# Patient Record
Sex: MALE | Race: WHITE | NOT HISPANIC OR LATINO | Employment: OTHER | URBAN - METROPOLITAN AREA
[De-identification: names, ages, dates, MRNs, and addresses within clinical notes are randomized per-mention and may not be internally consistent; named-entity substitution may affect disease eponyms.]

---

## 2017-11-09 ENCOUNTER — APPOINTMENT (OUTPATIENT)
Dept: GENERAL RADIOLOGY | Facility: HOSPITAL | Age: 72
End: 2017-11-09

## 2017-11-09 ENCOUNTER — HOSPITAL ENCOUNTER (EMERGENCY)
Facility: HOSPITAL | Age: 72
Discharge: HOME | End: 2017-11-09
Attending: EMERGENCY MEDICINE | Admitting: EMERGENCY MEDICINE

## 2017-11-09 VITALS
TEMPERATURE: 97.1 F | WEIGHT: 210 LBS | HEIGHT: 69 IN | HEART RATE: 82 BPM | SYSTOLIC BLOOD PRESSURE: 129 MMHG | OXYGEN SATURATION: 96 % | BODY MASS INDEX: 31.1 KG/M2 | DIASTOLIC BLOOD PRESSURE: 84 MMHG | RESPIRATION RATE: 20 BRPM

## 2017-11-09 DIAGNOSIS — S39.012A STRAIN OF LUMBAR REGION, INITIAL ENCOUNTER: Primary | ICD-10-CM

## 2017-11-09 PROCEDURE — 96374 THER/PROPH/DIAG INJ IV PUSH: CPT

## 2017-11-09 PROCEDURE — 99284 EMERGENCY DEPT VISIT MOD MDM: CPT

## 2017-11-09 PROCEDURE — 25010000002 MORPHINE PER 10 MG: Performed by: NURSE PRACTITIONER

## 2017-11-09 PROCEDURE — 72110 X-RAY EXAM L-2 SPINE 4/>VWS: CPT

## 2017-11-09 PROCEDURE — 25010000002 ONDANSETRON PER 1 MG: Performed by: NURSE PRACTITIONER

## 2017-11-09 PROCEDURE — 96375 TX/PRO/DX INJ NEW DRUG ADDON: CPT

## 2017-11-09 RX ORDER — HYDROCODONE BITARTRATE AND ACETAMINOPHEN 5; 325 MG/1; MG/1
1 TABLET ORAL EVERY 4 HOURS PRN
Qty: 12 TABLET | Refills: 0 | Status: SHIPPED | OUTPATIENT
Start: 2017-11-09 | End: 2017-12-15

## 2017-11-09 RX ORDER — MELATONIN
1000 EVERY MORNING
COMMUNITY

## 2017-11-09 RX ORDER — DIAZEPAM 5 MG/1
5 TABLET ORAL ONCE
Status: COMPLETED | OUTPATIENT
Start: 2017-11-09 | End: 2017-11-09

## 2017-11-09 RX ORDER — SODIUM CHLORIDE 0.9 % (FLUSH) 0.9 %
10 SYRINGE (ML) INJECTION AS NEEDED
Status: DISCONTINUED | OUTPATIENT
Start: 2017-11-09 | End: 2017-11-09 | Stop reason: HOSPADM

## 2017-11-09 RX ORDER — CHLORAL HYDRATE 500 MG
1000 CAPSULE ORAL 2 TIMES DAILY WITH MEALS
COMMUNITY

## 2017-11-09 RX ORDER — CLOPIDOGREL BISULFATE 75 MG/1
75 TABLET ORAL EVERY MORNING
COMMUNITY

## 2017-11-09 RX ORDER — ATORVASTATIN CALCIUM 20 MG/1
20 TABLET, FILM COATED ORAL NIGHTLY
COMMUNITY

## 2017-11-09 RX ORDER — METOPROLOL TARTRATE 50 MG/1
50 TABLET, FILM COATED ORAL 2 TIMES DAILY
COMMUNITY
End: 2017-12-15 | Stop reason: SDUPTHER

## 2017-11-09 RX ORDER — BACLOFEN 10 MG/1
10 TABLET ORAL 3 TIMES DAILY PRN
Qty: 15 TABLET | Refills: 0 | Status: SHIPPED | OUTPATIENT
Start: 2017-11-09 | End: 2017-12-15

## 2017-11-09 RX ORDER — ONDANSETRON 2 MG/ML
4 INJECTION INTRAMUSCULAR; INTRAVENOUS ONCE
Status: COMPLETED | OUTPATIENT
Start: 2017-11-09 | End: 2017-11-09

## 2017-11-09 RX ORDER — RAMIPRIL 2.5 MG/1
2.5 CAPSULE ORAL EVERY MORNING
COMMUNITY
End: 2022-04-15

## 2017-11-09 RX ADMIN — MORPHINE SULFATE 4 MG: 10 INJECTION, SOLUTION INTRAMUSCULAR; INTRAVENOUS at 15:54

## 2017-11-09 RX ADMIN — ONDANSETRON 4 MG: 2 INJECTION INTRAMUSCULAR; INTRAVENOUS at 15:53

## 2017-11-09 RX ADMIN — DIAZEPAM 5 MG: 5 TABLET ORAL at 15:38

## 2017-11-09 NOTE — ED PROVIDER NOTES
EMERGENCY DEPARTMENT ENCOUNTER    CHIEF COMPLAINT  Chief Complaint: back pain  History given by: patient  History limited by: N/A  Room Number: 05/05  PMD: Rigoberto Adhikari MD      HPI:  Pt is a 72 y.o. male who presents with intermittent right lower back pain that started about 3 weeks ago and has gradually worsened. It is exacerbated by movement and bending over. He denies recent injury or trauma, pain radiation to BLE, bladder dysfunction, bowel dysfunction, sensory loss, motor loss, saddle anesthesia, pain and difficulty with urination, abd pain, N/V/D, fevers, chest pain, and trouble breathing. He reports that he was seen by PMD for this on 10/30/17 and was prescribed steroid which he has taken without significant sx relief. He also reports that his PMD is currently in the process of referring him to orthopedic surgery. Past Medical History of prostate cancer (underwent prostatectomy, currently not on any treatment), HTN, and CAD (on plavix).     Duration: started about 3 weeks ago  Timing: intermittent   Location: right lower back  Radiation: none  Quality: pain  Intensity/Severity: moderate  Progression: worsening  Associated Symptoms: none  Aggravating Factors: movement, bending over  Alleviating Factors: none  Previous Episodes: none mentioned  Treatment before arrival: Pt reports that he has taken steroid for his right lower back pain without significant sx relief.     PAST MEDICAL HISTORY  Active Ambulatory Problems     Diagnosis Date Noted   • No Active Ambulatory Problems     Resolved Ambulatory Problems     Diagnosis Date Noted   • No Resolved Ambulatory Problems     Past Medical History:   Diagnosis Date   • Cancer    • Hypertension        PAST SURGICAL HISTORY  Past Surgical History:   Procedure Laterality Date   • APPENDECTOMY     • PROSTATECTOMY         FAMILY HISTORY  History reviewed. No pertinent family history.    SOCIAL HISTORY  Social History     Social History   • Marital status:       Spouse name: N/A   • Number of children: N/A   • Years of education: N/A     Occupational History   • Not on file.     Social History Main Topics   • Smoking status: Never Smoker   • Smokeless tobacco: Never Used   • Alcohol use No   • Drug use: No   • Sexual activity: Not on file     Other Topics Concern   • Not on file     Social History Narrative   • No narrative on file         ALLERGIES  Aspirin and Belladonna alkaloids-opium    REVIEW OF SYSTEMS  Review of Systems   Constitutional: Negative for chills and fever.   HENT: Negative for sore throat.    Respiratory: Negative for shortness of breath.    Cardiovascular: Negative for chest pain.   Gastrointestinal: Negative for abdominal pain, diarrhea, nausea and vomiting.        No bowel dysfunction   Genitourinary: Negative for difficulty urinating and dysuria.        No bladder dysfunction   Musculoskeletal: Positive for back pain (right lower back pain).   Skin: Negative for rash.   Neurological: Negative for weakness and numbness.        No saddle anesthesia   Psychiatric/Behavioral: The patient is not nervous/anxious.        PHYSICAL EXAM  ED Triage Vitals   Temp Heart Rate Resp BP SpO2   11/09/17 1210 11/09/17 1210 11/09/17 1219 11/09/17 1217 11/09/17 1210   97.1 °F (36.2 °C) 79 20 147/100 95 % WNL     Physical Exam   Constitutional: He is oriented to person, place, and time and well-developed, well-nourished, and in no distress. No distress.   HENT:   Head: Atraumatic.   Mouth/Throat: Mucous membranes are normal.   Eyes: No scleral icterus.   Neck: Normal range of motion.   Cardiovascular: Normal rate, regular rhythm and normal heart sounds.    Pulses:       Dorsalis pedis pulses are 2+ on the right side, and 2+ on the left side.        Posterior tibial pulses are 2+ on the right side, and 2+ on the left side.   Pulmonary/Chest: Effort normal and breath sounds normal. No respiratory distress.   Abdominal: Soft. There is no tenderness.    Musculoskeletal:   Right paraspinal lumbar tenderness, no midline l-spine tenderness, negative straight leg raises bilaterally, NV intact distally to BLE   Neurological: He is alert and oriented to person, place, and time. He has normal motor skills and normal sensation.   Reflex Scores:       Patellar reflexes are 2+ on the right side and 2+ on the left side.  Sensation and motor function intact to BLE   Skin: Skin is warm and dry.   Psychiatric: Mood and affect normal.   Nursing note and vitals reviewed.          RADIOLOGY         XR Spine Lumbar 4+ View (Final result) Result time: 11/09/17 15:32:35     Final result by Efrain Cooper MD (11/09/17 15:32:35)     Impression:        No acute fracture is identified. Degenerative changes. For further  evaluation, if clinically indicated, MRI can be considered.     This report was finalized on 11/9/2017 3:32 PM by Dr. Efrain Cooper MD.        Narrative:     XR SPINE LUMBAR 4+ VW-     INDICATIONS:     Low back pain, no injury     TECHNIQUE: 5 VIEWS OF THE LUMBAR SPINE     COMPARISON: None available     FINDINGS:      Alignment is preserved. Multilevel endplate spurring and mid to lower  lumbar facet arthropathy are seen. Vertebral body and disc space heights  are maintained. No acute fracture is identified. Arterial calcification  is present. Small calcifications at the level of the kidneys could be  material overlying bowel or potentially nephrolithiasis.          I ordered the above noted radiological studies and reviewed the images on the PACS system.          MEDICAL RECORD REVIEW  On 10/30/17, pt was seen by Dr Adhikari for chest pain and right hip pain. At the time, right hip and pelvis xray showed no acute process.         PROGRESS AND CONSULTS  2:54 PM- Ordered morphine, valium, and zofran for pain.   4:13 PM- Family is now at pt's bedside. Rechecked pt. He states that his pain has improved after ED treatments. He is resting comfortably and is in no  acute distress. Reviewed implications of results (including l-spine xray findings (degenerative changes of l-spine, no fracture)), diagnosis, meds, responsibility to follow up, warning signs and symptoms of possible worsening, potential complications and reasons to return to ER with patient.  Discussed all results and noted any abnormalities with patient. Discussed absolute need to recheck abnormalities and condition with PMD. Informed pt that sx could possibly be due to lumbar strain for which he will be prescribed short course of pain medication and muscle relaxer. Instructed pt to discuss with PMD about possibly arranging outpatient MRI l-spine for further evaluation of right lower back pain if sx persist. Advised pt to apply heat or ice to back with gentle stretching and to perform activity as tolerated.  Discussed plan for discharge, as there is no emergent indication for admission.  Pt is agreeable and understands need for follow up and repeat testing.  Pt is aware that discharge does not mean that nothing is wrong but it indicates no emergency is present.  Pt is discharged with instructions to follow up with primary care doctor to have their blood pressure rechecked.   4:20 PM- Reviewed pt's history and workup with Dr. Wright.  At bedside evaluation, they agree with the plan of care.  5:08 PM- Per RN, pt ambulated in ED without difficulty.       DIAGNOSIS  Final diagnoses:   Strain of lumbar region, initial encounter       FOLLOW UP   Rigoberto Adhikari MD  [address] [id number] [phone]    Call in 1 day        RX     Medication List      baclofen 10 MG tablet   Commonly known as:  LIORESAL   Take 1 tablet by mouth 3 (Three) Times a Day As Needed for Muscle Spasms.       HYDROcodone-acetaminophen 5-325 MG per tablet   Commonly known as:  NORCO   Take 1 tablet by mouth Every 4 (Four) Hours As Needed for Moderate Pain .           Be report [id number] reviewed.  Risks,  "benefits, alternatives discussed with patient.  Pt consents to treatment and agrees to follow up with PMD tomorrow for further care and any other prescriptions.         COURSE & MEDICAL DECISION MAKING  Pertinent Imaging studies that were ordered and reviewed are noted above.  Results were reviewed/discussed with the patient and they were also made aware of online assess.   Pt also made aware that some labs, such as cultures, will not be resulted during ER visit and follow up with PMD is necessary.     MEDICATIONS GIVEN IN ER  Medications   sodium chloride 0.9 % flush 10 mL (not administered)   morphine injection 4 mg (4 mg Intravenous Given 11/9/17 1554)   ondansetron (ZOFRAN) injection 4 mg (4 mg Intravenous Given 11/9/17 1553)   diazePAM (VALIUM) tablet 5 mg (5 mg Oral Given 11/9/17 1538)       /84  Pulse 79  Temp 97.1 °F (36.2 °C) (Tympanic)   Resp 20  Ht 69\" (175.3 cm)  Wt 210 lb (95.3 kg)  SpO2 94%  BMI 31.01 kg/m2      I personally reviewed the past medical history, past surgical history, social history, family history, current medications and allergies as they appear in this chart.  The scribe's note accurately reflects the work and decisions made by me.     Documentation assistance provided by german Benitez for CHARLES Monroy on 11/9/2017 at 5:02 PM. Information recorded by the scribe was done at my direction and has been verified and validated by me.     Martin Benitez  11/09/17 1709       IVONNE Donovan  11/15/17 0927    "

## 2017-11-09 NOTE — ED PROVIDER NOTES
The patient presents to the ED complaining of gradually worsening lower lumbar pain onset 3 weeks ago. Per spouse, the patient could not get out of bed today or dress himself. This prompted the couple to come to the ED for further evaluation. The patient denies radiating pain, bowel or bladder issues, injury to the area, history of back issues, or any other symptoms at this time. Patient completed a round of Prednisone last week. The patient's XR Spine shows no fracture, but some degenerative changes. Advised the patient and family further imaging (MRI) is not necessary at this time, possibly in the future.     Physical exam:  Patient is nontoxic appearing.  Lungs/cardiovascular: Normal  Abdomen: Soft and nontender.  Back/extremities: No vertebral tenderness. Some R lumbar perispinal tenderness.   Neuro: Normal sensation. No saddle anesthesia.     On examination, the patient's symptoms do not suggest signs of radiculopathy. Will treat symptomatically. I agree with the plan to discharge the patient home.     I supervised care provided by the midlevel provider.  We have discussed this patient's history, physical exam, and treatment plan.  I have reviewed the note and personally saw and examined the patient and agree with the plan of care.    Documentation assistance provided by german Jones for Dr. Wright.  Information recorded by the scribe was done at my direction and has been verified and validated by me.       Sho Jones  11/09/17 1638       Robert Wright MD  11/13/17 3548

## 2017-11-09 NOTE — DISCHARGE INSTRUCTIONS
Medications as ordered  Heat or ice to back with gentle stretching  Activity as tolerated  Follow up with pmd in 5-7 days for recheck  Return to er for numbness/tingling to legs, loss of bowel/bladder function, increased pain or any new or worsening symptoms

## 2017-11-09 NOTE — ED NOTES
Pt c/o low back pain x3 weeks that is worsening. Pt seen by PCP and given prednisone that did not help. Pt denies any radiation into the lower extremities.      Rosa Villaseñor RN  11/09/17 8437

## 2017-12-15 ENCOUNTER — APPOINTMENT (OUTPATIENT)
Dept: PREADMISSION TESTING | Facility: HOSPITAL | Age: 72
End: 2017-12-15

## 2017-12-15 VITALS
TEMPERATURE: 98.4 F | DIASTOLIC BLOOD PRESSURE: 84 MMHG | OXYGEN SATURATION: 100 % | WEIGHT: 206.8 LBS | HEIGHT: 69 IN | SYSTOLIC BLOOD PRESSURE: 154 MMHG | HEART RATE: 66 BPM | BODY MASS INDEX: 30.63 KG/M2 | RESPIRATION RATE: 16 BRPM

## 2017-12-15 LAB
ANION GAP SERPL CALCULATED.3IONS-SCNC: 11.8 MMOL/L
BACTERIA UR QL AUTO: ABNORMAL /HPF
BILIRUB UR QL STRIP: NEGATIVE
BUN BLD-MCNC: 17 MG/DL (ref 8–23)
BUN/CREAT SERPL: 17.7 (ref 7–25)
CALCIUM SPEC-SCNC: 9.7 MG/DL (ref 8.6–10.5)
CHLORIDE SERPL-SCNC: 104 MMOL/L (ref 98–107)
CLARITY UR: CLEAR
CO2 SERPL-SCNC: 29.2 MMOL/L (ref 22–29)
COLOR UR: YELLOW
CREAT BLD-MCNC: 0.96 MG/DL (ref 0.76–1.27)
DEPRECATED RDW RBC AUTO: 43 FL (ref 37–54)
ERYTHROCYTE [DISTWIDTH] IN BLOOD BY AUTOMATED COUNT: 12.4 % (ref 11.5–14.5)
GFR SERPL CREATININE-BSD FRML MDRD: 77 ML/MIN/1.73
GLUCOSE BLD-MCNC: 100 MG/DL (ref 65–99)
GLUCOSE UR STRIP-MCNC: NEGATIVE MG/DL
HCT VFR BLD AUTO: 39.1 % (ref 40.4–52.2)
HGB BLD-MCNC: 13 G/DL (ref 13.7–17.6)
HGB UR QL STRIP.AUTO: ABNORMAL
HYALINE CASTS UR QL AUTO: ABNORMAL /LPF
KETONES UR QL STRIP: NEGATIVE
LEUKOCYTE ESTERASE UR QL STRIP.AUTO: NEGATIVE
MCH RBC QN AUTO: 31.9 PG (ref 27–32.7)
MCHC RBC AUTO-ENTMCNC: 33.2 G/DL (ref 32.6–36.4)
MCV RBC AUTO: 96.1 FL (ref 79.8–96.2)
NITRITE UR QL STRIP: NEGATIVE
PH UR STRIP.AUTO: 6.5 [PH] (ref 5–8)
PLATELET # BLD AUTO: 144 10*3/MM3 (ref 140–500)
PMV BLD AUTO: 9.8 FL (ref 6–12)
POTASSIUM BLD-SCNC: 4.3 MMOL/L (ref 3.5–5.2)
PROT UR QL STRIP: NEGATIVE
RBC # BLD AUTO: 4.07 10*6/MM3 (ref 4.6–6)
RBC # UR: ABNORMAL /HPF
REF LAB TEST METHOD: ABNORMAL
SODIUM BLD-SCNC: 145 MMOL/L (ref 136–145)
SP GR UR STRIP: 1.02 (ref 1–1.03)
SQUAMOUS #/AREA URNS HPF: ABNORMAL /HPF
UROBILINOGEN UR QL STRIP: ABNORMAL
WBC NRBC COR # BLD: 5.59 10*3/MM3 (ref 4.5–10.7)
WBC UR QL AUTO: ABNORMAL /HPF

## 2017-12-15 PROCEDURE — 81001 URINALYSIS AUTO W/SCOPE: CPT | Performed by: UROLOGY

## 2017-12-15 PROCEDURE — 36415 COLL VENOUS BLD VENIPUNCTURE: CPT

## 2017-12-15 PROCEDURE — 80048 BASIC METABOLIC PNL TOTAL CA: CPT | Performed by: UROLOGY

## 2017-12-15 PROCEDURE — 85027 COMPLETE CBC AUTOMATED: CPT | Performed by: UROLOGY

## 2017-12-15 RX ORDER — METOPROLOL SUCCINATE 50 MG/1
50 TABLET, EXTENDED RELEASE ORAL NIGHTLY
COMMUNITY
End: 2021-12-27 | Stop reason: SDUPTHER

## 2017-12-15 NOTE — DISCHARGE INSTRUCTIONS
PLEASE ARRIVE AT 11:00 AM ON 12/29/2017        Take the following medications the morning of surgery with a small sip of water:        General Instructions:  • Do not eat or drink anything after midnight the night before surgery.  • Infants may have breast milk up to four hours before surgery.  • Infants drinking formula may drink formula up to six hours before surgery.   • Patients who avoid smoking, chewing tobacco and alcohol for 4 weeks prior to surgery have a reduced risk of post-operative complications.  Quit smoking as many days before surgery as you can.  • Do not smoke, use chewing tobacco or drink alcohol the day of surgery.   • If applicable bring your C-PAP/ BI-PAP machine.  • Bring any papers given to you in the doctor’s office.  • Wear clean comfortable clothes and socks.  • Do not wear contact lenses or make-up.  Bring a case for your glasses.   • Bring crutches or walker if applicable.  • Remove all piercings.  Leave jewelry and any other valuables at home.  • The Pre-Admission Testing nurse will instruct you to bring medications if unable to obtain an accurate list in Pre-Admission Testing.            Preventing a Surgical Site Infection:  • For 2 to 3 days before surgery, avoid shaving with a razor because the razor can irritate skin and make it easier to develop an infection.  • The night prior to surgery sleep in a clean bed with clean clothing.  Do not allow pets to sleep with you.  • Shower on the morning of surgery using a fresh bar of anti-bacterial soap (such as Dial) and clean washcloth.  Dry with a clean towel and dress in clean clothing.  • Ask your surgeon if you will be receiving antibiotics prior to surgery.  • Make sure you, your family, and all healthcare providers clean their hands with soap and water or an alcohol based hand  before caring for you or your wound.    Day of surgery:  Upon arrival, a Pre-op nurse and Anesthesiologist will review your health history, obtain  vital signs, and answer questions you may have.  The only belongings needed at this time will be your home medications and if applicable your C-PAP/BI-PAP machine.  If you are staying overnight your family can leave the rest of your belongings in the car and bring them to your room later.  A Pre-op nurse will start an IV and you may receive medication in preparation for surgery, including something to help you relax.  Your family will be able to see you in the Pre-op area.  While you are in surgery your family should notify the waiting room  if they leave the waiting room area and provide a contact phone number.    Please be aware that surgery does come with discomfort.  We want to make every effort to control your discomfort so please discuss any uncontrolled symptoms with your nurse.   Your doctor will most likely have prescribed pain medications.      If you are going home after surgery you will receive individualized written care instructions before being discharged.  A responsible adult must drive you to and from the hospital on the day of your surgery and stay with you for 24 hours.    If you are staying overnight following surgery, you will be transported to your hospital room following the recovery period.  [facility] has all private rooms.    If you have any questions please call Pre-Admission Testing at [phone].  Deductibles and co-payments are collected on the day of service. Please be prepared to pay the required co-pay, deductible or deposit on the day of service as defined by your plan.

## 2017-12-29 ENCOUNTER — ANESTHESIA (OUTPATIENT)
Dept: PERIOP | Facility: HOSPITAL | Age: 72
End: 2017-12-29

## 2017-12-29 ENCOUNTER — HOSPITAL ENCOUNTER (OUTPATIENT)
Facility: HOSPITAL | Age: 72
Setting detail: HOSPITAL OUTPATIENT SURGERY
Discharge: HOME | End: 2017-12-29
Attending: UROLOGY | Admitting: UROLOGY

## 2017-12-29 ENCOUNTER — ANESTHESIA EVENT (OUTPATIENT)
Dept: PERIOP | Facility: HOSPITAL | Age: 72
End: 2017-12-29

## 2017-12-29 VITALS
TEMPERATURE: 98 F | RESPIRATION RATE: 16 BRPM | SYSTOLIC BLOOD PRESSURE: 143 MMHG | DIASTOLIC BLOOD PRESSURE: 83 MMHG | HEART RATE: 68 BPM | OXYGEN SATURATION: 97 %

## 2017-12-29 DIAGNOSIS — N20.1 URETERAL CALCULUS: Primary | ICD-10-CM

## 2017-12-29 PROCEDURE — 25010000002 DEXAMETHASONE PER 1 MG: Performed by: NURSE ANESTHETIST, CERTIFIED REGISTERED

## 2017-12-29 PROCEDURE — 93010 ELECTROCARDIOGRAM REPORT: CPT | Performed by: INTERNAL MEDICINE

## 2017-12-29 PROCEDURE — C2617 STENT, NON-COR, TEM W/O DEL: HCPCS | Performed by: UROLOGY

## 2017-12-29 PROCEDURE — 25010000002 PROPOFOL 10 MG/ML EMULSION: Performed by: NURSE ANESTHETIST, CERTIFIED REGISTERED

## 2017-12-29 PROCEDURE — 25010000002 FENTANYL CITRATE (PF) 100 MCG/2ML SOLUTION: Performed by: NURSE ANESTHETIST, CERTIFIED REGISTERED

## 2017-12-29 PROCEDURE — 25010000003 CEFAZOLIN IN DEXTROSE 2-4 GM/100ML-% SOLUTION: Performed by: UROLOGY

## 2017-12-29 PROCEDURE — C1769 GUIDE WIRE: HCPCS | Performed by: UROLOGY

## 2017-12-29 PROCEDURE — 25010000002 ONDANSETRON PER 1 MG: Performed by: ANESTHESIOLOGY

## 2017-12-29 PROCEDURE — C1758 CATHETER, URETERAL: HCPCS | Performed by: UROLOGY

## 2017-12-29 PROCEDURE — 93005 ELECTROCARDIOGRAM TRACING: CPT | Performed by: ANESTHESIOLOGY

## 2017-12-29 PROCEDURE — 0 IOPAMIDOL PER 1 ML: Performed by: UROLOGY

## 2017-12-29 PROCEDURE — 25010000002 MIDAZOLAM PER 1 MG: Performed by: ANESTHESIOLOGY

## 2017-12-29 DEVICE — URETERAL STENT
Type: IMPLANTABLE DEVICE | Site: URETER | Status: FUNCTIONAL
Brand: POLARIS™ ULTRA

## 2017-12-29 RX ORDER — DEXAMETHASONE SODIUM PHOSPHATE 10 MG/ML
INJECTION INTRAMUSCULAR; INTRAVENOUS AS NEEDED
Status: DISCONTINUED | OUTPATIENT
Start: 2017-12-29 | End: 2017-12-29 | Stop reason: SURG

## 2017-12-29 RX ORDER — PROMETHAZINE HYDROCHLORIDE 25 MG/ML
12.5 INJECTION, SOLUTION INTRAMUSCULAR; INTRAVENOUS ONCE AS NEEDED
Status: DISCONTINUED | OUTPATIENT
Start: 2017-12-29 | End: 2017-12-29 | Stop reason: HOSPADM

## 2017-12-29 RX ORDER — CEFAZOLIN SODIUM 2 G/100ML
2 INJECTION, SOLUTION INTRAVENOUS ONCE
Status: COMPLETED | OUTPATIENT
Start: 2017-12-29 | End: 2017-12-29

## 2017-12-29 RX ORDER — OXYCODONE AND ACETAMINOPHEN 7.5; 325 MG/1; MG/1
1 TABLET ORAL ONCE AS NEEDED
Status: DISCONTINUED | OUTPATIENT
Start: 2017-12-29 | End: 2017-12-29 | Stop reason: HOSPADM

## 2017-12-29 RX ORDER — EPHEDRINE SULFATE 50 MG/ML
5 INJECTION, SOLUTION INTRAVENOUS ONCE AS NEEDED
Status: DISCONTINUED | OUTPATIENT
Start: 2017-12-29 | End: 2017-12-29 | Stop reason: HOSPADM

## 2017-12-29 RX ORDER — LABETALOL HYDROCHLORIDE 5 MG/ML
5 INJECTION, SOLUTION INTRAVENOUS
Status: DISCONTINUED | OUTPATIENT
Start: 2017-12-29 | End: 2017-12-29 | Stop reason: HOSPADM

## 2017-12-29 RX ORDER — PROMETHAZINE HYDROCHLORIDE 25 MG/1
25 TABLET ORAL ONCE AS NEEDED
Status: DISCONTINUED | OUTPATIENT
Start: 2017-12-29 | End: 2017-12-29 | Stop reason: HOSPADM

## 2017-12-29 RX ORDER — PROMETHAZINE HYDROCHLORIDE 25 MG/1
25 SUPPOSITORY RECTAL ONCE AS NEEDED
Status: DISCONTINUED | OUTPATIENT
Start: 2017-12-29 | End: 2017-12-29 | Stop reason: HOSPADM

## 2017-12-29 RX ORDER — HYDROCODONE BITARTRATE AND ACETAMINOPHEN 5; 325 MG/1; MG/1
1 TABLET ORAL EVERY 4 HOURS PRN
Qty: 30 TABLET | Refills: 0 | Status: SHIPPED | OUTPATIENT
Start: 2017-12-29 | End: 2018-12-26

## 2017-12-29 RX ORDER — PROPOFOL 10 MG/ML
VIAL (ML) INTRAVENOUS AS NEEDED
Status: DISCONTINUED | OUTPATIENT
Start: 2017-12-29 | End: 2017-12-29 | Stop reason: SURG

## 2017-12-29 RX ORDER — HYDRALAZINE HYDROCHLORIDE 20 MG/ML
5 INJECTION INTRAMUSCULAR; INTRAVENOUS
Status: DISCONTINUED | OUTPATIENT
Start: 2017-12-29 | End: 2017-12-29 | Stop reason: HOSPADM

## 2017-12-29 RX ORDER — EPHEDRINE SULFATE 50 MG/ML
INJECTION, SOLUTION INTRAVENOUS AS NEEDED
Status: DISCONTINUED | OUTPATIENT
Start: 2017-12-29 | End: 2017-12-29 | Stop reason: SURG

## 2017-12-29 RX ORDER — DIPHENHYDRAMINE HYDROCHLORIDE 50 MG/ML
12.5 INJECTION INTRAMUSCULAR; INTRAVENOUS
Status: DISCONTINUED | OUTPATIENT
Start: 2017-12-29 | End: 2017-12-29 | Stop reason: HOSPADM

## 2017-12-29 RX ORDER — ONDANSETRON 2 MG/ML
4 INJECTION INTRAMUSCULAR; INTRAVENOUS ONCE AS NEEDED
Status: COMPLETED | OUTPATIENT
Start: 2017-12-29 | End: 2017-12-29

## 2017-12-29 RX ORDER — FENTANYL CITRATE 50 UG/ML
50 INJECTION, SOLUTION INTRAMUSCULAR; INTRAVENOUS
Status: DISCONTINUED | OUTPATIENT
Start: 2017-12-29 | End: 2017-12-29 | Stop reason: HOSPADM

## 2017-12-29 RX ORDER — NALOXONE HCL 0.4 MG/ML
0.2 VIAL (ML) INJECTION AS NEEDED
Status: DISCONTINUED | OUTPATIENT
Start: 2017-12-29 | End: 2017-12-29 | Stop reason: HOSPADM

## 2017-12-29 RX ORDER — LIDOCAINE HYDROCHLORIDE 20 MG/ML
INJECTION, SOLUTION INFILTRATION; PERINEURAL AS NEEDED
Status: DISCONTINUED | OUTPATIENT
Start: 2017-12-29 | End: 2017-12-29 | Stop reason: SURG

## 2017-12-29 RX ORDER — MAGNESIUM HYDROXIDE 1200 MG/15ML
LIQUID ORAL AS NEEDED
Status: DISCONTINUED | OUTPATIENT
Start: 2017-12-29 | End: 2017-12-29 | Stop reason: HOSPADM

## 2017-12-29 RX ORDER — SODIUM CHLORIDE 0.9 % (FLUSH) 0.9 %
1-10 SYRINGE (ML) INJECTION AS NEEDED
Status: DISCONTINUED | OUTPATIENT
Start: 2017-12-29 | End: 2017-12-29 | Stop reason: HOSPADM

## 2017-12-29 RX ORDER — FAMOTIDINE 10 MG/ML
20 INJECTION, SOLUTION INTRAVENOUS ONCE
Status: COMPLETED | OUTPATIENT
Start: 2017-12-29 | End: 2017-12-29

## 2017-12-29 RX ORDER — PROMETHAZINE HYDROCHLORIDE 25 MG/1
12.5 TABLET ORAL ONCE AS NEEDED
Status: DISCONTINUED | OUTPATIENT
Start: 2017-12-29 | End: 2017-12-29 | Stop reason: HOSPADM

## 2017-12-29 RX ORDER — MIDAZOLAM HYDROCHLORIDE 1 MG/ML
2 INJECTION INTRAMUSCULAR; INTRAVENOUS
Status: DISCONTINUED | OUTPATIENT
Start: 2017-12-29 | End: 2017-12-29 | Stop reason: HOSPADM

## 2017-12-29 RX ORDER — SODIUM CHLORIDE, SODIUM LACTATE, POTASSIUM CHLORIDE, CALCIUM CHLORIDE 600; 310; 30; 20 MG/100ML; MG/100ML; MG/100ML; MG/100ML
9 INJECTION, SOLUTION INTRAVENOUS CONTINUOUS
Status: DISCONTINUED | OUTPATIENT
Start: 2017-12-29 | End: 2017-12-29 | Stop reason: HOSPADM

## 2017-12-29 RX ORDER — HYDROCODONE BITARTRATE AND ACETAMINOPHEN 7.5; 325 MG/1; MG/1
1 TABLET ORAL ONCE AS NEEDED
Status: COMPLETED | OUTPATIENT
Start: 2017-12-29 | End: 2017-12-29

## 2017-12-29 RX ORDER — MIDAZOLAM HYDROCHLORIDE 1 MG/ML
1 INJECTION INTRAMUSCULAR; INTRAVENOUS
Status: DISCONTINUED | OUTPATIENT
Start: 2017-12-29 | End: 2017-12-29 | Stop reason: HOSPADM

## 2017-12-29 RX ORDER — FLUMAZENIL 0.1 MG/ML
0.2 INJECTION INTRAVENOUS AS NEEDED
Status: DISCONTINUED | OUTPATIENT
Start: 2017-12-29 | End: 2017-12-29 | Stop reason: HOSPADM

## 2017-12-29 RX ORDER — FENTANYL CITRATE 50 UG/ML
INJECTION, SOLUTION INTRAMUSCULAR; INTRAVENOUS AS NEEDED
Status: DISCONTINUED | OUTPATIENT
Start: 2017-12-29 | End: 2017-12-29 | Stop reason: SURG

## 2017-12-29 RX ADMIN — FENTANYL CITRATE 25 MCG: 50 INJECTION INTRAMUSCULAR; INTRAVENOUS at 12:47

## 2017-12-29 RX ADMIN — DEXAMETHASONE SODIUM PHOSPHATE 4 MG: 10 INJECTION INTRAMUSCULAR; INTRAVENOUS at 13:30

## 2017-12-29 RX ADMIN — FAMOTIDINE 20 MG: 10 INJECTION, SOLUTION INTRAVENOUS at 12:24

## 2017-12-29 RX ADMIN — LIDOCAINE HYDROCHLORIDE 60 MG: 20 INJECTION, SOLUTION INFILTRATION; PERINEURAL at 12:42

## 2017-12-29 RX ADMIN — PROPOFOL 150 MG: 10 INJECTION, EMULSION INTRAVENOUS at 12:42

## 2017-12-29 RX ADMIN — SODIUM CHLORIDE, POTASSIUM CHLORIDE, SODIUM LACTATE AND CALCIUM CHLORIDE 9 ML/HR: 600; 310; 30; 20 INJECTION, SOLUTION INTRAVENOUS at 11:00

## 2017-12-29 RX ADMIN — ONDANSETRON HYDROCHLORIDE 4 MG: 2 SOLUTION INTRAMUSCULAR; INTRAVENOUS at 13:30

## 2017-12-29 RX ADMIN — Medication 2 MG: at 12:23

## 2017-12-29 RX ADMIN — HYDROCODONE BITARTRATE AND ACETAMINOPHEN 1 TABLET: 7.5; 325 TABLET ORAL at 14:24

## 2017-12-29 RX ADMIN — EPHEDRINE SULFATE 10 MG: 50 INJECTION INTRAMUSCULAR; INTRAVENOUS; SUBCUTANEOUS at 12:59

## 2017-12-29 RX ADMIN — CEFAZOLIN SODIUM 2 G: 2 INJECTION, SOLUTION INTRAVENOUS at 12:42

## 2017-12-29 RX ADMIN — SODIUM CHLORIDE, POTASSIUM CHLORIDE, SODIUM LACTATE AND CALCIUM CHLORIDE: 600; 310; 30; 20 INJECTION, SOLUTION INTRAVENOUS at 12:47

## 2017-12-29 RX ADMIN — FENTANYL CITRATE 25 MCG: 50 INJECTION INTRAMUSCULAR; INTRAVENOUS at 13:05

## 2017-12-29 NOTE — ANESTHESIA PREPROCEDURE EVALUATION
Anesthesia Evaluation     Patient summary reviewed   no history of anesthetic complications:  NPO Solid Status: > 8 hours  NPO Liquid Status: > 8 hours     Airway   Mallampati: I  TM distance: >3 FB  Neck ROM: full  no difficulty expected  Dental      Pulmonary     breath sounds clear to auscultation  (-) shortness of breath, not a smoker  Cardiovascular     Rhythm: regular  Rate: normal    (+) hypertension, angina (Stable angina x 15 yrs. with significant exertion. Followed by cardiology. ) with exertion, hyperlipidemia      Neuro/Psych  GI/Hepatic/Renal/Endo      Musculoskeletal     Abdominal    Substance History      OB/GYN          Other      history of cancer                                          Anesthesia Plan    ASA 3     general     intravenous induction   Anesthetic plan and risks discussed with patient.

## 2017-12-29 NOTE — ANESTHESIA POSTPROCEDURE EVALUATION
Patient: Loni Sanford    Procedure Summary     Date Anesthesia Start Anesthesia Stop Room / Location    12/29/17 1240 1349 [facility] OR [facility] / [facility]       Procedure Diagnosis Surgeon Provider    RIGHT EXTRACORPOREAL SHOCKWAVE LITHOTRIPSY CYSTOSCOPY STENT PLACEMENT (Right ) No diagnosis on file. MD Usama Gordon MD          Anesthesia Type: general  Last vitals  BP   143/83 (12/29/17 1530)   Temp   36.7 °C (98 °F) (12/29/17 1445)   Pulse   68 (12/29/17 1530)   Resp   16 (12/29/17 1530)     SpO2   97 % (12/29/17 1530)     Post Anesthesia Care and Evaluation    Patient location during evaluation: PHASE II  Patient participation: complete - patient participated  Level of consciousness: awake and alert  Pain management: adequate  Airway patency: patent  Anesthetic complications: No anesthetic complications    Cardiovascular status: acceptable  Respiratory status: acceptable  Hydration status: acceptable    Comments: /83  Pulse 68  Temp 36.7 °C (98 °F)  Resp 16  SpO2 97%

## 2017-12-29 NOTE — ANESTHESIA PROCEDURE NOTES
Airway  Urgency: elective    Date/Time: 12/29/2017 12:45 PM  Airway not difficult    General Information and Staff    Patient location during procedure: OR  Anesthesiologist: REYNALDO RUIZ  CRNA: MELVIN MADRID    Indications and Patient Condition  Indications for airway management: airway protection    Preoxygenated: yes  MILS not maintained throughout  Mask difficulty assessment: 1 - vent by mask    Final Airway Details  Final airway type: supraglottic airway      Successful airway: classic  Size 5    Number of attempts at approach: 1    Additional Comments  Preoxygenation FEO2 >85, SIVI, LMA placed with ease, teeth/lips as preop. Secured and placement confirmed.

## 2018-01-11 ENCOUNTER — TRANSCRIBE ORDERS (OUTPATIENT)
Dept: ADMINISTRATIVE | Facility: HOSPITAL | Age: 73
End: 2018-01-11

## 2018-01-11 DIAGNOSIS — N20.0 KIDNEY STONE: Primary | ICD-10-CM

## 2018-01-22 ENCOUNTER — HOSPITAL ENCOUNTER (OUTPATIENT)
Dept: ULTRASOUND IMAGING | Facility: HOSPITAL | Age: 73
Discharge: HOME | End: 2018-01-22
Attending: UROLOGY | Admitting: UROLOGY

## 2018-01-22 DIAGNOSIS — N20.0 KIDNEY STONE: ICD-10-CM

## 2018-01-22 PROCEDURE — 76775 US EXAM ABDO BACK WALL LIM: CPT

## 2018-03-29 ENCOUNTER — APPOINTMENT (OUTPATIENT)
Dept: PREADMISSION TESTING | Facility: HOSPITAL | Age: 73
End: 2018-03-29

## 2018-03-29 VITALS
DIASTOLIC BLOOD PRESSURE: 90 MMHG | TEMPERATURE: 98.7 F | BODY MASS INDEX: 31.1 KG/M2 | SYSTOLIC BLOOD PRESSURE: 146 MMHG | HEART RATE: 66 BPM | RESPIRATION RATE: 20 BRPM | HEIGHT: 69 IN | WEIGHT: 210 LBS | OXYGEN SATURATION: 100 %

## 2018-03-29 LAB
ANION GAP SERPL CALCULATED.3IONS-SCNC: 9.9 MMOL/L
BUN BLD-MCNC: 23 MG/DL (ref 8–23)
BUN/CREAT SERPL: 18.3 (ref 7–25)
CALCIUM SPEC-SCNC: 9.3 MG/DL (ref 8.6–10.5)
CHLORIDE SERPL-SCNC: 104 MMOL/L (ref 98–107)
CO2 SERPL-SCNC: 29.1 MMOL/L (ref 22–29)
CREAT BLD-MCNC: 1.26 MG/DL (ref 0.76–1.27)
DEPRECATED RDW RBC AUTO: 43.1 FL (ref 37–54)
ERYTHROCYTE [DISTWIDTH] IN BLOOD BY AUTOMATED COUNT: 12.5 % (ref 11.5–14.5)
GFR SERPL CREATININE-BSD FRML MDRD: 56 ML/MIN/1.73
GLUCOSE BLD-MCNC: 102 MG/DL (ref 65–99)
HCT VFR BLD AUTO: 37 % (ref 40.4–52.2)
HGB BLD-MCNC: 12.2 G/DL (ref 13.7–17.6)
MCH RBC QN AUTO: 31.4 PG (ref 27–32.7)
MCHC RBC AUTO-ENTMCNC: 33 G/DL (ref 32.6–36.4)
MCV RBC AUTO: 95.4 FL (ref 79.8–96.2)
PLATELET # BLD AUTO: 160 10*3/MM3 (ref 140–500)
PMV BLD AUTO: 9.8 FL (ref 6–12)
POTASSIUM BLD-SCNC: 4.2 MMOL/L (ref 3.5–5.2)
RBC # BLD AUTO: 3.88 10*6/MM3 (ref 4.6–6)
SODIUM BLD-SCNC: 143 MMOL/L (ref 136–145)
WBC NRBC COR # BLD: 5.29 10*3/MM3 (ref 4.5–10.7)

## 2018-03-29 PROCEDURE — 80048 BASIC METABOLIC PNL TOTAL CA: CPT | Performed by: UROLOGY

## 2018-03-29 PROCEDURE — 85027 COMPLETE CBC AUTOMATED: CPT | Performed by: UROLOGY

## 2018-03-29 PROCEDURE — 36415 COLL VENOUS BLD VENIPUNCTURE: CPT

## 2018-04-04 ENCOUNTER — ANESTHESIA EVENT (OUTPATIENT)
Dept: PERIOP | Facility: HOSPITAL | Age: 73
End: 2018-04-04

## 2018-04-05 ENCOUNTER — ANESTHESIA (OUTPATIENT)
Dept: PERIOP | Facility: HOSPITAL | Age: 73
End: 2018-04-05

## 2018-04-05 ENCOUNTER — HOSPITAL ENCOUNTER (INPATIENT)
Facility: HOSPITAL | Age: 73
LOS: 4 days | Discharge: HOME | End: 2018-04-09
Attending: UROLOGY | Admitting: UROLOGY

## 2018-04-05 DIAGNOSIS — N28.89 RENAL MASS, RIGHT: ICD-10-CM

## 2018-04-05 LAB
ABO GROUP BLD: NORMAL
BLD GP AB SCN SERPL QL: NEGATIVE
RH BLD: POSITIVE
T&S EXPIRATION DATE: NORMAL

## 2018-04-05 PROCEDURE — 82947 ASSAY GLUCOSE BLOOD QUANT: CPT

## 2018-04-05 PROCEDURE — 82803 BLOOD GASES ANY COMBINATION: CPT

## 2018-04-05 PROCEDURE — 25010000002 PROPOFOL 10 MG/ML EMULSION: Performed by: NURSE ANESTHETIST, CERTIFIED REGISTERED

## 2018-04-05 PROCEDURE — 86900 BLOOD TYPING SEROLOGIC ABO: CPT

## 2018-04-05 PROCEDURE — 25010000002 HYDROMORPHONE HCL PF 500 MG/50ML SOLUTION: Performed by: UROLOGY

## 2018-04-05 PROCEDURE — 25010000002 MANNITOL PER 50 ML: Performed by: NURSE ANESTHETIST, CERTIFIED REGISTERED

## 2018-04-05 PROCEDURE — 88307 TISSUE EXAM BY PATHOLOGIST: CPT | Performed by: UROLOGY

## 2018-04-05 PROCEDURE — 25010000002 FENTANYL CITRATE (PF) 100 MCG/2ML SOLUTION: Performed by: NURSE ANESTHETIST, CERTIFIED REGISTERED

## 2018-04-05 PROCEDURE — 86901 BLOOD TYPING SEROLOGIC RH(D): CPT | Performed by: ANESTHESIOLOGY

## 2018-04-05 PROCEDURE — 25010000002 MIDAZOLAM PER 1 MG

## 2018-04-05 PROCEDURE — 86920 COMPATIBILITY TEST SPIN: CPT

## 2018-04-05 PROCEDURE — 25010000002 ONDANSETRON PER 1 MG: Performed by: NURSE ANESTHETIST, CERTIFIED REGISTERED

## 2018-04-05 PROCEDURE — 25010000002 ALBUMIN HUMAN 5% PER 50 ML: Performed by: NURSE ANESTHETIST, CERTIFIED REGISTERED

## 2018-04-05 PROCEDURE — 36430 TRANSFUSION BLD/BLD COMPNT: CPT

## 2018-04-05 PROCEDURE — 25010000002 HYDROMORPHONE PER 4 MG: Performed by: NURSE ANESTHETIST, CERTIFIED REGISTERED

## 2018-04-05 PROCEDURE — 86901 BLOOD TYPING SEROLOGIC RH(D): CPT

## 2018-04-05 PROCEDURE — 25010000003 CEFAZOLIN IN DEXTROSE 2-4 GM/100ML-% SOLUTION: Performed by: UROLOGY

## 2018-04-05 PROCEDURE — P9041 ALBUMIN (HUMAN),5%, 50ML: HCPCS | Performed by: NURSE ANESTHETIST, CERTIFIED REGISTERED

## 2018-04-05 PROCEDURE — 25010000002 FUROSEMIDE PER 20 MG: Performed by: NURSE ANESTHETIST, CERTIFIED REGISTERED

## 2018-04-05 PROCEDURE — 86850 RBC ANTIBODY SCREEN: CPT | Performed by: ANESTHESIOLOGY

## 2018-04-05 PROCEDURE — 25010000002 DEXAMETHASONE PER 1 MG: Performed by: NURSE ANESTHETIST, CERTIFIED REGISTERED

## 2018-04-05 PROCEDURE — 94799 UNLISTED PULMONARY SVC/PX: CPT

## 2018-04-05 PROCEDURE — 86900 BLOOD TYPING SEROLOGIC ABO: CPT | Performed by: ANESTHESIOLOGY

## 2018-04-05 PROCEDURE — 0TB00ZZ EXCISION OF RIGHT KIDNEY, OPEN APPROACH: ICD-10-PCS | Performed by: UROLOGY

## 2018-04-05 PROCEDURE — 85018 HEMOGLOBIN: CPT

## 2018-04-05 PROCEDURE — 85014 HEMATOCRIT: CPT

## 2018-04-05 PROCEDURE — 25010000003 CEFAZOLIN 1 GM/50ML SOLUTION: Performed by: UROLOGY

## 2018-04-05 PROCEDURE — P9016 RBC LEUKOCYTES REDUCED: HCPCS

## 2018-04-05 RX ORDER — ONDANSETRON 2 MG/ML
INJECTION INTRAMUSCULAR; INTRAVENOUS AS NEEDED
Status: DISCONTINUED | OUTPATIENT
Start: 2018-04-05 | End: 2018-04-05 | Stop reason: SURG

## 2018-04-05 RX ORDER — EPHEDRINE SULFATE 50 MG/ML
INJECTION, SOLUTION INTRAVENOUS AS NEEDED
Status: DISCONTINUED | OUTPATIENT
Start: 2018-04-05 | End: 2018-04-05 | Stop reason: SURG

## 2018-04-05 RX ORDER — CEFAZOLIN SODIUM 2 G/100ML
2 INJECTION, SOLUTION INTRAVENOUS EVERY 8 HOURS
Status: COMPLETED | OUTPATIENT
Start: 2018-04-05 | End: 2018-04-06

## 2018-04-05 RX ORDER — HYDROMORPHONE HCL 110MG/55ML
0.5 PATIENT CONTROLLED ANALGESIA SYRINGE INTRAVENOUS
Status: DISCONTINUED | OUTPATIENT
Start: 2018-04-05 | End: 2018-04-05 | Stop reason: SDUPTHER

## 2018-04-05 RX ORDER — BUPIVACAINE HYDROCHLORIDE 2.5 MG/ML
INJECTION, SOLUTION EPIDURAL; INFILTRATION; INTRACAUDAL AS NEEDED
Status: DISCONTINUED | OUTPATIENT
Start: 2018-04-05 | End: 2018-04-05 | Stop reason: HOSPADM

## 2018-04-05 RX ORDER — DOCUSATE SODIUM 100 MG/1
100 CAPSULE, LIQUID FILLED ORAL 2 TIMES DAILY PRN
Status: DISCONTINUED | OUTPATIENT
Start: 2018-04-05 | End: 2018-04-09 | Stop reason: HOSPADM

## 2018-04-05 RX ORDER — LIDOCAINE HYDROCHLORIDE 10 MG/ML
0.5 INJECTION, SOLUTION EPIDURAL; INFILTRATION; INTRACAUDAL; PERINEURAL ONCE AS NEEDED
Status: DISCONTINUED | OUTPATIENT
Start: 2018-04-05 | End: 2018-04-05 | Stop reason: HOSPADM

## 2018-04-05 RX ORDER — ONDANSETRON 4 MG/1
4 TABLET, ORALLY DISINTEGRATING ORAL EVERY 6 HOURS PRN
Status: DISCONTINUED | OUTPATIENT
Start: 2018-04-05 | End: 2018-04-09 | Stop reason: HOSPADM

## 2018-04-05 RX ORDER — ROCURONIUM BROMIDE 10 MG/ML
INJECTION, SOLUTION INTRAVENOUS AS NEEDED
Status: DISCONTINUED | OUTPATIENT
Start: 2018-04-05 | End: 2018-04-05 | Stop reason: SURG

## 2018-04-05 RX ORDER — HYDROMORPHONE HCL 110MG/55ML
0.5 PATIENT CONTROLLED ANALGESIA SYRINGE INTRAVENOUS
Status: DISCONTINUED | OUTPATIENT
Start: 2018-04-05 | End: 2018-04-05

## 2018-04-05 RX ORDER — EPHEDRINE SULFATE 50 MG/ML
5 INJECTION, SOLUTION INTRAVENOUS ONCE AS NEEDED
Status: DISCONTINUED | OUTPATIENT
Start: 2018-04-05 | End: 2018-04-05

## 2018-04-05 RX ORDER — PROMETHAZINE HYDROCHLORIDE 25 MG/ML
12.5 INJECTION, SOLUTION INTRAMUSCULAR; INTRAVENOUS ONCE AS NEEDED
Status: DISCONTINUED | OUTPATIENT
Start: 2018-04-05 | End: 2018-04-05

## 2018-04-05 RX ORDER — PROPOFOL 10 MG/ML
VIAL (ML) INTRAVENOUS AS NEEDED
Status: DISCONTINUED | OUTPATIENT
Start: 2018-04-05 | End: 2018-04-05 | Stop reason: SURG

## 2018-04-05 RX ORDER — MANNITOL 250 MG/ML
INJECTION, SOLUTION INTRAVENOUS AS NEEDED
Status: DISCONTINUED | OUTPATIENT
Start: 2018-04-05 | End: 2018-04-05 | Stop reason: SURG

## 2018-04-05 RX ORDER — LIDOCAINE HYDROCHLORIDE 20 MG/ML
INJECTION, SOLUTION INFILTRATION; PERINEURAL AS NEEDED
Status: DISCONTINUED | OUTPATIENT
Start: 2018-04-05 | End: 2018-04-05 | Stop reason: SURG

## 2018-04-05 RX ORDER — ONDANSETRON 4 MG/1
4 TABLET, FILM COATED ORAL EVERY 6 HOURS PRN
Status: DISCONTINUED | OUTPATIENT
Start: 2018-04-05 | End: 2018-04-09 | Stop reason: HOSPADM

## 2018-04-05 RX ORDER — HYDROMORPHONE HCL 110MG/55ML
1 PATIENT CONTROLLED ANALGESIA SYRINGE INTRAVENOUS
Status: DISCONTINUED | OUTPATIENT
Start: 2018-04-05 | End: 2018-04-05

## 2018-04-05 RX ORDER — FLUMAZENIL 0.1 MG/ML
0.2 INJECTION INTRAVENOUS AS NEEDED
Status: DISCONTINUED | OUTPATIENT
Start: 2018-04-05 | End: 2018-04-05

## 2018-04-05 RX ORDER — FUROSEMIDE 10 MG/ML
INJECTION INTRAMUSCULAR; INTRAVENOUS AS NEEDED
Status: DISCONTINUED | OUTPATIENT
Start: 2018-04-05 | End: 2018-04-05 | Stop reason: SURG

## 2018-04-05 RX ORDER — SODIUM CHLORIDE, SODIUM LACTATE, POTASSIUM CHLORIDE, CALCIUM CHLORIDE 600; 310; 30; 20 MG/100ML; MG/100ML; MG/100ML; MG/100ML
9 INJECTION, SOLUTION INTRAVENOUS CONTINUOUS
Status: DISCONTINUED | OUTPATIENT
Start: 2018-04-05 | End: 2018-04-05

## 2018-04-05 RX ORDER — HYDRALAZINE HYDROCHLORIDE 20 MG/ML
5 INJECTION INTRAMUSCULAR; INTRAVENOUS
Status: DISCONTINUED | OUTPATIENT
Start: 2018-04-05 | End: 2018-04-05

## 2018-04-05 RX ORDER — METOPROLOL SUCCINATE 50 MG/1
50 TABLET, EXTENDED RELEASE ORAL NIGHTLY
Status: DISCONTINUED | OUTPATIENT
Start: 2018-04-05 | End: 2018-04-09 | Stop reason: HOSPADM

## 2018-04-05 RX ORDER — HYDROMORPHONE HYDROCHLORIDE 1 MG/ML
0.5 INJECTION, SOLUTION INTRAMUSCULAR; INTRAVENOUS; SUBCUTANEOUS
Status: DISCONTINUED | OUTPATIENT
Start: 2018-04-05 | End: 2018-04-07

## 2018-04-05 RX ORDER — ONDANSETRON 2 MG/ML
4 INJECTION INTRAMUSCULAR; INTRAVENOUS EVERY 6 HOURS PRN
Status: DISCONTINUED | OUTPATIENT
Start: 2018-04-05 | End: 2018-04-09 | Stop reason: HOSPADM

## 2018-04-05 RX ORDER — RAMIPRIL 2.5 MG/1
2.5 CAPSULE ORAL EVERY MORNING
Status: DISCONTINUED | OUTPATIENT
Start: 2018-04-06 | End: 2018-04-09 | Stop reason: HOSPADM

## 2018-04-05 RX ORDER — FAMOTIDINE 10 MG/ML
INJECTION, SOLUTION INTRAVENOUS
Status: COMPLETED
Start: 2018-04-05 | End: 2018-04-05

## 2018-04-05 RX ORDER — DIPHENHYDRAMINE HYDROCHLORIDE 50 MG/ML
12.5 INJECTION INTRAMUSCULAR; INTRAVENOUS
Status: DISCONTINUED | OUTPATIENT
Start: 2018-04-05 | End: 2018-04-05

## 2018-04-05 RX ORDER — MAGNESIUM HYDROXIDE 1200 MG/15ML
LIQUID ORAL AS NEEDED
Status: DISCONTINUED | OUTPATIENT
Start: 2018-04-05 | End: 2018-04-05 | Stop reason: HOSPADM

## 2018-04-05 RX ORDER — PROMETHAZINE HYDROCHLORIDE 25 MG/1
12.5 TABLET ORAL ONCE AS NEEDED
Status: DISCONTINUED | OUTPATIENT
Start: 2018-04-05 | End: 2018-04-05

## 2018-04-05 RX ORDER — PROMETHAZINE HYDROCHLORIDE 25 MG/1
25 SUPPOSITORY RECTAL ONCE AS NEEDED
Status: DISCONTINUED | OUTPATIENT
Start: 2018-04-05 | End: 2018-04-05

## 2018-04-05 RX ORDER — FENTANYL CITRATE 50 UG/ML
INJECTION, SOLUTION INTRAMUSCULAR; INTRAVENOUS AS NEEDED
Status: DISCONTINUED | OUTPATIENT
Start: 2018-04-05 | End: 2018-04-05 | Stop reason: SURG

## 2018-04-05 RX ORDER — FENTANYL CITRATE 50 UG/ML
50 INJECTION, SOLUTION INTRAMUSCULAR; INTRAVENOUS
Status: DISCONTINUED | OUTPATIENT
Start: 2018-04-05 | End: 2018-04-05 | Stop reason: HOSPADM

## 2018-04-05 RX ORDER — SODIUM CHLORIDE 9 MG/ML
INJECTION, SOLUTION INTRAVENOUS CONTINUOUS PRN
Status: DISCONTINUED | OUTPATIENT
Start: 2018-04-05 | End: 2018-04-05 | Stop reason: SURG

## 2018-04-05 RX ORDER — SODIUM CHLORIDE, SODIUM LACTATE, POTASSIUM CHLORIDE, CALCIUM CHLORIDE 600; 310; 30; 20 MG/100ML; MG/100ML; MG/100ML; MG/100ML
75 INJECTION, SOLUTION INTRAVENOUS CONTINUOUS
Status: DISCONTINUED | OUTPATIENT
Start: 2018-04-05 | End: 2018-04-09 | Stop reason: HOSPADM

## 2018-04-05 RX ORDER — HYDROCODONE BITARTRATE AND ACETAMINOPHEN 5; 325 MG/1; MG/1
1 TABLET ORAL EVERY 4 HOURS PRN
Status: DISCONTINUED | OUTPATIENT
Start: 2018-04-05 | End: 2018-04-09 | Stop reason: HOSPADM

## 2018-04-05 RX ORDER — ALBUMIN, HUMAN INJ 5% 5 %
SOLUTION INTRAVENOUS CONTINUOUS PRN
Status: DISCONTINUED | OUTPATIENT
Start: 2018-04-05 | End: 2018-04-05 | Stop reason: SURG

## 2018-04-05 RX ORDER — HYDROMORPHONE HCL 110MG/55ML
PATIENT CONTROLLED ANALGESIA SYRINGE INTRAVENOUS AS NEEDED
Status: DISCONTINUED | OUTPATIENT
Start: 2018-04-05 | End: 2018-04-05 | Stop reason: SURG

## 2018-04-05 RX ORDER — DEXAMETHASONE SODIUM PHOSPHATE 10 MG/ML
INJECTION INTRAMUSCULAR; INTRAVENOUS AS NEEDED
Status: DISCONTINUED | OUTPATIENT
Start: 2018-04-05 | End: 2018-04-05 | Stop reason: SURG

## 2018-04-05 RX ORDER — SODIUM CHLORIDE 0.9 % (FLUSH) 0.9 %
1-10 SYRINGE (ML) INJECTION AS NEEDED
Status: DISCONTINUED | OUTPATIENT
Start: 2018-04-05 | End: 2018-04-05 | Stop reason: HOSPADM

## 2018-04-05 RX ORDER — MIDAZOLAM HYDROCHLORIDE 1 MG/ML
1 INJECTION INTRAMUSCULAR; INTRAVENOUS
Status: DISCONTINUED | OUTPATIENT
Start: 2018-04-05 | End: 2018-04-05 | Stop reason: HOSPADM

## 2018-04-05 RX ORDER — LABETALOL HYDROCHLORIDE 5 MG/ML
5 INJECTION, SOLUTION INTRAVENOUS
Status: DISCONTINUED | OUTPATIENT
Start: 2018-04-05 | End: 2018-04-05

## 2018-04-05 RX ORDER — ONDANSETRON 2 MG/ML
4 INJECTION INTRAMUSCULAR; INTRAVENOUS ONCE AS NEEDED
Status: DISCONTINUED | OUTPATIENT
Start: 2018-04-05 | End: 2018-04-05

## 2018-04-05 RX ORDER — NALOXONE HCL 0.4 MG/ML
0.2 VIAL (ML) INJECTION AS NEEDED
Status: DISCONTINUED | OUTPATIENT
Start: 2018-04-05 | End: 2018-04-05

## 2018-04-05 RX ORDER — FAMOTIDINE 10 MG/ML
20 INJECTION, SOLUTION INTRAVENOUS ONCE
Status: COMPLETED | OUTPATIENT
Start: 2018-04-05 | End: 2018-04-05

## 2018-04-05 RX ORDER — MIDAZOLAM HYDROCHLORIDE 1 MG/ML
2 INJECTION INTRAMUSCULAR; INTRAVENOUS
Status: DISCONTINUED | OUTPATIENT
Start: 2018-04-05 | End: 2018-04-05 | Stop reason: HOSPADM

## 2018-04-05 RX ORDER — PROMETHAZINE HYDROCHLORIDE 25 MG/1
25 TABLET ORAL ONCE AS NEEDED
Status: DISCONTINUED | OUTPATIENT
Start: 2018-04-05 | End: 2018-04-05

## 2018-04-05 RX ORDER — MIDAZOLAM HYDROCHLORIDE 1 MG/ML
INJECTION INTRAMUSCULAR; INTRAVENOUS
Status: COMPLETED
Start: 2018-04-05 | End: 2018-04-05

## 2018-04-05 RX ORDER — FENTANYL CITRATE 50 UG/ML
50 INJECTION, SOLUTION INTRAMUSCULAR; INTRAVENOUS
Status: DISCONTINUED | OUTPATIENT
Start: 2018-04-05 | End: 2018-04-05

## 2018-04-05 RX ADMIN — FAMOTIDINE 20 MG: 10 INJECTION INTRAVENOUS at 08:32

## 2018-04-05 RX ADMIN — ROCURONIUM BROMIDE 20 MG: 10 INJECTION INTRAVENOUS at 10:25

## 2018-04-05 RX ADMIN — HYDROMORPHONE HYDROCHLORIDE 0.5 MG: 10 INJECTION INTRAMUSCULAR; INTRAVENOUS; SUBCUTANEOUS at 21:14

## 2018-04-05 RX ADMIN — SUGAMMADEX 381 MG: 100 INJECTION, SOLUTION INTRAVENOUS at 14:03

## 2018-04-05 RX ADMIN — ROCURONIUM BROMIDE 20 MG: 10 INJECTION INTRAVENOUS at 11:18

## 2018-04-05 RX ADMIN — FENTANYL CITRATE 100 MCG: 50 INJECTION, SOLUTION INTRAMUSCULAR; INTRAVENOUS at 08:44

## 2018-04-05 RX ADMIN — ROCURONIUM BROMIDE 30 MG: 10 INJECTION INTRAVENOUS at 12:48

## 2018-04-05 RX ADMIN — DEXAMETHASONE SODIUM PHOSPHATE 8 MG: 10 INJECTION INTRAMUSCULAR; INTRAVENOUS at 12:06

## 2018-04-05 RX ADMIN — CEFAZOLIN SODIUM 2 G: 2 INJECTION, SOLUTION INTRAVENOUS at 18:31

## 2018-04-05 RX ADMIN — MIDAZOLAM 1 MG: 1 INJECTION INTRAMUSCULAR; INTRAVENOUS at 08:32

## 2018-04-05 RX ADMIN — EPHEDRINE SULFATE 10 MG: 50 INJECTION INTRAMUSCULAR; INTRAVENOUS; SUBCUTANEOUS at 08:55

## 2018-04-05 RX ADMIN — MIDAZOLAM HYDROCHLORIDE 1 MG: 1 INJECTION INTRAMUSCULAR; INTRAVENOUS at 08:32

## 2018-04-05 RX ADMIN — EPHEDRINE SULFATE 10 MG: 50 INJECTION INTRAMUSCULAR; INTRAVENOUS; SUBCUTANEOUS at 11:15

## 2018-04-05 RX ADMIN — ROCURONIUM BROMIDE 10 MG: 10 INJECTION INTRAVENOUS at 12:24

## 2018-04-05 RX ADMIN — SODIUM CHLORIDE, POTASSIUM CHLORIDE, SODIUM LACTATE AND CALCIUM CHLORIDE 150 ML/HR: 600; 310; 30; 20 INJECTION, SOLUTION INTRAVENOUS at 15:45

## 2018-04-05 RX ADMIN — LIDOCAINE HYDROCHLORIDE 100 MG: 20 INJECTION, SOLUTION INFILTRATION; PERINEURAL at 08:44

## 2018-04-05 RX ADMIN — CEFAZOLIN SODIUM 1 G: 1 INJECTION, SOLUTION INTRAVENOUS at 08:51

## 2018-04-05 RX ADMIN — EPHEDRINE SULFATE 10 MG: 50 INJECTION INTRAMUSCULAR; INTRAVENOUS; SUBCUTANEOUS at 09:11

## 2018-04-05 RX ADMIN — PROPOFOL 200 MG: 10 INJECTION, EMULSION INTRAVENOUS at 08:44

## 2018-04-05 RX ADMIN — FAMOTIDINE 20 MG: 10 INJECTION, SOLUTION INTRAVENOUS at 08:32

## 2018-04-05 RX ADMIN — ONDANSETRON 4 MG: 2 INJECTION INTRAMUSCULAR; INTRAVENOUS at 12:06

## 2018-04-05 RX ADMIN — EPHEDRINE SULFATE 10 MG: 50 INJECTION INTRAMUSCULAR; INTRAVENOUS; SUBCUTANEOUS at 13:14

## 2018-04-05 RX ADMIN — SODIUM CHLORIDE, POTASSIUM CHLORIDE, SODIUM LACTATE AND CALCIUM CHLORIDE: 600; 310; 30; 20 INJECTION, SOLUTION INTRAVENOUS at 09:57

## 2018-04-05 RX ADMIN — SODIUM CHLORIDE, POTASSIUM CHLORIDE, SODIUM LACTATE AND CALCIUM CHLORIDE: 600; 310; 30; 20 INJECTION, SOLUTION INTRAVENOUS at 14:09

## 2018-04-05 RX ADMIN — MANNITOL 12500 MG: 12.5 INJECTION, SOLUTION INTRAVENOUS at 12:30

## 2018-04-05 RX ADMIN — SODIUM CHLORIDE, POTASSIUM CHLORIDE, SODIUM LACTATE AND CALCIUM CHLORIDE 150 ML/HR: 600; 310; 30; 20 INJECTION, SOLUTION INTRAVENOUS at 22:58

## 2018-04-05 RX ADMIN — HYDROMORPHONE HYDROCHLORIDE 0.5 MG: 10 INJECTION INTRAMUSCULAR; INTRAVENOUS; SUBCUTANEOUS at 18:32

## 2018-04-05 RX ADMIN — EPHEDRINE SULFATE 10 MG: 50 INJECTION INTRAMUSCULAR; INTRAVENOUS; SUBCUTANEOUS at 11:08

## 2018-04-05 RX ADMIN — HYDROMORPHONE HYDROCHLORIDE 0.5 MG: 2 INJECTION INTRAMUSCULAR; INTRAVENOUS; SUBCUTANEOUS at 10:54

## 2018-04-05 RX ADMIN — ALBUMIN (HUMAN): 0.05 SOLUTION INTRAVENOUS at 11:18

## 2018-04-05 RX ADMIN — FENTANYL CITRATE 50 MCG: 50 INJECTION, SOLUTION INTRAMUSCULAR; INTRAVENOUS at 09:41

## 2018-04-05 RX ADMIN — HYDROMORPHONE HYDROCHLORIDE 0.5 MG: 2 INJECTION INTRAMUSCULAR; INTRAVENOUS; SUBCUTANEOUS at 14:00

## 2018-04-05 RX ADMIN — ROCURONIUM BROMIDE 10 MG: 10 INJECTION INTRAVENOUS at 12:20

## 2018-04-05 RX ADMIN — SODIUM CHLORIDE, POTASSIUM CHLORIDE, SODIUM LACTATE AND CALCIUM CHLORIDE: 600; 310; 30; 20 INJECTION, SOLUTION INTRAVENOUS at 09:17

## 2018-04-05 RX ADMIN — FUROSEMIDE 20 MG: 10 INJECTION, SOLUTION INTRAMUSCULAR; INTRAVENOUS at 12:33

## 2018-04-05 RX ADMIN — EPHEDRINE SULFATE 10 MG: 50 INJECTION INTRAMUSCULAR; INTRAVENOUS; SUBCUTANEOUS at 12:15

## 2018-04-05 RX ADMIN — ROCURONIUM BROMIDE 50 MG: 10 INJECTION INTRAVENOUS at 08:44

## 2018-04-05 RX ADMIN — SODIUM CHLORIDE, POTASSIUM CHLORIDE, SODIUM LACTATE AND CALCIUM CHLORIDE 9 ML/HR: 600; 310; 30; 20 INJECTION, SOLUTION INTRAVENOUS at 08:33

## 2018-04-05 RX ADMIN — FENTANYL CITRATE 100 MCG: 50 INJECTION, SOLUTION INTRAMUSCULAR; INTRAVENOUS at 09:32

## 2018-04-05 RX ADMIN — ROCURONIUM BROMIDE 30 MG: 10 INJECTION INTRAVENOUS at 09:51

## 2018-04-05 RX ADMIN — SODIUM CHLORIDE: 9 INJECTION, SOLUTION INTRAVENOUS at 12:26

## 2018-04-05 NOTE — ANESTHESIA POSTPROCEDURE EVALUATION
Patient: Loni Sanford    Procedure Summary     Date:  04/05/18 Room / Location:  [facility] OR [address] [place] [address] MAIN OR    Anesthesia Start:  0842 Anesthesia Stop:  1431    Procedure:  RT OPEN PARTIAL NEPHRECTOMY WITH INTRAOPERATIVE DOPPLER, DECORTICATION OF RIGHT RENAL CYST (Right ) Diagnosis:      Surgeon:  Marcelo Suarez MD Provider:  Jeanie Green MD    Anesthesia Type:  general ASA Status:  3          Anesthesia Type: general  Last vitals  BP   164/94 (04/05/18 1545)   Temp   36.6 °C (97.8 °F) (04/05/18 1545)   Pulse   68 (04/05/18 1545)   Resp   16 (04/05/18 1545)     SpO2   95 % (04/05/18 1545)     Post Anesthesia Care and Evaluation    Patient location during evaluation: PACU  Patient participation: complete - patient participated  Level of consciousness: awake and alert  Pain management: adequate  Airway patency: patent  Anesthetic complications: No anesthetic complications  PONV Status: none  Cardiovascular status: acceptable  Respiratory status: acceptable  Hydration status: acceptable

## 2018-04-05 NOTE — ANESTHESIA PREPROCEDURE EVALUATION
Anesthesia Evaluation     Patient summary reviewed   NPO Solid Status: > 8 hours  NPO Liquid Status: > 8 hours           Airway   Mallampati: II  TM distance: >3 FB  Dental      Pulmonary    Cardiovascular     Rhythm: regular  Rate: normal    (+) hypertension, hyperlipidemia,       Neuro/Psych  (+) dizziness/light headedness,     GI/Hepatic/Renal/Endo    (+)   renal disease stones,     Musculoskeletal     Abdominal    Substance History      OB/GYN          Other      history of cancer active                    Anesthesia Plan    ASA 3     general     intravenous induction   Anesthetic plan and risks discussed with patient.    Plan discussed with CRNA.

## 2018-04-05 NOTE — ANESTHESIA PROCEDURE NOTES
Airway  Urgency: elective    Airway not difficult    General Information and Staff    Patient location during procedure: OR  Anesthesiologist: ROBIN SIERRA  CRNA: ROSS GARCES    Indications and Patient Condition  Indications for airway management: airway protection    Preoxygenated: yes  Mask difficulty assessment: 2 - vent by mask + OA or adjuvant +/- NMBA    Final Airway Details  Final airway type: endotracheal airway      Successful airway: ETT  Cuffed: yes   Successful intubation technique: direct laryngoscopy  Endotracheal tube insertion site: oral  Blade: Landeros  Blade size: #2  ETT size: 8.0 mm  Cormack-Lehane Classification: grade I - full view of glottis  Placement verified by: chest auscultation and capnometry   Cuff volume (mL): 8  Number of attempts at approach: 1    Additional Comments  PreO2 with 100% O2;  FeO2 >85%;  sniff position; easy mask ventilation;  Intubated with no difficultly after eyes taped; Appears atraumatic;  Lips and teeth intact as preop condition;  Airway secured. Connected to ventilator.

## 2018-04-06 ENCOUNTER — APPOINTMENT (OUTPATIENT)
Dept: GENERAL RADIOLOGY | Facility: HOSPITAL | Age: 73
End: 2018-04-06
Attending: UROLOGY

## 2018-04-06 LAB
ABO + RH BLD: NORMAL
ABO + RH BLD: NORMAL
ANION GAP SERPL CALCULATED.3IONS-SCNC: 10.6 MMOL/L
ANION GAP SERPL CALCULATED.3IONS-SCNC: 9.2 MMOL/L
BASE EXCESS BLDA CALC-SCNC: -3 MMOL/L (ref -5–5)
BASE EXCESS BLDA CALC-SCNC: 3 MMOL/L (ref -5–5)
BH BB BLOOD EXPIRATION DATE: NORMAL
BH BB BLOOD EXPIRATION DATE: NORMAL
BH BB BLOOD TYPE BARCODE: 5100
BH BB BLOOD TYPE BARCODE: 5100
BH BB DISPENSE STATUS: NORMAL
BH BB DISPENSE STATUS: NORMAL
BH BB PRODUCT CODE: NORMAL
BH BB PRODUCT CODE: NORMAL
BH BB UNIT NUMBER: NORMAL
BH BB UNIT NUMBER: NORMAL
BUN BLD-MCNC: 19 MG/DL (ref 8–23)
BUN BLD-MCNC: 22 MG/DL (ref 8–23)
BUN/CREAT SERPL: 17.3 (ref 7–25)
BUN/CREAT SERPL: 17.6 (ref 7–25)
CALCIUM SPEC-SCNC: 7.7 MG/DL (ref 8.6–10.5)
CALCIUM SPEC-SCNC: 8.1 MG/DL (ref 8.6–10.5)
CHLORIDE SERPL-SCNC: 100 MMOL/L (ref 98–107)
CHLORIDE SERPL-SCNC: 100 MMOL/L (ref 98–107)
CO2 BLDA-SCNC: 24 MMOL/L (ref 24–29)
CO2 BLDA-SCNC: 30 MMOL/L (ref 24–29)
CO2 SERPL-SCNC: 27.4 MMOL/L (ref 22–29)
CO2 SERPL-SCNC: 28.8 MMOL/L (ref 22–29)
CREAT BLD-MCNC: 1.1 MG/DL (ref 0.76–1.27)
CREAT BLD-MCNC: 1.25 MG/DL (ref 0.76–1.27)
CROSSMATCH INTERPRETATION: NORMAL
CROSSMATCH INTERPRETATION: NORMAL
CYTO UR: NORMAL
DEPRECATED RDW RBC AUTO: 50.3 FL (ref 37–54)
ERYTHROCYTE [DISTWIDTH] IN BLOOD BY AUTOMATED COUNT: 14.6 % (ref 11.5–14.5)
GFR SERPL CREATININE-BSD FRML MDRD: 57 ML/MIN/1.73
GFR SERPL CREATININE-BSD FRML MDRD: 66 ML/MIN/1.73
GLUCOSE BLD-MCNC: 115 MG/DL (ref 65–99)
GLUCOSE BLD-MCNC: 161 MG/DL (ref 65–99)
GLUCOSE BLDC GLUCOMTR-MCNC: 126 MG/DL (ref 70–130)
GLUCOSE BLDC GLUCOMTR-MCNC: 135 MG/DL (ref 70–130)
HCO3 BLDA-SCNC: 22.5 MMOL/L (ref 22–26)
HCO3 BLDA-SCNC: 28.5 MMOL/L (ref 22–26)
HCT VFR BLD AUTO: 35 % (ref 40.4–52.2)
HCT VFR BLDA CALC: 26 % (ref 38–51)
HCT VFR BLDA CALC: 29 % (ref 38–51)
HGB BLD-MCNC: 11.5 G/DL (ref 13.7–17.6)
HGB BLDA-MCNC: 8.8 G/DL (ref 12–17)
HGB BLDA-MCNC: 9.9 G/DL (ref 12–17)
LAB AP CASE REPORT: NORMAL
Lab: NORMAL
MCH RBC QN AUTO: 30.9 PG (ref 27–32.7)
MCHC RBC AUTO-ENTMCNC: 32.9 G/DL (ref 32.6–36.4)
MCV RBC AUTO: 94.1 FL (ref 79.8–96.2)
PATH REPORT.FINAL DX SPEC: NORMAL
PATH REPORT.GROSS SPEC: NORMAL
PCO2 BLDA: 42.1 MM HG (ref 35–45)
PCO2 BLDA: 51.5 MM HG (ref 35–45)
PH BLDA: 7.34 PH UNITS (ref 7.35–7.6)
PH BLDA: 7.35 PH UNITS (ref 7.35–7.6)
PLATELET # BLD AUTO: 103 10*3/MM3 (ref 140–500)
PMV BLD AUTO: 9.9 FL (ref 6–12)
PO2 BLDA: 67 MMHG (ref 80–105)
PO2 BLDA: 84 MMHG (ref 80–105)
POTASSIUM BLD-SCNC: 3.9 MMOL/L (ref 3.5–5.2)
POTASSIUM BLD-SCNC: 4 MMOL/L (ref 3.5–5.2)
POTASSIUM BLDA-SCNC: 3 MMOL/L (ref 3.5–4.9)
POTASSIUM BLDA-SCNC: 3.9 MMOL/L (ref 3.5–4.9)
RBC # BLD AUTO: 3.72 10*6/MM3 (ref 4.6–6)
SAO2 % BLDA: 92 % (ref 95–98)
SAO2 % BLDA: 96 % (ref 95–98)
SODIUM BLD-SCNC: 138 MMOL/L (ref 136–145)
SODIUM BLD-SCNC: 138 MMOL/L (ref 136–145)
UNIT  ABO: NORMAL
UNIT  ABO: NORMAL
UNIT  RH: NORMAL
UNIT  RH: NORMAL
WBC NRBC COR # BLD: 8.44 10*3/MM3 (ref 4.5–10.7)

## 2018-04-06 PROCEDURE — 71045 X-RAY EXAM CHEST 1 VIEW: CPT

## 2018-04-06 PROCEDURE — 25010000002 ONDANSETRON PER 1 MG: Performed by: UROLOGY

## 2018-04-06 PROCEDURE — 86901 BLOOD TYPING SEROLOGIC RH(D): CPT

## 2018-04-06 PROCEDURE — 86900 BLOOD TYPING SEROLOGIC ABO: CPT

## 2018-04-06 PROCEDURE — 25010000002 HYDROMORPHONE HCL PF 500 MG/50ML SOLUTION: Performed by: UROLOGY

## 2018-04-06 PROCEDURE — 85027 COMPLETE CBC AUTOMATED: CPT | Performed by: UROLOGY

## 2018-04-06 PROCEDURE — 80048 BASIC METABOLIC PNL TOTAL CA: CPT | Performed by: UROLOGY

## 2018-04-06 PROCEDURE — 25010000003 CEFAZOLIN IN DEXTROSE 2-4 GM/100ML-% SOLUTION: Performed by: UROLOGY

## 2018-04-06 RX ORDER — OXYCODONE HYDROCHLORIDE AND ACETAMINOPHEN 5; 325 MG/1; MG/1
2 TABLET ORAL EVERY 4 HOURS PRN
Status: DISCONTINUED | OUTPATIENT
Start: 2018-04-06 | End: 2018-04-09 | Stop reason: HOSPADM

## 2018-04-06 RX ORDER — OXYCODONE HYDROCHLORIDE AND ACETAMINOPHEN 5; 325 MG/1; MG/1
1 TABLET ORAL EVERY 4 HOURS PRN
Status: DISCONTINUED | OUTPATIENT
Start: 2018-04-06 | End: 2018-04-09 | Stop reason: HOSPADM

## 2018-04-06 RX ADMIN — METOPROLOL SUCCINATE 50 MG: 50 TABLET, FILM COATED, EXTENDED RELEASE ORAL at 21:38

## 2018-04-06 RX ADMIN — RAMIPRIL 2.5 MG: 2.5 CAPSULE ORAL at 08:35

## 2018-04-06 RX ADMIN — HYDROCODONE BITARTRATE AND ACETAMINOPHEN 1 TABLET: 5; 325 TABLET ORAL at 08:59

## 2018-04-06 RX ADMIN — CEFAZOLIN SODIUM 2 G: 2 INJECTION, SOLUTION INTRAVENOUS at 03:19

## 2018-04-06 RX ADMIN — HYDROMORPHONE HYDROCHLORIDE 0.5 MG: 10 INJECTION INTRAMUSCULAR; INTRAVENOUS; SUBCUTANEOUS at 02:32

## 2018-04-06 RX ADMIN — SODIUM CHLORIDE, POTASSIUM CHLORIDE, SODIUM LACTATE AND CALCIUM CHLORIDE 150 ML/HR: 600; 310; 30; 20 INJECTION, SOLUTION INTRAVENOUS at 14:22

## 2018-04-06 RX ADMIN — HYDROMORPHONE HYDROCHLORIDE 1 MG: 10 INJECTION INTRAMUSCULAR; INTRAVENOUS; SUBCUTANEOUS at 21:31

## 2018-04-06 RX ADMIN — HYDROCODONE BITARTRATE AND ACETAMINOPHEN 1 TABLET: 5; 325 TABLET ORAL at 03:19

## 2018-04-06 RX ADMIN — SODIUM CHLORIDE, POTASSIUM CHLORIDE, SODIUM LACTATE AND CALCIUM CHLORIDE 150 ML/HR: 600; 310; 30; 20 INJECTION, SOLUTION INTRAVENOUS at 21:44

## 2018-04-06 RX ADMIN — ONDANSETRON 4 MG: 2 INJECTION, SOLUTION INTRAMUSCULAR; INTRAVENOUS at 03:19

## 2018-04-06 RX ADMIN — HYDROMORPHONE HYDROCHLORIDE 0.5 MG: 10 INJECTION INTRAMUSCULAR; INTRAVENOUS; SUBCUTANEOUS at 17:22

## 2018-04-06 RX ADMIN — HYDROMORPHONE HYDROCHLORIDE 0.5 MG: 10 INJECTION INTRAMUSCULAR; INTRAVENOUS; SUBCUTANEOUS at 13:33

## 2018-04-06 RX ADMIN — DOCUSATE SODIUM 100 MG: 100 CAPSULE, LIQUID FILLED ORAL at 21:38

## 2018-04-07 LAB
ANION GAP SERPL CALCULATED.3IONS-SCNC: 9.6 MMOL/L
BASOPHILS # BLD AUTO: 0 10*3/MM3 (ref 0–0.2)
BASOPHILS NFR BLD AUTO: 0 % (ref 0–1.5)
BUN BLD-MCNC: 17 MG/DL (ref 8–23)
BUN/CREAT SERPL: 15.5 (ref 7–25)
CALCIUM SPEC-SCNC: 8 MG/DL (ref 8.6–10.5)
CHLORIDE SERPL-SCNC: 100 MMOL/L (ref 98–107)
CO2 SERPL-SCNC: 28.4 MMOL/L (ref 22–29)
CREAT BLD-MCNC: 1.1 MG/DL (ref 0.76–1.27)
DEPRECATED RDW RBC AUTO: 51.9 FL (ref 37–54)
EOSINOPHIL # BLD AUTO: 0 10*3/MM3 (ref 0–0.7)
EOSINOPHIL NFR BLD AUTO: 0 % (ref 0.3–6.2)
ERYTHROCYTE [DISTWIDTH] IN BLOOD BY AUTOMATED COUNT: 14.6 % (ref 11.5–14.5)
GFR SERPL CREATININE-BSD FRML MDRD: 66 ML/MIN/1.73
GLUCOSE BLD-MCNC: 107 MG/DL (ref 65–99)
HCT VFR BLD AUTO: 32.3 % (ref 40.4–52.2)
HGB BLD-MCNC: 10.4 G/DL (ref 13.7–17.6)
IMM GRANULOCYTES # BLD: 0 10*3/MM3 (ref 0–0.03)
IMM GRANULOCYTES NFR BLD: 0 % (ref 0–0.5)
LYMPHOCYTES # BLD AUTO: 0.53 10*3/MM3 (ref 0.9–4.8)
LYMPHOCYTES NFR BLD AUTO: 8 % (ref 19.6–45.3)
MCH RBC QN AUTO: 31 PG (ref 27–32.7)
MCHC RBC AUTO-ENTMCNC: 32.2 G/DL (ref 32.6–36.4)
MCV RBC AUTO: 96.4 FL (ref 79.8–96.2)
MONOCYTES # BLD AUTO: 0.9 10*3/MM3 (ref 0.2–1.2)
MONOCYTES NFR BLD AUTO: 13.5 % (ref 5–12)
NEUTROPHILS # BLD AUTO: 5.22 10*3/MM3 (ref 1.9–8.1)
NEUTROPHILS NFR BLD AUTO: 78.5 % (ref 42.7–76)
PLATELET # BLD AUTO: 85 10*3/MM3 (ref 140–500)
PMV BLD AUTO: 10.3 FL (ref 6–12)
POTASSIUM BLD-SCNC: 4.2 MMOL/L (ref 3.5–5.2)
RBC # BLD AUTO: 3.35 10*6/MM3 (ref 4.6–6)
SODIUM BLD-SCNC: 138 MMOL/L (ref 136–145)
WBC NRBC COR # BLD: 6.65 10*3/MM3 (ref 4.5–10.7)

## 2018-04-07 PROCEDURE — 80048 BASIC METABOLIC PNL TOTAL CA: CPT | Performed by: UROLOGY

## 2018-04-07 PROCEDURE — 97110 THERAPEUTIC EXERCISES: CPT | Performed by: PHYSICAL THERAPIST

## 2018-04-07 PROCEDURE — 85025 COMPLETE CBC W/AUTO DIFF WBC: CPT | Performed by: UROLOGY

## 2018-04-07 PROCEDURE — 97162 PT EVAL MOD COMPLEX 30 MIN: CPT | Performed by: PHYSICAL THERAPIST

## 2018-04-07 PROCEDURE — 25010000002 HYDROMORPHONE HCL PF 500 MG/50ML SOLUTION: Performed by: UROLOGY

## 2018-04-07 RX ORDER — KETOROLAC TROMETHAMINE 30 MG/ML
15 INJECTION, SOLUTION INTRAMUSCULAR; INTRAVENOUS EVERY 8 HOURS PRN
Status: DISCONTINUED | OUTPATIENT
Start: 2018-04-07 | End: 2018-04-09 | Stop reason: HOSPADM

## 2018-04-07 RX ADMIN — HYDROMORPHONE HYDROCHLORIDE 1 MG: 10 INJECTION INTRAMUSCULAR; INTRAVENOUS; SUBCUTANEOUS at 06:42

## 2018-04-07 RX ADMIN — SODIUM CHLORIDE, POTASSIUM CHLORIDE, SODIUM LACTATE AND CALCIUM CHLORIDE 150 ML/HR: 600; 310; 30; 20 INJECTION, SOLUTION INTRAVENOUS at 04:32

## 2018-04-07 RX ADMIN — RAMIPRIL 2.5 MG: 2.5 CAPSULE ORAL at 06:36

## 2018-04-07 RX ADMIN — METOPROLOL SUCCINATE 50 MG: 50 TABLET, FILM COATED, EXTENDED RELEASE ORAL at 20:04

## 2018-04-07 RX ADMIN — HYDROCODONE BITARTRATE AND ACETAMINOPHEN 1 TABLET: 5; 325 TABLET ORAL at 19:14

## 2018-04-07 RX ADMIN — HYDROCODONE BITARTRATE AND ACETAMINOPHEN 1 TABLET: 5; 325 TABLET ORAL at 13:39

## 2018-04-08 LAB
ANION GAP SERPL CALCULATED.3IONS-SCNC: 9.5 MMOL/L
BUN BLD-MCNC: 16 MG/DL (ref 8–23)
BUN/CREAT SERPL: 16.2 (ref 7–25)
CALCIUM SPEC-SCNC: 8 MG/DL (ref 8.6–10.5)
CHLORIDE SERPL-SCNC: 102 MMOL/L (ref 98–107)
CO2 SERPL-SCNC: 27.5 MMOL/L (ref 22–29)
CREAT BLD-MCNC: 0.99 MG/DL (ref 0.76–1.27)
GFR SERPL CREATININE-BSD FRML MDRD: 75 ML/MIN/1.73
GLUCOSE BLD-MCNC: 101 MG/DL (ref 65–99)
POTASSIUM BLD-SCNC: 4 MMOL/L (ref 3.5–5.2)
SODIUM BLD-SCNC: 139 MMOL/L (ref 136–145)

## 2018-04-08 PROCEDURE — 25010000002 KETOROLAC TROMETHAMINE PER 15 MG: Performed by: UROLOGY

## 2018-04-08 PROCEDURE — 80048 BASIC METABOLIC PNL TOTAL CA: CPT | Performed by: UROLOGY

## 2018-04-08 PROCEDURE — 97110 THERAPEUTIC EXERCISES: CPT | Performed by: PHYSICAL THERAPIST

## 2018-04-08 PROCEDURE — 94799 UNLISTED PULMONARY SVC/PX: CPT

## 2018-04-08 RX ADMIN — RAMIPRIL 2.5 MG: 2.5 CAPSULE ORAL at 06:34

## 2018-04-08 RX ADMIN — METOPROLOL SUCCINATE 50 MG: 50 TABLET, FILM COATED, EXTENDED RELEASE ORAL at 20:20

## 2018-04-08 RX ADMIN — OXYCODONE HYDROCHLORIDE AND ACETAMINOPHEN 1 TABLET: 5; 325 TABLET ORAL at 18:16

## 2018-04-08 RX ADMIN — KETOROLAC TROMETHAMINE 15 MG: 30 INJECTION, SOLUTION INTRAMUSCULAR; INTRAVENOUS at 14:29

## 2018-04-09 VITALS
TEMPERATURE: 96.7 F | HEART RATE: 65 BPM | WEIGHT: 210 LBS | HEIGHT: 69 IN | BODY MASS INDEX: 31.1 KG/M2 | OXYGEN SATURATION: 92 % | DIASTOLIC BLOOD PRESSURE: 80 MMHG | SYSTOLIC BLOOD PRESSURE: 132 MMHG | RESPIRATION RATE: 18 BRPM

## 2018-04-09 LAB
ANION GAP SERPL CALCULATED.3IONS-SCNC: 8.3 MMOL/L
BUN BLD-MCNC: 17 MG/DL (ref 8–23)
BUN/CREAT SERPL: 17.3 (ref 7–25)
CALCIUM SPEC-SCNC: 8.2 MG/DL (ref 8.6–10.5)
CHLORIDE SERPL-SCNC: 104 MMOL/L (ref 98–107)
CO2 SERPL-SCNC: 29.7 MMOL/L (ref 22–29)
CREAT BLD-MCNC: 0.98 MG/DL (ref 0.76–1.27)
GFR SERPL CREATININE-BSD FRML MDRD: 75 ML/MIN/1.73
GLUCOSE BLD-MCNC: 99 MG/DL (ref 65–99)
POTASSIUM BLD-SCNC: 3.9 MMOL/L (ref 3.5–5.2)
SODIUM BLD-SCNC: 142 MMOL/L (ref 136–145)

## 2018-04-09 PROCEDURE — 80048 BASIC METABOLIC PNL TOTAL CA: CPT | Performed by: UROLOGY

## 2018-04-09 RX ADMIN — OXYCODONE HYDROCHLORIDE AND ACETAMINOPHEN 1 TABLET: 5; 325 TABLET ORAL at 09:02

## 2018-04-09 RX ADMIN — RAMIPRIL 2.5 MG: 2.5 CAPSULE ORAL at 07:13

## 2018-04-09 RX ADMIN — OXYCODONE HYDROCHLORIDE AND ACETAMINOPHEN 2 TABLET: 5; 325 TABLET ORAL at 02:11

## 2018-12-26 ENCOUNTER — APPOINTMENT (OUTPATIENT)
Dept: PREADMISSION TESTING | Facility: HOSPITAL | Age: 73
End: 2018-12-26

## 2018-12-26 VITALS
HEART RATE: 72 BPM | HEIGHT: 65 IN | BODY MASS INDEX: 34.99 KG/M2 | OXYGEN SATURATION: 100 % | WEIGHT: 210 LBS | DIASTOLIC BLOOD PRESSURE: 79 MMHG | TEMPERATURE: 98.4 F | RESPIRATION RATE: 20 BRPM | SYSTOLIC BLOOD PRESSURE: 121 MMHG

## 2018-12-26 LAB
ANION GAP SERPL CALCULATED.3IONS-SCNC: 8.7 MMOL/L
BACTERIA UR QL AUTO: ABNORMAL /HPF
BILIRUB UR QL STRIP: NEGATIVE
BUN BLD-MCNC: 16 MG/DL (ref 8–23)
BUN/CREAT SERPL: 14.4 (ref 7–25)
CALCIUM SPEC-SCNC: 9.3 MG/DL (ref 8.6–10.5)
CHLORIDE SERPL-SCNC: 105 MMOL/L (ref 98–107)
CLARITY UR: CLEAR
CO2 SERPL-SCNC: 28.3 MMOL/L (ref 22–29)
COLOR UR: YELLOW
CREAT BLD-MCNC: 1.11 MG/DL (ref 0.76–1.27)
DEPRECATED RDW RBC AUTO: 42 FL (ref 37–54)
ERYTHROCYTE [DISTWIDTH] IN BLOOD BY AUTOMATED COUNT: 12.4 % (ref 11.5–14.5)
GFR SERPL CREATININE-BSD FRML MDRD: 65 ML/MIN/1.73
GLUCOSE BLD-MCNC: 117 MG/DL (ref 65–99)
GLUCOSE UR STRIP-MCNC: NEGATIVE MG/DL
HCT VFR BLD AUTO: 37.2 % (ref 40.4–52.2)
HGB BLD-MCNC: 12.5 G/DL (ref 13.7–17.6)
HGB UR QL STRIP.AUTO: NEGATIVE
HYALINE CASTS UR QL AUTO: ABNORMAL /LPF
KETONES UR QL STRIP: NEGATIVE
LEUKOCYTE ESTERASE UR QL STRIP.AUTO: ABNORMAL
MCH RBC QN AUTO: 31.7 PG (ref 27–32.7)
MCHC RBC AUTO-ENTMCNC: 33.6 G/DL (ref 32.6–36.4)
MCV RBC AUTO: 94.4 FL (ref 79.8–96.2)
NITRITE UR QL STRIP: NEGATIVE
PH UR STRIP.AUTO: 6 [PH] (ref 5–8)
PLATELET # BLD AUTO: 152 10*3/MM3 (ref 140–500)
PMV BLD AUTO: 9.6 FL (ref 6–12)
POTASSIUM BLD-SCNC: 4.7 MMOL/L (ref 3.5–5.2)
PROT UR QL STRIP: NEGATIVE
RBC # BLD AUTO: 3.94 10*6/MM3 (ref 4.6–6)
RBC # UR: ABNORMAL /HPF
REF LAB TEST METHOD: ABNORMAL
SODIUM BLD-SCNC: 142 MMOL/L (ref 136–145)
SP GR UR STRIP: 1.01 (ref 1–1.03)
SQUAMOUS #/AREA URNS HPF: ABNORMAL /HPF
UROBILINOGEN UR QL STRIP: ABNORMAL
WBC NRBC COR # BLD: 4.5 10*3/MM3 (ref 4.5–10.7)
WBC UR QL AUTO: ABNORMAL /HPF

## 2018-12-26 PROCEDURE — 93005 ELECTROCARDIOGRAM TRACING: CPT

## 2018-12-26 PROCEDURE — 81001 URINALYSIS AUTO W/SCOPE: CPT | Performed by: UROLOGY

## 2018-12-26 PROCEDURE — 85027 COMPLETE CBC AUTOMATED: CPT | Performed by: UROLOGY

## 2018-12-26 PROCEDURE — 93010 ELECTROCARDIOGRAM REPORT: CPT | Performed by: INTERNAL MEDICINE

## 2018-12-26 PROCEDURE — 36415 COLL VENOUS BLD VENIPUNCTURE: CPT

## 2018-12-26 PROCEDURE — 87086 URINE CULTURE/COLONY COUNT: CPT | Performed by: UROLOGY

## 2018-12-26 PROCEDURE — 80048 BASIC METABOLIC PNL TOTAL CA: CPT | Performed by: UROLOGY

## 2018-12-26 RX ORDER — ACETAMINOPHEN 500 MG
1000 TABLET ORAL EVERY 6 HOURS PRN
COMMUNITY

## 2018-12-26 NOTE — DISCHARGE INSTRUCTIONS
Take the following medications the morning of surgery with a small sip of water:        General Instructions:  • Do not eat or drink anything after midnight the night before surgery.  • Infants may have breast milk up to four hours before surgery.  • Infants drinking formula may drink formula up to six hours before surgery.   • Patients who avoid smoking, chewing tobacco and alcohol for 4 weeks prior to surgery have a reduced risk of post-operative complications.  Quit smoking as many days before surgery as you can.  • Do not smoke, use chewing tobacco or drink alcohol the day of surgery.   • If applicable bring your C-PAP/ BI-PAP machine.  • Bring any papers given to you in the doctor’s office.  • Wear clean comfortable clothes and socks.  • Do not wear contact lenses or make-up.  Bring a case for your glasses.   • Bring crutches or walker if applicable.  • Remove all piercings.  Leave jewelry and any other valuables at home.  • Hair extensions with metal clips must be removed prior to surgery.  • The Pre-Admission Testing nurse will instruct you to bring medications if unable to obtain an accurate list in Pre-Admission Testing.        If you were given a blood bank ID arm band remember to bring it with you the day of surgery.    Preventing a Surgical Site Infection:  • For 2 to 3 days before surgery, avoid shaving with a razor because the razor can irritate skin and make it easier to develop an infection.    • Any areas of open skin can increase the risk of a post-operative wound infection by allowing bacteria to enter and travel throughout the body.  Notify your surgeon if you have any skin wounds / rashes even if it is not near the expected surgical site.  The area will need assessed to determine if surgery should be delayed until it is healed.  • The night prior to surgery sleep in a clean bed with clean clothing.  Do not allow pets to sleep with you.  • Shower on the morning of surgery using a fresh bar of  anti-bacterial soap (such as Dial) and clean washcloth.  Dry with a clean towel and dress in clean clothing.  • Ask your surgeon if you will be receiving antibiotics prior to surgery.  • Make sure you, your family, and all healthcare providers clean their hands with soap and water or an alcohol based hand  before caring for you or your wound.    Day of surgery:12/28/18   0730  Upon arrival, a Pre-op nurse and Anesthesiologist will review your health history, obtain vital signs, and answer questions you may have.  The only belongings needed at this time will be your home medications and if applicable your C-PAP/BI-PAP machine.  If you are staying overnight your family can leave the rest of your belongings in the car and bring them to your room later.  A Pre-op nurse will start an IV and you may receive medication in preparation for surgery, including something to help you relax.  Your family will be able to see you in the Pre-op area.  While you are in surgery your family should notify the waiting room  if they leave the waiting room area and provide a contact phone number.    Please be aware that surgery does come with discomfort.  We want to make every effort to control your discomfort so please discuss any uncontrolled symptoms with your nurse.   Your doctor will most likely have prescribed pain medications.      If you are going home after surgery you will receive individualized written care instructions before being discharged.  A responsible adult must drive you to and from the hospital on the day of your surgery and stay with you for 24 hours.    If you are staying overnight following surgery, you will be transported to your hospital room following the recovery period.  [facility] has all private rooms.    You have received a list of surgical assistants for your reference.  If you have any questions please call Pre-Admission Testing at [phone].  Deductibles and co-payments  are collected on the day of service. Please be prepared to pay the required co-pay, deductible or deposit on the day of service as defined by your plan.

## 2018-12-26 NOTE — PAT
Pt relayed to me that he had had a hallucination side effect when he was here in April 2018 after his surgery. He doesn't want this to happen to him again. Medical records  The pharmacist and clinical informatic was called to help and no specific med was identified . Pt told to report the situation before surgery when he comes back.

## 2018-12-27 LAB — BACTERIA SPEC AEROBE CULT: NO GROWTH

## 2018-12-28 ENCOUNTER — ANESTHESIA EVENT (OUTPATIENT)
Dept: PERIOP | Facility: HOSPITAL | Age: 73
End: 2018-12-28

## 2018-12-28 ENCOUNTER — ANESTHESIA (OUTPATIENT)
Dept: PERIOP | Facility: HOSPITAL | Age: 73
End: 2018-12-28

## 2018-12-28 ENCOUNTER — HOSPITAL ENCOUNTER (OUTPATIENT)
Facility: HOSPITAL | Age: 73
Setting detail: HOSPITAL OUTPATIENT SURGERY
Discharge: HOME | End: 2018-12-28
Attending: UROLOGY | Admitting: UROLOGY

## 2018-12-28 ENCOUNTER — APPOINTMENT (OUTPATIENT)
Dept: GENERAL RADIOLOGY | Facility: HOSPITAL | Age: 73
End: 2018-12-28
Attending: UROLOGY

## 2018-12-28 VITALS
OXYGEN SATURATION: 97 % | TEMPERATURE: 99 F | RESPIRATION RATE: 16 BRPM | SYSTOLIC BLOOD PRESSURE: 138 MMHG | DIASTOLIC BLOOD PRESSURE: 78 MMHG | HEART RATE: 68 BPM

## 2018-12-28 PROBLEM — N20.1 URETERAL CALCULUS, RIGHT: Status: ACTIVE | Noted: 2018-12-28

## 2018-12-28 PROCEDURE — 74018 RADEX ABDOMEN 1 VIEW: CPT

## 2018-12-28 PROCEDURE — 25010000003 CEFAZOLIN 1-4 GM/50ML-% SOLUTION: Performed by: UROLOGY

## 2018-12-28 PROCEDURE — C1758 CATHETER, URETERAL: HCPCS | Performed by: UROLOGY

## 2018-12-28 PROCEDURE — 25010000002 ONDANSETRON PER 1 MG: Performed by: NURSE ANESTHETIST, CERTIFIED REGISTERED

## 2018-12-28 PROCEDURE — 25010000002 MIDAZOLAM PER 1 MG: Performed by: ANESTHESIOLOGY

## 2018-12-28 PROCEDURE — 25010000002 FENTANYL CITRATE (PF) 100 MCG/2ML SOLUTION: Performed by: NURSE ANESTHETIST, CERTIFIED REGISTERED

## 2018-12-28 PROCEDURE — 25010000002 PROPOFOL 10 MG/ML EMULSION: Performed by: NURSE ANESTHETIST, CERTIFIED REGISTERED

## 2018-12-28 RX ORDER — SODIUM CHLORIDE 0.9 % (FLUSH) 0.9 %
1-10 SYRINGE (ML) INJECTION AS NEEDED
Status: DISCONTINUED | OUTPATIENT
Start: 2018-12-28 | End: 2018-12-28 | Stop reason: HOSPADM

## 2018-12-28 RX ORDER — NALOXONE HCL 0.4 MG/ML
0.4 VIAL (ML) INJECTION AS NEEDED
Status: DISCONTINUED | OUTPATIENT
Start: 2018-12-28 | End: 2018-12-28 | Stop reason: HOSPADM

## 2018-12-28 RX ORDER — FENTANYL CITRATE 50 UG/ML
50 INJECTION, SOLUTION INTRAMUSCULAR; INTRAVENOUS
Status: DISCONTINUED | OUTPATIENT
Start: 2018-12-28 | End: 2018-12-28 | Stop reason: HOSPADM

## 2018-12-28 RX ORDER — LIDOCAINE HYDROCHLORIDE 10 MG/ML
0.5 INJECTION, SOLUTION EPIDURAL; INFILTRATION; INTRACAUDAL; PERINEURAL ONCE AS NEEDED
Status: COMPLETED | OUTPATIENT
Start: 2018-12-28 | End: 2018-12-28

## 2018-12-28 RX ORDER — MIDAZOLAM HYDROCHLORIDE 1 MG/ML
2 INJECTION INTRAMUSCULAR; INTRAVENOUS
Status: DISCONTINUED | OUTPATIENT
Start: 2018-12-28 | End: 2018-12-28 | Stop reason: HOSPADM

## 2018-12-28 RX ORDER — SODIUM CHLORIDE 0.9 % (FLUSH) 0.9 %
1-10 SYRINGE (ML) INJECTION AS NEEDED
Status: DISCONTINUED | OUTPATIENT
Start: 2018-12-28 | End: 2018-12-28

## 2018-12-28 RX ORDER — SODIUM CHLORIDE 9 MG/ML
INJECTION, SOLUTION INTRAVENOUS AS NEEDED
Status: DISCONTINUED | OUTPATIENT
Start: 2018-12-28 | End: 2018-12-28 | Stop reason: HOSPADM

## 2018-12-28 RX ORDER — HYDRALAZINE HYDROCHLORIDE 20 MG/ML
5 INJECTION INTRAMUSCULAR; INTRAVENOUS
Status: DISCONTINUED | OUTPATIENT
Start: 2018-12-28 | End: 2018-12-28 | Stop reason: HOSPADM

## 2018-12-28 RX ORDER — PROPOFOL 10 MG/ML
VIAL (ML) INTRAVENOUS AS NEEDED
Status: DISCONTINUED | OUTPATIENT
Start: 2018-12-28 | End: 2018-12-28 | Stop reason: SURG

## 2018-12-28 RX ORDER — CEFAZOLIN SODIUM 1 G/50ML
1 INJECTION, SOLUTION INTRAVENOUS ONCE
Status: COMPLETED | OUTPATIENT
Start: 2018-12-28 | End: 2018-12-28

## 2018-12-28 RX ORDER — FENTANYL CITRATE 50 UG/ML
50 INJECTION, SOLUTION INTRAMUSCULAR; INTRAVENOUS AS NEEDED
Status: DISCONTINUED | OUTPATIENT
Start: 2018-12-28 | End: 2018-12-28

## 2018-12-28 RX ORDER — PROMETHAZINE HYDROCHLORIDE 25 MG/1
25 TABLET ORAL ONCE AS NEEDED
Status: DISCONTINUED | OUTPATIENT
Start: 2018-12-28 | End: 2018-12-28 | Stop reason: HOSPADM

## 2018-12-28 RX ORDER — ACETAMINOPHEN 325 MG/1
650 TABLET ORAL ONCE AS NEEDED
Status: DISCONTINUED | OUTPATIENT
Start: 2018-12-28 | End: 2018-12-28 | Stop reason: HOSPADM

## 2018-12-28 RX ORDER — ONDANSETRON 2 MG/ML
INJECTION INTRAMUSCULAR; INTRAVENOUS AS NEEDED
Status: DISCONTINUED | OUTPATIENT
Start: 2018-12-28 | End: 2018-12-28 | Stop reason: SURG

## 2018-12-28 RX ORDER — HYDROCODONE BITARTRATE AND ACETAMINOPHEN 7.5; 325 MG/1; MG/1
1-2 TABLET ORAL EVERY 4 HOURS PRN
Qty: 20 TABLET | Refills: 0 | Status: SHIPPED | OUTPATIENT
Start: 2018-12-28 | End: 2021-06-29

## 2018-12-28 RX ORDER — ACETAMINOPHEN 650 MG/1
650 SUPPOSITORY RECTAL ONCE AS NEEDED
Status: DISCONTINUED | OUTPATIENT
Start: 2018-12-28 | End: 2018-12-28 | Stop reason: HOSPADM

## 2018-12-28 RX ORDER — SODIUM CHLORIDE 0.9 % (FLUSH) 0.9 %
3 SYRINGE (ML) INJECTION EVERY 12 HOURS SCHEDULED
Status: DISCONTINUED | OUTPATIENT
Start: 2018-12-28 | End: 2018-12-28 | Stop reason: HOSPADM

## 2018-12-28 RX ORDER — ACETAMINOPHEN 500 MG
500 TABLET ORAL ONCE
Status: COMPLETED | OUTPATIENT
Start: 2018-12-28 | End: 2018-12-28

## 2018-12-28 RX ORDER — PROMETHAZINE HYDROCHLORIDE 25 MG/ML
6.25 INJECTION, SOLUTION INTRAMUSCULAR; INTRAVENOUS ONCE AS NEEDED
Status: DISCONTINUED | OUTPATIENT
Start: 2018-12-28 | End: 2018-12-28 | Stop reason: HOSPADM

## 2018-12-28 RX ORDER — MIDAZOLAM HYDROCHLORIDE 1 MG/ML
1 INJECTION INTRAMUSCULAR; INTRAVENOUS
Status: DISCONTINUED | OUTPATIENT
Start: 2018-12-28 | End: 2018-12-28 | Stop reason: HOSPADM

## 2018-12-28 RX ORDER — DIPHENHYDRAMINE HYDROCHLORIDE 50 MG/ML
12.5 INJECTION INTRAMUSCULAR; INTRAVENOUS
Status: DISCONTINUED | OUTPATIENT
Start: 2018-12-28 | End: 2018-12-28 | Stop reason: HOSPADM

## 2018-12-28 RX ORDER — FENTANYL CITRATE 50 UG/ML
INJECTION, SOLUTION INTRAMUSCULAR; INTRAVENOUS AS NEEDED
Status: DISCONTINUED | OUTPATIENT
Start: 2018-12-28 | End: 2018-12-28 | Stop reason: SURG

## 2018-12-28 RX ORDER — METHYLPREDNISOLONE ACETATE 80 MG/ML
80 INJECTION, SUSPENSION INTRA-ARTICULAR; INTRALESIONAL; INTRAMUSCULAR; SOFT TISSUE ONCE
Status: DISCONTINUED | OUTPATIENT
Start: 2018-12-28 | End: 2018-12-28

## 2018-12-28 RX ORDER — LIDOCAINE HYDROCHLORIDE 20 MG/ML
INJECTION, SOLUTION INFILTRATION; PERINEURAL AS NEEDED
Status: DISCONTINUED | OUTPATIENT
Start: 2018-12-28 | End: 2018-12-28 | Stop reason: SURG

## 2018-12-28 RX ORDER — PROMETHAZINE HYDROCHLORIDE 25 MG/1
25 SUPPOSITORY RECTAL ONCE AS NEEDED
Status: DISCONTINUED | OUTPATIENT
Start: 2018-12-28 | End: 2018-12-28 | Stop reason: HOSPADM

## 2018-12-28 RX ORDER — MIDAZOLAM HYDROCHLORIDE 1 MG/ML
1 INJECTION INTRAMUSCULAR; INTRAVENOUS AS NEEDED
Status: DISCONTINUED | OUTPATIENT
Start: 2018-12-28 | End: 2018-12-28

## 2018-12-28 RX ORDER — SODIUM CHLORIDE, SODIUM LACTATE, POTASSIUM CHLORIDE, CALCIUM CHLORIDE 600; 310; 30; 20 MG/100ML; MG/100ML; MG/100ML; MG/100ML
9 INJECTION, SOLUTION INTRAVENOUS CONTINUOUS
Status: DISCONTINUED | OUTPATIENT
Start: 2018-12-28 | End: 2018-12-28 | Stop reason: HOSPADM

## 2018-12-28 RX ORDER — LIDOCAINE HYDROCHLORIDE 10 MG/ML
1 INJECTION, SOLUTION INFILTRATION; PERINEURAL ONCE AS NEEDED
Status: DISCONTINUED | OUTPATIENT
Start: 2018-12-28 | End: 2018-12-28

## 2018-12-28 RX ORDER — SULFAMETHOXAZOLE AND TRIMETHOPRIM 800; 160 MG/1; MG/1
1 TABLET ORAL 2 TIMES DAILY
Qty: 6 TABLET | Refills: 0 | Status: SHIPPED | OUTPATIENT
Start: 2018-12-28 | End: 2021-06-29

## 2018-12-28 RX ADMIN — FENTANYL CITRATE 50 MCG: 50 INJECTION INTRAMUSCULAR; INTRAVENOUS at 09:55

## 2018-12-28 RX ADMIN — FENTANYL CITRATE 50 MCG: 50 INJECTION INTRAMUSCULAR; INTRAVENOUS at 09:44

## 2018-12-28 RX ADMIN — PROPOFOL 150 MG: 10 INJECTION, EMULSION INTRAVENOUS at 09:44

## 2018-12-28 RX ADMIN — SODIUM CHLORIDE, POTASSIUM CHLORIDE, SODIUM LACTATE AND CALCIUM CHLORIDE: 600; 310; 30; 20 INJECTION, SOLUTION INTRAVENOUS at 09:44

## 2018-12-28 RX ADMIN — CEFAZOLIN SODIUM 1 G: 1 INJECTION, SOLUTION INTRAVENOUS at 09:42

## 2018-12-28 RX ADMIN — SODIUM CHLORIDE, POTASSIUM CHLORIDE, SODIUM LACTATE AND CALCIUM CHLORIDE 9 ML/HR: 600; 310; 30; 20 INJECTION, SOLUTION INTRAVENOUS at 08:55

## 2018-12-28 RX ADMIN — ONDANSETRON 4 MG: 2 INJECTION INTRAMUSCULAR; INTRAVENOUS at 09:49

## 2018-12-28 RX ADMIN — SODIUM CHLORIDE, POTASSIUM CHLORIDE, SODIUM LACTATE AND CALCIUM CHLORIDE 9 ML/HR: 600; 310; 30; 20 INJECTION, SOLUTION INTRAVENOUS at 11:04

## 2018-12-28 RX ADMIN — LIDOCAINE HYDROCHLORIDE 60 MG: 20 INJECTION, SOLUTION INFILTRATION; PERINEURAL at 09:44

## 2018-12-28 RX ADMIN — ACETAMINOPHEN 500 MG: 500 TABLET, FILM COATED ORAL at 08:56

## 2018-12-28 RX ADMIN — LIDOCAINE HYDROCHLORIDE 0.5 ML: 10 INJECTION, SOLUTION EPIDURAL; INFILTRATION; INTRACAUDAL; PERINEURAL at 08:55

## 2018-12-28 RX ADMIN — MIDAZOLAM HYDROCHLORIDE 1 MG: 2 INJECTION, SOLUTION INTRAMUSCULAR; INTRAVENOUS at 08:55

## 2018-12-28 NOTE — ANESTHESIA PREPROCEDURE EVALUATION
Anesthesia Evaluation     NPO Solid Status: > 8 hours             Airway   Mallampati: II  TM distance: >3 FB  Neck ROM: full  No difficulty expected  Dental - normal exam     Pulmonary - negative pulmonary ROS and normal exam   Cardiovascular   Exercise tolerance: good (4-7 METS)    ECG reviewed  Rhythm: regular    (+) hypertension, CAD, hyperlipidemia,   (-) murmur    ROS comment: plavix for medical management of CAD    Neuro/Psych  GI/Hepatic/Renal/Endo    (+)   renal disease stones,     Musculoskeletal     Abdominal    Substance History      OB/GYN          Other                        Anesthesia Plan    ASA 2     general   (  D/W R&B of GA including but not limited to: heart, lung, liver, kidney, neurologic problems, positioning injuries, dental damage, corneal abrasion and TMJ.  .)  intravenous induction   Anesthetic plan, all risks, benefits, and alternatives have been provided, discussed and informed consent has been obtained with: patient.

## 2018-12-28 NOTE — ANESTHESIA PROCEDURE NOTES
ANESTHESIA INTUBATION  Urgency: elective    Airway not difficult    General Information and Staff    Patient location during procedure: OR  Anesthesiologist: Dav Koch MD  CRNA: Yisel Hendrix CRNA    Indications and Patient Condition  Indications for airway management: airway protection    Preoxygenated: yes  MILS not maintained throughout  Mask difficulty assessment: 1 - vent by mask    Final Airway Details  Final airway type: supraglottic airway      Successful airway: classic  Size 5    Number of attempts at approach: 1    Additional Comments  Preoxygenation FEO2 >85, SIVI, LMA placed with ease, teeth/lips as preop. Secured and placement confirmed.

## 2018-12-28 NOTE — ANESTHESIA POSTPROCEDURE EVALUATION
Patient: Loni Sanford    Procedure Summary     Date:  12/28/18 Room / Location:   [facility] [address] [place] [address] [facility]    Anesthesia Start:  0941 Anesthesia Stop:  1037    Procedure:  RT EXTRACORPREAL SHOCKWAVE LITHOTRIPSY CYSTOSCOPY  WITH  MANIPULATION OF STONE (Right ) Diagnosis:      Surgeon:  Moe Vasquez MD Provider:  Dav Koch MD    Anesthesia Type:  general ASA Status:  2          Anesthesia Type: general  Last vitals  BP   164/93 (12/28/18 1200)   Temp   37.2 °C (99 °F) (12/28/18 1115)   Pulse   66 (12/28/18 1200)   Resp   16 (12/28/18 1200)     SpO2   96 % (12/28/18 1200)     Post Anesthesia Care and Evaluation    Patient location during evaluation: bedside  Patient participation: complete - patient participated  Level of consciousness: awake  Pain score: 1  Pain management: adequate  Airway patency: patent  Anesthetic complications: No anesthetic complications    Cardiovascular status: acceptable  Respiratory status: acceptable  Hydration status: acceptable    Comments: --------------------            12/28/18               1200     --------------------   BP:       164/93     Pulse:      66       Resp:       16       Temp:                SpO2:      96%      --------------------

## 2021-06-29 ENCOUNTER — OFFICE VISIT (OUTPATIENT)
Dept: FAMILY MEDICINE CLINIC | Facility: CLINIC | Age: 76
End: 2021-06-29

## 2021-06-29 VITALS
WEIGHT: 196 LBS | BODY MASS INDEX: 29.03 KG/M2 | SYSTOLIC BLOOD PRESSURE: 110 MMHG | HEIGHT: 69 IN | OXYGEN SATURATION: 98 % | DIASTOLIC BLOOD PRESSURE: 70 MMHG | TEMPERATURE: 97.9 F | HEART RATE: 67 BPM

## 2021-06-29 DIAGNOSIS — R73.9 HYPERGLYCEMIA: ICD-10-CM

## 2021-06-29 DIAGNOSIS — C64.1 CLEAR CELL CARCINOMA OF RIGHT KIDNEY (HCC): ICD-10-CM

## 2021-06-29 DIAGNOSIS — E55.9 HYPOVITAMINOSIS D: ICD-10-CM

## 2021-06-29 DIAGNOSIS — R73.03 PREDIABETES: ICD-10-CM

## 2021-06-29 DIAGNOSIS — G31.84 MCI (MILD COGNITIVE IMPAIRMENT): ICD-10-CM

## 2021-06-29 DIAGNOSIS — E78.5 HYPERLIPIDEMIA LDL GOAL <70: ICD-10-CM

## 2021-06-29 DIAGNOSIS — G62.9 NEUROPATHY: ICD-10-CM

## 2021-06-29 DIAGNOSIS — G25.81 RESTLESS LEG SYNDROME: ICD-10-CM

## 2021-06-29 DIAGNOSIS — I25.118 CORONARY ARTERY DISEASE OF NATIVE ARTERY OF NATIVE HEART WITH STABLE ANGINA PECTORIS (HCC): Primary | ICD-10-CM

## 2021-06-29 PROBLEM — Z85.46 HISTORY OF ADENOCARCINOMA OF PROSTATE: Status: ACTIVE | Noted: 2019-06-17

## 2021-06-29 PROBLEM — C64.9 CLEAR CELL CARCINOMA OF KIDNEY: Status: ACTIVE | Noted: 2018-12-17

## 2021-06-29 PROBLEM — I20.8 STABLE ANGINA: Status: ACTIVE | Noted: 2020-11-20

## 2021-06-29 PROBLEM — N28.89 RENAL MASS, RIGHT: Status: RESOLVED | Noted: 2018-04-05 | Resolved: 2021-06-29

## 2021-06-29 PROBLEM — I20.89 STABLE ANGINA: Status: ACTIVE | Noted: 2020-11-20

## 2021-06-29 PROBLEM — I25.10 CORONARY ARTERY DISEASE: Status: ACTIVE | Noted: 2021-06-29

## 2021-06-29 PROBLEM — G20.A1 PARKINSON DISEASE: Status: ACTIVE | Noted: 2018-12-17

## 2021-06-29 PROBLEM — G20 PARKINSON DISEASE: Status: ACTIVE | Noted: 2018-12-17

## 2021-06-29 PROCEDURE — 99204 OFFICE O/P NEW MOD 45 MIN: CPT | Performed by: FAMILY MEDICINE

## 2021-06-29 RX ORDER — ROPINIROLE 0.25 MG/1
0.25 TABLET, FILM COATED ORAL NIGHTLY
Qty: 90 TABLET | Refills: 1 | Status: SHIPPED | OUTPATIENT
Start: 2021-06-29 | End: 2021-08-16 | Stop reason: SDUPTHER

## 2021-06-29 RX ORDER — TRAZODONE HYDROCHLORIDE 50 MG/1
50 TABLET ORAL DAILY
COMMUNITY
Start: 2020-12-01 | End: 2021-08-31

## 2021-06-29 RX ORDER — NITROGLYCERIN 0.4 MG/1
TABLET SUBLINGUAL
COMMUNITY
Start: 2021-02-08

## 2021-06-29 NOTE — PROGRESS NOTES
"Chief Complaint  Establish Care (new pt establishing today with dr spaulding ) and Insomnia (trazodone is not really  and get probably just a little  slepp 3 hours at night )    Subjective          Loni Sanford presents to [facility]  History of Present Illness  Pleasant 76-year-old male here as a new patient.  His main concern today is insomnia which is chronic problem that is not well controlled.  He is being treated for hypertension which is well controlled, CAD which has been stable without symptoms of chest pain without stent, hyperlipidemia which is well controlled, Parkinson's disease that is stable on carbidopa levodopa with complications of mild cognitive impairment diagnosed in 2018 (Neurologist Dr Sams), renal cell carcinoma followed by urology.  He over all feels that he is good over all aside from insomnia.  He has more struggle staying asseep, he often wakes up feeling restless and has to get up and walk around.  He will walk for a half hour and do some stretches so he can get tired again and get sleepy again.  Some leg cramps.     Objective   Vital Signs:   /70   Pulse 67   Temp 97.9 °F (36.6 °C)   Ht 175.3 cm (69\")   Wt 88.9 kg (196 lb)   SpO2 98%   BMI 28.94 kg/m²     Physical Exam  Vitals and nursing note reviewed.   Constitutional:       General: He is not in acute distress.     Appearance: He is well-developed.   HENT:      Head: Normocephalic.      Nose: Nose normal.   Cardiovascular:      Rate and Rhythm: Normal rate and regular rhythm.      Heart sounds: Normal heart sounds. No murmur heard.     Pulmonary:      Effort: Pulmonary effort is normal. No respiratory distress.      Breath sounds: Normal breath sounds.   Musculoskeletal:         General: Normal range of motion.   Skin:     General: Skin is warm and dry.      Findings: No rash.   Neurological:      Mental Status: He is alert and oriented to person, place, and time.   Psychiatric:         " Behavior: Behavior normal.         Thought Content: Thought content normal.         Judgment: Judgment normal.        Result Review :                 Assessment and Plan    Diagnoses and all orders for this visit:    1. Coronary artery disease of native artery of native heart with stable angina pectoris (CMS/HCC) (Primary)  -     Comprehensive Metabolic Panel  -     CBC & Differential  -     Lipid Panel    2. Hyperlipidemia LDL goal <70  -     Comprehensive Metabolic Panel  -     CBC & Differential  -     Lipid Panel    3. MCI (mild cognitive impairment)    4. Neuropathy  -     TSH Rfx On Abnormal To Free T4  -     Hemoglobin A1c  -     Vitamin B12  -     Folate    5. 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 Restless leg syndrome  -     rOPINIRole (REQUIP) 0.25 MG tablet; Take 1 tablet by mouth Every Night. Take 1 hour before bedtime.  Dispense: 90 tablet; Refill: 1  -     CBC & Differential  -     Iron and TIBC  -     Ferritin    7. Hypovitaminosis D  -     Vitamin D 25 Hydroxy    8. Hyperglycemia  -     Hemoglobin A1c    9. Prediabetes   -     TSH Rfx On Abnormal To Free T4    Pleasant 76-year-old male here to establish care.  He has an unfortunately complex medical history that is stable right now.  He does have a history of coronary artery disease, not seen his cardiologist in the last year due to the COVID-19 pandemic.  Due for labs as above.  Asymptomatic.  He is going to make an appoint with his cardiologist to set up a routine follow-up.  He has Parkinson's disease that has been stable on levodopa carbidopa.  Sees Dr. Sams with neurology for this.  Also following up with Dr. Farooq with urology for clear cell carcinoma of the right kidney that was removed and prostate cancer.  He is scheduled for some imaging test with him next week and  blood work.  We will get above labs and follow-up routinely with his specialist.    Patient with insomnia, waking earlier and multiple times during the night.  I do think his symptoms are more consistent with restless leg syndrome.  We will start with Requip, 0.25 mg nightly for the first 2 weeks and increase to 0.5 mg nightly and follow-up with me in 4 to 6 weeks.  I do think he is doing well with stretching and exercises and trying to stay active.  Continue with this plan, check ferritin levels as above and follow-up with me.      Follow Up   Return in 6 weeks (on 8/10/2021), or if symptoms worsen or fail to improve, for Recheck RLS .  Patient was given instructions and counseling regarding his condition or for health maintenance advice. Please see specific information pulled into the AVS if appropriate. Medical assistant and I wore mask and eyewear protection during entire encounter.  Patient wore mask.    Zo Carrillo MD

## 2021-07-02 LAB
25(OH)D3+25(OH)D2 SERPL-MCNC: 66.3 NG/ML (ref 30–100)
ALBUMIN SERPL-MCNC: 4.4 G/DL (ref 3.5–5.2)
ALBUMIN/GLOB SERPL: 2.6 G/DL
ALP SERPL-CCNC: 86 U/L (ref 39–117)
ALT SERPL-CCNC: 7 U/L (ref 1–41)
AST SERPL-CCNC: 16 U/L (ref 1–40)
BASOPHILS # BLD AUTO: 0.02 10*3/MM3 (ref 0–0.2)
BASOPHILS NFR BLD AUTO: 0.5 % (ref 0–1.5)
BILIRUB SERPL-MCNC: 0.8 MG/DL (ref 0–1.2)
BUN SERPL-MCNC: 15 MG/DL (ref 8–23)
BUN/CREAT SERPL: 13 (ref 7–25)
CALCIUM SERPL-MCNC: 9.3 MG/DL (ref 8.6–10.5)
CHLORIDE SERPL-SCNC: 103 MMOL/L (ref 98–107)
CHOLEST SERPL-MCNC: 91 MG/DL (ref 0–200)
CO2 SERPL-SCNC: 28.3 MMOL/L (ref 22–29)
CREAT SERPL-MCNC: 1.15 MG/DL (ref 0.76–1.27)
EOSINOPHIL # BLD AUTO: 0.09 10*3/MM3 (ref 0–0.4)
EOSINOPHIL NFR BLD AUTO: 2.2 % (ref 0.3–6.2)
ERYTHROCYTE [DISTWIDTH] IN BLOOD BY AUTOMATED COUNT: 11.8 % (ref 12.3–15.4)
FERRITIN SERPL-MCNC: 216 NG/ML (ref 30–400)
FOLATE SERPL-MCNC: >20 NG/ML (ref 4.78–24.2)
GLOBULIN SER CALC-MCNC: 1.7 GM/DL
GLUCOSE SERPL-MCNC: 95 MG/DL (ref 65–99)
HBA1C MFR BLD: 5.4 % (ref 4.8–5.6)
HCT VFR BLD AUTO: 36.5 % (ref 37.5–51)
HDLC SERPL-MCNC: 33 MG/DL (ref 40–60)
HGB BLD-MCNC: 12.5 G/DL (ref 13–17.7)
IMM GRANULOCYTES # BLD AUTO: 0.01 10*3/MM3 (ref 0–0.05)
IMM GRANULOCYTES NFR BLD AUTO: 0.2 % (ref 0–0.5)
IRON SATN MFR SERPL: 37 % (ref 20–50)
IRON SERPL-MCNC: 114 MCG/DL (ref 59–158)
LDLC SERPL CALC-MCNC: 44 MG/DL (ref 0–100)
LYMPHOCYTES # BLD AUTO: 0.85 10*3/MM3 (ref 0.7–3.1)
LYMPHOCYTES NFR BLD AUTO: 20.9 % (ref 19.6–45.3)
MCH RBC QN AUTO: 32.1 PG (ref 26.6–33)
MCHC RBC AUTO-ENTMCNC: 34.2 G/DL (ref 31.5–35.7)
MCV RBC AUTO: 93.6 FL (ref 79–97)
MONOCYTES # BLD AUTO: 0.48 10*3/MM3 (ref 0.1–0.9)
MONOCYTES NFR BLD AUTO: 11.8 % (ref 5–12)
NEUTROPHILS # BLD AUTO: 2.62 10*3/MM3 (ref 1.7–7)
NEUTROPHILS NFR BLD AUTO: 64.4 % (ref 42.7–76)
NRBC BLD AUTO-RTO: 0 /100 WBC (ref 0–0.2)
PLATELET # BLD AUTO: 154 10*3/MM3 (ref 140–450)
POTASSIUM SERPL-SCNC: 4.1 MMOL/L (ref 3.5–5.2)
PROT SERPL-MCNC: 6.1 G/DL (ref 6–8.5)
RBC # BLD AUTO: 3.9 10*6/MM3 (ref 4.14–5.8)
SODIUM SERPL-SCNC: 140 MMOL/L (ref 136–145)
TIBC SERPL-MCNC: 306 MCG/DL
TRIGL SERPL-MCNC: 61 MG/DL (ref 0–150)
TSH SERPL DL<=0.005 MIU/L-ACNC: 1.63 UIU/ML (ref 0.27–4.2)
UIBC SERPL-MCNC: 192 MCG/DL (ref 112–346)
VIT B12 SERPL-MCNC: 647 PG/ML (ref 211–946)
VLDLC SERPL CALC-MCNC: 14 MG/DL (ref 5–40)
WBC # BLD AUTO: 4.07 10*3/MM3 (ref 3.4–10.8)

## 2021-08-16 ENCOUNTER — TELEPHONE (OUTPATIENT)
Dept: FAMILY MEDICINE CLINIC | Facility: CLINIC | Age: 76
End: 2021-08-16

## 2021-08-16 DIAGNOSIS — G25.81 RESTLESS LEG SYNDROME: ICD-10-CM

## 2021-08-16 RX ORDER — ROPINIROLE 0.5 MG/1
0.5 TABLET, FILM COATED ORAL NIGHTLY
Qty: 90 TABLET | Refills: 1 | Status: SHIPPED | OUTPATIENT
Start: 2021-08-16 | End: 2021-08-19 | Stop reason: SDUPTHER

## 2021-08-16 NOTE — TELEPHONE ENCOUNTER
Caller: Loni Sanford    Relationship: Self    Best call back number: [phone]     Medication needed:   ROPINIROLE (REQUIP) 0.25 (PATIENT IS REQUESTING TO GET A PRESCRIPTION FOR TWICE A DAY) HE HAS BEEN TAKING 2 A DAY BECAUSE IT WORKS BETTER FOR HIM.     When do you need the refill by: 8-16-21        Does the patient have less than a 3 day supply:  [x] Yes  [] No    What is the patient's preferred pharmacy: [facility] [address] [place] [address] - [phone] [facility] [phone] FX

## 2021-08-19 DIAGNOSIS — G25.81 RESTLESS LEG SYNDROME: ICD-10-CM

## 2021-08-19 RX ORDER — ROPINIROLE 1 MG/1
0.5 TABLET, FILM COATED ORAL NIGHTLY
Qty: 90 TABLET | Refills: 1 | Status: SHIPPED | OUTPATIENT
Start: 2021-08-19 | End: 2022-02-04

## 2021-08-31 ENCOUNTER — TELEPHONE (OUTPATIENT)
Dept: FAMILY MEDICINE CLINIC | Facility: CLINIC | Age: 76
End: 2021-08-31

## 2021-08-31 ENCOUNTER — OFFICE VISIT (OUTPATIENT)
Dept: FAMILY MEDICINE CLINIC | Facility: CLINIC | Age: 76
End: 2021-08-31

## 2021-08-31 VITALS — TEMPERATURE: 97.7 F | BODY MASS INDEX: 28.88 KG/M2 | WEIGHT: 195 LBS | OXYGEN SATURATION: 98 % | HEIGHT: 69 IN

## 2021-08-31 DIAGNOSIS — I95.2 HYPOTENSION DUE TO DRUGS: ICD-10-CM

## 2021-08-31 DIAGNOSIS — G25.81 RESTLESS LEG SYNDROME: Primary | ICD-10-CM

## 2021-08-31 DIAGNOSIS — I25.118 CORONARY ARTERY DISEASE OF NATIVE ARTERY OF NATIVE HEART WITH STABLE ANGINA PECTORIS (HCC): ICD-10-CM

## 2021-08-31 DIAGNOSIS — G20 PARKINSON DISEASE (HCC): ICD-10-CM

## 2021-08-31 DIAGNOSIS — E78.5 HYPERLIPIDEMIA LDL GOAL <70: ICD-10-CM

## 2021-08-31 PROCEDURE — 99214 OFFICE O/P EST MOD 30 MIN: CPT | Performed by: FAMILY MEDICINE

## 2021-08-31 NOTE — TELEPHONE ENCOUNTER
----- Message from Zo Carrillo MD sent at 8/31/2021  9:06 AM EDT -----  Please contact patient's cardiologist Dr. Jules at [facility].  He has been having very low blood pressures to the 50s/40 with feelings of passing out.  Today his blood pressure is better.  It does seem he is not drinking enough water, about 16 ounces a day.  Orthostatic blood pressure was done today that was normal.  I advised him to decrease his metoprolol to half a tablet, 25 mg daily and increase his water intake to at least 60 ounces a day and to follow-up if his blood pressure does not improve.  I wanted to make sure his cardiologist is aware as he is the doctor prescribing his metoprolol and make any modifications to my recommendations if needed.Thanks!Zo

## 2021-10-27 ENCOUNTER — TELEPHONE (OUTPATIENT)
Dept: FAMILY MEDICINE CLINIC | Facility: CLINIC | Age: 76
End: 2021-10-27

## 2021-11-02 NOTE — TELEPHONE ENCOUNTER
Thank you for making me aware.  Is he seeing a neurologist?  If he is seeing a neurologist closely okay to follow-up with him.  Otherwise please advise him to make an appointment in the next couple of weeks with me.

## 2021-12-21 ENCOUNTER — TELEPHONE (OUTPATIENT)
Dept: FAMILY MEDICINE CLINIC | Facility: CLINIC | Age: 76
End: 2021-12-21

## 2021-12-21 ENCOUNTER — APPOINTMENT (OUTPATIENT)
Dept: CT IMAGING | Facility: HOSPITAL | Age: 76
End: 2021-12-21

## 2021-12-21 ENCOUNTER — HOSPITAL ENCOUNTER (EMERGENCY)
Facility: HOSPITAL | Age: 76
Discharge: HOME | End: 2021-12-21
Attending: EMERGENCY MEDICINE | Admitting: EMERGENCY MEDICINE

## 2021-12-21 ENCOUNTER — APPOINTMENT (OUTPATIENT)
Dept: CARDIOLOGY | Facility: HOSPITAL | Age: 76
End: 2021-12-21

## 2021-12-21 VITALS
OXYGEN SATURATION: 97 % | RESPIRATION RATE: 16 BRPM | TEMPERATURE: 98 F | SYSTOLIC BLOOD PRESSURE: 126 MMHG | DIASTOLIC BLOOD PRESSURE: 79 MMHG | HEART RATE: 98 BPM

## 2021-12-21 DIAGNOSIS — G89.29 ACUTE EXACERBATION OF CHRONIC LOW BACK PAIN: Primary | ICD-10-CM

## 2021-12-21 DIAGNOSIS — M54.50 ACUTE EXACERBATION OF CHRONIC LOW BACK PAIN: Primary | ICD-10-CM

## 2021-12-21 LAB
BH CV LOWER VASCULAR LEFT COMMON FEMORAL AUGMENT: NORMAL
BH CV LOWER VASCULAR LEFT COMMON FEMORAL COMPETENT: NORMAL
BH CV LOWER VASCULAR LEFT COMMON FEMORAL COMPRESS: NORMAL
BH CV LOWER VASCULAR LEFT COMMON FEMORAL PHASIC: NORMAL
BH CV LOWER VASCULAR LEFT COMMON FEMORAL SPONT: NORMAL
BH CV LOWER VASCULAR LEFT DISTAL FEMORAL COMPRESS: NORMAL
BH CV LOWER VASCULAR LEFT GASTRONEMIUS COMPRESS: NORMAL
BH CV LOWER VASCULAR LEFT GREATER SAPH AK COMPRESS: NORMAL
BH CV LOWER VASCULAR LEFT GREATER SAPH BK COMPRESS: NORMAL
BH CV LOWER VASCULAR LEFT LESSER SAPH COMPRESS: NORMAL
BH CV LOWER VASCULAR LEFT MID FEMORAL AUGMENT: NORMAL
BH CV LOWER VASCULAR LEFT MID FEMORAL COMPETENT: NORMAL
BH CV LOWER VASCULAR LEFT MID FEMORAL COMPRESS: NORMAL
BH CV LOWER VASCULAR LEFT MID FEMORAL PHASIC: NORMAL
BH CV LOWER VASCULAR LEFT MID FEMORAL SPONT: NORMAL
BH CV LOWER VASCULAR LEFT PERONEAL COMPRESS: NORMAL
BH CV LOWER VASCULAR LEFT POPLITEAL AUGMENT: NORMAL
BH CV LOWER VASCULAR LEFT POPLITEAL COMPETENT: NORMAL
BH CV LOWER VASCULAR LEFT POPLITEAL COMPRESS: NORMAL
BH CV LOWER VASCULAR LEFT POPLITEAL PHASIC: NORMAL
BH CV LOWER VASCULAR LEFT POPLITEAL SPONT: NORMAL
BH CV LOWER VASCULAR LEFT POSTERIOR TIBIAL COMPRESS: NORMAL
BH CV LOWER VASCULAR LEFT PROFUNDA FEMORAL COMPRESS: NORMAL
BH CV LOWER VASCULAR LEFT PROXIMAL FEMORAL COMPRESS: NORMAL
BH CV LOWER VASCULAR LEFT SAPHENOFEMORAL JUNCTION COMPRESS: NORMAL
BH CV LOWER VASCULAR RIGHT COMMON FEMORAL AUGMENT: NORMAL
BH CV LOWER VASCULAR RIGHT COMMON FEMORAL COMPETENT: NORMAL
BH CV LOWER VASCULAR RIGHT COMMON FEMORAL COMPRESS: NORMAL
BH CV LOWER VASCULAR RIGHT COMMON FEMORAL PHASIC: NORMAL
BH CV LOWER VASCULAR RIGHT COMMON FEMORAL SPONT: NORMAL
MAXIMAL PREDICTED HEART RATE: 145 BPM
STRESS TARGET HR: 123 BPM

## 2021-12-21 PROCEDURE — 93971 EXTREMITY STUDY: CPT

## 2021-12-21 PROCEDURE — 99283 EMERGENCY DEPT VISIT LOW MDM: CPT

## 2021-12-21 PROCEDURE — 72131 CT LUMBAR SPINE W/O DYE: CPT

## 2021-12-21 PROCEDURE — 63710000001 ONDANSETRON ODT 4 MG TABLET DISPERSIBLE: Performed by: NURSE PRACTITIONER

## 2021-12-21 RX ORDER — HYDROCODONE BITARTRATE AND ACETAMINOPHEN 5; 325 MG/1; MG/1
0.5 TABLET ORAL EVERY 6 HOURS PRN
Qty: 6 TABLET | Refills: 0 | Status: SHIPPED | OUTPATIENT
Start: 2021-12-21 | End: 2021-12-27

## 2021-12-21 RX ORDER — ONDANSETRON 4 MG/1
4 TABLET, ORALLY DISINTEGRATING ORAL ONCE
Status: COMPLETED | OUTPATIENT
Start: 2021-12-21 | End: 2021-12-21

## 2021-12-21 RX ORDER — HYDROCODONE BITARTRATE AND ACETAMINOPHEN 5; 325 MG/1; MG/1
1 TABLET ORAL ONCE
Status: COMPLETED | OUTPATIENT
Start: 2021-12-21 | End: 2021-12-21

## 2021-12-21 RX ADMIN — HYDROCODONE BITARTRATE AND ACETAMINOPHEN 1 TABLET: 5; 325 TABLET ORAL at 11:47

## 2021-12-21 RX ADMIN — ONDANSETRON 4 MG: 4 TABLET, ORALLY DISINTEGRATING ORAL at 11:46

## 2021-12-21 NOTE — ED PROVIDER NOTES
I have supervised the care provided by the midlevel provider.    We have discussed this patient's history, physical exam, and treatment plan.   I have reviewed the note and have personally examined the patient and agree with the plan of care.  See attached attending note.  My personal findings are below:    Patient complains of the left posterior calf pain since yesterday.  Pain is constant but is worse with walking or standing.  Denies numbness/tingling/weakness in his left leg, chest pain, shortness of breath, or leg swelling.    On exam: Awake and alert.  Resting comfortably, no acute distress.  Heart is regular rate and rhythm.  Lungs are clear bilaterally.  Respiratory effort is normal.  Back is nontender.  There is mild left calf tenderness.  No pedal edema.  Normal strength and light touch sensation of both lower extremities.    Plan is to obtain Doppler ultrasound of the left leg and CT scan of the lumbar spine.     Robert Wright MD  12/21/21 [id number]

## 2021-12-21 NOTE — TELEPHONE ENCOUNTER
Pt's wife called.  Pt is having severe leg pain.     Pain started on 12/20 and  Left leg was bothering pt upon waking, getting worse throughout the day.      On 12/21 (this morning) pt couldn't walk on leg, hurts from calf to hip 8/9 pain level  Doesn't hurt when sitting, just when standing or walking    Pt's wife was informed there are no same day appointments available and pt should proceed to urgent care or ED.     Pt and wife are concerned about possible blood clots.      Pt is requesting a call back about concern over the possibility of blood clots.     Please advise.

## 2021-12-21 NOTE — ED TRIAGE NOTES
Pt reports left leg pain that starts in his calf and radiates up. Pt reports pain 8/10.     Pt was wearing a mask during assessment.  This RN wore appropriate PPE

## 2021-12-21 NOTE — ED PROVIDER NOTES
EMERGENCY DEPARTMENT ENCOUNTER    Room Number:  B01/01  Date of encounter:  12/24/2021  PCP: Zo Carrillo MD  Historian: patient, wife   Full history not obtainable due to: none     HPI:  Chief Complaint: Left leg pain    Context: Loni Sanford is a 76 y.o. male who presents to the ED c/o left leg pain onset yesterday.  Pain is constant. Worse with walking. Radiates from the hip down the posterior aspect of the leg, abetting mid calf. Does not report fall or injury. No hx of dvt. No reported swelling. Hx of chronic low back pain and did report it hurt more over the weekend.     PAST MEDICAL HISTORY    Active Ambulatory Problems     Diagnosis Date Noted   • Ureteral calculus, right 12/28/2018   • Clear cell carcinoma of kidney (HCC) 12/17/2018   • Coronary artery disease 06/29/2021   • History of adenocarcinoma of prostate 06/17/2019   • Hyperlipidemia LDL goal <70 06/29/2021   • MCI (mild cognitive impairment) 11/15/2019   • Neuropathy 11/15/2019   • Parkinson disease (HCC) 12/17/2018   • Stable angina ([facility]) 11/20/2020     Resolved Ambulatory Problems     Diagnosis Date Noted   • Renal mass, right 04/05/2018     Past Medical History:   Diagnosis Date   • Cancer (CMS/HCC)    • Cancer (CMS/HCC)    • Dizziness    • Hematuria    • History of angina    • History of transfusion    • Hyperlipidemia    • Hypertension    • Kidney stones    • Kidney tumor          PAST SURGICAL HISTORY  Past Surgical History:   Procedure Laterality Date   • APPENDECTOMY  2011   • CARDIAC CATHETERIZATION     • CATARACT EXTRACTION W/ INTRAOCULAR LENS  IMPLANT, BILATERAL     • EXTRACORPOREAL SHOCKWAVE LITHOTRIPSY (ESWL), STENT INSERTION/REMOVAL Right 12/29/2017    Procedure: RIGHT EXTRACORPOREAL SHOCKWAVE LITHOTRIPSY CYSTOSCOPY STENT PLACEMENT;  Surgeon: Marcelo Suarez MD;  Location: [facility] OR [facility];  Service:    • EXTRACORPOREAL SHOCKWAVE LITHOTRIPSY (ESWL), STENT INSERTION/REMOVAL Right 12/28/2018    Procedure: RT EXTRACORPREAL  SHOCKWAVE LITHOTRIPSY CYSTOSCOPY  WITH  MANIPULATION OF STONE;  Surgeon: Moe Vasquez MD;  Location: [facility];  Service: Urology   • FINGER SURGERY Left 1984    INDEX   • HAND SURGERY Right 1998   • NEPHRECTOMY PARTIAL Right 4/5/2018    Procedure: RT OPEN PARTIAL NEPHRECTOMY WITH INTRAOPERATIVE DOPPLER, DECORTICATION OF RIGHT RENAL CYST;  Surgeon: Marcelo Suarez MD;  Location: [facility] OR;  Service: Urology   • PROSTATECTOMY  2013         FAMILY HISTORY  Family History   Problem Relation Age of Onset   • Heart disease Father    • Heart attack Father    • Diabetes Brother    • Heart attack Brother    • Coronary artery disease Brother         CABG   • Heart disease Brother    • Hypertension Mother    • Malig Hyperthermia Neg Hx    • Stroke Neg Hx          SOCIAL HISTORY  Social History     Socioeconomic History   • Marital status:    Tobacco Use   • Smoking status: Never Smoker   • Smokeless tobacco: Never Used   Substance and Sexual Activity   • Alcohol use: No   • Drug use: No   • Sexual activity: Defer         ALLERGIES  Aspirin and Belladonna alkaloids-opium        REVIEW OF SYSTEMS  Review of Systems   All systems reviewed and marked as negative except as listed in HPI       PHYSICAL EXAM    I have reviewed the triage vital signs and nursing notes.    ED Triage Vitals [12/21/21 1048]   Temp Heart Rate Resp BP SpO2   98 °F (36.7 °C) 98 16 126/79 97 %      Temp src Heart Rate Source Patient Position BP Location FiO2 (%)   -- -- -- -- --       GENERAL: alert well developed, well nourished in no distress  HENT: NCAT, neck supple, trachea midline  EYES: no scleral icterus, PERRL, normal conjunctivae  CV: regular rhythm, regular rate, no murmur  RESPIRATORY: unlabored effort, CTAB  ABDOMEN: soft, nontender, nondistended, bowel sounds present  MUSCULOSKELETAL: no gross deformity. No focal vertebral tenderness or step off. No para spinous spasm. Left calf is mildly tender without any rash or  appreciated edema.   NEURO: alert,  sensory and motor function of extremities grossly intact, speech clear, mental status normal/baseline  SKIN: warm, dry, no rash  PSYCH:  Appropriate mood and affect    Vital signs and nursing notes reviewed.      RADIOLOGY  CT Lumbar Spine Without Contrast   Final Result       There is loss of the usual lordotic curvature of the lumbar spine. There   is no significant degree of canal stenosis throughout the lumbar spine.       Relatively mild degrees of multilevel foraminal narrowing is as   discussed in detail above.        The most prominent of these areas of foraminal narrowing is seen within   the left L4-5 neural foramen where there is a mild-to-moderate degree of   foraminal compromise.       These findings were discussed with Chiquita Wong of the emergency room   on 12/21/2021 at approximately 12:37 PM.       Radiation dose reduction techniques were utilized, including automated   exposure control and exposure modulation based on body size.       This report was finalized on 12/21/2021 2:27 PM by Dr. Dung Cardenas M.D.              I ordered the above noted radiological studies. Independently reviewed by me and discussed with radiologist.  See dictation above for official radiology interpretation.      PROCEDURES    Procedures        MEDICATIONS GIVEN IN ER    Medications   HYDROcodone-acetaminophen (NORCO) 5-325 MG per tablet 1 tablet (1 tablet Oral Given 12/21/21 1147)   ondansetron ODT (ZOFRAN-ODT) disintegrating tablet 4 mg (4 mg Oral Given 12/21/21 1146)         PROGRESS, DATA ANALYSIS, CONSULTS, AND MEDICAL DECISION MAKING    All labs have been independently reviewed by me.  All radiology studies have been reviewed by me.   EKG's independently reviewed by me.  Discussion below represents my analysis of pertinent findings related to patient's condition, differential diagnosis, treatment plan and final disposition.    DIFFERENTIAL DIAGNOSIS INCLUDE BUT NOT LIMITED  TO: Lumbago, muscle spasm, vertebral fracture, spinal stenosis, lumbar radiculopathy, cauda equina syndrome, DDD, AAA, retroperitoneal hematoma, SBO, diverticulitis, UTI      ED Course    Tue Dec 21, 2021   1200 Discussed pt with vascular technician who reports pt is negative for DVT of LLE  [JS]   1241 Discussed pt with Dr Cardenas who reports ct lumbar spine shows left foraminal narrowing at L4-L5.  [JS]   1319 JOHANA queried and reviewed. No encounters noted.    [JS]      ED Course User Index  [JS] Chiquita Wong, IVONNE     MDM: Patient presents with acute left leg pain and recently worsening chronic low back pain.  Believe his symptoms are radicular in nature.  He did have some mild foraminal narrowing at L4 and L5 which may account for his radicular symptoms.  DVT was ruled out via vascular ultrasound.  Do not suspect the patient has AAA as a cause of his pain.  He will be prescribed low-dose of hydrocodone and recommended close follow-up with his family physician.  Return precautions given.  He is stable and ambulatory at time of discharge and appears in no distress.    BP - 126/79  HR - 98  TEMP - 98 °F (36.7 °C)  O2 SATS - 97%         Medication List      New Prescriptions    HYDROcodone-acetaminophen 5-325 MG per tablet  Commonly known as: NORCO  Take 0.5 tablets by mouth Every 6 (Six) Hours As Needed for Severe Pain .           Where to Get Your Medications      These medications were sent to [facility] [address] [place] [address] - [address] [place] [address] - [phone]  - [phone]   [facility] [address]    Phone: [phone]   · HYDROcodone-acetaminophen 5-325 MG per tablet           DIAGNOSIS  Final diagnoses:   Acute exacerbation of chronic low back pain         DISPOSITION  Discharge     Pt masked in first look. I wore appropriate PPE throughout my encounters with the pt. I performed hand hygiene on entry into the pt room and upon exit.     Dictated utilizing Dragon  dictation:  Much of this encounter note is an electronic transcription/translation of spoken language to printed text. The electronic translation of spoken language may permit erroneous, or at times, nonsensical words or phrases to be inadvertently transcribed; Although I have reviewed the note for such errors, some may still exist.     Chiquita Wong, APRN  12/24/21 1913

## 2021-12-21 NOTE — TELEPHONE ENCOUNTER
Spoke with denise and advise to go to ER blod clot are very dangerous and we can not take any chances that could be the cause pt  Did not have swellingbut has sudden pain from hip to calf I advise anyway get evaluation it could be many thing muscle spasm, sciatic ,but defibnly he needs evaluation ,she agreed to go today

## 2021-12-21 NOTE — DISCHARGE INSTRUCTIONS
Follow up with your doctor   Tylenol for pain   Return if worse or new concerns   Continue care with your primary care physician and have your blood pressure regularly checked and managed. Normal blood pressure is 120/80.

## 2021-12-27 ENCOUNTER — OFFICE VISIT (OUTPATIENT)
Dept: FAMILY MEDICINE CLINIC | Facility: CLINIC | Age: 76
End: 2021-12-27

## 2021-12-27 VITALS
HEART RATE: 69 BPM | TEMPERATURE: 97.1 F | WEIGHT: 202 LBS | DIASTOLIC BLOOD PRESSURE: 90 MMHG | RESPIRATION RATE: 16 BRPM | BODY MASS INDEX: 29.83 KG/M2 | SYSTOLIC BLOOD PRESSURE: 130 MMHG | OXYGEN SATURATION: 98 %

## 2021-12-27 DIAGNOSIS — M54.42 ACUTE BILATERAL LOW BACK PAIN WITH LEFT-SIDED SCIATICA: ICD-10-CM

## 2021-12-27 DIAGNOSIS — M25.562 ACUTE PAIN OF LEFT KNEE: Primary | ICD-10-CM

## 2021-12-27 PROCEDURE — 73560 X-RAY EXAM OF KNEE 1 OR 2: CPT

## 2021-12-27 PROCEDURE — 99213 OFFICE O/P EST LOW 20 MIN: CPT

## 2021-12-27 RX ORDER — GABAPENTIN 300 MG/1
300 CAPSULE ORAL 2 TIMES DAILY
Qty: 60 CAPSULE | Refills: 0 | Status: SHIPPED | OUTPATIENT
Start: 2021-12-27 | End: 2022-01-17

## 2021-12-27 RX ORDER — CYCLOBENZAPRINE HCL 5 MG
5 TABLET ORAL 3 TIMES DAILY PRN
Qty: 30 TABLET | Refills: 1 | Status: SHIPPED | OUTPATIENT
Start: 2021-12-27 | End: 2022-01-17

## 2021-12-27 RX ORDER — METOPROLOL SUCCINATE 25 MG/1
12.5 TABLET, EXTENDED RELEASE ORAL DAILY
COMMUNITY
Start: 2021-12-14

## 2021-12-27 RX ORDER — PREDNISONE 20 MG/1
20 TABLET ORAL 2 TIMES DAILY
Qty: 10 TABLET | Refills: 0 | Status: SHIPPED | OUTPATIENT
Start: 2021-12-27 | End: 2022-01-01

## 2021-12-27 NOTE — PROGRESS NOTES
Chief Complaint  Pain (Left leg x 10 days sharp, starts with the ankle shoots up.)    Subjective          Loni Sanford presents to [facility]  History of Present Illness       Patient presents the office with low back pain that radiates down the left leg has been going on for the past 2 weeks.  Patient said about 2 weeks ago he started off with some low back pain that was mostly on the left side.  He started radiating down the left side of his leg and has mostly stayed on the left leg since then.  Patient said that the pain is mostly with standing and walking.  Patient feels really stiff with standing and walking.  At rest there is no pain.  Patient went to the ED on the 21st to have the pain evaluated.  Ultrasound the left leg was negative for DVT.  CT of the lumbar back did show disc bulging in foraminal narrowing on L4-L5.  He was sent home with [place] and was told to follow-up with PCP.  Patient said since taking the Norco it has not helped the pain.  Patient said he now feels a lot in his knee and will radiate down his lower leg sometimes up as well.  Patient is on Plavix and cannot take any anti-inflammatories.    Patient denies any numbness or tingling in the groin.  No loss of bowel or bladder function.  No weakness associated.  Patient said he just had to take short steps with the left leg due to the pain.  Nothing is helped relieve the pain and pain is worse with movement.  Patient still is full range of motion of the left leg.  Patient feels like the pain is a neuropathic pain and shooting pain that goes down the leg.        Objective   Vital Signs:   /90   Pulse 69   Temp 97.1 °F (36.2 °C)   Resp 16   Wt 91.6 kg (202 lb)   SpO2 98%   BMI 29.83 kg/m²     Physical Exam  Vitals reviewed.   Constitutional:       General: He is not in acute distress.  Cardiovascular:      Rate and Rhythm: Normal rate.      Heart sounds: Normal heart sounds.   Pulmonary:       Effort: Pulmonary effort is normal.      Breath sounds: Normal breath sounds.   Musculoskeletal:      Lumbar back: No tenderness or bony tenderness. Normal range of motion.      Left knee: No swelling or bony tenderness. Normal range of motion. No tenderness.      Instability Tests: Anterior drawer test negative. Posterior drawer test negative. Anterior Lachman test negative.      Comments: No pain on exam or with palpation.  Pain only with standing to low back that radiates down the left leg.   Skin:     General: Skin is warm and dry.   Neurological:      Mental Status: He is alert.   Psychiatric:         Mood and Affect: Mood normal.        Result Review :            X-ray of the left leg complete.  No previous exams to compare with.  No acute findings.  Awaiting full radiology report.     Assessment and Plan    Diagnoses and all orders for this visit:    1. Acute pain of left knee (Primary)  -     XR Knee 1 or 2 View Left (In Office)  -     predniSONE (DELTASONE) 20 MG tablet; Take 1 tablet by mouth 2 (Two) Times a Day for 5 days.  Dispense: 10 tablet; Refill: 0  -     cyclobenzaprine (FLEXERIL) 5 MG tablet; Take 1 tablet by mouth 3 (Three) Times a Day As Needed for Muscle Spasms.  Dispense: 30 tablet; Refill: 1    2. Acute bilateral low back pain with left-sided sciatica  -     Ambulatory Referral to Physical Therapy  -     predniSONE (DELTASONE) 20 MG tablet; Take 1 tablet by mouth 2 (Two) Times a Day for 5 days.  Dispense: 10 tablet; Refill: 0  -     cyclobenzaprine (FLEXERIL) 5 MG tablet; Take 1 tablet by mouth 3 (Three) Times a Day As Needed for Muscle Spasms.  Dispense: 30 tablet; Refill: 1        Pleasant 76-year-old male presents to clinic with low back pain with sciatica down the left leg.  Unsure if some of the pain is due to the knee as well or if it is all coming from the low back and just radiating down the left leg.  Obtain a x-ray of the left knee.  Do not see an acute findings but waiting full  radiology report.  For pain management at this time sending patient home with prednisone and Flexeril.  Counseled him to take with medication and to watch for any signs of drowsiness with Flexeril.  I also discussed patient with Dr. Carrillo, patient's PCP, and she suggested adding on gabapentin as well which she will order for him for the neuropathic pain.  Counseled patient that gabapentin can also make him drowsy and to watch for this especially when taking with Flexeril.  Also sending patient to physical therapy.  Patient to follow-up in 1 month with Dr. Carrillo to reassess low back pain and left leg pain.  If pain gets worse or does not improve with physical therapy return to clinic sooner.  If experiencing any numbness or tingling in the groin, loss of bowel or bladder habit, or decreased range of motion or ability to walk or weakness return to clinic.      Follow Up   Return in about 4 weeks (around 1/24/2022) for Recheck back pain wiht Dr Carrillo.  Patient was given instructions and counseling regarding his condition or for health maintenance advice. Please see specific information pulled into the AVS if appropriate.     Medical assistant and I wore mask and eyewear protection during entire encounter.  Patient wore mask.      Electronically signed by IVONNE Silva, 12/27/21, 10:21 AM EST.

## 2021-12-27 NOTE — TELEPHONE ENCOUNTER
PATIENT CALLED WANTING TO KNOW IF YOU COULD CALL IN THIS RX TO [facility]?     THEY HAVE NOT RECEIVED ANYTHING YET    Loni Sanford ([facility]) [phone] (H)

## 2021-12-27 NOTE — TELEPHONE ENCOUNTER
Pt called again and stated that she still has not received her husbands medication.  Pt was in formed that  is the only provider in the office at the moment.

## 2022-01-12 ENCOUNTER — TELEPHONE (OUTPATIENT)
Dept: FAMILY MEDICINE CLINIC | Facility: CLINIC | Age: 77
End: 2022-01-12

## 2022-01-12 NOTE — TELEPHONE ENCOUNTER
Pt stating that gabapentin (NEURONTIN) 300 MG capsule (12/27/2021)  Is not working for his left leg pain and wants to know if the medication can be changed to something stronger.  Stating that he is in constant pain.     Please advise.     Pt requesting a call.       Please see Telephone Encounter by Lisandra Yusuf RegSched Rep (01/12/2022 11:34)      Was seen by IVONNE Grijalva on 12/22/21 for severe leg pain     Was seen by IVONNE Shea on 12/27/21 for severe leg pain

## 2022-01-12 NOTE — TELEPHONE ENCOUNTER
If patient is still having a lot of issues with his leg pain I think he needs to come in sooner to the clinic to be further evaluated.  I think I saw him at the end of December but unfortunately I am unable to prescribe any stronger medication.  Usually sees Dr. Carrillo.  If he is willing to be able to get worked in with one of the other providers this week?

## 2022-01-12 NOTE — TELEPHONE ENCOUNTER
Patient had just had a CT of the lumbar spine completed at the end of December.  Patient was requesting an MRI due to continued sciatica down the left leg.  However I was unsure if we would really need an MRI since CT was already done?  I saw him for acute visit at the end of December and I think he follows up with you on February 7.

## 2022-01-12 NOTE — TELEPHONE ENCOUNTER
Caller: Loni Sanford    Relationship: Self    Best call back number: [phone]    What orders are you requesting (i.e. lab or imaging): MRI FOR LEFT LEG AND BACK     In what timeframe would the patient need to come in: ASAP     Where will you receive your lab/imaging services: [facility]

## 2022-01-13 NOTE — TELEPHONE ENCOUNTER
Has the patient started physical therapy yet?  He did have a CT scan which does show findings that I think contribute to his symptoms.  So I agree that he already has a diagnostic image showing this.  He also has an appointment with spine surgery in March.  I know that is a long way I was away, but is very challenging to get in sooner anywhere else.  I would recommend doing PT, the muscle relaxers and anti-inflammatories with ice or heat as  Randa Prescribed.

## 2022-01-14 NOTE — TELEPHONE ENCOUNTER
Can you verify if the patient has had physical therapy started for his leg pain?  Otherwise I believe patient has a follow-up with Dr. Carrillo on the 17th to further discuss leg pain.

## 2022-01-14 NOTE — TELEPHONE ENCOUNTER
Order is in but may not have been authorized yet.  He can call [facility] physiotherapy to try and schedule an appointment.  Their number is [phone]

## 2022-01-17 ENCOUNTER — OFFICE VISIT (OUTPATIENT)
Dept: FAMILY MEDICINE CLINIC | Facility: CLINIC | Age: 77
End: 2022-01-17

## 2022-01-17 VITALS
BODY MASS INDEX: 31.25 KG/M2 | HEART RATE: 71 BPM | SYSTOLIC BLOOD PRESSURE: 114 MMHG | OXYGEN SATURATION: 96 % | WEIGHT: 211 LBS | DIASTOLIC BLOOD PRESSURE: 76 MMHG | TEMPERATURE: 97.9 F | HEIGHT: 69 IN

## 2022-01-17 DIAGNOSIS — M54.32 SCIATICA WITHOUT BACK PAIN, LEFT: Primary | ICD-10-CM

## 2022-01-17 DIAGNOSIS — G20 PARKINSON DISEASE: ICD-10-CM

## 2022-01-17 PROCEDURE — 99214 OFFICE O/P EST MOD 30 MIN: CPT | Performed by: FAMILY MEDICINE

## 2022-01-17 RX ORDER — TRAMADOL HYDROCHLORIDE 50 MG/1
50 TABLET ORAL EVERY 6 HOURS PRN
Qty: 30 TABLET | Refills: 1 | Status: SHIPPED | OUTPATIENT
Start: 2022-01-17 | End: 2022-06-17 | Stop reason: SDUPTHER

## 2022-01-17 RX ORDER — BACLOFEN 10 MG/1
10 TABLET ORAL 3 TIMES DAILY PRN
Qty: 60 TABLET | Refills: 1 | Status: SHIPPED | OUTPATIENT
Start: 2022-01-17 | End: 2022-03-08

## 2022-01-17 NOTE — PROGRESS NOTES
"Chief Complaint  Leg Pain (since december l;eg pain sometimes burning and sometimes sharps pain  left leg get worse when walk gabapentin is not working at all  )    Subjective          Loni Sanford presents to [facility]  History of Present Illness  Pleasant 76-year-old male here for left-sided leg pain.  It started with lower back pain and radiation down the left leg, difficult to assess if it was coming from the knee or the lower back but involved both areas.  X-ray of the knee was reassuring, treated with prednisone and Flexeril at the last appointment.  Treated with gabapentin as well.  The pain started in the back 12/21/21, it will radiated to the back of the leg and some to the calf, to the ankle.      Objective   Vital Signs:   /76   Pulse 71   Temp 97.9 °F (36.6 °C)   Ht 175.3 cm (69\")   Wt 95.7 kg (211 lb)   SpO2 96%   BMI 31.16 kg/m²     Physical Exam  Vitals and nursing note reviewed.   Constitutional:       General: He is not in acute distress.     Appearance: He is well-developed.   HENT:      Head: Normocephalic.      Nose: Nose normal.   Cardiovascular:      Heart sounds: No murmur heard.      Pulmonary:      Effort: Pulmonary effort is normal. No respiratory distress.   Musculoskeletal:         General: Normal range of motion.   Skin:     General: Skin is warm and dry.      Findings: No rash.   Neurological:      Mental Status: He is alert and oriented to person, place, and time.   Psychiatric:         Behavior: Behavior normal.         Thought Content: Thought content normal.         Judgment: Judgment normal.        Result Review :                 Assessment and Plan    Diagnoses and all orders for this visit:    1. Sciatica without back pain, left (Primary)  -     Ambulatory Referral to Physical Therapy Evaluate and treat  -     baclofen (LIORESAL) 10 MG tablet; Take 1 tablet by mouth 3 (Three) Times a Day As Needed for Muscle Spasms.  Dispense: 60 " tablet; Refill: 1  -     traMADol (ULTRAM) 50 MG tablet; Take 1 tablet by mouth Every 6 (Six) Hours As Needed for Moderate Pain .  Dispense: 30 tablet; Refill: 1    2. Parkinson disease (HCC)    Pleasant 76-year-old male here for left-sided sciatica, thankfully the back pain has resolved but he is still having left leg pain that starts in the buttocks and radiates down the back of the leg to the ankle.  He has been treated with Flexeril, prednisone and gabapentin without substantial improvement.  CT scan of the lumbar spine was performed December 21 which does show some foraminal narrowing on L4-L5 with moderate foraminal compromise.  Otherwise no other acute findings were found.  He has not done PT, will transition medications to different muscle relaxers and tramadol for pain, follow-up with me in 6 weeks.  If not improving after this I do think an MRI would be warranted.      Follow Up   Return in about 6 weeks (around 2/28/2022), or if symptoms worsen or fail to improve, for Recheck lower back pain .  Patient was given instructions and counseling regarding his condition or for health maintenance advice. Please see specific information pulled into the AVS if appropriate. Medical assistant and I wore mask and eyewear protection during entire encounter.  Patient wore mask.    Zo Carrillo MD

## 2022-02-04 DIAGNOSIS — G25.81 RESTLESS LEG SYNDROME: ICD-10-CM

## 2022-02-04 RX ORDER — ROPINIROLE 0.5 MG/1
TABLET, FILM COATED ORAL
Qty: 90 TABLET | Refills: 1 | Status: SHIPPED | OUTPATIENT
Start: 2022-02-04 | End: 2022-07-08 | Stop reason: DRUGHIGH

## 2022-02-04 NOTE — TELEPHONE ENCOUNTER
Rx Refill Note  Requested Prescriptions     Pending Prescriptions Disp Refills   • rOPINIRole (REQUIP) 0.5 MG tablet [Pharmacy Med Name: rOPINIRole HCl 0.5 MG Oral Tablet] 90 tablet 0     Sig: TAKE 1 TABLET BY MOUTH ONCE DAILY AT NIGHT      Last office visit with prescribing clinician: 1/17/2022      Next office visit with prescribing clinician: 3/1/2022            Lorena Pacheco  02/04/22, 10:26 EST

## 2022-03-08 ENCOUNTER — OFFICE VISIT (OUTPATIENT)
Dept: FAMILY MEDICINE CLINIC | Facility: CLINIC | Age: 77
End: 2022-03-08

## 2022-03-08 ENCOUNTER — TELEPHONE (OUTPATIENT)
Dept: FAMILY MEDICINE CLINIC | Facility: CLINIC | Age: 77
End: 2022-03-08

## 2022-03-08 VITALS
BODY MASS INDEX: 30.81 KG/M2 | WEIGHT: 208 LBS | TEMPERATURE: 97.8 F | HEIGHT: 69 IN | SYSTOLIC BLOOD PRESSURE: 86 MMHG | DIASTOLIC BLOOD PRESSURE: 44 MMHG | OXYGEN SATURATION: 98 % | HEART RATE: 66 BPM

## 2022-03-08 DIAGNOSIS — Z11.59 ENCOUNTER FOR HEPATITIS C SCREENING TEST FOR LOW RISK PATIENT: ICD-10-CM

## 2022-03-08 DIAGNOSIS — Z12.5 SCREENING PSA (PROSTATE SPECIFIC ANTIGEN): ICD-10-CM

## 2022-03-08 DIAGNOSIS — I25.118 CORONARY ARTERY DISEASE OF NATIVE ARTERY OF NATIVE HEART WITH STABLE ANGINA PECTORIS: ICD-10-CM

## 2022-03-08 DIAGNOSIS — Z00.00 MEDICARE ANNUAL WELLNESS VISIT, SUBSEQUENT: Primary | ICD-10-CM

## 2022-03-08 DIAGNOSIS — I95.2 HYPOTENSION DUE TO DRUGS: ICD-10-CM

## 2022-03-08 PROCEDURE — 1159F MED LIST DOCD IN RCRD: CPT | Performed by: FAMILY MEDICINE

## 2022-03-08 PROCEDURE — 99213 OFFICE O/P EST LOW 20 MIN: CPT | Performed by: FAMILY MEDICINE

## 2022-03-08 PROCEDURE — G0439 PPPS, SUBSEQ VISIT: HCPCS | Performed by: FAMILY MEDICINE

## 2022-03-08 PROCEDURE — 96160 PT-FOCUSED HLTH RISK ASSMT: CPT | Performed by: FAMILY MEDICINE

## 2022-03-08 PROCEDURE — 1126F AMNT PAIN NOTED NONE PRSNT: CPT | Performed by: FAMILY MEDICINE

## 2022-03-08 PROCEDURE — 1170F FXNL STATUS ASSESSED: CPT | Performed by: FAMILY MEDICINE

## 2022-03-08 NOTE — PROGRESS NOTES
The ABCs of the Annual Wellness Visit  Subsequent Medicare Wellness Visit    Chief Complaint   Patient presents with   • Medicare Wellness-subsequent     No complains  doing well       Subjective    History of Present Illness:  Loni Sanford is a 76 y.o. male who presents for a Subsequent Medicare Wellness Visit.    He has had 2 episodes of dizziness, light headed and weak, one episode he was holding a plate and dropped it.  These episodes have all happened in the last 3 weeks.  He thinks it is different than Parkinson's, he is concerned more about the lightheadedness.  He denies chest pain, palpitations or shortness of breath.  He has seen his cardiologist regularly.  Last a few months ago.    The following portions of the patient's history were reviewed and   updated as appropriate: allergies, current medications, past family history, past medical history, past social history, past surgical history and problem list.    Compared to one year ago, the patient feels his physical   health is the same.    Compared to one year ago, the patient feels his mental   health is the same.    Recent Hospitalizations:  He was not admitted to the hospital during the last year.       Current Medical Providers:  Patient Care Team:  Zo Carrillo MD as PCP - General (Family Medicine)  Robert Tang MD as Consulting Physician (Cardiology)    Outpatient Medications Prior to Visit   Medication Sig Dispense Refill   • atorvastatin (LIPITOR) 20 MG tablet Take 20 mg by mouth Every Night.     • carbidopa-levodopa (SINEMET)  MG per tablet Take 2 tablets by mouth 3 (Three) Times a Day.     • cholecalciferol (VITAMIN D3) 1000 units tablet Take 1,000 Units by mouth Every Morning.     • clopidogrel (PLAVIX) 75 MG tablet Take 75 mg by mouth Every Morning.     • metoprolol succinate XL (TOPROL-XL) 25 MG 24 hr tablet Take 25 mg by mouth Daily.     • Multiple Vitamins-Minerals (CENTRUM ADULTS PO) Take 1 tablet by mouth Every Morning.      • nitroglycerin (NITROSTAT) 0.4 MG SL tablet DISSOLVE ONE TABLET UNDER THE TONGUE EVERY 5 MINUTES AS NEEDED FOR CHEST PAIN     • Omega-3 Fatty Acids (FISH OIL) 1000 MG capsule capsule Take 1,000 mg by mouth 2 (Two) Times a Day With Meals. STOP DATE 3/27/18 FOR PROCEDURE     • ramipril (ALTACE) 2.5 MG capsule Take 2.5 mg by mouth Every Morning.     • rOPINIRole (REQUIP) 0.5 MG tablet TAKE 1 TABLET BY MOUTH ONCE DAILY AT NIGHT 90 tablet 1   • traMADol (ULTRAM) 50 MG tablet Take 1 tablet by mouth Every 6 (Six) Hours As Needed for Moderate Pain . 30 tablet 1   • acetaminophen (TYLENOL) 500 MG tablet Take 1,000 mg by mouth Every 6 (Six) Hours As Needed for Mild Pain .     • baclofen (LIORESAL) 10 MG tablet Take 1 tablet by mouth 3 (Three) Times a Day As Needed for Muscle Spasms. 60 tablet 1     No facility-administered medications prior to visit.       Opioid medication/s are on active medication list.  and I have evaluated his active treatment plan and pain score trends (see table).  Vitals:    03/08/22 0924   PainSc: 0-No pain     I have reviewed the chart for potential of high risk medication and harmful drug interactions in the elderly.            Aspirin is not on active medication list.  Aspirin use is not indicated based on review of current medical condition/s. Risk of harm outweighs potential benefits.  .    Patient Active Problem List   Diagnosis   • Ureteral calculus, right   • Clear cell carcinoma of kidney (HCC)   • Coronary artery disease   • History of adenocarcinoma of prostate   • Hyperlipidemia LDL goal <70   • MCI (mild cognitive impairment)   • Neuropathy   • Parkinson disease (HCC)   • Stable angina (HCC)     Advance Care Planning  Advance Directive is not on file.  ACP discussion was held with the patient during this visit. Patient has an advance directive (not in EMR), copy requested.          Objective    Vitals:    03/08/22 0924   BP: (!) 86/44   Pulse: 66   Temp: 97.8 °F (36.6 °C)   SpO2:  "98%   Weight: 94.3 kg (208 lb)   Height: 175.3 cm (69\")   PainSc: 0-No pain     BMI Readings from Last 1 Encounters:   03/08/22 30.72 kg/m²   BMI is above normal parameters. Recommendations include: exercise counseling and nutrition counseling    Does the patient have evidence of cognitive impairment? No    Physical Exam  Vitals and nursing note reviewed.   Constitutional:       General: He is not in acute distress.     Appearance: He is well-developed.   HENT:      Head: Normocephalic.      Right Ear: Tympanic membrane and ear canal normal.      Left Ear: Tympanic membrane and ear canal normal.      Nose: Nose normal.   Neck:      Vascular: No carotid bruit.   Cardiovascular:      Rate and Rhythm: Normal rate and regular rhythm.      Heart sounds: Normal heart sounds. No murmur heard.  Pulmonary:      Effort: Pulmonary effort is normal. No respiratory distress.      Breath sounds: Normal breath sounds.   Musculoskeletal:         General: Normal range of motion.   Skin:     General: Skin is warm and dry.      Findings: No rash.   Neurological:      Mental Status: He is alert and oriented to person, place, and time.   Psychiatric:         Behavior: Behavior normal.         Thought Content: Thought content normal.         Judgment: Judgment normal.       Lab Results   Component Value Date    CHLPL 111 03/01/2022    TRIG 88 03/01/2022    HDL 40 03/01/2022    LDL 54 03/01/2022    VLDL 17 03/01/2022            HEALTH RISK ASSESSMENT    Smoking Status:  Social History     Tobacco Use   Smoking Status Never Smoker   Smokeless Tobacco Never Used     Alcohol Consumption:  Social History     Substance and Sexual Activity   Alcohol Use No     Fall Risk Screen:    STEADI Fall Risk Assessment was completed, and patient is at LOW risk for falls.Assessment completed on:3/8/2022    Depression Screening:  PHQ-2/PHQ-9 Depression Screening 3/8/2022   Little Interest or Pleasure in Doing Things 0-->not at all   Feeling Down, Depressed " or Hopeless 0-->not at all   PHQ-9: Brief Depression Severity Measure Score 0       Health Habits and Functional and Cognitive Screening:  Functional & Cognitive Status 3/8/2022   Do you have difficulty preparing food and eating? No   Do you have difficulty bathing yourself, getting dressed or grooming yourself? Yes   Do you have difficulty using the toilet? No   Do you have difficulty moving around from place to place? No   Do you have trouble with steps or getting out of a bed or a chair? No   Current Diet Well Balanced Diet   Dental Exam Up to date   Eye Exam Not up to date   Exercise (times per week) 2 times per week   Current Exercises Include Walking   Do you need help using the phone?  No   Are you deaf or do you have serious difficulty hearing?  No   Do you need help with transportation? Yes   Do you need help shopping? No   Do you need help preparing meals?  No   Do you need help with housework?  No   Do you need help with laundry? No   Do you need help taking your medications? No   Do you need help managing money? No   Do you ever drive or ride in a car without wearing a seat belt? No   Have you felt unusual stress, anger or loneliness in the last month? No   Who do you live with? Spouse   If you need help, do you have trouble finding someone available to you? No   Have you been bothered in the last four weeks by sexual problems? No   Do you have difficulty concentrating, remembering or making decisions? No       Age-appropriate Screening Schedule:  Refer to the list below for future screening recommendations based on patient's age, sex and/or medical conditions. Orders for these recommended tests are listed in the plan section. The patient has been provided with a written plan.    Health Maintenance   Topic Date Due   • TDAP/TD VACCINES (2 - Tdap) 07/11/2011   • LIPID PANEL  03/01/2023   • INFLUENZA VACCINE  Completed   • ZOSTER VACCINE  Completed              Assessment/Plan   CMS Preventative Services  Quick Reference  Risk Factors Identified During Encounter  Cardiovascular Disease  Chronic Pain   Immunizations Discussed/Encouraged (specific Immunizations; Td  Inactivity/Sedentary  Obesity/Overweight   The above risks/problems have been discussed with the patient.  Follow up actions/plans if indicated are seen below in the Assessment/Plan Section.  Pertinent information has been shared with the patient in the After Visit Summary.    Diagnoses and all orders for this visit:    1. Medicare annual wellness visit, subsequent (Primary)    2. Encounter for hepatitis C screening test for low risk patient  -     Hepatitis C Antibody    3. Screening PSA (prostate specific antigen)  -     PSA SCREENING    4. Coronary artery disease of native artery of native heart with stable angina pectoris (HCC)  Comments:  No stent in place, medical management with Plavix, ACE, beta-blocker and statin.  Continue Plavix and statin, asymptomatic.  Notify cardiologist of BP    5. Hypotension due to drugs  -     Urinalysis With Microscopic - Urine, Clean Catch  -     Basic Metabolic Panel      Patient with episodes of lightheadedness, low blood pressure.  In normal sinus rhythm, EKG unavailable in the office today.  But up-to-date with cardiologist.  On exam he was in normal sinus rhythm.  I do think a lot of this is due to a combination of dehydration as he only drinks 1 glass of water a day and hypertension to medications.    Plan:  -Notify his cardiologist  -Increase water to 6 glasses of water a day, more possible.  -Hold ramipril 2.5 mg and metoprolol 25 mg daily.  -Check blood pressure daily and record, I would like his blood pressure to return to the 120s over 80s.  Permissible up to 140/90.  -Will call back with blood pressures in 2 weeks and follow-up with me in 1 month.    Follow Up:   Return in about 4 weeks (around 4/5/2022), or if symptoms worsen or fail to improve, for Recheck blood pressure.     An After Visit Summary and  PPPS were made available to the patient.    Medical assistant and I wore mask and eyewear protection during entire encounter.  Patient wore mask.    Zo Carrillo MD

## 2022-03-08 NOTE — TELEPHONE ENCOUNTER
----- Message from Zo Carrillo MD sent at 3/8/2022 10:30 AM EST -----  Please contact patient's cardiologist, Dr. Tang.  Okay to send my note-she has hypotension today with blood pressure of 86/44.  Maintained after resting.  He drinks 1 glass of water a day.  I encouraged him to increase his water intake, hold ramipril and metoprolol, he does get lightheadedness but otherwise is asymptomatic.  Normal sinus rhythm today.    Thank you!

## 2022-03-09 LAB
APPEARANCE UR: ABNORMAL
BACTERIA #/AREA URNS HPF: ABNORMAL /[HPF]
BILIRUB UR QL STRIP: NEGATIVE
BUN SERPL-MCNC: 25 MG/DL (ref 8–27)
BUN/CREAT SERPL: 19 (ref 10–24)
CALCIUM SERPL-MCNC: 9.5 MG/DL (ref 8.6–10.2)
CASTS URNS MICRO: ABNORMAL
CASTS URNS QL MICRO: PRESENT /LPF
CHLORIDE SERPL-SCNC: 105 MMOL/L (ref 96–106)
CO2 SERPL-SCNC: 26 MMOL/L (ref 20–29)
COLOR UR: YELLOW
CREAT SERPL-MCNC: 1.32 MG/DL (ref 0.76–1.27)
CRYSTALS URNS MICRO: ABNORMAL
EGFR GENE MUT ANL BLD/T: 56 ML/MIN/1.73
EPI CELLS #/AREA URNS HPF: ABNORMAL /HPF (ref 0–10)
GLUCOSE SERPL-MCNC: 82 MG/DL (ref 65–99)
GLUCOSE UR QL STRIP: NEGATIVE
HCV AB S/CO SERPL IA: <0.1 S/CO RATIO (ref 0–0.9)
HGB UR QL STRIP: NEGATIVE
KETONES UR QL STRIP: ABNORMAL
LEUKOCYTE ESTERASE UR QL STRIP: ABNORMAL
MICRO URNS: ABNORMAL
MUCOUS THREADS URNS QL MICRO: PRESENT
NITRITE UR QL STRIP: NEGATIVE
PH UR STRIP: 6 [PH] (ref 5–7.5)
POTASSIUM SERPL-SCNC: 4.6 MMOL/L (ref 3.5–5.2)
PROT UR QL STRIP: ABNORMAL
PSA SERPL-MCNC: <0.1 NG/ML (ref 0–4)
RBC #/AREA URNS HPF: ABNORMAL /HPF (ref 0–2)
SODIUM SERPL-SCNC: 144 MMOL/L (ref 134–144)
SP GR UR STRIP: 1.02 (ref 1–1.03)
UNIDENT CRYS URNS QL MICRO: PRESENT
UROBILINOGEN UR STRIP-MCNC: 1 MG/DL (ref 0.2–1)
WBC #/AREA URNS HPF: ABNORMAL /HPF (ref 0–5)

## 2022-03-11 ENCOUNTER — TELEPHONE (OUTPATIENT)
Dept: FAMILY MEDICINE CLINIC | Facility: CLINIC | Age: 77
End: 2022-03-11

## 2022-03-11 NOTE — TELEPHONE ENCOUNTER
Yes, please advise him to restart his metoprolol.  Continue to recheck his blood pressure and call his heart doctor.  He was having very low blood pressure with lightheadedness and dizziness previously.  I would monitor for the symptoms as well.  Thank you!

## 2022-03-11 NOTE — TELEPHONE ENCOUNTER
Pt has been informed but would also like to know if he should restart the Ramipril as well. Please advise

## 2022-03-11 NOTE — TELEPHONE ENCOUNTER
Pt called because Dr. Carrillo took his off this BP medication.     He stated that he is having issues.    BP READINGS   3/10/22  5PM-178/100  6PM-164/94-He took a Metoprolol at that time     3/11/22  7:30AM-148/82  9:30AM-112/70    He also stated that Dr. Carrillo was going to reach out to his heart  About the removal of the BP meds

## 2022-03-14 ENCOUNTER — TELEPHONE (OUTPATIENT)
Dept: FAMILY MEDICINE CLINIC | Facility: CLINIC | Age: 77
End: 2022-03-14

## 2022-03-14 NOTE — TELEPHONE ENCOUNTER
Patient called on-call after-hours service, his blood pressure has been labile, ranging from 80/40 to 178/90.  Last week his blood pressure was very low so I held his ramipril 2.5 mg and metoprolol 25 mg daily.  He did talk to Dr. Jules who recommended taking half a tablet of metoprolol -however his blood pressure slightly higher today.  Apparently it can fluctuate with Parkinson's.    Plan to take the second half of metoprolol now.  Continue with taking half a tablet daily, possibly around lunchtime and if his blood pressure does not improve to be less than 160/90 did take the second dose.  Continue follow-up with Dr. Jules his cardiologist and Dr. Sams his neurologist for this.

## 2022-03-16 ENCOUNTER — TELEPHONE (OUTPATIENT)
Dept: FAMILY MEDICINE CLINIC | Facility: CLINIC | Age: 77
End: 2022-03-16

## 2022-03-16 NOTE — TELEPHONE ENCOUNTER
Caller: Loni Sanford    Relationship to patient: Self    Best call back number: [phone]    Patient is needing: BLOOD PRESSURE READINGS   3-14-22    152/95  3-14-22 10:30 A.M.     90/80  3-14-22 11:30 A.M.   88/57  3-14-22  AFTERNOON 178/90    3-15-22   92/57  3-15-22   EVENING 155/99 (TOOK MEDICATION EARLY metoprolol succinate XL (TOPROL-XL) 25 MG 24 hr tablet)   3-15-22   11:00 P.M.   163/98        3-16-22   DID NOT GET OUT OF BED BEFORE THIS READING    190/95      3-16-22   AFTER TAKING SHOWER       129/85    3-16-22   AFTER EATING BREAKFAST   156/91    3-16-22   10:00 A.M.  AFTER COMING HOME FROM AN APPOINTMENT 121/73

## 2022-03-17 NOTE — TELEPHONE ENCOUNTER
Thank you for making me aware, I would recommend that he discuss this with Dr. Doe and Dr. Sams.  I would recommend that he continue with metoprolol 25 mg half a tablet daily and take a second half only if his blood pressure goes over 160/100.  If he is feeling fine I would recommend that he not over check his blood pressure.  It is possible that it has been liable like this for some time.

## 2022-04-15 ENCOUNTER — OFFICE VISIT (OUTPATIENT)
Dept: FAMILY MEDICINE CLINIC | Facility: CLINIC | Age: 77
End: 2022-04-15

## 2022-04-15 VITALS
HEIGHT: 69 IN | SYSTOLIC BLOOD PRESSURE: 156 MMHG | HEART RATE: 56 BPM | OXYGEN SATURATION: 96 % | TEMPERATURE: 97.8 F | BODY MASS INDEX: 31.7 KG/M2 | DIASTOLIC BLOOD PRESSURE: 86 MMHG | WEIGHT: 214 LBS

## 2022-04-15 DIAGNOSIS — R79.89 ELEVATED SERUM CREATININE: ICD-10-CM

## 2022-04-15 DIAGNOSIS — I10 PRIMARY HYPERTENSION: Primary | ICD-10-CM

## 2022-04-15 DIAGNOSIS — G20 PARKINSON DISEASE: ICD-10-CM

## 2022-04-15 PROCEDURE — 99214 OFFICE O/P EST MOD 30 MIN: CPT | Performed by: FAMILY MEDICINE

## 2022-04-15 RX ORDER — CLOPIDOGREL BISULFATE 75 MG/1
1 TABLET ORAL DAILY
COMMUNITY
Start: 2022-04-08 | End: 2022-04-15

## 2022-04-15 RX ORDER — ATORVASTATIN CALCIUM 20 MG/1
1 TABLET, FILM COATED ORAL DAILY
COMMUNITY
Start: 2022-02-11 | End: 2022-04-15

## 2022-04-15 NOTE — PROGRESS NOTES
"Chief Complaint  Hypertension (4 weeks follow up ,just one time had dizzy spell  ,feeling much better )    Subjective          Loni Sanford presents to [facility]  History of Present Illness  Pleasant 76-year-old male here to follow-up hypertension.  He had significant lightheadedness and hypotension at his last appointment.  Likely due to accommodation of dehydration and hypertensive meds.  I encouraged him to increase his water intake to 6 glasses a day and hold his ramipril 2.5 metoprolol 25 mg daily.  Goal blood pressure less than 140/90.  He did call his cardiologist Dr. Tang.  He continued off the ramipril but restarted metoprolol 25 in the morning and 12.5 nightly.  We discussed that it would be helpful for him to see his Neurologist.     He has not had any dizziness in the last 3 weeks.  He has been working to increase his water intake.  Also doing therapy, decreased his meds as above.    Some BPs in the 140s, he does not recall any of the other blood pressures.  I reviewed some notes from Dr. Tang, it appears that he did have some that were in the 170s and some that were as low as the 90s.    Objective   Vital Signs:   /86   Pulse 56   Temp 97.8 °F (36.6 °C)   Ht 175.3 cm (69\")   Wt 97.1 kg (214 lb)   SpO2 96%   BMI 31.60 kg/m²            Physical Exam  Vitals and nursing note reviewed.   Constitutional:       General: He is not in acute distress.     Appearance: He is well-developed.   HENT:      Head: Normocephalic.      Nose: Nose normal.   Cardiovascular:      Heart sounds: No murmur heard.  Pulmonary:      Effort: Pulmonary effort is normal. No respiratory distress.   Musculoskeletal:         General: Normal range of motion.   Skin:     General: Skin is warm and dry.      Findings: No rash.   Neurological:      Mental Status: He is alert and oriented to person, place, and time.   Psychiatric:         Behavior: Behavior normal.         Thought Content: " Thought content normal.         Judgment: Judgment normal.        Result Review :   The following data was reviewed by: Zo Carrillo MD on 04/15/2022:  CMP    CMP 7/1/21 3/1/22 3/8/22   Glucose 95 105 (A) 82   BUN 15 20 25   Creatinine 1.15 1.17 1.32 (A)   eGFR Non  Am 62     eGFR  Am 75     Sodium 140 142 144   Potassium 4.1 4.5 4.6   Chloride 103 101 105   Calcium 9.3 9.3 9.5   Total Protein 6.1 6.6    Albumin 4.40 4.5    Globulin 1.7 2.1    Total Bilirubin 0.8 0.8    Alkaline Phosphatase 86 103    AST (SGOT) 16 16    ALT (SGPT) 7 20    (A) Abnormal value       Comments are available for some flowsheets but are not being displayed.           CBC    CBC 7/1/21 3/1/22   WBC 4.07 5.3   RBC 3.90 (A) 4.10 (A)   Hemoglobin 12.5 (A) 13.4   Hematocrit 36.5 (A) 38.5   MCV 93.6 94   MCH 32.1 32.7   MCHC 34.2 34.8   RDW 11.8 (A) 11.8   Platelets 154 166   (A) Abnormal value            Lipid Panel    Lipid Panel 7/1/21 3/1/22   Total Cholesterol 91 111   Triglycerides 61 88   HDL Cholesterol 33 (A) 40   VLDL Cholesterol 14 17   LDL Cholesterol  44 54   (A) Abnormal value       Comments are available for some flowsheets but are not being displayed.           UA    Urinalysis 3/8/22 3/8/22    1024 1024   Blood, UA Negative    Leukocytes, UA Trace (A)    Nitrite, UA Negative    RBC, UA  0-2   Bacteria, UA  None seen   (A) Abnormal value                      Assessment and Plan    Diagnoses and all orders for this visit:    1. Primary hypertension (Primary)    2. Parkinson disease (HCC)    3. Elevated serum creatinine  -     Basic Metabolic Panel    Pleasant 76-year-old male here to follow-up hypertension, liable blood pressure likely related to Parkinson's.  It is above goal currently but that will drop down to 90/50 rather quickly.  He does have upcoming appointments with both neurology and cardiology.  He feels substantially better, less dizziness.  Doing physical therapy currently.  He also has increased his  water intake.  We discussed that his recent labs did show a decline in his renal function.    Plan:  -Continue with hydration, physical therapy and possibly start exercising with pool exercises.  This may help his blood pressure and overall mobility.  -Continue with metoprolol 12.5 mg daily per recs from Dr. Tang.  If his blood pressure is above 150/90 okay to take the second half. (Ramipril 2.5 mg discontinued due to hypotension)  -Continue follow-up with cardiology for CAD and labile hypertension  -Continue with follow-up for Parkinson's with Dr. Sams.  He does show more rigidity now than I recall previously.  Again continue with therapy, pool exercises.  Continue carbidopa levodopa.    Some crystals in urine, patient asymptomatic.  If his renal function does not improve will follow this up further.      Follow Up   Return in about 6 months (around 10/15/2022), or if symptoms worsen or fail to improve, for Recheck hypertension.  Patient was given instructions and counseling regarding his condition or for health maintenance advice. Please see specific information pulled into the AVS if appropriate. Medical assistant and I wore mask and eyewear protection during entire encounter.  Patient wore mask.    Zo Carrillo MD

## 2022-04-16 LAB
BUN SERPL-MCNC: 25 MG/DL (ref 8–27)
BUN/CREAT SERPL: 23 (ref 10–24)
CALCIUM SERPL-MCNC: 9.6 MG/DL (ref 8.6–10.2)
CHLORIDE SERPL-SCNC: 101 MMOL/L (ref 96–106)
CO2 SERPL-SCNC: 26 MMOL/L (ref 20–29)
CREAT SERPL-MCNC: 1.09 MG/DL (ref 0.76–1.27)
EGFRCR SERPLBLD CKD-EPI 2021: 71 ML/MIN/1.73
GLUCOSE SERPL-MCNC: 95 MG/DL (ref 65–99)
POTASSIUM SERPL-SCNC: 4.5 MMOL/L (ref 3.5–5.2)
SODIUM SERPL-SCNC: 141 MMOL/L (ref 134–144)

## 2022-04-21 NOTE — PROGRESS NOTES
Please call patient back with results.  The labs has resulted as normal kidney function, great work!  Please let us know if you have further questions.     Thank you

## 2022-06-17 DIAGNOSIS — M54.32 SCIATICA WITHOUT BACK PAIN, LEFT: ICD-10-CM

## 2022-06-17 NOTE — TELEPHONE ENCOUNTER
Caller: Loni Sanford    Relationship: Self    Best call back number: [phone]    Requested Prescriptions:   Requested Prescriptions     Pending Prescriptions Disp Refills   • traMADol (ULTRAM) 50 MG tablet 30 tablet 1     Sig: Take 1 tablet by mouth Every 6 (Six) Hours As Needed for Moderate Pain .        Pharmacy where request should be sent: [facility] [address] [place] [address] - [phone]  - [phone] FX     Additional details provided by patient: PATIENT HAS ONE PILL LEFT. PLEASE CALL AND ADVISE IF THIS MEDICATION CAN BE REFILLED OR NOT   Does the patient have less than a 3 day supply:  [x] Yes  [] No    Fartun CARRASQUILLO Rep   06/17/22 08:51 EDT

## 2022-06-19 RX ORDER — TRAMADOL HYDROCHLORIDE 50 MG/1
50 TABLET ORAL EVERY 6 HOURS PRN
Qty: 15 TABLET | Refills: 0 | Status: SHIPPED | OUTPATIENT
Start: 2022-06-19 | End: 2022-10-17

## 2022-07-05 ENCOUNTER — TELEPHONE (OUTPATIENT)
Dept: FAMILY MEDICINE CLINIC | Facility: CLINIC | Age: 77
End: 2022-07-05

## 2022-07-05 DIAGNOSIS — G25.81 RESTLESS LEG SYNDROME: ICD-10-CM

## 2022-07-05 NOTE — TELEPHONE ENCOUNTER
rls is getting worse not sure if stronger medication or  Higher dose will help also asking is compression sock besides with edema will also help for RLS  Before he was having issues rls  around 9pm and now he episodes start around 6pm in afternoon ,he know will take couple days to answer him back

## 2022-07-05 NOTE — TELEPHONE ENCOUNTER
Caller: Loni Sanford    Relationship: Self    Best call back number: [phone]    What is the best time to reach you: ANY    Who are you requesting to speak with (clinical staff, provider,  specific staff member): CLINICAL STAFF    What was the call regarding: RESTLESS LEG SYNDROME ACTING UP AND HAPPENING MORE OFTEN, NOT SLEEPING WELL.     Do you require a callback: YES

## 2022-07-07 NOTE — TELEPHONE ENCOUNTER
He is welcome to increase the ropinirole to 1 mg every night to see if this will help with the restless leg syndrome.  I will caution him that he will likely become tolerant to this medication over time and gradually need higher and higher doses.  So I would encourage him to be slow and increasing the dose.    In terms of the swelling, I would recommend compression socks and elevating during the middle of the day.  We can assess if he needs any diuretics at his next appointment.  If it seems like it is really urgent we can see if we can get him in with one of our nurse practitioners.

## 2022-07-08 RX ORDER — ROPINIROLE 1 MG/1
1 TABLET, FILM COATED ORAL NIGHTLY
Qty: 30 TABLET | Refills: 0 | Status: SHIPPED | OUTPATIENT
Start: 2022-07-08 | End: 2023-01-17

## 2022-07-08 NOTE — TELEPHONE ENCOUNTER
Pt informed ,will send 1mg nightly ropinirole to [facility]

## 2022-08-01 ENCOUNTER — TRANSCRIBE ORDERS (OUTPATIENT)
Dept: CARDIOLOGY | Facility: HOSPITAL | Age: 77
End: 2022-08-01

## 2022-08-01 ENCOUNTER — TRANSCRIBE ORDERS (OUTPATIENT)
Dept: ADMINISTRATIVE | Facility: HOSPITAL | Age: 77
End: 2022-08-01

## 2022-08-01 ENCOUNTER — APPOINTMENT (OUTPATIENT)
Dept: LAB | Facility: HOSPITAL | Age: 77
End: 2022-08-01

## 2022-08-01 ENCOUNTER — HOSPITAL ENCOUNTER (OUTPATIENT)
Dept: CARDIOLOGY | Facility: HOSPITAL | Age: 77
Discharge: HOME | End: 2022-08-01

## 2022-08-01 ENCOUNTER — LAB (OUTPATIENT)
Dept: LAB | Facility: HOSPITAL | Age: 77
End: 2022-08-01

## 2022-08-01 DIAGNOSIS — N20.1 URETERAL STONE: Primary | ICD-10-CM

## 2022-08-01 DIAGNOSIS — Z01.811 PRE-OP CHEST EXAM: Primary | ICD-10-CM

## 2022-08-01 DIAGNOSIS — N20.1 URETERAL STONE: ICD-10-CM

## 2022-08-01 DIAGNOSIS — Z01.818 OTHER SPECIFIED PRE-OPERATIVE EXAMINATION: Primary | ICD-10-CM

## 2022-08-01 LAB
ANION GAP SERPL CALCULATED.3IONS-SCNC: 9.2 MMOL/L (ref 5–15)
BUN SERPL-MCNC: 18 MG/DL (ref 8–23)
BUN/CREAT SERPL: 16.5 (ref 7–25)
CALCIUM SPEC-SCNC: 9.5 MG/DL (ref 8.6–10.5)
CHLORIDE SERPL-SCNC: 104 MMOL/L (ref 98–107)
CO2 SERPL-SCNC: 28.8 MMOL/L (ref 22–29)
CREAT SERPL-MCNC: 1.09 MG/DL (ref 0.76–1.27)
DEPRECATED RDW RBC AUTO: 39.9 FL (ref 37–54)
EGFRCR SERPLBLD CKD-EPI 2021: 70.3 ML/MIN/1.73
ERYTHROCYTE [DISTWIDTH] IN BLOOD BY AUTOMATED COUNT: 11.8 % (ref 12.3–15.4)
GLUCOSE SERPL-MCNC: 99 MG/DL (ref 65–99)
HCT VFR BLD AUTO: 34.8 % (ref 37.5–51)
HGB BLD-MCNC: 11.9 G/DL (ref 13–17.7)
MCH RBC QN AUTO: 31.8 PG (ref 26.6–33)
MCHC RBC AUTO-ENTMCNC: 34.2 G/DL (ref 31.5–35.7)
MCV RBC AUTO: 93 FL (ref 79–97)
PLATELET # BLD AUTO: 163 10*3/MM3 (ref 140–450)
PMV BLD AUTO: 9.6 FL (ref 6–12)
POTASSIUM SERPL-SCNC: 3.9 MMOL/L (ref 3.5–5.2)
QT INTERVAL: 441 MS
RBC # BLD AUTO: 3.74 10*6/MM3 (ref 4.14–5.8)
SARS-COV-2 ORF1AB RESP QL NAA+PROBE: DETECTED
SODIUM SERPL-SCNC: 142 MMOL/L (ref 136–145)
WBC NRBC COR # BLD: 4.49 10*3/MM3 (ref 3.4–10.8)

## 2022-08-01 PROCEDURE — 93010 ELECTROCARDIOGRAM REPORT: CPT | Performed by: INTERNAL MEDICINE

## 2022-08-01 PROCEDURE — C9803 HOPD COVID-19 SPEC COLLECT: HCPCS

## 2022-08-01 PROCEDURE — 93005 ELECTROCARDIOGRAM TRACING: CPT

## 2022-08-01 PROCEDURE — 85027 COMPLETE CBC AUTOMATED: CPT

## 2022-08-01 PROCEDURE — 80048 BASIC METABOLIC PNL TOTAL CA: CPT

## 2022-08-01 PROCEDURE — 36415 COLL VENOUS BLD VENIPUNCTURE: CPT

## 2022-08-01 PROCEDURE — U0004 COV-19 TEST NON-CDC HGH THRU: HCPCS | Performed by: INTERNAL MEDICINE

## 2022-08-02 ENCOUNTER — TELEPHONE (OUTPATIENT)
Dept: GASTROENTEROLOGY | Facility: CLINIC | Age: 77
End: 2022-08-02

## 2022-08-02 ENCOUNTER — LAB REQUISITION (OUTPATIENT)
Dept: LAB | Facility: HOSPITAL | Age: 77
End: 2022-08-02

## 2022-08-02 DIAGNOSIS — N20.1 CALCULUS OF URETER: ICD-10-CM

## 2022-08-02 PROCEDURE — 82365 CALCULUS SPECTROSCOPY: CPT | Performed by: UROLOGY

## 2022-08-02 NOTE — TELEPHONE ENCOUNTER
Attempted to reach patient but no answer on the phone numbers listed on chart.   Phone call to patient's listed PCP Zo Carrillo's office.   Spoke with Aaliyah, she states the practice manager is out of the until later.   Advised the patient has tested positive for COVID and Dr. Palacios has never seen the patient. She states she will pass the information on to her practice manager Lorena Pacheco once she comes into the office.   Phone messages sent to Lorena Pacheco and Judy Leigh.

## 2022-08-02 NOTE — TELEPHONE ENCOUNTER
Thank you for making me aware, please call patient to make him aware of positive COVID test.  He would qualify for monoclonal antibodies, I would not recommend Paxlovid as he is on medications that could interact with his Plavix which does thin the blood.  I do not think we should hold the Plavix for Paxlovid.    If he is in agreement, we can order monoclonal antibodies.

## 2022-08-02 NOTE — TELEPHONE ENCOUNTER
Thank you so much, I just signed the prescription.  Thank you!    Do you mind calling to see how he is doing?  And if he has any severity of symptoms?

## 2022-08-02 NOTE — TELEPHONE ENCOUNTER
Dr. Carrillo,  I want to make sure you are aware of this before reaching out to the patient. Per previous note on this message, patient needs to be contacted to give Covid results. Please advise.    Shawn,  I have copied you on this so you are aware. Please wait for response from Dr. Carrillo before contacting patient.    Thanks,

## 2022-08-08 LAB
CALCIUM OXALATE DIHYDRATE MFR STONE IR: 50 %
COLOR STONE: NORMAL
COM MFR STONE: 50 %
COMPN STONE: NORMAL
LABORATORY COMMENT REPORT: NORMAL
Lab: NORMAL
Lab: NORMAL
PHOTO: NORMAL
SIZE STONE: NORMAL MM
SPEC SOURCE SUBJ: NORMAL
WT STONE: 13 MG

## 2022-10-17 ENCOUNTER — OFFICE VISIT (OUTPATIENT)
Dept: FAMILY MEDICINE CLINIC | Facility: CLINIC | Age: 77
End: 2022-10-17

## 2022-10-17 VITALS
HEART RATE: 68 BPM | TEMPERATURE: 97.8 F | DIASTOLIC BLOOD PRESSURE: 80 MMHG | OXYGEN SATURATION: 98 % | WEIGHT: 209 LBS | BODY MASS INDEX: 30.96 KG/M2 | HEIGHT: 69 IN | SYSTOLIC BLOOD PRESSURE: 134 MMHG

## 2022-10-17 DIAGNOSIS — G31.84 MCI (MILD COGNITIVE IMPAIRMENT): ICD-10-CM

## 2022-10-17 DIAGNOSIS — Z23 NEED FOR VACCINATION: ICD-10-CM

## 2022-10-17 DIAGNOSIS — F51.01 PRIMARY INSOMNIA: ICD-10-CM

## 2022-10-17 DIAGNOSIS — R29.898 WEAKNESS OF BOTH LOWER EXTREMITIES: ICD-10-CM

## 2022-10-17 DIAGNOSIS — G62.9 NEUROPATHY: ICD-10-CM

## 2022-10-17 DIAGNOSIS — I10 PRIMARY HYPERTENSION: Primary | ICD-10-CM

## 2022-10-17 DIAGNOSIS — G20 PARKINSON DISEASE: ICD-10-CM

## 2022-10-17 PROCEDURE — 90662 IIV NO PRSV INCREASED AG IM: CPT | Performed by: FAMILY MEDICINE

## 2022-10-17 PROCEDURE — 99214 OFFICE O/P EST MOD 30 MIN: CPT | Performed by: FAMILY MEDICINE

## 2022-10-17 PROCEDURE — G0008 ADMIN INFLUENZA VIRUS VAC: HCPCS | Performed by: FAMILY MEDICINE

## 2022-10-17 RX ORDER — DOXEPIN HYDROCHLORIDE 10 MG/1
10 CAPSULE ORAL NIGHTLY
Qty: 30 CAPSULE | Refills: 2 | Status: SHIPPED | OUTPATIENT
Start: 2022-10-17 | End: 2023-01-04 | Stop reason: SDUPTHER

## 2022-10-17 RX ORDER — LISINOPRIL 2.5 MG/1
2.5 TABLET ORAL DAILY
Qty: 30 TABLET | Refills: 2 | Status: SHIPPED | OUTPATIENT
Start: 2022-10-17 | End: 2023-01-04 | Stop reason: SDUPTHER

## 2022-10-17 NOTE — PROGRESS NOTES
"Chief Complaint  Hypertension (6 month follow ,bp fluctuates a lot  has some reading from past weeks )    Subjective        Loni Sanford presents to [facility]  History of Present Illness  Pleasant 77-year-old male here to follow-up for hypertension.  Some difficulty sleeping.    Insomnia - has RSL and cant get comfortable and knows he can't sleep so he gets up for the day at 5 AM.  He does have feelings of his mind being busy.  He goes to be at 11:30 and wakes up at 4:30 - 5 to go to the bathroom and then can not fall asleep.  He does feel tired during the day and he feels that this is attributed to his worsening sleep    Lower extremity weakness, he did do physical therapy in May with minimal improvement.  Largely due to Parkinson's.  He does see Dr. Sams regularly for this since 2018.  He is adherent with meds.  However he is having more gait disturbances and difficulty maintaining balance.  He is requesting a lift chair to move safely around his home.    Objective   Vital Signs:  /80   Pulse 68   Temp 97.8 °F (36.6 °C)   Ht 175.3 cm (69\")   Wt 94.8 kg (209 lb)   SpO2 98%   BMI 30.86 kg/m²   Estimated body mass index is 30.86 kg/m² as calculated from the following:    Height as of this encounter: 175.3 cm (69\").    Weight as of this encounter: 94.8 kg (209 lb).          Physical Exam  Vitals and nursing note reviewed.   Constitutional:       General: He is not in acute distress.     Appearance: He is well-developed.   HENT:      Head: Normocephalic.      Nose: Nose normal.   Cardiovascular:      Rate and Rhythm: Normal rate and regular rhythm.      Heart sounds: Normal heart sounds. No murmur heard.  Pulmonary:      Effort: Pulmonary effort is normal. No respiratory distress.      Breath sounds: Normal breath sounds.   Musculoskeletal:         General: Normal range of motion.      Comments: Patient using hands to stand up from a seated position, overall rigidity " present.   Skin:     General: Skin is warm and dry.      Findings: No rash.   Neurological:      Mental Status: He is alert and oriented to person, place, and time.   Psychiatric:         Behavior: Behavior normal.         Thought Content: Thought content normal.         Judgment: Judgment normal.        Result Review :{Labs  Result Review  Imaging  Med Tab  Media  Procedures  :23}                Assessment and Plan   Diagnoses and all orders for this visit:    1. Primary hypertension (Primary)  -     lisinopril (Zestril) 2.5 MG tablet; Take 1 tablet by mouth Daily. Blood pressure  Dispense: 30 tablet; Refill: 2  -     Ambulatory Referral to Social Care Services (Amb Case Mgmt)    2. Parkinson disease (HCC)  -     Ambulatory Referral to Social Care Services (Amb Case Mgmt)    3. Primary insomnia  -     doxepin (SINEquan) 10 MG capsule; Take 1 capsule by mouth Every Night. For sleep  Dispense: 30 capsule; Refill: 2    4. Need for vaccination  -     Fluzone High-Dose 65+yrs ([id number])    5. MCI (mild cognitive impairment)  -     Ambulatory Referral to Social Care Services (Amb Case Mgmt)    6. Neuropathy  -     Ambulatory Referral to Social Care Services (Amb Case Mgmt)    7. Weakness of both lower extremities  -     Ambulatory Referral to Social Care Services (Amb Case Mgmt)    Pleasant 77-year-old male here for hypertension, not well controlled, he had hypotension with ramipril 2.5 mg daily, will have him continue the metoprolol and transition to lisinopril instead as this is not as strong of an ACE inhibitor.  I would love his blood pressures to be in the 140s over 80s as I think this will help get adequate cerebral perfusion to prevent falls but then also prevent elevated blood pressures.  We will continue to track his blood pressures at home and follow-up in 3 months    Parkinson's overall seems to be slowly progressing.  He does see Dr. Sams with neurology for this.  He is adherent with  carbidopa-levodopa.  He did do physical therapy in May but he is continuing to have worsening shuffling of the feet, weakness and feeling unsteady with going up stairs.  He is requesting a lift chair.  I am not exactly sure what we need to do to get this authorized.  I will refer him to social work to see if they have some thoughts on getting this DME equipment which I do think would be helpful for him to maintain use of his home but also reduce risk of fall due to rigidity with Parkinson's.      Insomnia not well controlled.  He is having worsening nighttime awakenings, plan to start doxepin as this helps him sleep throughout the night and encouraged him to not drink past 7 PM.  We will follow-up in 3 months    Labs prior to next visit, CMP, CBC, lipids, hemoglobin A1c       Follow Up   Return in about 3 months (around 1/17/2023), or if symptoms worsen or fail to improve, for Recheck hypertension and hyperglycemia.  Patient was given instructions and counseling regarding his condition or for health maintenance advice. Please see specific information pulled into the AVS if appropriate. Medical assistant and I wore mask and eyewear protection during entire encounter.  Patient wore mask.    Zo Carrillo MD

## 2022-10-18 ENCOUNTER — REFERRAL TRIAGE (OUTPATIENT)
Dept: CASE MANAGEMENT | Facility: OTHER | Age: 77
End: 2022-10-18

## 2022-10-21 ENCOUNTER — PATIENT OUTREACH (OUTPATIENT)
Dept: CASE MANAGEMENT | Facility: OTHER | Age: 77
End: 2022-10-21

## 2022-10-21 NOTE — OUTREACH NOTE
Social Work Assessment  Questions/Answers    Flowsheet Row Most Recent Value   Referral Source physician   Reason for Consult community resources, other (see comments)   Preferred Language English   General Information Comments medical equipment   People in Home spouse   Current Living Arrangements home   Primary Care Provided by self, spouse/significant other   Provides Primary Care For no one, unable/limited ability to care for self   Quality of Family Relationships helpful   Source of Income social security   Usual Activity Tolerance fair   Current Activity Tolerance fair        SDOH updated and reviewed with the patient during this program:  Financial Resource Strain: Low Risk    • Difficulty of Paying Living Expenses: Not hard at all      Food Insecurity: No Food Insecurity   • Worried About Running Out of Food in the Last Year: Never true   • Ran Out of Food in the Last Year: Never true      Transportation Needs: No Transportation Needs   • Lack of Transportation (Medical): No   • Lack of Transportation (Non-Medical): No      Housing Stability: Low Risk    • Unable to Pay for Housing in the Last Year: No   • Number of Places Lived in the Last Year: 1   • Unstable Housing in the Last Year: No        Patient Outreach    MSW outreach to patient to discuss community resources available to help patient. MSW spoke to patient's wife Zuri who states that chair lift was discussed at past PCP appointment. Patient and wife have decided to wait on chair lift after discussion with Dr. Carrillo because they do not want patient to become dependent on the lift. Patient's wife stated they do not have additional financial, transportation, housing or food needs at this time. Patient's wife will consider advance care planning discussions with patient. Patient's wife wrote down MSW phone number and will call back with additional needs.     DEYA OWUSU -   Ambulatory Case Management    10/21/2022, 10:47 EDT

## 2022-11-12 ENCOUNTER — HOSPITAL ENCOUNTER (EMERGENCY)
Facility: HOSPITAL | Age: 77
Discharge: HOME | End: 2022-11-12
Attending: EMERGENCY MEDICINE | Admitting: EMERGENCY MEDICINE

## 2022-11-12 VITALS
DIASTOLIC BLOOD PRESSURE: 80 MMHG | TEMPERATURE: 99 F | HEART RATE: 73 BPM | OXYGEN SATURATION: 94 % | SYSTOLIC BLOOD PRESSURE: 138 MMHG | RESPIRATION RATE: 17 BRPM

## 2022-11-12 DIAGNOSIS — B30.9 ACUTE VIRAL CONJUNCTIVITIS OF RIGHT EYE: Primary | ICD-10-CM

## 2022-11-12 PROCEDURE — 99283 EMERGENCY DEPT VISIT LOW MDM: CPT

## 2022-11-12 RX ORDER — ERYTHROMYCIN 5 MG/G
1 OINTMENT OPHTHALMIC ONCE
Status: DISCONTINUED | OUTPATIENT
Start: 2022-11-12 | End: 2022-11-12

## 2022-11-12 RX ORDER — POLYMYXIN B SULFATE AND TRIMETHOPRIM 1; 10000 MG/ML; [USP'U]/ML
1 SOLUTION OPHTHALMIC ONCE
Status: COMPLETED | OUTPATIENT
Start: 2022-11-12 | End: 2022-11-12

## 2022-11-12 RX ORDER — POLYMYXIN B SULFATE AND TRIMETHOPRIM 1; 10000 MG/ML; [USP'U]/ML
1 SOLUTION OPHTHALMIC EVERY 4 HOURS
Qty: 10 ML | Refills: 0 | Status: SHIPPED | OUTPATIENT
Start: 2022-11-12

## 2022-11-12 RX ADMIN — POLYMYXIN B SULFATE AND TRIMETHOPRIM 1 DROP: 10000; 1 SOLUTION OPHTHALMIC at 11:43

## 2022-11-12 NOTE — ED PROVIDER NOTES
EMERGENCY DEPARTMENT ENCOUNTER    Room Number:  01/01  Date of encounter:  11/12/2022  PCP: Zo Carrillo MD  Historian: Patient      PPE    Patient was placed in face mask in first look. Patient was wearing facemask when I entered the room and throughout our encounter. I wore full protective equipment throughout this patient encounter including a face mask, and gloves. Hand hygiene was performed before donning protective equipment and after removal when leaving the room.        HPI:  Chief Complaint: Right eye drainage  A complete HPI/ROS/PMH/PSH/SH/FH are unobtainable due to: Nothing    Context: Loni Sanford is a 77 y.o. male with a history of HTN, Parkinson's, CAD, kidney cancer, anticoagulated on Plavix who arrives to the ED via private vehicle from home.  Patient presents with c/o right eye irritation and drainage that began yesterday.   Patient also complains of waking up this morning with his right eye matted together.  Patient states that he has pressure when he bends down to pick something up.  Patient denies fever, chills, visual changes, eye pain, swelling around his eye, headache, dizziness, nausea, vomiting.  Patient states that nothing makes the symptoms better and nothing worsens symptoms.          PAST MEDICAL HISTORY  Active Ambulatory Problems     Diagnosis Date Noted   • Ureteral calculus, right 12/28/2018   • Clear cell carcinoma of kidney (HCC) 12/17/2018   • Coronary artery disease 06/29/2021   • History of adenocarcinoma of prostate 06/17/2019   • Hyperlipidemia LDL goal <70 06/29/2021   • MCI (mild cognitive impairment) 11/15/2019   • Neuropathy 11/15/2019   • Parkinson disease (HCC) 12/17/2018   • Stable angina (HCC) 11/20/2020   • Primary hypertension 04/15/2022     Resolved Ambulatory Problems     Diagnosis Date Noted   • Renal mass, right 04/05/2018     Past Medical History:   Diagnosis Date   • Cancer (HCC)    • Cancer (HCC)    • Dizziness    • Hematuria    • History of angina     • History of transfusion    • Hyperlipidemia    • Hypertension    • Kidney stones    • Kidney tumor          PAST SURGICAL HISTORY  Past Surgical History:   Procedure Laterality Date   • APPENDECTOMY  2011   • CARDIAC CATHETERIZATION     • CATARACT EXTRACTION W/ INTRAOCULAR LENS  IMPLANT, BILATERAL     • EXTRACORPOREAL SHOCKWAVE LITHOTRIPSY (ESWL), STENT INSERTION/REMOVAL Right 12/29/2017    Procedure: RIGHT EXTRACORPOREAL SHOCKWAVE LITHOTRIPSY CYSTOSCOPY STENT PLACEMENT;  Surgeon: Marcelo Suarez MD;  Location: [facility];  Service:    • EXTRACORPOREAL SHOCKWAVE LITHOTRIPSY (ESWL), STENT INSERTION/REMOVAL Right 12/28/2018    Procedure: RT EXTRACORPREAL SHOCKWAVE LITHOTRIPSY CYSTOSCOPY  WITH  MANIPULATION OF STONE;  Surgeon: Moe Vasquez MD;  Location: [facility] OR [facility];  Service: Urology   • FINGER SURGERY Left 1984    INDEX   • HAND SURGERY Right 1998   • NEPHRECTOMY PARTIAL Right 4/5/2018    Procedure: RT OPEN PARTIAL NEPHRECTOMY WITH INTRAOPERATIVE DOPPLER, DECORTICATION OF RIGHT RENAL CYST;  Surgeon: Marcelo Suarez MD;  Location: [facility];  Service: Urology   • PROSTATECTOMY  2013         FAMILY HISTORY  Family History   Problem Relation Age of Onset   • Heart disease Father    • Heart attack Father    • Diabetes Brother    • Heart attack Brother    • Coronary artery disease Brother         CABG   • Heart disease Brother    • Hypertension Mother    • Malig Hyperthermia Neg Hx    • Stroke Neg Hx          SOCIAL HISTORY  Social History     Socioeconomic History   • Marital status:    Tobacco Use   • Smoking status: Never   • Smokeless tobacco: Never   Substance and Sexual Activity   • Alcohol use: No   • Drug use: No   • Sexual activity: Defer         ALLERGIES  Aspirin and Belladonna alkaloids-opium        REVIEW OF SYSTEMS  Review of Systems     All systems reviewed and negative except for those discussed in HPI.        PHYSICAL EXAM    ED Triage Vitals   Temp Heart Rate Resp BP SpO2    11/12/22 0812 11/12/22 0812 11/12/22 0812 11/12/22 0827 11/12/22 0812   99 °F (37.2 °C) 94 17 138/80 96 %       Physical Exam  GENERAL: Well appearing, nontoxic appearing, not distressed  HENT: normocephalic, atraumatic  EYES: no scleral icterus, PERRL, EOMI  Right eye injected, matted together, no swelling or tenderness around the right eye  CV: regular rhythm, regular rate, no murmur  RESPIRATORY: normal effort, CTAB  ABDOMEN: soft   MUSCULOSKELETAL: no deformity  NEURO: alert, moves all extremities, follows commands, mental status normal/baseline  SKIN: warm, dry, no rash   Psych: Appropriate mood and affect  Nursing notes and vital signs reviewed      LAB RESULTS  No results found for this or any previous visit (from the past 24 hour(s)).    Ordered the above labs and independently reviewed the results.      RADIOLOGY  No Radiology Exams Resulted Within Past 24 Hours    I ordered the above noted radiological studies and viewed the images on the PACS system.       MEDICAL RECORD REVIEW  Medical records reviewed in [facility], patient last saw PMD in October for wellness exam, primary hypertension, Parkinson's disease.  He did receive his flu vaccine during that visit.      PROCEDURES    Procedures        DIFFERENTIAL DIAGNOSIS  Differential Diagnosis for Eye Pain include but are not limited to the following:  Acute Eye pain, Conjunctivitis( chemical, allergic, bacterial), Corneal Abrasion, Corneal Ulcer, Injury to Cornea from contact lens, Corneal Foreign Body, Corneal burn, Acute Glaucoma, Herpes Keratits, Retinal Detachment, Acute Iritis        PROGRESS, DATA ANALYSIS, CONSULTS, AND MEDICAL DECISION MAKING        ED Course as of 11/12/22 1544   Sat Nov 12, 2022   1039 Patient is a 77-year-old who presents today with right eye irritation and drainage and matted together that began yesterday.  He denies eye pain or visual changes.  Exam appears to be  consistent with conjunctivitis.  Discussed good hand hygiene when  using eye drops.  Discussed that it very well could develop in his left eye.  Ophthalmology follow-up will be given and strict return to ER precautions provided.  Patient and family both verbalized understanding and are agreeable to this plan. [MS]   1043 Reviewed pt's history and workup with Dr. Bella.  After a bedside evaluation, he agrees with the plan of care.     [MS]   1048 Per RN, patient's visual acuity was 20/40 left eye and 20/50 right eye. [MS]      ED Course User Index  [MS] Tsering Santos APRN     Discussed plan for discharge, as there is no emergent indication for admission. Pt/family is agreeable and understands need for follow up and repeat testing.  Pt is aware that discharge does not mean that nothing is wrong but it indicates no emergency is present that requires admission and they must continue care with follow-up as given below or physician of their choice.   Patient/Family voiced understanding of above instructions.  Patient discharged in stable condition.    DIAGNOSIS  Final diagnoses:   Acute viral conjunctivitis of right eye       FOLLOW UP   Zo Carrillo MD  [address] [facility] [id number] [phone]    Call in 2 days      Donovan Funes MD  [address] [facility] [id number] [phone]    Call in 2 days        RX     Medication List      New Prescriptions    trimethoprim-polymyxin b 90685-5.1 UNIT/ML-% ophthalmic solution  Commonly known as: POLYTRIM  Administer 1 drop to the right eye Every 4 (Four) Hours.           Where to Get Your Medications      These medications were sent to [facility] [address] [place] [address] - [address] [place] [address] - [phone]  - [phone]   [address] [facility] [address]    Phone: [phone]   · trimethoprim-polymyxin b 17268-1.1 UNIT/ML-% ophthalmic solution           MEDICATIONS GIVEN IN ED    Medications   trimethoprim-polymyxin b (POLYTRIM) 86977-1.1 UNIT/ML-% ophthalmic solution 1 drop  (1 drop Right Eye Given 11/12/22 1143)           COURSE & MEDICAL DECISION MAKING  Any/All labs and Any/All Imaging studies that were ordered were reviewed and are noted above.  Results were reviewed/discussed with the patient and they were also made aware of online access.    Pt also made aware that some labs, such as cultures, will not be resulted during ER visit and followup with PMD is necessary.        Tsering Santos, APRN  11/12/22 1544       Tsering Santos, APRN  11/12/22 1610

## 2022-11-12 NOTE — DISCHARGE INSTRUCTIONS
Medications as ordered  Warm compress to eye to help remove the dried drainage  Follow-up with PMD and ophthalmology, call Monday to get an appointment scheduled  Good hand hygiene when using eyedrops, this could spread to your left eye if so start using the eyedrops and that eye  Return to the ER for fever, chills, visual changes, eye pain, headache, swelling or redness around her eye or any new or worsening symptoms

## 2022-11-12 NOTE — ED PROVIDER NOTES
MD ATTESTATION NOTE    The LARRY and I have discussed this patient's history, physical exam, and treatment plan.  I have reviewed the documentation and personally had a face to face interaction with the patient. I affirm the documentation and agree with the treatment and plan.  The attached note describes my personal findings.      I provided a substantive portion of the care of the patient.  I personally performed the physical exam in its entirety, and below are my findings.  For this patient encounter, the patient wore surgical mask, I wore full protective PPE including N95 and eye protection.      Brief HPI: Patient presents for evaluation of redness to right eye.  Has had crusting and mattering to the right eye.  Has had no vision changes.  Has had no vomiting.  Has family members that have viral illness at home.    PHYSICAL EXAM  ED Triage Vitals   Temp Heart Rate Resp BP SpO2   11/12/22 0812 11/12/22 0812 11/12/22 0812 11/12/22 0827 11/12/22 0812   99 °F (37.2 °C) 94 17 138/80 96 %      Temp src Heart Rate Source Patient Position BP Location FiO2 (%)   -- 11/12/22 0827 11/12/22 0827 11/12/22 0827 --    Monitor Lying Right arm          GENERAL: no acute distress  HENT: nares patent  EYES: no scleral icterus.  Conjunctival injection.  Patient does have mattering.  Pupils equal and reactive.  RESPIRATORY: normal effort  MUSCULOSKELETAL: no deformity  NEURO: alert, moves all extremities, follows commands  PSYCH:  calm, cooperative  SKIN: warm, dry    Vital signs and nursing notes reviewed.        Plan: Topical antibiotics and referral to ophthalmologist       Ronn Bella MD  11/12/22 9984

## 2022-11-12 NOTE — ED TRIAGE NOTES
RT eye pain, swelling and redness onset yesterday. Denies injury, denies vision problems     Mask placed on patient in triage. Triage staff wore appropriate PPE during interaction with patient.

## 2023-01-04 ENCOUNTER — TELEPHONE (OUTPATIENT)
Dept: FAMILY MEDICINE CLINIC | Facility: CLINIC | Age: 78
End: 2023-01-04
Payer: MEDICARE

## 2023-01-04 DIAGNOSIS — I10 PRIMARY HYPERTENSION: ICD-10-CM

## 2023-01-04 DIAGNOSIS — F51.01 PRIMARY INSOMNIA: ICD-10-CM

## 2023-01-04 NOTE — TELEPHONE ENCOUNTER
Rx Refill Note  Requested Prescriptions     Pending Prescriptions Disp Refills   • lisinopril (Zestril) 2.5 MG tablet 30 tablet 2     Sig: Take 1 tablet by mouth Daily. Blood pressure   • doxepin (SINEquan) 10 MG capsule 30 capsule 2     Sig: Take 1 capsule by mouth Every Night. For sleep      Last office visit with prescribing clinician: 10/17/2022   Last telemedicine visit with prescribing clinician: 1/17/2023   Next office visit with prescribing clinician: 1/17/2023                         Would you like a call back once the refill request has been completed: [] Yes [] No    If the office needs to give you a call back, can they leave a voicemail: [] Yes [] No    Gee Jeffrey MA  01/04/23, 09:22 EST

## 2023-01-04 NOTE — TELEPHONE ENCOUNTER
Caller: Loni Sanford    Relationship: Self    Best call back number: [phone]    Requested Prescriptions:   Requested Prescriptions     Pending Prescriptions Disp Refills   • lisinopril (Zestril) 2.5 MG tablet 30 tablet 2     Sig: Take 1 tablet by mouth Daily. Blood pressure   • doxepin (SINEquan) 10 MG capsule 30 capsule 2     Sig: Take 1 capsule by mouth Every Night. For sleep        Pharmacy where request should be sent: [address] - [phone]  - [phone] FX     Additional details provided by patient:     Does the patient have less than a 3 day supply:  [] Yes  [x] No    Would you like a call back once the refill request has been completed: [x] Yes [] No    If the office needs to give you a call back, can they leave a voicemail: [x] Yes [] No    Fartun Giles Rep   01/04/23 09:07 EST

## 2023-01-05 RX ORDER — DOXEPIN HYDROCHLORIDE 10 MG/1
10 CAPSULE ORAL NIGHTLY
Qty: 30 CAPSULE | Refills: 2 | Status: SHIPPED | OUTPATIENT
Start: 2023-01-05 | End: 2023-01-17 | Stop reason: SDUPTHER

## 2023-01-05 RX ORDER — LISINOPRIL 2.5 MG/1
2.5 TABLET ORAL DAILY
Qty: 30 TABLET | Refills: 2 | Status: SHIPPED | OUTPATIENT
Start: 2023-01-05 | End: 2023-02-17 | Stop reason: SDUPTHER

## 2023-01-17 ENCOUNTER — OFFICE VISIT (OUTPATIENT)
Dept: FAMILY MEDICINE CLINIC | Facility: CLINIC | Age: 78
End: 2023-01-17
Payer: MEDICARE

## 2023-01-17 VITALS
DIASTOLIC BLOOD PRESSURE: 76 MMHG | SYSTOLIC BLOOD PRESSURE: 128 MMHG | BODY MASS INDEX: 31.55 KG/M2 | OXYGEN SATURATION: 99 % | TEMPERATURE: 97.8 F | HEART RATE: 66 BPM | HEIGHT: 69 IN | WEIGHT: 213 LBS

## 2023-01-17 DIAGNOSIS — E78.5 HYPERLIPIDEMIA LDL GOAL <70: ICD-10-CM

## 2023-01-17 DIAGNOSIS — F51.01 PRIMARY INSOMNIA: ICD-10-CM

## 2023-01-17 DIAGNOSIS — G20 PARKINSON DISEASE: ICD-10-CM

## 2023-01-17 DIAGNOSIS — Z12.11 SCREEN FOR COLON CANCER: ICD-10-CM

## 2023-01-17 DIAGNOSIS — G25.81 RESTLESS LEG SYNDROME: ICD-10-CM

## 2023-01-17 DIAGNOSIS — I10 PRIMARY HYPERTENSION: Primary | ICD-10-CM

## 2023-01-17 PROCEDURE — 99214 OFFICE O/P EST MOD 30 MIN: CPT | Performed by: FAMILY MEDICINE

## 2023-01-17 RX ORDER — GABAPENTIN 100 MG/1
CAPSULE ORAL
COMMUNITY
Start: 2023-01-05

## 2023-01-17 RX ORDER — DOXEPIN HYDROCHLORIDE 10 MG/1
10 CAPSULE ORAL NIGHTLY
Qty: 90 CAPSULE | Refills: 1 | Status: SHIPPED | OUTPATIENT
Start: 2023-01-17

## 2023-01-17 NOTE — PROGRESS NOTES
"Chief Complaint  Hypertension (Follow up )    Subjective        Loni Sanford presents to [facility] PRIMARY [facility]ARE  History of Present Illness  Pleasant 77-year-old male here for hypertension which is well controlled.  He has been adherent with medications with no adverse effects, he does have known history of coronary artery disease, due for lipids but not fasting today.  He also has known [place]'s disease and restless leg syndrome.  He did see neurology, ropinirole was discontinued and transitioned to gabapentin.  He does feel like the doxepin has been helpful for him to sleep and would like to continue this.    Objective   Vital Signs:  /76   Pulse 66   Temp 97.8 °F (36.6 °C)   Ht 175.3 cm (69\")   Wt 96.6 kg (213 lb)   SpO2 99%   BMI 31.45 kg/m²   Estimated body mass index is 31.45 kg/m² as calculated from the following:    Height as of this encounter: 175.3 cm (69\").    Weight as of this encounter: 96.6 kg (213 lb).             Physical Exam  Vitals and nursing note reviewed.   Constitutional:       General: He is not in acute distress.     Appearance: He is well-developed.   HENT:      Head: Normocephalic.      Nose: Nose normal.   Cardiovascular:      Rate and Rhythm: Normal rate and regular rhythm.      Heart sounds: Normal heart sounds. No murmur heard.  Pulmonary:      Effort: Pulmonary effort is normal. No respiratory distress.      Breath sounds: Normal breath sounds.   Musculoskeletal:         General: Normal range of motion.   Skin:     General: Skin is warm and dry.      Findings: No rash.   Neurological:      Mental Status: He is alert and oriented to person, place, and time.      Comments: Rolling tremor present on both hands at rest   Psychiatric:         Behavior: Behavior normal.         Thought Content: Thought content normal.         Judgment: Judgment normal.        Result Review :                   Assessment and Plan   Diagnoses and all orders for this " visit:    1. Primary hypertension (Primary)  -     Comprehensive Metabolic Panel  -     CBC & Differential    2. Primary insomnia  -     doxepin (SINEquan) 10 MG capsule; Take 1 capsule by mouth Every Night. For sleep  Dispense: 90 capsule; Refill: 1    3. Hyperlipidemia LDL goal <70  -     Lipid Panel    4. Restless leg syndrome    5. Parkinson disease (HCC)    6. Screen for colon cancer  -     Ambulatory Referral For Screening Colonoscopy    Hypertension well-controlled, continue with lisinopril 2.5 mg daily no changes.  We will reassess in 6 months.    Hyperlipidemia, well controlled, goal LDL less than 70 with history of CAD.  We will collect labs when fasting    Parkinson's, stable with neurology, agree with stopping ropinirole    Restless leg syndrome, doing well on addition of gabapentin, managed by neurology,    In terms of insomnia, it does seem the doxepin has been giving relief, his neurologist is okay for him to take this with the gabapentin.  We will continue for now.    Due for screening colonoscopy this year.  He has had some issues with anesthesia in the past that made his Parkinson's worse.  I encouraged him to discuss this with the anesthesiologist and neurologist.         Follow Up   Return in about 6 months (around 7/17/2023), or if symptoms worsen or fail to improve, for Annual Exam with fasting labs prior.  Patient was given instructions and counseling regarding his condition or for health maintenance advice. Please see specific information pulled into the AVS if appropriate.

## 2023-01-31 LAB
ALBUMIN SERPL-MCNC: 4.7 G/DL (ref 3.5–5.2)
ALBUMIN/GLOB SERPL: 2.4 G/DL
ALP SERPL-CCNC: 117 U/L (ref 39–117)
ALT SERPL-CCNC: <5 U/L (ref 1–41)
AST SERPL-CCNC: 15 U/L (ref 1–40)
BASOPHILS # BLD AUTO: 0.04 10*3/MM3 (ref 0–0.2)
BASOPHILS NFR BLD AUTO: 0.7 % (ref 0–1.5)
BILIRUB SERPL-MCNC: 0.6 MG/DL (ref 0–1.2)
BUN SERPL-MCNC: 22 MG/DL (ref 8–23)
BUN/CREAT SERPL: 21.4 (ref 7–25)
CALCIUM SERPL-MCNC: 9.7 MG/DL (ref 8.6–10.5)
CHLORIDE SERPL-SCNC: 104 MMOL/L (ref 98–107)
CHOLEST SERPL-MCNC: 117 MG/DL (ref 0–200)
CO2 SERPL-SCNC: 29.2 MMOL/L (ref 22–29)
CREAT SERPL-MCNC: 1.03 MG/DL (ref 0.76–1.27)
EGFRCR SERPLBLD CKD-EPI 2021: 75.3 ML/MIN/1.73
EOSINOPHIL # BLD AUTO: 0.16 10*3/MM3 (ref 0–0.4)
EOSINOPHIL NFR BLD AUTO: 2.8 % (ref 0.3–6.2)
ERYTHROCYTE [DISTWIDTH] IN BLOOD BY AUTOMATED COUNT: 11.7 % (ref 12.3–15.4)
GLOBULIN SER CALC-MCNC: 2 GM/DL
GLUCOSE SERPL-MCNC: 94 MG/DL (ref 65–99)
HCT VFR BLD AUTO: 37.5 % (ref 37.5–51)
HDLC SERPL-MCNC: 45 MG/DL (ref 40–60)
HGB BLD-MCNC: 12.7 G/DL (ref 13–17.7)
IMM GRANULOCYTES # BLD AUTO: 0.02 10*3/MM3 (ref 0–0.05)
IMM GRANULOCYTES NFR BLD AUTO: 0.3 % (ref 0–0.5)
LDLC SERPL CALC-MCNC: 56 MG/DL (ref 0–100)
LYMPHOCYTES # BLD AUTO: 0.96 10*3/MM3 (ref 0.7–3.1)
LYMPHOCYTES NFR BLD AUTO: 16.5 % (ref 19.6–45.3)
MCH RBC QN AUTO: 32.2 PG (ref 26.6–33)
MCHC RBC AUTO-ENTMCNC: 33.9 G/DL (ref 31.5–35.7)
MCV RBC AUTO: 95.2 FL (ref 79–97)
MONOCYTES # BLD AUTO: 0.67 10*3/MM3 (ref 0.1–0.9)
MONOCYTES NFR BLD AUTO: 11.5 % (ref 5–12)
NEUTROPHILS # BLD AUTO: 3.96 10*3/MM3 (ref 1.7–7)
NEUTROPHILS NFR BLD AUTO: 68.2 % (ref 42.7–76)
NRBC BLD AUTO-RTO: 0 /100 WBC (ref 0–0.2)
PLATELET # BLD AUTO: 157 10*3/MM3 (ref 140–450)
POTASSIUM SERPL-SCNC: 4.4 MMOL/L (ref 3.5–5.2)
PROT SERPL-MCNC: 6.7 G/DL (ref 6–8.5)
RBC # BLD AUTO: 3.94 10*6/MM3 (ref 4.14–5.8)
SODIUM SERPL-SCNC: 143 MMOL/L (ref 136–145)
TRIGL SERPL-MCNC: 83 MG/DL (ref 0–150)
VLDLC SERPL CALC-MCNC: 16 MG/DL (ref 5–40)
WBC # BLD AUTO: 5.81 10*3/MM3 (ref 3.4–10.8)

## 2023-02-13 NOTE — PROGRESS NOTES
Please call patient back with results.  The labs has resulted as normal kidney and liver function, blood counts are improving from 6 months ago, cholesterol is stable.  Please let us know if you have further questions.     Thank you

## 2023-02-17 ENCOUNTER — TELEPHONE (OUTPATIENT)
Dept: FAMILY MEDICINE CLINIC | Facility: CLINIC | Age: 78
End: 2023-02-17
Payer: MEDICARE

## 2023-02-17 ENCOUNTER — PRE-PROCEDURE SCREENING (OUTPATIENT)
Dept: GASTROENTEROLOGY | Facility: CLINIC | Age: 78
End: 2023-02-17
Payer: MEDICARE

## 2023-02-17 DIAGNOSIS — I10 PRIMARY HYPERTENSION: ICD-10-CM

## 2023-02-17 RX ORDER — LISINOPRIL 2.5 MG/1
2.5 TABLET ORAL DAILY
Qty: 90 TABLET | Refills: 1 | Status: SHIPPED | OUTPATIENT
Start: 2023-02-17

## 2023-02-17 NOTE — TELEPHONE ENCOUNTER
Caller: SanfordZuri    Relationship: Emergency Contact    Best call back number: [phone]    Who are you requesting to speak with (clinical staff, provider,  specific staff member): CLINICAL    What was the call regarding: PATIENTS WIFE STATED THE NEW MAIL DELIVERY PHARMACY TO BE USED, [facility], WILL BE SENDING REQUESTS FOR THE PATIENTS PRESCRIPTION.    PATIENTS WIFE STATED THE PATIENT IS LOW ON MEDICATIONS.    Do you require a callback: NO

## 2023-02-17 NOTE — TELEPHONE ENCOUNTER
LAST SCOPE 8/17 IN Epic --Personal history of polyps--Family history of polyps AND  colon cancer--ASPIRIN PLAVIX --Medications:              acetaminophen (TYLENOL) 500 MG tablet  atorvastatin (LIPITOR) 20 MG tablet  carbidopa-levodopa (SINEMET)  MG per tablet  cholecalciferol (VITAMIN D3) 1000 units tablet  clopidogrel (PLAVIX) 75 MG tablet    doxepin (SINEquan) 10 MG capsule  gabapentin (NEURONTIN) 100 MG capsule    lisinopril (Zestril) 2.5 MG tablet  metoprolol succinate XL (TOPROL-XL) 25 MG 24 hr tablet  Multiple Vitamins-Minerals (CENTRUM ADULTS PO)    nitroglycerin (NITROSTAT) 0.4 MG SL tablet  Omega-3 Fatty Acids (FISH OIL) 1000 MG capsule capsule  trimethoprim-polymyxin b (POLYTRIM) 08588-6.1 UNIT/ML-% ophthalmic solution        QUESTIONNAIRE SCEEENING IN MEDIA &   HAS BEEN SENT TO DOCTOR FOR REVIEW

## 2023-02-21 ENCOUNTER — PREP FOR SURGERY (OUTPATIENT)
Dept: OTHER | Facility: HOSPITAL | Age: 78
End: 2023-02-21
Payer: MEDICARE

## 2023-02-21 DIAGNOSIS — K63.5 COLON POLYPS: Primary | ICD-10-CM

## 2023-02-24 ENCOUNTER — TELEPHONE (OUTPATIENT)
Dept: FAMILY MEDICINE CLINIC | Facility: CLINIC | Age: 78
End: 2023-02-24
Payer: MEDICARE

## 2023-02-28 NOTE — TELEPHONE ENCOUNTER
Zo Estrada MD Morris, Charlette, RegSched Rep  I am in agreement, okay to hold Plavix 5 days prior to colonoscopy.          Call unable reach pt if call back see message thx you

## 2023-03-02 ENCOUNTER — TELEPHONE (OUTPATIENT)
Dept: FAMILY MEDICINE CLINIC | Facility: CLINIC | Age: 78
End: 2023-03-02
Payer: MEDICARE

## 2023-03-13 ENCOUNTER — TELEPHONE (OUTPATIENT)
Dept: GASTROENTEROLOGY | Facility: CLINIC | Age: 78
End: 2023-03-13

## 2023-03-13 DIAGNOSIS — Z12.11 ENCOUNTER FOR SCREENING COLONOSCOPY: Primary | ICD-10-CM

## 2023-03-13 NOTE — TELEPHONE ENCOUNTER
UNABLE TO WARM TRANSFER CALL    Caller: Zuri Sanford    Relationship to patient: Emergency Contact    Best call back number: [phone]    Patient is needing: WIFE CALLED TO SCHEDULE HER HUSBANDS COLONOSCOPY. PLEASE CALL HER TO SCHEDULE.

## 2023-03-29 ENCOUNTER — TELEPHONE (OUTPATIENT)
Dept: GASTROENTEROLOGY | Facility: CLINIC | Age: 78
End: 2023-03-29
Payer: MEDICARE

## 2023-03-31 ENCOUNTER — PREP FOR SURGERY (OUTPATIENT)
Dept: SURGERY | Facility: SURGERY CENTER | Age: 78
End: 2023-03-31
Payer: MEDICARE

## 2023-03-31 DIAGNOSIS — Z86.010 HISTORY OF COLON POLYPS: ICD-10-CM

## 2023-03-31 DIAGNOSIS — Z12.11 ENCOUNTER FOR SCREENING FOR MALIGNANT NEOPLASM OF COLON: Primary | ICD-10-CM

## 2023-03-31 RX ORDER — SODIUM CHLORIDE, SODIUM LACTATE, POTASSIUM CHLORIDE, CALCIUM CHLORIDE 600; 310; 30; 20 MG/100ML; MG/100ML; MG/100ML; MG/100ML
30 INJECTION, SOLUTION INTRAVENOUS CONTINUOUS PRN
OUTPATIENT
Start: 2023-03-31

## 2023-04-10 ENCOUNTER — TELEPHONE (OUTPATIENT)
Dept: GASTROENTEROLOGY | Facility: CLINIC | Age: 78
End: 2023-04-10
Payer: MEDICARE

## 2023-04-11 ENCOUNTER — TELEPHONE (OUTPATIENT)
Dept: GASTROENTEROLOGY | Facility: CLINIC | Age: 78
End: 2023-04-11
Payer: MEDICARE

## 2023-05-03 PROBLEM — Z12.11 ENCOUNTER FOR SCREENING FOR MALIGNANT NEOPLASM OF COLON: Status: ACTIVE | Noted: 2023-05-03

## 2023-05-03 PROBLEM — Z86.010 HISTORY OF COLON POLYPS: Status: ACTIVE | Noted: 2023-05-03

## 2023-05-03 PROBLEM — Z86.0100 HISTORY OF COLON POLYPS: Status: ACTIVE | Noted: 2023-05-03

## 2023-05-09 ENCOUNTER — ANESTHESIA EVENT (OUTPATIENT)
Dept: SURGERY | Facility: SURGERY CENTER | Age: 78
End: 2023-05-09
Payer: MEDICARE

## 2023-05-09 ENCOUNTER — ANESTHESIA (OUTPATIENT)
Dept: SURGERY | Facility: SURGERY CENTER | Age: 78
End: 2023-05-09
Payer: MEDICARE

## 2023-05-09 ENCOUNTER — HOSPITAL ENCOUNTER (OUTPATIENT)
Facility: SURGERY CENTER | Age: 78
Setting detail: HOSPITAL OUTPATIENT SURGERY
Discharge: HOME | End: 2023-05-09
Attending: INTERNAL MEDICINE | Admitting: INTERNAL MEDICINE
Payer: MEDICARE

## 2023-05-09 VITALS
DIASTOLIC BLOOD PRESSURE: 77 MMHG | WEIGHT: 211 LBS | SYSTOLIC BLOOD PRESSURE: 119 MMHG | OXYGEN SATURATION: 94 % | HEART RATE: 63 BPM | RESPIRATION RATE: 16 BRPM | HEIGHT: 69 IN | BODY MASS INDEX: 31.25 KG/M2 | TEMPERATURE: 98.2 F

## 2023-05-09 DIAGNOSIS — Z12.11 ENCOUNTER FOR SCREENING FOR MALIGNANT NEOPLASM OF COLON: ICD-10-CM

## 2023-05-09 DIAGNOSIS — Z86.010 HISTORY OF COLON POLYPS: ICD-10-CM

## 2023-05-09 PROCEDURE — 45385 COLONOSCOPY W/LESION REMOVAL: CPT | Performed by: INTERNAL MEDICINE

## 2023-05-09 PROCEDURE — 25010000002 PROPOFOL 10 MG/ML EMULSION: Performed by: ANESTHESIOLOGY

## 2023-05-09 PROCEDURE — 88305 TISSUE EXAM BY PATHOLOGIST: CPT | Performed by: INTERNAL MEDICINE

## 2023-05-09 RX ORDER — SODIUM CHLORIDE, SODIUM LACTATE, POTASSIUM CHLORIDE, CALCIUM CHLORIDE 600; 310; 30; 20 MG/100ML; MG/100ML; MG/100ML; MG/100ML
30 INJECTION, SOLUTION INTRAVENOUS CONTINUOUS PRN
Status: DISCONTINUED | OUTPATIENT
Start: 2023-05-09 | End: 2023-05-09 | Stop reason: HOSPADM

## 2023-05-09 RX ORDER — LIDOCAINE HYDROCHLORIDE 20 MG/ML
INJECTION, SOLUTION INFILTRATION; PERINEURAL AS NEEDED
Status: DISCONTINUED | OUTPATIENT
Start: 2023-05-09 | End: 2023-05-09 | Stop reason: SURG

## 2023-05-09 RX ORDER — SODIUM CHLORIDE 0.9 % (FLUSH) 0.9 %
10 SYRINGE (ML) INJECTION AS NEEDED
Status: DISCONTINUED | OUTPATIENT
Start: 2023-05-09 | End: 2023-05-09 | Stop reason: HOSPADM

## 2023-05-09 RX ORDER — SODIUM CHLORIDE, SODIUM LACTATE, POTASSIUM CHLORIDE, CALCIUM CHLORIDE 600; 310; 30; 20 MG/100ML; MG/100ML; MG/100ML; MG/100ML
1000 INJECTION, SOLUTION INTRAVENOUS CONTINUOUS
Status: DISCONTINUED | OUTPATIENT
Start: 2023-05-09 | End: 2023-05-09 | Stop reason: HOSPADM

## 2023-05-09 RX ORDER — MAGNESIUM HYDROXIDE 1200 MG/15ML
LIQUID ORAL AS NEEDED
Status: DISCONTINUED | OUTPATIENT
Start: 2023-05-09 | End: 2023-05-09 | Stop reason: HOSPADM

## 2023-05-09 RX ORDER — LIDOCAINE HYDROCHLORIDE 10 MG/ML
0.5 INJECTION, SOLUTION INFILTRATION; PERINEURAL ONCE AS NEEDED
Status: DISCONTINUED | OUTPATIENT
Start: 2023-05-09 | End: 2023-05-09 | Stop reason: HOSPADM

## 2023-05-09 RX ORDER — PROPOFOL 10 MG/ML
VIAL (ML) INTRAVENOUS AS NEEDED
Status: DISCONTINUED | OUTPATIENT
Start: 2023-05-09 | End: 2023-05-09 | Stop reason: SURG

## 2023-05-09 RX ADMIN — GLYCOPYRROLATE 0.2 MG: 0.2 INJECTION, SOLUTION INTRAMUSCULAR; INTRAVITREAL at 11:12

## 2023-05-09 RX ADMIN — SODIUM CHLORIDE, POTASSIUM CHLORIDE, SODIUM LACTATE AND CALCIUM CHLORIDE 1000 ML: 600; 310; 30; 20 INJECTION, SOLUTION INTRAVENOUS at 10:17

## 2023-05-09 RX ADMIN — LIDOCAINE HYDROCHLORIDE 30 MG: 20 INJECTION, SOLUTION INFILTRATION; PERINEURAL at 11:11

## 2023-05-09 RX ADMIN — PROPOFOL 100 MG: 10 INJECTION, EMULSION INTRAVENOUS at 11:11

## 2023-05-09 RX ADMIN — PROPOFOL 160 MCG/KG/MIN: 10 INJECTION, EMULSION INTRAVENOUS at 11:11

## 2023-05-09 RX ADMIN — SODIUM CHLORIDE, SODIUM LACTATE, POTASSIUM CHLORIDE, AND CALCIUM CHLORIDE: .6; .31; .03; .02 INJECTION, SOLUTION INTRAVENOUS at 11:08

## 2023-05-09 NOTE — ANESTHESIA POSTPROCEDURE EVALUATION
Patient: Loni Sanford    Procedure Summary     Date: 05/09/23 Room / Location: [facility]    Anesthesia Start: 1106 Anesthesia Stop: 1130    Procedure: COLONOSCOPY FOR SCREENING Diagnosis:       Encounter for screening for malignant neoplasm of colon      History of colon polyps      (Encounter for screening for malignant neoplasm of colon [Z12.11])      (History of colon polyps [Z86.010])    Surgeons: Trerell Zhang MD Provider: Lalito Vences MD    Anesthesia Type: MAC ASA Status: 3          Anesthesia Type: MAC    Vitals  Vitals Value Taken Time   /77 05/09/23 1154   Temp 36.8 °C (98.2 °F) 05/09/23 1132   Pulse 63 05/09/23 1154   Resp 16 05/09/23 1154   SpO2 94 % 05/09/23 1154           Post Anesthesia Care and Evaluation    Level of consciousness: awake  Pain management: satisfactory to patient    Airway patency: patent  Anesthetic complications: No anesthetic complications  PONV Status: controlled  Cardiovascular status: acceptable  Respiratory status: acceptable  Hydration status: acceptable

## 2023-05-09 NOTE — H&P
No chief complaint on file.      HPI  Patient today for screening colonoscopy.  He has a history of colon polyps on colonoscopy in 2017.         Problem List:    Patient Active Problem List   Diagnosis   • Ureteral calculus, right   • Clear cell carcinoma of kidney   • Coronary artery disease   • History of adenocarcinoma of prostate   • Hyperlipidemia LDL goal <70   • MCI (mild cognitive impairment)   • Neuropathy   • Parkinson disease   • Stable angina   • Primary hypertension   • Encounter for screening for malignant neoplasm of colon   • History of colon polyps       Medical History:    Past Medical History:   Diagnosis Date   • Cancer     Prostate cancer 2013   • Cancer     kidney dx 4/2018   • Coronary artery disease    • Dizziness     occ   • Hematuria    • History of angina     carries NTG   • History of transfusion    • Hyperlipidemia    • Hypertension    • Kidney stones     new one 12/2018   • Kidney tumor     RIGHT removed 4/2018   • Parkinson disease    • Renal mass, right     surgically removed 4/2018        Social History:    Social History     Socioeconomic History   • Marital status:    Tobacco Use   • Smoking status: Never   • Smokeless tobacco: Never   Substance and Sexual Activity   • Alcohol use: No   • Drug use: No   • Sexual activity: Defer       Family History:   Family History   Problem Relation Age of Onset   • Heart disease Father    • Heart attack Father    • Diabetes Brother    • Heart attack Brother    • Coronary artery disease Brother         CABG   • Heart disease Brother    • Hypertension Mother    • Malig Hyperthermia Neg Hx    • Stroke Neg Hx        Surgical History:   Past Surgical History:   Procedure Laterality Date   • APPENDECTOMY  2011   • CARDIAC CATHETERIZATION     • CATARACT EXTRACTION W/ INTRAOCULAR LENS  IMPLANT, BILATERAL     • EXTRACORPOREAL SHOCKWAVE LITHOTRIPSY (ESWL), STENT INSERTION/REMOVAL Right 12/29/2017    Procedure: RIGHT EXTRACORPOREAL SHOCKWAVE  LITHOTRIPSY CYSTOSCOPY STENT PLACEMENT;  Surgeon: Marcelo Suarez MD;  Location: [facility] OR [facility] [place];  Service:    • EXTRACORPOREAL SHOCKWAVE LITHOTRIPSY (ESWL), STENT INSERTION/REMOVAL Right 12/28/2018    Procedure: RT EXTRACORPREAL SHOCKWAVE LITHOTRIPSY CYSTOSCOPY  WITH  MANIPULATION OF STONE;  Surgeon: Moe Vasquez MD;  Location: [facility] OR [facility] [place];  Service: Urology   • FINGER SURGERY Left 1984    INDEX   • HAND SURGERY Right 1998   • NEPHRECTOMY PARTIAL Right 4/5/2018    Procedure: RT OPEN PARTIAL NEPHRECTOMY WITH INTRAOPERATIVE DOPPLER, DECORTICATION OF RIGHT RENAL CYST;  Surgeon: Marcelo Suarez MD;  Location: [facility];  Service: Urology   • PROSTATECTOMY  2013       No current facility-administered medications for this encounter.    Allergies:   Allergies   Allergen Reactions   • Aspirin Hives   • Belladonna Alkaloids-Opium Other (See Comments)     COLD SWEATS AND FEVER        The following portions of the patient's history were reviewed by me and updated as appropriate: review of systems, allergies, current medications, past family history, past medical history, past social history, past surgical history and problem list.    There were no vitals filed for this visit.    PHYSICAL EXAM:    CONSTITUTIONAL:  today's vital signs reviewed by me  GASTROINTESTINAL: abdomen is soft nontender nondistended with normal active bowel sounds, no masses are appreciated    Assessment/ Plan  We will proceed today with screening colonoscopy.    Risks and benefits as well as alternatives to endoscopic evaluation were explained to the patient and they voiced understanding and wish to proceed.  These risks include but are not limited to the risk of bleeding, perforation, adverse reaction to sedation, and missed lesions.  The patient was given the opportunity to ask questions prior to the endoscopic procedure.

## 2023-05-09 NOTE — ANESTHESIA PREPROCEDURE EVALUATION
Anesthesia Evaluation     Patient summary reviewed and Nursing notes reviewed   NPO Solid Status: > 6 hours  NPO Liquid Status: > 6 hours           Airway   Mallampati: II  TM distance: >3 FB  Neck ROM: full  Dental - normal exam     Pulmonary    (-) COPD, asthma, sleep apnea, not a smoker  Cardiovascular     ECG reviewed    (+) hypertension, CAD, hyperlipidemia,       Neuro/Psych  (-) seizures, CVA    ROS Comment: Parkinson's  GI/Hepatic/Renal/Endo    (-) GERD, diabetes    Musculoskeletal     Abdominal    Substance History      OB/GYN          Other      history of cancer (hx renal cell ca)                    Anesthesia Plan    ASA 3     MAC       Anesthetic plan, risks, benefits, and alternatives have been provided, discussed and informed consent has been obtained with: patient.        CODE STATUS:

## 2023-05-10 LAB
LAB AP CASE REPORT: NORMAL
PATH REPORT.FINAL DX SPEC: NORMAL
PATH REPORT.GROSS SPEC: NORMAL

## 2023-05-29 DIAGNOSIS — F51.01 PRIMARY INSOMNIA: ICD-10-CM

## 2023-05-30 RX ORDER — DOXEPIN HYDROCHLORIDE 10 MG/1
CAPSULE ORAL
Qty: 90 CAPSULE | Refills: 3 | Status: SHIPPED | OUTPATIENT
Start: 2023-05-30

## 2023-06-07 ENCOUNTER — OFFICE VISIT (OUTPATIENT)
Dept: FAMILY MEDICINE CLINIC | Facility: CLINIC | Age: 78
End: 2023-06-07
Payer: MEDICARE

## 2023-06-07 VITALS
WEIGHT: 212 LBS | TEMPERATURE: 97.9 F | OXYGEN SATURATION: 95 % | SYSTOLIC BLOOD PRESSURE: 144 MMHG | HEART RATE: 65 BPM | HEIGHT: 69 IN | BODY MASS INDEX: 31.4 KG/M2 | DIASTOLIC BLOOD PRESSURE: 84 MMHG

## 2023-06-07 DIAGNOSIS — R60.0 EDEMA OF LEFT LOWER EXTREMITY: Primary | ICD-10-CM

## 2023-06-07 PROCEDURE — 99213 OFFICE O/P EST LOW 20 MIN: CPT | Performed by: STUDENT IN AN ORGANIZED HEALTH CARE EDUCATION/TRAINING PROGRAM

## 2023-06-07 PROCEDURE — 1160F RVW MEDS BY RX/DR IN RCRD: CPT | Performed by: STUDENT IN AN ORGANIZED HEALTH CARE EDUCATION/TRAINING PROGRAM

## 2023-06-07 PROCEDURE — 3077F SYST BP >= 140 MM HG: CPT | Performed by: STUDENT IN AN ORGANIZED HEALTH CARE EDUCATION/TRAINING PROGRAM

## 2023-06-07 PROCEDURE — 3079F DIAST BP 80-89 MM HG: CPT | Performed by: STUDENT IN AN ORGANIZED HEALTH CARE EDUCATION/TRAINING PROGRAM

## 2023-06-07 PROCEDURE — 1159F MED LIST DOCD IN RCRD: CPT | Performed by: STUDENT IN AN ORGANIZED HEALTH CARE EDUCATION/TRAINING PROGRAM

## 2023-06-07 RX ORDER — KETOCONAZOLE 20 MG/ML
SHAMPOO TOPICAL
COMMUNITY
Start: 2023-04-14

## 2023-06-07 NOTE — PROGRESS NOTES
"Chief Complaint  Foot Swelling (Both feet but mostly left /-swelling started over the weekend /-pt complains that his skin feels tight on his feet ) and Hypertension (Pt states bp has been running high and would like to discuss his bp med)    Subjective        Loni Sanford presents to [facility]  History of Present Illness  78yoM with Parkinson Disease, orthostatic hypotension, CAD who presents for leg swelling.    Wife is present with him today.    They noted that he has had increased leg swelling, L>R for the last week. It has improved since initially presenting. No inciting factor. No associated chest pain, shortness of breath. No recent surgeries, travel. No prior DVT/PE.     No skin color changes, fever, warmth. No injuries.    Does not wear compression socks. Orthostatic hypotension has been better lately.    Objective   Vital Signs:  /84 (BP Location: Right arm, Patient Position: Sitting, Cuff Size: Adult)   Pulse 65   Temp 97.9 °F (36.6 °C) (Infrared)   Ht 175.3 cm (69.02\")   Wt 96.2 kg (212 lb)   SpO2 95%   BMI 31.29 kg/m²   Estimated body mass index is 31.29 kg/m² as calculated from the following:    Height as of this encounter: 175.3 cm (69.02\").    Weight as of this encounter: 96.2 kg (212 lb).       [unfilled]    Physical Exam  Constitutional:       General: He is not in acute distress.  Eyes:      Conjunctiva/sclera: Conjunctivae normal.   Cardiovascular:      Rate and Rhythm: Normal rate and regular rhythm.   Pulmonary:      Effort: Pulmonary effort is normal. No respiratory distress.      Breath sounds: Normal breath sounds.   Skin:     General: Skin is warm and dry.   Neurological:      Mental Status: He is alert and oriented to person, place, and time.   Psychiatric:         Mood and Affect: Mood normal.         Behavior: Behavior normal.      2+ pitting edema on left to mid calf. Trace pitting edema on right to mid calf. No erythema, " warmth. Varicose veins present bilaterally.    Result Review :  The following data was reviewed by: Julienne Angel MD on 06/07/2023:  Common labs          8/1/2022    12:38 1/30/2023    11:24 6/7/2023    14:50   Common Labs   Glucose 99  94  84    BUN 18  22  19    Creatinine 1.09  1.03  1.08    Sodium 142  143  143    Potassium 3.9  4.4  4.3    Chloride 104  104  103    Calcium 9.5  9.7  9.5    Total Protein  6.7  6.8    Albumin  4.7  4.8    Total Bilirubin  0.6  0.8    Alkaline Phosphatase  117  121    AST (SGOT)  15  19    ALT (SGPT)  <5  8    WBC 4.49  5.81     Hemoglobin 11.9  12.7     Hematocrit 34.8  37.5     Platelets 163  157     Total Cholesterol  117     Triglycerides  83     HDL Cholesterol  45     LDL Cholesterol   56       Data reviewed : none             Assessment and Plan   Diagnoses and all orders for this visit:    1. Edema of left lower extremity (Primary)  -     Comprehensive metabolic panel  -     D-dimer, Quantitative    Ddx: venous insufficiency (most likely), less likely DVT as Wells Score is very low. Will obtain CMP and ddimer. If ddimer elevated will recommend venous duplex to evaluate for DVT. However, I think venous insufficiency much more likely. Varicose veins present on exam. Discussed compression socks, elevation, limit salt intake.          Follow Up   No follow-ups on file.  Patient was given instructions and counseling regarding his condition or for health maintenance advice. Please see specific information pulled into the AVS if appropriate.

## 2023-06-08 LAB
ALBUMIN SERPL-MCNC: 4.8 G/DL (ref 3.7–4.7)
ALBUMIN/GLOB SERPL: 2.4 {RATIO} (ref 1.2–2.2)
ALP SERPL-CCNC: 121 IU/L (ref 44–121)
ALT SERPL-CCNC: 8 IU/L (ref 0–44)
AST SERPL-CCNC: 19 IU/L (ref 0–40)
BILIRUB SERPL-MCNC: 0.8 MG/DL (ref 0–1.2)
BUN SERPL-MCNC: 19 MG/DL (ref 8–27)
BUN/CREAT SERPL: 18 (ref 10–24)
CALCIUM SERPL-MCNC: 9.5 MG/DL (ref 8.6–10.2)
CHLORIDE SERPL-SCNC: 103 MMOL/L (ref 96–106)
CO2 SERPL-SCNC: 25 MMOL/L (ref 20–29)
CREAT SERPL-MCNC: 1.08 MG/DL (ref 0.76–1.27)
D DIMER PPP FEU-MCNC: 0.53 MG/L FEU (ref 0–0.49)
EGFRCR SERPLBLD CKD-EPI 2021: 71 ML/MIN/1.73
GLOBULIN SER CALC-MCNC: 2 G/DL (ref 1.5–4.5)
GLUCOSE SERPL-MCNC: 84 MG/DL (ref 70–99)
POTASSIUM SERPL-SCNC: 4.3 MMOL/L (ref 3.5–5.2)
PROT SERPL-MCNC: 6.8 G/DL (ref 6–8.5)
SODIUM SERPL-SCNC: 143 MMOL/L (ref 134–144)

## 2023-06-08 NOTE — PROGRESS NOTES
Please let him know labs are all normal. D-dimer is a lab test that tests for blood clots - it is normal for your age so no further testing needs to be performed. Continue plan with compression socks, limit salt in diet, elevate legs.

## 2023-07-09 ENCOUNTER — APPOINTMENT (OUTPATIENT)
Dept: CT IMAGING | Facility: HOSPITAL | Age: 78
DRG: 515 | End: 2023-07-09
Payer: MEDICARE

## 2023-07-09 ENCOUNTER — HOSPITAL ENCOUNTER (INPATIENT)
Facility: HOSPITAL | Age: 78
LOS: 8 days | Discharge: NURSING, REHAB OR LONG-TERM CARE | DRG: 515 | End: 2023-07-20
Attending: EMERGENCY MEDICINE | Admitting: INTERNAL MEDICINE
Payer: MEDICARE

## 2023-07-09 DIAGNOSIS — R10.30 LOWER ABDOMINAL PAIN: ICD-10-CM

## 2023-07-09 DIAGNOSIS — G20 PARKINSON'S DISEASE: ICD-10-CM

## 2023-07-09 DIAGNOSIS — S22.080A COMPRESSION FRACTURE OF T12 VERTEBRA, INITIAL ENCOUNTER: Primary | ICD-10-CM

## 2023-07-09 PROBLEM — R29.6 RECURRENT FALLS: Status: ACTIVE | Noted: 2023-07-09

## 2023-07-09 PROBLEM — S22.000A COMPRESSION FRACTURE OF BODY OF THORACIC VERTEBRA: Status: ACTIVE | Noted: 2023-07-09

## 2023-07-09 LAB
ALBUMIN SERPL-MCNC: 3.9 G/DL (ref 3.5–5.2)
ALBUMIN/GLOB SERPL: 1.5 G/DL
ALP SERPL-CCNC: 144 U/L (ref 39–117)
ALT SERPL W P-5'-P-CCNC: 6 U/L (ref 1–41)
ANION GAP SERPL CALCULATED.3IONS-SCNC: 12.4 MMOL/L (ref 5–15)
AST SERPL-CCNC: 15 U/L (ref 1–40)
BASOPHILS # BLD AUTO: 0.03 10*3/MM3 (ref 0–0.2)
BASOPHILS NFR BLD AUTO: 0.4 % (ref 0–1.5)
BILIRUB SERPL-MCNC: 0.7 MG/DL (ref 0–1.2)
BILIRUB UR QL STRIP: NEGATIVE
BUN SERPL-MCNC: 23 MG/DL (ref 8–23)
BUN/CREAT SERPL: 25 (ref 7–25)
CALCIUM SPEC-SCNC: 9.1 MG/DL (ref 8.6–10.5)
CHLORIDE SERPL-SCNC: 104 MMOL/L (ref 98–107)
CLARITY UR: CLEAR
CO2 SERPL-SCNC: 25.6 MMOL/L (ref 22–29)
COLOR UR: YELLOW
CREAT SERPL-MCNC: 0.92 MG/DL (ref 0.76–1.27)
DEPRECATED RDW RBC AUTO: 38.6 FL (ref 37–54)
EGFRCR SERPLBLD CKD-EPI 2021: 85.7 ML/MIN/1.73
EOSINOPHIL # BLD AUTO: 0.08 10*3/MM3 (ref 0–0.4)
EOSINOPHIL NFR BLD AUTO: 1 % (ref 0.3–6.2)
ERYTHROCYTE [DISTWIDTH] IN BLOOD BY AUTOMATED COUNT: 11.4 % (ref 12.3–15.4)
GLOBULIN UR ELPH-MCNC: 2.6 GM/DL
GLUCOSE SERPL-MCNC: 113 MG/DL (ref 65–99)
GLUCOSE UR STRIP-MCNC: NEGATIVE MG/DL
HCT VFR BLD AUTO: 36.6 % (ref 37.5–51)
HGB BLD-MCNC: 12.7 G/DL (ref 13–17.7)
HGB UR QL STRIP.AUTO: NEGATIVE
IMM GRANULOCYTES # BLD AUTO: 0.03 10*3/MM3 (ref 0–0.05)
IMM GRANULOCYTES NFR BLD AUTO: 0.4 % (ref 0–0.5)
KETONES UR QL STRIP: NEGATIVE
LEUKOCYTE ESTERASE UR QL STRIP.AUTO: NEGATIVE
LIPASE SERPL-CCNC: 24 U/L (ref 13–60)
LYMPHOCYTES # BLD AUTO: 0.8 10*3/MM3 (ref 0.7–3.1)
LYMPHOCYTES NFR BLD AUTO: 9.6 % (ref 19.6–45.3)
MCH RBC QN AUTO: 32.2 PG (ref 26.6–33)
MCHC RBC AUTO-ENTMCNC: 34.7 G/DL (ref 31.5–35.7)
MCV RBC AUTO: 92.9 FL (ref 79–97)
MONOCYTES # BLD AUTO: 0.96 10*3/MM3 (ref 0.1–0.9)
MONOCYTES NFR BLD AUTO: 11.5 % (ref 5–12)
NEUTROPHILS NFR BLD AUTO: 6.42 10*3/MM3 (ref 1.7–7)
NEUTROPHILS NFR BLD AUTO: 77.1 % (ref 42.7–76)
NITRITE UR QL STRIP: NEGATIVE
NRBC BLD AUTO-RTO: 0 /100 WBC (ref 0–0.2)
PH UR STRIP.AUTO: 7 [PH] (ref 5–8)
PLATELET # BLD AUTO: 219 10*3/MM3 (ref 140–450)
PMV BLD AUTO: 9.1 FL (ref 6–12)
POTASSIUM SERPL-SCNC: 4.2 MMOL/L (ref 3.5–5.2)
PROT SERPL-MCNC: 6.5 G/DL (ref 6–8.5)
PROT UR QL STRIP: NEGATIVE
RBC # BLD AUTO: 3.94 10*6/MM3 (ref 4.14–5.8)
SODIUM SERPL-SCNC: 142 MMOL/L (ref 136–145)
SP GR UR STRIP: 1.02 (ref 1–1.03)
UROBILINOGEN UR QL STRIP: ABNORMAL
WBC NRBC COR # BLD: 8.32 10*3/MM3 (ref 3.4–10.8)

## 2023-07-09 PROCEDURE — 25010000002 HYDROMORPHONE PER 4 MG: Performed by: INTERNAL MEDICINE

## 2023-07-09 PROCEDURE — 74176 CT ABD & PELVIS W/O CONTRAST: CPT

## 2023-07-09 PROCEDURE — 72131 CT LUMBAR SPINE W/O DYE: CPT

## 2023-07-09 PROCEDURE — 83690 ASSAY OF LIPASE: CPT | Performed by: EMERGENCY MEDICINE

## 2023-07-09 PROCEDURE — G0378 HOSPITAL OBSERVATION PER HR: HCPCS

## 2023-07-09 PROCEDURE — 63710000001 PREDNISONE PER 1 MG: Performed by: INTERNAL MEDICINE

## 2023-07-09 PROCEDURE — 80053 COMPREHEN METABOLIC PANEL: CPT | Performed by: EMERGENCY MEDICINE

## 2023-07-09 PROCEDURE — 99285 EMERGENCY DEPT VISIT HI MDM: CPT

## 2023-07-09 PROCEDURE — 81003 URINALYSIS AUTO W/O SCOPE: CPT | Performed by: EMERGENCY MEDICINE

## 2023-07-09 PROCEDURE — 85025 COMPLETE CBC W/AUTO DIFF WBC: CPT | Performed by: EMERGENCY MEDICINE

## 2023-07-09 RX ORDER — SODIUM CHLORIDE 9 MG/ML
40 INJECTION, SOLUTION INTRAVENOUS AS NEEDED
Status: DISCONTINUED | OUTPATIENT
Start: 2023-07-09 | End: 2023-07-20 | Stop reason: HOSPADM

## 2023-07-09 RX ORDER — ACETAMINOPHEN 650 MG/1
650 SUPPOSITORY RECTAL EVERY 4 HOURS PRN
Status: DISCONTINUED | OUTPATIENT
Start: 2023-07-09 | End: 2023-07-20 | Stop reason: HOSPADM

## 2023-07-09 RX ORDER — SODIUM CHLORIDE 0.9 % (FLUSH) 0.9 %
10 SYRINGE (ML) INJECTION AS NEEDED
Status: DISCONTINUED | OUTPATIENT
Start: 2023-07-09 | End: 2023-07-20 | Stop reason: HOSPADM

## 2023-07-09 RX ORDER — BISACODYL 5 MG/1
5 TABLET, DELAYED RELEASE ORAL DAILY PRN
Status: DISCONTINUED | OUTPATIENT
Start: 2023-07-09 | End: 2023-07-20 | Stop reason: HOSPADM

## 2023-07-09 RX ORDER — METOPROLOL SUCCINATE 25 MG/1
12.5 TABLET, EXTENDED RELEASE ORAL DAILY
Status: DISCONTINUED | OUTPATIENT
Start: 2023-07-10 | End: 2023-07-10

## 2023-07-09 RX ORDER — AMOXICILLIN 250 MG
2 CAPSULE ORAL 2 TIMES DAILY
Status: DISCONTINUED | OUTPATIENT
Start: 2023-07-09 | End: 2023-07-20 | Stop reason: HOSPADM

## 2023-07-09 RX ORDER — SODIUM CHLORIDE 0.9 % (FLUSH) 0.9 %
10 SYRINGE (ML) INJECTION EVERY 12 HOURS SCHEDULED
Status: DISCONTINUED | OUTPATIENT
Start: 2023-07-09 | End: 2023-07-20 | Stop reason: HOSPADM

## 2023-07-09 RX ORDER — HYDROCODONE BITARTRATE AND ACETAMINOPHEN 5; 325 MG/1; MG/1
1 TABLET ORAL ONCE
Status: COMPLETED | OUTPATIENT
Start: 2023-07-09 | End: 2023-07-09

## 2023-07-09 RX ORDER — PREDNISONE 20 MG/1
40 TABLET ORAL EVERY 24 HOURS
Status: COMPLETED | OUTPATIENT
Start: 2023-07-09 | End: 2023-07-11

## 2023-07-09 RX ORDER — NITROGLYCERIN 0.4 MG/1
0.4 TABLET SUBLINGUAL
Status: DISCONTINUED | OUTPATIENT
Start: 2023-07-09 | End: 2023-07-20 | Stop reason: HOSPADM

## 2023-07-09 RX ORDER — DOXEPIN HYDROCHLORIDE 10 MG/1
10 CAPSULE ORAL NIGHTLY
Status: DISCONTINUED | OUTPATIENT
Start: 2023-07-09 | End: 2023-07-18

## 2023-07-09 RX ORDER — ACETAMINOPHEN 160 MG/5ML
650 SOLUTION ORAL EVERY 4 HOURS PRN
Status: DISCONTINUED | OUTPATIENT
Start: 2023-07-09 | End: 2023-07-20 | Stop reason: HOSPADM

## 2023-07-09 RX ORDER — NALOXONE HCL 0.4 MG/ML
0.4 VIAL (ML) INJECTION
Status: DISCONTINUED | OUTPATIENT
Start: 2023-07-09 | End: 2023-07-16

## 2023-07-09 RX ORDER — HYDROMORPHONE HYDROCHLORIDE 1 MG/ML
0.5 INJECTION, SOLUTION INTRAMUSCULAR; INTRAVENOUS; SUBCUTANEOUS
Status: DISCONTINUED | OUTPATIENT
Start: 2023-07-09 | End: 2023-07-16

## 2023-07-09 RX ORDER — ONDANSETRON 2 MG/ML
4 INJECTION INTRAMUSCULAR; INTRAVENOUS EVERY 6 HOURS PRN
Status: DISCONTINUED | OUTPATIENT
Start: 2023-07-09 | End: 2023-07-20 | Stop reason: HOSPADM

## 2023-07-09 RX ORDER — ACETAMINOPHEN 325 MG/1
650 TABLET ORAL EVERY 4 HOURS PRN
Status: DISCONTINUED | OUTPATIENT
Start: 2023-07-09 | End: 2023-07-20 | Stop reason: HOSPADM

## 2023-07-09 RX ORDER — BISACODYL 10 MG
10 SUPPOSITORY, RECTAL RECTAL DAILY PRN
Status: DISCONTINUED | OUTPATIENT
Start: 2023-07-09 | End: 2023-07-20 | Stop reason: HOSPADM

## 2023-07-09 RX ORDER — ATORVASTATIN CALCIUM 20 MG/1
20 TABLET, FILM COATED ORAL NIGHTLY
Status: DISCONTINUED | OUTPATIENT
Start: 2023-07-09 | End: 2023-07-20 | Stop reason: HOSPADM

## 2023-07-09 RX ORDER — POLYETHYLENE GLYCOL 3350 17 G/17G
17 POWDER, FOR SOLUTION ORAL DAILY PRN
Status: DISCONTINUED | OUTPATIENT
Start: 2023-07-09 | End: 2023-07-20 | Stop reason: HOSPADM

## 2023-07-09 RX ORDER — GABAPENTIN 100 MG/1
100 CAPSULE ORAL DAILY
Status: DISCONTINUED | OUTPATIENT
Start: 2023-07-09 | End: 2023-07-15

## 2023-07-09 RX ORDER — CARBIDOPA AND LEVODOPA 50; 200 MG/1; MG/1
2.5 TABLET, EXTENDED RELEASE ORAL 4 TIMES DAILY
Status: DISCONTINUED | OUTPATIENT
Start: 2023-07-09 | End: 2023-07-17

## 2023-07-09 RX ORDER — LISINOPRIL 2.5 MG/1
2.5 TABLET ORAL DAILY
Status: DISCONTINUED | OUTPATIENT
Start: 2023-07-10 | End: 2023-07-13

## 2023-07-09 RX ORDER — HYDROCODONE BITARTRATE AND ACETAMINOPHEN 5; 325 MG/1; MG/1
1 TABLET ORAL EVERY 4 HOURS PRN
Status: DISCONTINUED | OUTPATIENT
Start: 2023-07-09 | End: 2023-07-12

## 2023-07-09 RX ORDER — OMEGA-3S/DHA/EPA/FISH OIL/D3 300MG-1000
1000 CAPSULE ORAL DAILY
Status: DISCONTINUED | OUTPATIENT
Start: 2023-07-10 | End: 2023-07-20 | Stop reason: HOSPADM

## 2023-07-09 RX ADMIN — DOXEPIN HYDROCHLORIDE 10 MG: 10 CAPSULE ORAL at 22:07

## 2023-07-09 RX ADMIN — HYDROMORPHONE HYDROCHLORIDE 0.5 MG: 1 INJECTION, SOLUTION INTRAMUSCULAR; INTRAVENOUS; SUBCUTANEOUS at 17:13

## 2023-07-09 RX ADMIN — SENNOSIDES AND DOCUSATE SODIUM 2 TABLET: 50; 8.6 TABLET ORAL at 22:08

## 2023-07-09 RX ADMIN — Medication 10 ML: at 22:09

## 2023-07-09 RX ADMIN — HYDROCODONE BITARTRATE AND ACETAMINOPHEN 1 TABLET: 5; 325 TABLET ORAL at 14:28

## 2023-07-09 RX ADMIN — CARBIDOPA AND LEVODOPA 2.5 TABLET: 50; 200 TABLET, EXTENDED RELEASE ORAL at 17:13

## 2023-07-09 RX ADMIN — GABAPENTIN 100 MG: 100 CAPSULE ORAL at 17:13

## 2023-07-09 RX ADMIN — ATORVASTATIN CALCIUM 20 MG: 20 TABLET, FILM COATED ORAL at 22:07

## 2023-07-09 RX ADMIN — PREDNISONE 40 MG: 20 TABLET ORAL at 17:13

## 2023-07-09 RX ADMIN — CARBIDOPA AND LEVODOPA 2.5 TABLET: 50; 200 TABLET, EXTENDED RELEASE ORAL at 22:07

## 2023-07-09 RX ADMIN — HYDROMORPHONE HYDROCHLORIDE 0.5 MG: 1 INJECTION, SOLUTION INTRAMUSCULAR; INTRAVENOUS; SUBCUTANEOUS at 22:22

## 2023-07-09 NOTE — ED NOTES
Nursing report ED to floor  Loni Sanford  78 y.o.  male    HPI :   Chief Complaint   Patient presents with    Abdominal Pain    Back Pain       Admitting doctor:   Romie Boyle MD    Admitting diagnosis:   The primary encounter diagnosis was Compression fracture of T12 vertebra, initial encounter. Diagnoses of Lower abdominal pain and Parkinson's disease were also pertinent to this visit.    Code status:   Current Code Status       Date Active Code Status Order ID Comments User Context       Prior            Allergies:   Aspirin and Belladonna alkaloids-opium    Isolation:   No active isolations    Intake and Output  No intake or output data in the 24 hours ending 07/09/23 1416    Weight:       07/09/23  0745   Weight: 94.3 kg (208 lb)       Most recent vitals:   Vitals:    07/09/23 1001 07/09/23 1021 07/09/23 1041 07/09/23 1056   BP: (!) 175/107 (!) 167/109 (!) 172/109    Pulse: 75 76 74 80   Resp:       Temp:       TempSrc:       SpO2: 94% 94% 94% 95%   Weight:       Height:           Active LDAs/IV Access:   Lines, Drains & Airways       Active LDAs       Name Placement date Placement time Site Days    Peripheral IV 07/09/23 0824 Anterior;Left Forearm 07/09/23  0824  Forearm  less than 1                    Labs (abnormal labs have a star):   Labs Reviewed   COMPREHENSIVE METABOLIC PANEL - Abnormal; Notable for the following components:       Result Value    Glucose 113 (*)     Alkaline Phosphatase 144 (*)     All other components within normal limits    Narrative:     GFR Normal >60  Chronic Kidney Disease <60  Kidney Failure <15    The GFR formula is only valid for adults with stable renal function between ages 18 and 70.   URINALYSIS W/ MICROSCOPIC IF INDICATED (NO CULTURE) - Abnormal; Notable for the following components:    Urobilinogen, UA 2.0 E.U./dL (*)     All other components within normal limits    Narrative:     Urine microscopic not indicated.   CBC WITH AUTO DIFFERENTIAL - Abnormal; Notable for  the following components:    RBC 3.94 (*)     Hemoglobin 12.7 (*)     Hematocrit 36.6 (*)     RDW 11.4 (*)     Neutrophil % 77.1 (*)     Lymphocyte % 9.6 (*)     Monocytes, Absolute 0.96 (*)     All other components within normal limits   LIPASE - Normal   CBC AND DIFFERENTIAL    Narrative:     The following orders were created for panel order CBC & Differential.  Procedure                               Abnormality         Status                     ---------                               -----------         ------                     CBC Auto Differential[[id number]]        Abnormal            Final result                 Please view results for these tests on the individual orders.       EKG:   No orders to display       Meds given in ED:   Medications   sodium chloride 0.9 % flush 10 mL (has no administration in time range)   HYDROcodone-acetaminophen (NORCO) 5-325 MG per tablet 1 tablet (has no administration in time range)       Imaging results:  CT Abdomen Pelvis Without Contrast    Result Date: 7/9/2023  1. T12 compression fracture with 50% height loss centrally. This is suspected to represent an acute or recent compression fracture. No further evidence for acute abnormality of the lumbar spine or abdomen. 2. Multiple bilateral nonobstructing renal stones. Thin rim of mineralization surrounding the right kidney as well as areas of adjacent fat necrosis most likely related to previous subcapsular hemorrhage that has resolved.  Radiation dose reduction techniques were utilized, including automated exposure control and exposure modulation based on body size.  This report was finalized on 7/9/2023 1:27 PM by Dr. Dmeetrio Pavon M.D.      CT Lumbar Spine Without Contrast    Result Date: 7/9/2023  1. T12 compression fracture with 50% height loss centrally. This is suspected to represent an acute or recent compression fracture. No further evidence for acute abnormality of the lumbar spine or abdomen. 2. Multiple  bilateral nonobstructing renal stones. Thin rim of mineralization surrounding the right kidney as well as areas of adjacent fat necrosis most likely related to previous subcapsular hemorrhage that has resolved.  Radiation dose reduction techniques were utilized, including automated exposure control and exposure modulation based on body size.  This report was finalized on 7/9/2023 1:27 PM by Dr. Demetrio Pavon M.D.       Ambulatory status:   Full assist    Social issues:   Social History     Socioeconomic History    Marital status:    Tobacco Use    Smoking status: Never     Passive exposure: Never    Smokeless tobacco: Never   Vaping Use    Vaping Use: Never used   Substance and Sexual Activity    Alcohol use: No    Drug use: No    Sexual activity: Defer       NIH Stroke Scale:       Deja Gutierres RN  07/09/23 14:16 EDT

## 2023-07-09 NOTE — PROGRESS NOTES
Clinical Pharmacy Services: Medication History    Loni Sanford is a 78 y.o. male presenting to [facility] for   Chief Complaint   Patient presents with    Abdominal Pain    Back Pain       He  has a past medical history of Cancer, Cancer, Coronary artery disease, Dizziness, Hematuria, History of angina, History of transfusion, Hyperlipidemia, Hypertension, Kidney stones, Kidney tumor, Parkinson disease, and Renal mass, right.    Allergies as of 07/09/2023 - Reviewed 07/09/2023   Allergen Reaction Noted    Aspirin Hives 11/09/2017    Belladonna alkaloids-opium Other (See Comments) 12/17/2012       Medication information was obtained from: Patient's wife  Pharmacy and Phone Number:     Prior to Admission Medications       Prescriptions Last Dose Informant Patient Reported? Taking?    acetaminophen (TYLENOL) 500 MG tablet  Spouse/Significant Other Yes Yes    Take 2 tablets by mouth Every 6 (Six) Hours As Needed for Mild Pain.    atorvastatin (LIPITOR) 20 MG tablet 7/8/2023 Spouse/Significant Other Yes Yes    Take 1 tablet by mouth Every Night.    baclofen (LIORESAL) 5 MG tablet 7/8/2023 Spouse/Significant Other No Yes    Take 1 tablet by mouth 3 (Three) Times a Day.    carbidopa-levodopa CR (SINEMET CR)  MG per CR tablet 7/9/2023 Spouse/Significant Other Yes Yes    Take 2.5 tablets by mouth 4 (Four) Times a Day.    cholecalciferol (VITAMIN D3) 1000 units tablet 7/9/2023 Spouse/Significant Other Yes Yes    Take 1 tablet by mouth Every Morning.    clopidogrel (PLAVIX) 75 MG tablet 7/9/2023 Spouse/Significant Other Yes Yes    Take 1 tablet by mouth Every Morning.    doxepin (SINEquan) 10 MG capsule 7/8/2023 Spouse/Significant Other No Yes    TAKE 1 CAPSULE BY MOUTH AT NIGHT FOR SLEEP    gabapentin (NEURONTIN) 100 MG capsule 7/8/2023 Spouse/Significant Other Yes Yes    Take 1 capsule by mouth Daily.    Gabapentin 10 %, Baclofen 2 %, lidocaine 4 %  Spouse/Significant Other No Yes    Apply 1-2 g  topically to the appropriate area as directed 3 (Three) to 4 (Four) times daily.    ketoconazole (NIZORAL) 2 % shampoo 7/9/2023 Spouse/Significant Other Yes Yes    1 application  Daily.    lisinopril (Zestril) 2.5 MG tablet 7/9/2023 Spouse/Significant Other No Yes    Take 1 tablet by mouth Daily. Blood pressure    metoprolol succinate XL (TOPROL-XL) 25 MG 24 hr tablet 7/9/2023 Spouse/Significant Other Yes Yes    Take 12.5 mg by mouth Daily.    Multiple Vitamins-Minerals (CENTRUM ADULTS PO) 7/9/2023 Spouse/Significant Other Yes Yes    Take 1 tablet by mouth Every Morning.    predniSONE (DELTASONE) 20 MG tablet 7/8/2023 Spouse/Significant Other No Yes    Take 2 tablets by mouth Daily for 5 days.    nitroglycerin (NITROSTAT) 0.4 MG SL tablet  Spouse/Significant Other Yes No    DISSOLVE ONE TABLET UNDER THE TONGUE EVERY 5 MINUTES AS NEEDED FOR CHEST PAIN    Patient not taking:  Reported on 7/7/2023              Medication notes: the compound cream was prescribed on Friday 07-07-23 but pt has not started using.    This medication list is complete to the best of my knowledge as of 7/9/2023    Please call if questions.    Colette Calles  Medication History Technician  [phone]    7/9/2023 13:41 EDT

## 2023-07-09 NOTE — ED PROVIDER NOTES
EMERGENCY DEPARTMENT ENCOUNTER    Room Number:  33/33  Date seen:  7/9/2023  PCP: Zo Estrada MD  Historian(s): Patient and his wife      HPI:  Chief Complaint: Low back pain, lower abdomen pain, falls  A complete HPI/ROS/PMH/PSH/SH/FH are unobtainable / limited due to: None  Context: Loni Sanford is a 78 y.o. male who presents to the ED for evaluation of low back pain with lower abdomen pain.  Patient has had some degenerative disc diagnosis in the past.  However he has been experiencing recent worsening pain in the low back area and saw his PCP last week for this problem.  He was prescribed a muscle relaxer and a steroid.  Despite taking that medicine for the past 2 or 3 days now, he is not improving.  He says he has had 2 falls over the weekend and he thinks that has made his back pain worse especially in the left side.  Additionally he is now experiencing some pain in the lower abdomen all across both sides.  He denies any vomiting or diarrhea.  He denies blood in the stools.  He denies any dysuria or change in urinary habits.  He has not had any fevers either.      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    Coronary artery disease 06/29/2021    History of adenocarcinoma of prostate 06/17/2019    Hyperlipidemia LDL goal <70 06/29/2021    MCI (mild cognitive impairment) 11/15/2019    Neuropathy 11/15/2019    Parkinson disease 12/17/2018    Stable angina 11/20/2020    Primary hypertension 04/15/2022    Encounter for screening for malignant neoplasm of colon 05/03/2023    History of colon polyps 05/03/2023     Resolved Ambulatory Problems     Diagnosis Date Noted    Renal mass, right 04/05/2018     Past Medical History:   Diagnosis Date    Cancer     Cancer     Dizziness     Hematuria     History of angina     History of transfusion     Hyperlipidemia     Hypertension     Kidney stones      Kidney tumor          PAST SURGICAL HISTORY  Past Surgical History:   Procedure Laterality Date    APPENDECTOMY  2011    CARDIAC CATHETERIZATION      CATARACT EXTRACTION W/ INTRAOCULAR LENS  IMPLANT, BILATERAL      COLONOSCOPY N/A 5/9/2023    Procedure: COLONOSCOPY FOR SCREENING;  Surgeon: Terrell Zhang MD;  Location: [facility] OR;  Service: Gastroenterology;  Laterality: N/A;  poor prep, polyp    EXTRACORPOREAL SHOCKWAVE LITHOTRIPSY (ESWL), STENT INSERTION/REMOVAL Right 12/29/2017    Procedure: RIGHT EXTRACORPOREAL SHOCKWAVE LITHOTRIPSY CYSTOSCOPY STENT PLACEMENT;  Surgeon: Marcelo Suarez MD;  Location: [facility];  Service:     EXTRACORPOREAL SHOCKWAVE LITHOTRIPSY (ESWL), STENT INSERTION/REMOVAL Right 12/28/2018    Procedure: RT EXTRACORPREAL SHOCKWAVE LITHOTRIPSY CYSTOSCOPY  WITH  MANIPULATION OF STONE;  Surgeon: Moe Vasquez MD;  Location: [facility];  Service: Urology    FINGER SURGERY Left 1984    INDEX    HAND SURGERY Right 1998    NEPHRECTOMY PARTIAL Right 4/5/2018    Procedure: RT OPEN PARTIAL NEPHRECTOMY WITH INTRAOPERATIVE DOPPLER, DECORTICATION OF RIGHT RENAL CYST;  Surgeon: Marcelo Suarez MD;  Location: [facility];  Service: Urology    PROSTATECTOMY  2013         FAMILY HISTORY  Family History   Problem Relation Age of Onset    Heart disease Father     Heart attack Father     Diabetes Brother     Heart attack Brother     Coronary artery disease Brother         CABG    Heart disease Brother     Hypertension Mother     Malig Hyperthermia Neg Hx     Stroke Neg Hx          SOCIAL HISTORY  Social History     Socioeconomic History    Marital status:    Tobacco Use    Smoking status: Never     Passive exposure: Never    Smokeless tobacco: Never   Vaping Use    Vaping Use: Never used   Substance and Sexual Activity    Alcohol use: No    Drug use: No    Sexual activity: Defer         ALLERGIES  Aspirin and Belladonna alkaloids-opium        REVIEW OF SYSTEMS  Review of  Systems   Constitutional:  Negative for activity change and fever.   HENT: Negative.     Eyes:  Negative for pain and visual disturbance.   Respiratory:  Negative for cough and shortness of breath.    Cardiovascular:  Negative for chest pain.   Gastrointestinal:  Positive for abdominal pain. Negative for diarrhea and vomiting.   Genitourinary:  Negative for dysuria.   Musculoskeletal:  Positive for back pain, gait problem and myalgias.   Skin:  Negative for color change.   Neurological:  Negative for syncope and headaches.   All other systems reviewed and are negative.         PHYSICAL EXAM  ED Triage Vitals   Temp Heart Rate Resp BP SpO2   07/09/23 0745 07/09/23 0745 07/09/23 0745 07/09/23 0751 07/09/23 0745   98.1 °F (36.7 °C) 87 16 153/91 96 %      Temp src Heart Rate Source Patient Position BP Location FiO2 (%)   07/09/23 0745 07/09/23 0745 -- -- --   Tympanic Monitor          Physical Exam      GENERAL: Pleasant, older gentleman, calm, no diaphoresis, no acute distress  HENT: nares patent, normocephalic and atraumatic  EYES: no scleral icterus, EOMI, normal conjunctiva  CV: regular rhythm, normal rate, normal distal pulses  RESPIRATORY: normal effort, no stridor  ABDOMEN: soft, nontender in all quadrants.  No masses palpable, no peritoneal features, bowel sounds normal.  MUSCULOSKELETAL: no deformity, no asymmetry  NEURO: alert, moves all extremities, follows commands, no facial droop, speech is clear, features of parkinsonism notable.  PSYCH:  calm, cooperative  SKIN: warm, dry    Vital signs and nursing notes reviewed.        LAB RESULTS  Recent Results (from the past 24 hour(s))   Lipase    Collection Time: 07/09/23  8:19 AM    Specimen: Blood   Result Value Ref Range    Lipase 24 13 - 60 U/L   CBC Auto Differential    Collection Time: 07/09/23  8:19 AM    Specimen: Blood   Result Value Ref Range    WBC 8.32 3.40 - 10.80 10*3/mm3    RBC 3.94 (L) 4.14 - 5.80 10*6/mm3    Hemoglobin 12.7 (L) 13.0 - 17.7 g/dL     Hematocrit 36.6 (L) 37.5 - 51.0 %    MCV 92.9 79.0 - 97.0 fL    MCH 32.2 26.6 - 33.0 pg    MCHC 34.7 31.5 - 35.7 g/dL    RDW 11.4 (L) 12.3 - 15.4 %    RDW-SD 38.6 37.0 - 54.0 fl    MPV 9.1 6.0 - 12.0 fL    Platelets 219 140 - 450 10*3/mm3    Neutrophil % 77.1 (H) 42.7 - 76.0 %    Lymphocyte % 9.6 (L) 19.6 - 45.3 %    Monocyte % 11.5 5.0 - 12.0 %    Eosinophil % 1.0 0.3 - 6.2 %    Basophil % 0.4 0.0 - 1.5 %    Immature Grans % 0.4 0.0 - 0.5 %    Neutrophils, Absolute 6.42 1.70 - 7.00 10*3/mm3    Lymphocytes, Absolute 0.80 0.70 - 3.10 10*3/mm3    Monocytes, Absolute 0.96 (H) 0.10 - 0.90 10*3/mm3    Eosinophils, Absolute 0.08 0.00 - 0.40 10*3/mm3    Basophils, Absolute 0.03 0.00 - 0.20 10*3/mm3    Immature Grans, Absolute 0.03 0.00 - 0.05 10*3/mm3    nRBC 0.0 0.0 - 0.2 /100 WBC   Comprehensive Metabolic Panel    Collection Time: 07/09/23 10:08 AM    Specimen: Blood   Result Value Ref Range    Glucose 113 (H) 65 - 99 mg/dL    BUN 23 8 - 23 mg/dL    Creatinine 0.92 0.76 - 1.27 mg/dL    Sodium 142 136 - 145 mmol/L    Potassium 4.2 3.5 - 5.2 mmol/L    Chloride 104 98 - 107 mmol/L    CO2 25.6 22.0 - 29.0 mmol/L    Calcium 9.1 8.6 - 10.5 mg/dL    Total Protein 6.5 6.0 - 8.5 g/dL    Albumin 3.9 3.5 - 5.2 g/dL    ALT (SGPT) 6 1 - 41 U/L    AST (SGOT) 15 1 - 40 U/L    Alkaline Phosphatase 144 (H) 39 - 117 U/L    Total Bilirubin 0.7 0.0 - 1.2 mg/dL    Globulin 2.6 gm/dL    A/G Ratio 1.5 g/dL    BUN/Creatinine Ratio 25.0 7.0 - 25.0    Anion Gap 12.4 5.0 - 15.0 mmol/L    eGFR 85.7 >60.0 mL/min/1.73   Urinalysis With Microscopic If Indicated (No Culture) - Urine, Clean Catch    Collection Time: 07/09/23  1:09 PM    Specimen: Urine, Clean Catch   Result Value Ref Range    Color, UA Yellow Yellow, Straw    Appearance, UA Clear Clear    pH, UA 7.0 5.0 - 8.0    Specific Gravity, UA 1.018 1.005 - 1.030    Glucose, UA Negative Negative    Ketones, UA Negative Negative    Bilirubin, UA Negative Negative    Blood, UA Negative Negative     Protein, UA Negative Negative    Leuk Esterase, UA Negative Negative    Nitrite, UA Negative Negative    Urobilinogen, UA 2.0 E.U./dL (A) 0.2 - 1.0 E.U./dL       Ordered the above labs and reviewed the results.        RADIOLOGY  CT Lumbar Spine Without Contrast, CT Abdomen Pelvis Without Contrast    Result Date: 7/9/2023  CT ABDOMEN AND PELVIS WITHOUT IV CONTRAST, CT LUMBAR SPINE WITHOUT CONTRAST  HISTORY: Lower abdominal pain. Two falls over the past weekend. Back pain.  TECHNIQUE:  CT includes axial imaging from the lung bases to the trochanters without intravenous contrast and without use of oral contrast. CT lumbar spine includes axial imaging through the lumbar spine. Data reconstructed in coronal and sagittal planes. Radiation dose reduction techniques were utilized, including automated exposure control and exposure modulation based on body size.  COMPARISON: Lumbar spine x-rays 11/09/2017.  FINDINGS: CT ABDOMEN AND PELVIS: There is minimal gynecomastia. Atherosclerotic calcifications are present and there are coronary arterial calcifications. There are small bilateral pleural effusions and there is bilateral lower lobe subsegmental atelectasis.  Hepatic and splenic calcifications are present associated with old granulomatous disease. No focal hepatic or splenic abnormality is demonstrated.  The gallbladder, adrenal glands, and pancreas exhibit normal noncontrasted CT appearance.  There are several 3 mm and smaller bilateral renal nonobstructing stones. There is no ureteral stone or evidence for obstruction. There is bilateral perinephric stranding. There is a thin rim of mineralization surrounding the right kidney that may be associated with an old subcapsular hemorrhage. There is also chronic appearing fat necrosis along the right kidney particularly at the lower pole. Urinary bladder appears within normal limits. There is no bowel dilatation or evidence for bowel obstruction. There is mild-to-moderate stool  throughout the colon which may represent mild constipation. Atherosclerotic calcifications are present involving the abdominal aorta and iliac vasculature. There is no shanda enlargement in the abdomen or pelvis.   LUMBAR SPINE: There is a fracture of the T12 vertebral body with 50% height loss centrally.  Horizontal fracture plane extends through the superior aspect of the body and fracture extends into the disc space superiorly. There is mild retropulsion of the posterior superior aspect of the T12 with mild narrowing of the central canal.  At T12-L1, the central canal and neural foramina are patent.  At L1-2, there is a mild broad disc bulge mildly effacing the anterior thecal sac. The neural foramina are patent.  At L2-3, there is a mild broad disc bulge with mild narrowing of the central canal. The neural foramina are patent.  At L3-4, there is a broad disc bulge slightly effacing the anterior thecal sac. The neural foramina are patent.  At L4-5, there is a broad disc bulge and there is mild bilateral facet arthritis and there is mild bilateral foraminal narrowing. The central canal is patent.  At L5-S1, there is a broad disc bulge and there is mild facet arthritis. The neural foramina are patent.  There is bridging anterior spur formation at the right sacroiliac joint.      1. T12 compression fracture with 50% height loss centrally. This is suspected to represent an acute or recent compression fracture. No further evidence for acute abnormality of the lumbar spine or abdomen. 2. Multiple bilateral nonobstructing renal stones. Thin rim of mineralization surrounding the right kidney as well as areas of adjacent fat necrosis most likely related to previous subcapsular hemorrhage that has resolved.  Radiation dose reduction techniques were utilized, including automated exposure control and exposure modulation based on body size.  This report was finalized on 7/9/2023 1:27 PM by Dr. Demetrio Pavon M.D.        Ordered the above noted radiological studies. Reviewed by me in PACS.          PROCEDURES  Procedures      MEDICATIONS GIVEN IN ER  Medications   sodium chloride 0.9 % flush 10 mL (has no administration in time range)   HYDROcodone-acetaminophen (NORCO) 5-325 MG per tablet 1 tablet (has no administration in time range)           MEDICAL DECISION MAKING, PROGRESS, and CONSULTS    All labs have been independently reviewed by me.  All radiology studies have been reviewed by me and I have also reviewed the radiology report.   EKG's independently viewed and interpreted by me.  Discussion below represents my analysis of pertinent findings related to patient's condition, differential diagnosis, treatment plan and final disposition.      Additional sources:  - Discussed/ obtained information from independent historians: Patient's wife provided a substantial amount of the history while at the bedside.    - External (non-ED) record review: Reviewed the most recent PCP progress note from July 7, 2023 when patient was seen for his history of back pain for the past 5 days.  At that time he was prescribed baclofen and a course of prednisone.  Also discussed referral for physical therapy.    - Chronic or social conditions impacting care: Patient has Parkinson disease.  Has some difficulties with walking at baseline and often holds onto his wife's arm for support.        Orders placed during this visit:  Orders Placed This Encounter   Procedures    CT Lumbar Spine Without Contrast    CT Abdomen Pelvis Without Contrast    Comprehensive Metabolic Panel    Lipase    Urinalysis With Microscopic If Indicated (No Culture) - Urine, Clean Catch    CBC Auto Differential    [facility] (on-call MD unless specified) Details    Insert Peripheral IV    Initiate Observation Status    CBC & Differential           Differential diagnosis includes but is not limited to:    Degenerative disc disease, compression fracture, facet fracture, sciatica,  diverticulitis, kidney stone      Independent interpretation of labs, radiology studies, and discussions with consultants:  ED Course as of 07/09/23 1358   Sun Jul 09, 2023   0803 Given patient's new element of pain in the low back and lower abdomen I will get images of both these areas including CT abdomen and pelvis as well as CT lumbar spine.  He does not have any hematuria or dysuria symptoms so I think that a pyelonephritis or kidney stone is less likely. [JAZLYN]   1319 I independently interpreted the abdomen CT study and my findings are: No free air, no small bowel obstruction pattern   [JAZLYN]   1319 I reviewed the CT findings with the patient and his wife at the bedside.  He does have a new T12 compression fracture deformity which I think is probably from his recent falls.  During my reassessment of him, he was trying to get up to the bedside to urinate for us.  He had quite a lot of difficulties even just getting up to a standing position.  I think his mobility is quite compromised right now.  Therefore, I recommended that we keep him in the hospital today for further supportive care.  He and his wife agreed to that plan. [JAZLYN]   1330 I just discussed with Dr. Boyle from [facility].  He agrees to accept the patient for further management today. [JAZLYN]      ED Course User Index  [JAZLYN] Nash Zepeda MD         DIAGNOSIS  Final diagnoses:   Compression fracture of T12 vertebra, initial encounter   Lower abdominal pain   Parkinson's disease         DISPOSITION  Observation to [facility]        Latest Documented Vital Signs:  As of 13:58 EDT  BP- (!) 172/109 HR- 80 Temp- 98.1 °F (36.7 °C) (Tympanic) O2 sat- 95%              --    Please note that portions of this were completed with a voice recognition program.       Note Disclaimer: At [facility], we believe that sharing information builds trust and better relationships. You are receiving this note because you are receiving care at [facility] or recently visited. It is  possible you will see health information before a provider has talked with you about it. This kind of information can be easy to misunderstand. To help you fully understand what it means for your health, we urge you to discuss this note with your provider.             Nash Zepeda MD  07/09/23 6511

## 2023-07-09 NOTE — ED NOTES
When he woke he had back and abd pain.  He was seen at [facility] on Friday and was given med that he takes at 8pm daily.  He isn't feeling better.  He tripped and fell on fri and sat night.  No nvd

## 2023-07-09 NOTE — H&P
Internal medicine history and physical  INTERNAL MEDICINE   [facility]       Patient Identification:  Name: Loni Sanford  Age: 78 y.o.  Sex: male  :  1945  MRN: [id number]                   Primary Care Physician: Zo Estrada MD                               Date of admission:2023    Chief Complaint: Progressive pain and discomfort in his lower back circling around his abdomen for few weeks worse in the last couple days.    History of Present Illness:   Patient is a 78-year-old male who has history of Parkinson's disease with associated balance issues mobility and tendency to fall.  According to the patient and his wife he has fallen about 6 or 7 times in the last month or so with the last fall yesterday.  Patient was complaining of discomfort in his abdomen and back for the last 3 to 4 weeks and was seen by his primary care provider and various methods were employed including use of muscle relaxers recently without much help.  In this background fell Friday and afterwards complaining of significant pain in his back circling around his abdomen.  He continued to have worsening pain and fell again yesterday resulting in him coming to the emergency room for further evaluation.  Work-up in the emergency room revealed T12 compression fracture with 50% height loss centrally.  He was also noted to have multiple nonobstructing calculi bilaterally.  No other acute abnormalities noted significant degenerative changes in the lumbar spine noted.  Patient is being admitted for pain control and possible spine surgery evaluation.      Past Medical History:  Past Medical History:   Diagnosis Date    Cancer     Prostate cancer     Cancer     kidney dx 2018    Coronary artery disease     Dizziness     occ    Hematuria     History of angina     carries NTG    History of transfusion     Hyperlipidemia     Hypertension     Kidney stones     new one 2018    Kidney tumor     RIGHT removed  4/2018    Parkinson disease     Renal mass, right     surgically removed 4/2018     Past Surgical History:  Past Surgical History:   Procedure Laterality Date    APPENDECTOMY  2011    CARDIAC CATHETERIZATION      CATARACT EXTRACTION W/ INTRAOCULAR LENS  IMPLANT, BILATERAL      COLONOSCOPY N/A 5/9/2023    Procedure: COLONOSCOPY FOR SCREENING;  Surgeon: Terrell Zhang MD;  Location: [facility] MAIN OR;  Service: Gastroenterology;  Laterality: N/A;  poor prep, polyp    EXTRACORPOREAL SHOCKWAVE LITHOTRIPSY (ESWL), STENT INSERTION/REMOVAL Right 12/29/2017    Procedure: RIGHT EXTRACORPOREAL SHOCKWAVE LITHOTRIPSY CYSTOSCOPY STENT PLACEMENT;  Surgeon: Marcelo Suarez MD;  Location: [facility], [place];  Service:     EXTRACORPOREAL SHOCKWAVE LITHOTRIPSY (ESWL), STENT INSERTION/REMOVAL Right 12/28/2018    Procedure: RT EXTRACORPREAL SHOCKWAVE LITHOTRIPSY CYSTOSCOPY  WITH  MANIPULATION OF STONE;  Surgeon: Moe Vasquez MD;  Location: [facility], [place];  Service: Urology    FINGER SURGERY Left 1984    INDEX    HAND SURGERY Right 1998    NEPHRECTOMY PARTIAL Right 4/5/2018    Procedure: RT OPEN PARTIAL NEPHRECTOMY WITH INTRAOPERATIVE DOPPLER, DECORTICATION OF RIGHT RENAL CYST;  Surgeon: Marcelo Suarez MD;  Location: [facility];  Service: Urology    PROSTATECTOMY  2013      Home Meds:  Medications Prior to Admission   Medication Sig Dispense Refill Last Dose    acetaminophen (TYLENOL) 500 MG tablet Take 2 tablets by mouth Every 6 (Six) Hours As Needed for Mild Pain.       atorvastatin (LIPITOR) 20 MG tablet Take 1 tablet by mouth Every Night.   7/8/2023    baclofen (LIORESAL) 5 MG tablet Take 1 tablet by mouth 3 (Three) Times a Day. 30 tablet 0 7/8/2023    carbidopa-levodopa CR (SINEMET CR)  MG per CR tablet Take 2.5 tablets by mouth 4 (Four) Times a Day.   7/9/2023    cholecalciferol (VITAMIN D3) 1000 units tablet Take 1 tablet by mouth Every Morning.   7/9/2023    clopidogrel (PLAVIX) 75 MG tablet Take 1 tablet by  mouth Every Morning.   7/9/2023    doxepin (SINEquan) 10 MG capsule TAKE 1 CAPSULE BY MOUTH AT NIGHT FOR SLEEP 90 capsule 3 7/8/2023    gabapentin (NEURONTIN) 100 MG capsule Take 1 capsule by mouth Daily.   7/8/2023    Gabapentin 10 %, Baclofen 2 %, lidocaine 4 % Apply 1-2 g topically to the appropriate area as directed 3 (Three) to 4 (Four) times daily. 90 g 0     ketoconazole (NIZORAL) 2 % shampoo 1 application  Daily.   7/9/2023    lisinopril (Zestril) 2.5 MG tablet Take 1 tablet by mouth Daily. Blood pressure 90 tablet 1 7/9/2023    metoprolol succinate XL (TOPROL-XL) 25 MG 24 hr tablet Take 12.5 mg by mouth Daily.   7/9/2023    Multiple Vitamins-Minerals (CENTRUM ADULTS PO) Take 1 tablet by mouth Every Morning.   7/9/2023    predniSONE (DELTASONE) 20 MG tablet Take 2 tablets by mouth Daily for 5 days. 10 tablet 0 7/8/2023    nitroglycerin (NITROSTAT) 0.4 MG SL tablet DISSOLVE ONE TABLET UNDER THE TONGUE EVERY 5 MINUTES AS NEEDED FOR CHEST PAIN (Patient not taking: Reported on 7/7/2023)        Current Meds:     Current Facility-Administered Medications:     [COMPLETED] Insert Peripheral IV, , , Once **AND** sodium chloride 0.9 % flush 10 mL, 10 mL, Intravenous, PRN, Nash Zepeda MD  Allergies:  Allergies   Allergen Reactions    Aspirin Hives    Belladonna Alkaloids-Opium Other (See Comments)     COLD SWEATS AND FEVER     Social History:   Social History     Tobacco Use    Smoking status: Never     Passive exposure: Never    Smokeless tobacco: Never   Substance Use Topics    Alcohol use: No      Family History:  Family History   Problem Relation Age of Onset    Heart disease Father     Heart attack Father     Diabetes Brother     Heart attack Brother     Coronary artery disease Brother         CABG    Heart disease Brother     Hypertension Mother     Malig Hyperthermia Neg Hx     Stroke Neg Hx           Review of Systems  See history of present illness and past medical history.    Constitutional:  Negative  "for activity change and fever.   HENT: Negative.     Eyes:  Negative for pain and visual disturbance.   Respiratory:  Negative for cough and shortness of breath.    Cardiovascular:  Negative for chest pain.   Gastrointestinal:  Positive for abdominal pain. Negative for diarrhea and vomiting.   Genitourinary:  Negative for dysuria.   Musculoskeletal:  Positive for back pain, gait problem and myalgias.   Skin:  Negative for color change.   Neurological:  Negative for syncope and headaches.   All other systems reviewed and are negative.       Vitals:   /93   Pulse 77   Temp 97.5 °F (36.4 °C) (Oral)   Resp 16   Ht 175.3 cm (69\")   Wt 87.5 kg (193 lb)   SpO2 91%   BMI 28.50 kg/m²   I/O: No intake or output data in the 24 hours ending 07/09/23 1531  Exam:  Patient is examined using the personal protective equipment as per guidelines from infection control for this particular patient as enacted.  Hand washing was performed before and after patient interaction.  General Appearance:    Alert, cooperative, no distress, appears stated age   Head:    Normocephalic, without obvious abnormality, atraumatic   Eyes:    PERRL, conjunctiva/corneas clear, EOM's intact, both eyes   Ears:    Normal external ear canals, both ears   Nose:   Nares normal, septum midline, mucosa normal, no drainage    or sinus tenderness   Throat:   Lips, tongue, gums normal; oral mucosa pink and moist   Neck: No adenopathy noted.   Back:     Symmetric, no curvature, ROM normal, no CVA tenderness   Lungs:     Clear to auscultation bilaterally, respirations unlabored   Chest Wall:    No tenderness or deformity    Heart:  S1-S2 regular.   Abdomen:   Soft no guarding rigidity or localized abdominal tenderness noted   Extremities:   Extremities normal, atraumatic, no cyanosis or edema   Pulses:   Pulses palpable in all extremities; symmetric all extremities   Skin: No rash noted he has some bruising due to recurrent falls.   Neurologic: Spasticity " and rigidity in the extremities due to underlying Parkinson's disease as well as peculiar Parkinson facies noted.       Data Review:      I reviewed the patient's new clinical results.  Results from last 7 days   Lab Units 07/09/23  0819   WBC 10*3/mm3 8.32   HEMOGLOBIN g/dL 12.7*   PLATELETS 10*3/mm3 219     Results from last 7 days   Lab Units 07/09/23  1008   SODIUM mmol/L 142   POTASSIUM mmol/L 4.2   CHLORIDE mmol/L 104   CO2 mmol/L 25.6   BUN mg/dL 23   CREATININE mg/dL 0.92   CALCIUM mg/dL 9.1   GLUCOSE mg/dL 113*     CT Abdomen Pelvis Without Contrast    Result Date: 7/9/2023  1. T12 compression fracture with 50% height loss centrally. This is suspected to represent an acute or recent compression fracture. No further evidence for acute abnormality of the lumbar spine or abdomen. 2. Multiple bilateral nonobstructing renal stones. Thin rim of mineralization surrounding the right kidney as well as areas of adjacent fat necrosis most likely related to previous subcapsular hemorrhage that has resolved.  Radiation dose reduction techniques were utilized, including automated exposure control and exposure modulation based on body size.  This report was finalized on 7/9/2023 1:27 PM by Dr. Demetrio Pavon M.D.      CT Lumbar Spine Without Contrast    Result Date: 7/9/2023  1. T12 compression fracture with 50% height loss centrally. This is suspected to represent an acute or recent compression fracture. No further evidence for acute abnormality of the lumbar spine or abdomen. 2. Multiple bilateral nonobstructing renal stones. Thin rim of mineralization surrounding the right kidney as well as areas of adjacent fat necrosis most likely related to previous subcapsular hemorrhage that has resolved.  Radiation dose reduction techniques were utilized, including automated exposure control and exposure modulation based on body size.  This report was finalized on 7/9/2023 1:27 PM by Dr. Demetrio Pavon M.D.      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 after midnight except for pain medications, spine surgery/neurosurgery Tatian for possible consideration for kyphoplasty.  Continue with pain management and muscle relaxants.  Recurrent falls in the setting of underlying Parkinson's disease-provide him with fall precautions OT PT evaluation continue his antiparkinson regimen.  History of coronary artery disease and hypertension-continue with atorvastatin and metoprolol and hold his aspirin.    Romie Boyle MD   7/9/2023  15:31 EDT    Parts of this note may be an electronic transcription/translation of spoken language to printed text using the Dragon dictation system.

## 2023-07-10 ENCOUNTER — APPOINTMENT (OUTPATIENT)
Dept: MRI IMAGING | Facility: HOSPITAL | Age: 78
DRG: 515 | End: 2023-07-10
Payer: MEDICARE

## 2023-07-10 PROCEDURE — 99203 OFFICE O/P NEW LOW 30 MIN: CPT | Performed by: PHYSICIAN ASSISTANT

## 2023-07-10 PROCEDURE — 63710000001 PREDNISONE PER 1 MG: Performed by: INTERNAL MEDICINE

## 2023-07-10 PROCEDURE — 72148 MRI LUMBAR SPINE W/O DYE: CPT

## 2023-07-10 PROCEDURE — G0378 HOSPITAL OBSERVATION PER HR: HCPCS

## 2023-07-10 PROCEDURE — 72146 MRI CHEST SPINE W/O DYE: CPT

## 2023-07-10 PROCEDURE — 25010000002 HYDROMORPHONE PER 4 MG: Performed by: INTERNAL MEDICINE

## 2023-07-10 RX ORDER — METOPROLOL SUCCINATE 25 MG/1
12.5 TABLET, EXTENDED RELEASE ORAL DAILY
Status: DISCONTINUED | OUTPATIENT
Start: 2023-07-10 | End: 2023-07-20 | Stop reason: HOSPADM

## 2023-07-10 RX ADMIN — CHOLECALCIFEROL TAB 10 MCG (400 UNIT) 1000 UNITS: 10 TAB at 09:21

## 2023-07-10 RX ADMIN — LISINOPRIL 2.5 MG: 2.5 TABLET ORAL at 13:01

## 2023-07-10 RX ADMIN — CARBIDOPA AND LEVODOPA 2.5 TABLET: 50; 200 TABLET, EXTENDED RELEASE ORAL at 21:05

## 2023-07-10 RX ADMIN — ATORVASTATIN CALCIUM 20 MG: 20 TABLET, FILM COATED ORAL at 21:06

## 2023-07-10 RX ADMIN — HYDROMORPHONE HYDROCHLORIDE 0.5 MG: 1 INJECTION, SOLUTION INTRAMUSCULAR; INTRAVENOUS; SUBCUTANEOUS at 17:47

## 2023-07-10 RX ADMIN — DOXEPIN HYDROCHLORIDE 10 MG: 10 CAPSULE ORAL at 21:06

## 2023-07-10 RX ADMIN — Medication 10 ML: at 21:07

## 2023-07-10 RX ADMIN — HYDROMORPHONE HYDROCHLORIDE 0.5 MG: 1 INJECTION, SOLUTION INTRAMUSCULAR; INTRAVENOUS; SUBCUTANEOUS at 13:00

## 2023-07-10 RX ADMIN — GABAPENTIN 100 MG: 100 CAPSULE ORAL at 12:59

## 2023-07-10 RX ADMIN — CARBIDOPA AND LEVODOPA 2.5 TABLET: 50; 200 TABLET, EXTENDED RELEASE ORAL at 17:47

## 2023-07-10 RX ADMIN — METOPROLOL TARTRATE 12.5 MG: 25 TABLET, FILM COATED ORAL at 01:16

## 2023-07-10 RX ADMIN — PREDNISONE 40 MG: 20 TABLET ORAL at 17:47

## 2023-07-10 RX ADMIN — HYDROMORPHONE HYDROCHLORIDE 0.5 MG: 1 INJECTION, SOLUTION INTRAMUSCULAR; INTRAVENOUS; SUBCUTANEOUS at 09:18

## 2023-07-10 RX ADMIN — CARBIDOPA AND LEVODOPA 2.5 TABLET: 50; 200 TABLET, EXTENDED RELEASE ORAL at 13:03

## 2023-07-10 NOTE — SIGNIFICANT NOTE
07/10/23 0817   OTHER   Discipline physical therapist   Rehab Time/Intention   Session Not Performed other (see comments)  (Patient with T12 compression fx. PT will hold and await neurosurgery recommendations for further treatment.)   Recommendation   PT - Next Appointment 07/11/23

## 2023-07-10 NOTE — SIGNIFICANT NOTE
07/10/23 3752   OTHER   Discipline speech language pathologist   Rehab Time/Intention   Session Not Performed other (see comments)  (Orders received for swallow evaluation due to patient complaints of difficulty swallowing at times. Patient currently NPO awaiting neurosurgery consult. Will follow for evaluation as able.)

## 2023-07-10 NOTE — CASE MANAGEMENT/SOCIAL WORK
Discharge Planning Assessment  [facility]     Patient Name: Loni Sanford  MRN: [id number]  Today's Date: 7/10/2023    Admit Date: 7/9/2023    Plan: TBD   Discharge Needs Assessment       Row Name 07/10/23 1002       Living Environment    People in Home spouse    Current Living Arrangements home    Potentially Unsafe Housing Conditions none    Primary Care Provided by spouse/significant other;self    Provides Primary Care For no one, unable/limited ability to care for self    Family Caregiver if Needed spouse    Quality of Family Relationships helpful;involved;supportive    Able to Return to Prior Arrangements yes       Resource/Environmental Concerns    Resource/Environmental Concerns none       Transition Planning    Patient/Family Anticipates Transition to home with help/services;inpatient rehabilitation facility    Patient/Family Anticipated Services at Transition home health care;skilled nursing    Transportation Anticipated family or friend will provide       Discharge Needs Assessment    Readmission Within the Last 30 Days no previous admission in last 30 days    Equipment Currently Used at Home cane, quad;lift device    Concerns to be Addressed discharge planning    Anticipated Changes Related to Illness inability to care for self    Discharge Facility/Level of Care Needs home with home health;nursing facility, skilled                   Discharge Plan       Row Name 07/10/23 1004       Plan    Plan TBD    Plan Comments S/W pt and his wife Zuri at bedside.  Facesheet and pharmacy info confirmed.  Pt lives in a 2 story house w/ Zuri.  Their bedroom and bath are on the main floor.  Home DME includes a cane and lift chair.  No hx of HH or SNF.  Pt has had several falls recently.  CCP discussed DC options including HH and SNF.  Await KAUSHAL consult and PT eval to determine appropariate DC plans.  CCP will followup and assist as needed. ..........Debbie YBARRA/ YODIT                  Continued Care and Services -  Admitted Since 7/9/2023    Coordination has not been started for this encounter.          Demographic Summary       Row Name 07/10/23 1001       General Information    Admission Type observation    Arrived From home    Required Notices Provided Observation Status Notice    Referral Source admission list    Reason for Consult discharge planning    Preferred Language English                   Functional Status       Row Name 07/10/23 1001       Functional Status    Usual Activity Tolerance moderate       Functional Status, IADL    Medications independent    Meal Preparation assistive person    Housekeeping assistive person    Laundry assistive person    Shopping assistive person       Mental Status    General Appearance WDL WDL       Mental Status Summary    Recent Changes in Mental Status/Cognitive Functioning no changes       Employment/    Employment Status retired                             Debbie Ayala RN

## 2023-07-10 NOTE — PLAN OF CARE
Goal Outcome Evaluation:  Plan of Care Reviewed With: patient      Pt A&Ox4. Pt c/o back pain, prn pain meds given. Bp elevated, pt given night dose of metoprolol. Physician note on summary page to change metoprolol to nightly per pt request also pt states his daily lisinopril was increased from 2.5 to 5mg, PTA med list updated, will pass this on to day shift nurse. NAD noted. VSS. Will continue to monitor.

## 2023-07-10 NOTE — PROGRESS NOTES
Name: Loni Sanford ADMIT: 2023   : 1945  PCP: Zo Estrada MD    MRN: [id number] LOS: 0 days   AGE/SEX: 78 y.o. male  ROOM: [facility]     Subjective   Subjective   No acute events. Patient has no new complaints. Pain is better controlled. Taking PO. No CP/dyspnea/f/c/n/v/d.   His wife is at bedside.    Objective   Objective   Vital Signs  Temp:  [97.3 °F (36.3 °C)-98 °F (36.7 °C)] 97.3 °F (36.3 °C)  Heart Rate:  [67-93] 70  Resp:  [16-18] 16  BP: (111-181)/() 164/101  SpO2:  [91 %-97 %] 96 %  on   ;   Device (Oxygen Therapy): room air  Body mass index is 28.5 kg/m².  Physical Exam  Vitals and nursing note reviewed.   Constitutional:       General: He is not in acute distress.     Appearance: He is not toxic-appearing or diaphoretic.   HENT:      Head: Normocephalic and atraumatic.      Nose: Nose normal.      Mouth/Throat:      Mouth: Mucous membranes are moist.      Pharynx: Oropharynx is clear.   Eyes:      Conjunctiva/sclera: Conjunctivae normal.      Pupils: Pupils are equal, round, and reactive to light.   Cardiovascular:      Rate and Rhythm: Normal rate and regular rhythm.      Pulses: Normal pulses.   Pulmonary:      Effort: Pulmonary effort is normal.      Breath sounds: Normal breath sounds.   Abdominal:      General: Bowel sounds are normal.      Palpations: Abdomen is soft.      Tenderness: There is no abdominal tenderness.   Musculoskeletal:         General: No swelling or tenderness.      Cervical back: Normal range of motion and neck supple.   Skin:     General: Skin is warm and dry.      Capillary Refill: Capillary refill takes less than 2 seconds.   Neurological:      General: No focal deficit present.      Mental Status: He is alert.   Psychiatric:         Mood and Affect: Mood normal.         Behavior: Behavior normal.     Results Review     I reviewed the patient's new clinical results.  Results from last 7 days   Lab Units 23  0819   WBC 10*3/mm3 8.32    HEMOGLOBIN g/dL 12.7*   PLATELETS 10*3/mm3 219     Results from last 7 days   Lab Units 07/09/23  1008   SODIUM mmol/L 142   POTASSIUM mmol/L 4.2   CHLORIDE mmol/L 104   CO2 mmol/L 25.6   BUN mg/dL 23   CREATININE mg/dL 0.92   GLUCOSE mg/dL 113*   EGFR mL/min/1.73 85.7     Results from last 7 days   Lab Units 07/09/23  1008   ALBUMIN g/dL 3.9   BILIRUBIN mg/dL 0.7   ALK PHOS U/L 144*   AST (SGOT) U/L 15   ALT (SGPT) U/L 6     Results from last 7 days   Lab Units 07/09/23  1008   CALCIUM mg/dL 9.1   ALBUMIN g/dL 3.9       No results found for: HGBA1C, POCGLU    CT Abdomen Pelvis Without Contrast    Result Date: 7/9/2023  1. T12 compression fracture with 50% height loss centrally. This is suspected to represent an acute or recent compression fracture. No further evidence for acute abnormality of the lumbar spine or abdomen. 2. Multiple bilateral nonobstructing renal stones. Thin rim of mineralization surrounding the right kidney as well as areas of adjacent fat necrosis most likely related to previous subcapsular hemorrhage that has resolved.  Radiation dose reduction techniques were utilized, including automated exposure control and exposure modulation based on body size.  This report was finalized on 7/9/2023 1:27 PM by Dr. Demetrio Pavon M.D.      CT Lumbar Spine Without Contrast    Result Date: 7/9/2023  1. T12 compression fracture with 50% height loss centrally. This is suspected to represent an acute or recent compression fracture. No further evidence for acute abnormality of the lumbar spine or abdomen. 2. Multiple bilateral nonobstructing renal stones. Thin rim of mineralization surrounding the right kidney as well as areas of adjacent fat necrosis most likely related to previous subcapsular hemorrhage that has resolved.  Radiation dose reduction techniques were utilized, including automated exposure control and exposure modulation based on body size.  This report was finalized on 7/9/2023 1:27 PM by   Demetrio Pavon M.D.     I have personally reviewed all medications:  Scheduled Medications  atorvastatin, 20 mg, Oral, Nightly  carbidopa-levodopa CR, 2.5 tablet, Oral, 4x Daily  cholecalciferol, 1,000 Units, Oral, Daily  doxepin, 10 mg, Oral, Nightly  gabapentin, 100 mg, Oral, Daily  Gabapentin 10% Baclofen 2% Lidocaine 4% Cream, 1 dose, Topical, 4x Daily  lisinopril, 2.5 mg, Oral, Daily  metoprolol succinate XL, 12.5 mg, Oral, Daily  predniSONE, 40 mg, Oral, Q24H  senna-docusate sodium, 2 tablet, Oral, BID  sodium chloride, 10 mL, Intravenous, Q12H    Infusions   Diet  Diet: Regular/House Diet, Cardiac Diets; Healthy Heart (2-3 Na+); Texture: Regular Texture (IDDSI 7); Fluid Consistency: Thin (IDDSI 0)    I have personally reviewed:  [x]  Laboratory   [x]  Microbiology   [x]  Radiology   [x]  EKG/Telemetry  []  Cardiology/Vascular   []  Pathology    [x]  Records    Assessment/Plan     Active Hospital Problems    Diagnosis  POA    **Compression fracture of body of thoracic vertebra [S22.000A]  Yes    Recurrent falls [R29.6]  Not Applicable    Primary hypertension [I10]  Yes    Coronary artery disease [I25.10]  Yes    MCI (mild cognitive impairment) [G31.84]  Yes    History of adenocarcinoma of prostate [Z85.46]  Not Applicable    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   Acute T12 Compression Fracture  - continue pain control  - MRI T&L spine pending  - KAUSHAL consulted    Parkinson's Disease  - continue sinemet  - wife has expressed concern about him coughing on occasion when eating/drinking, SLP consulted    CAD/HTN  - BP is a little elevated likely secondary to pain, no anginal symptoms  - resume lisinopril, metoprolol, and doxepin  - on lipitor, plavix held pending T12 fracture workup and KAUSHAL plans    Hx of Renal Cell Carcinoma  - s/p partial nephrectomy 2018    SCDs for DVT prophylaxis.  Full code.  Discussed  with patient, spouse, and nursing staff.  Anticipate discharge home with HH vs SNU facility in 1-2 days..      Dmitriy Patterson MD  [facility]  07/10/23  12:51 EDT

## 2023-07-10 NOTE — SIGNIFICANT NOTE
07/10/23 1547   OTHER   Discipline occupational therapist   Rehab Time/Intention   Session Not Performed other (see comments)  (pt with acute T12 fx, awaiting KAUSHAL recommendations prior to therapy eval.)   Recommendation   OT - Next Appointment 07/11/23

## 2023-07-10 NOTE — CONSULTS
[facility] NEUROSURGERY CONSULT NOTE    Patient Name: Loni Sanford  Referring Provider: Romie Boyle MD  Reason for Consultation: T12 compression fracture    Patient Care Team:  Zo Estrada MD as PCP - General (Family Medicine)  Robert Tang MD as Consulting Physician (Cardiology)    Chief Complaint: Low back pain    History of Present Illness:  Patient is a 78 y.o. right handed male, on Plavix at home, who presented to the emergency department yesterday for evaluation of lower back pain..  The history is being obtained by the patient, his wife and by review of the medical chart.  The patient initially presented to the emergency department yesterday complaining of lower back pain and lower abdominal pain.  He reported having worsening of her chronic lower back pain and saw her PCP last week for this issue.  He prescribed him a muscle relaxer and a steroid.  However, his pain was not improving on an outpatient basis.  The patient also endorsed having 2 falls over the weekend.  The patient underwent CT imaging of the lumbar spine while in the ED which demonstrated a T12 compression fracture with approximately 50% loss of vertebral height.  He was also noted to have multilevel DDD.  He was noted to have difficulty standing while in the ED, so the patient was admitted for further evaluation and neurosurgical consultation.  The patient continues to have lower back pain today.  However, he states that it is improved whenever he lies still and does not move.  It increases with any changes in position.  He denies pain or numbness radiating into the lower extremities.    Medical Record Review:  Reviewed in EPIC.     Review of Systems:  14 point review of systems is negative except for those listed in the HPI    Past Medical History:  Past Medical History:   Diagnosis Date    Cancer     Prostate cancer 2013    Cancer     kidney dx 4/2018    Coronary artery disease     Dizziness     occ    Hematuria     History of  angina     carries NTG    History of transfusion     Hyperlipidemia     Hypertension     Kidney stones     new one 12/2018    Kidney tumor     RIGHT removed 4/2018    Parkinson disease     Renal mass, right     surgically removed 4/2018       Past Surgical History:  Past Surgical History:   Procedure Laterality Date    APPENDECTOMY  2011    CARDIAC CATHETERIZATION      CATARACT EXTRACTION W/ INTRAOCULAR LENS  IMPLANT, BILATERAL      COLONOSCOPY N/A 5/9/2023    Procedure: COLONOSCOPY FOR SCREENING;  Surgeon: Terrell Zhang MD;  Location: [facility] MAIN OR;  Service: Gastroenterology;  Laterality: N/A;  poor prep, polyp    EXTRACORPOREAL SHOCKWAVE LITHOTRIPSY (ESWL), STENT INSERTION/REMOVAL Right 12/29/2017    Procedure: RIGHT EXTRACORPOREAL SHOCKWAVE LITHOTRIPSY CYSTOSCOPY STENT PLACEMENT;  Surgeon: Marcelo Suarez MD;  Location: [facility];  Service:     EXTRACORPOREAL SHOCKWAVE LITHOTRIPSY (ESWL), STENT INSERTION/REMOVAL Right 12/28/2018    Procedure: RT EXTRACORPREAL SHOCKWAVE LITHOTRIPSY CYSTOSCOPY  WITH  MANIPULATION OF STONE;  Surgeon: Moe Vasquez MD;  Location: [facility];  Service: Urology    FINGER SURGERY Left 1984    INDEX    HAND SURGERY Right 1998    NEPHRECTOMY PARTIAL Right 4/5/2018    Procedure: RT OPEN PARTIAL NEPHRECTOMY WITH INTRAOPERATIVE DOPPLER, DECORTICATION OF RIGHT RENAL CYST;  Surgeon: Marcelo Suarez MD;  Location: [facility] OR;  Service: Urology    PROSTATECTOMY  2013     Reviewed past surgical history and it is not pertinent to this visit    Family History:  Family History   Problem Relation Age of Onset    Heart disease Father     Heart attack Father     Diabetes Brother     Heart attack Brother     Coronary artery disease Brother         CABG    Heart disease Brother     Hypertension Mother     Malig Hyperthermia Neg Hx     Stroke Neg Hx      Reviewed past family history and it is not pertinent to this visit    Social History:  Social History     Tobacco Use     Smoking status: Never     Passive exposure: Never    Smokeless tobacco: Never   Vaping Use    Vaping Use: Never used   Substance Use Topics    Alcohol use: No    Drug use: No     Reviewed past social history and it is not pertinent to this visit    Allergies:  Aspirin and Belladonna alkaloids-opium    Home Medications:  Prior to Admission medications    Medication Sig Start Date End Date Taking? Authorizing Provider   acetaminophen (TYLENOL) 500 MG tablet Take 2 tablets by mouth Every 6 (Six) Hours As Needed for Mild Pain.   Yes Isabel Hernandez MD   atorvastatin (LIPITOR) 20 MG tablet Take 1 tablet by mouth Every Night.   Yes Isabel Hernandez MD   baclofen (LIORESAL) 5 MG tablet Take 1 tablet by mouth 3 (Three) Times a Day. 7/7/23  Yes Fior Grijalva APRN   carbidopa-levodopa CR (SINEMET CR)  MG per CR tablet Take 2.5 tablets by mouth 4 (Four) Times a Day. 7/3/23  Yes Isabel Hernandez MD   cholecalciferol (VITAMIN D3) 1000 units tablet Take 1 tablet by mouth Every Morning.   Yes Isabel Hernandez MD   clopidogrel (PLAVIX) 75 MG tablet Take 1 tablet by mouth Every Morning.   Yes Isabel Hernandez MD   doxepin (SINEquan) 10 MG capsule TAKE 1 CAPSULE BY MOUTH AT NIGHT FOR SLEEP 5/30/23  Yes Zo Estrada MD   gabapentin (NEURONTIN) 100 MG capsule Take 1 capsule by mouth Daily. 1/5/23  Yes Isabel Hernandez MD   Gabapentin 10 %, Baclofen 2 %, lidocaine 4 % Apply 1-2 g topically to the appropriate area as directed 3 (Three) to 4 (Four) times daily. 7/7/23 7/6/24 Yes Fior Grijalva APRN   ketoconazole (NIZORAL) 2 % shampoo 1 application  Daily. 4/14/23  Yes Isabel Hernandez MD   lisinopril (Zestril) 2.5 MG tablet Take 1 tablet by mouth Daily. Blood pressure  Patient taking differently: Take 2 tablets by mouth Daily. Blood pressure 2/17/23  Yes Zo Estrada MD   metoprolol succinate XL (TOPROL-XL) 25 MG 24 hr tablet Take 12.5 mg by mouth Every Night. 12/14/21  Yes Mary  MD Isabel   Multiple Vitamins-Minerals (CENTRUM ADULTS PO) Take 1 tablet by mouth Every Morning.   Yes ProviderIsabel MD   predniSONE (DELTASONE) 20 MG tablet Take 2 tablets by mouth Daily for 5 days. 7/7/23 7/12/23 Yes Fior Grijalva APRN   nitroglycerin (NITROSTAT) 0.4 MG SL tablet DISSOLVE ONE TABLET UNDER THE TONGUE EVERY 5 MINUTES AS NEEDED FOR CHEST PAIN  Patient not taking: Reported on 7/7/2023 2/8/21   ProviderIsabel MD       Results Review:  Labs:  Recent Results (from the past 24 hour(s))   Urinalysis With Microscopic If Indicated (No Culture) - Urine, Clean Catch    Collection Time: 07/09/23  1:09 PM    Specimen: Urine, Clean Catch   Result Value Ref Range    Color, UA Yellow Yellow, Straw    Appearance, UA Clear Clear    pH, UA 7.0 5.0 - 8.0    Specific Gravity, UA 1.018 1.005 - 1.030    Glucose, UA Negative Negative    Ketones, UA Negative Negative    Bilirubin, UA Negative Negative    Blood, UA Negative Negative    Protein, UA Negative Negative    Leuk Esterase, UA Negative Negative    Nitrite, UA Negative Negative    Urobilinogen, UA 2.0 E.U./dL (A) 0.2 - 1.0 E.U./dL       Radiology:  Imaging Results (Last 24 Hours)       Procedure Component Value Units Date/Time    CT Lumbar Spine Without Contrast [[id number]] Collected: 07/09/23 1216     Updated: 07/09/23 1330    Narrative:      CT ABDOMEN AND PELVIS WITHOUT IV CONTRAST, CT LUMBAR SPINE WITHOUT  CONTRAST     HISTORY: Lower abdominal pain. Two falls over the past weekend. Back  pain.     TECHNIQUE:  CT includes axial imaging from the lung bases to the  trochanters without intravenous contrast and without use of oral  contrast. CT lumbar spine includes axial imaging through the lumbar  spine. Data reconstructed in coronal and sagittal planes. Radiation dose  reduction techniques were utilized, including automated exposure control  and exposure modulation based on body size.     COMPARISON: Lumbar spine x-rays 11/09/2017.      FINDINGS:   CT ABDOMEN AND PELVIS: There is minimal gynecomastia. Atherosclerotic  calcifications are present and there are coronary arterial  calcifications. There are small bilateral pleural effusions and there is  bilateral lower lobe subsegmental atelectasis.     Hepatic and splenic calcifications are present associated with old  granulomatous disease. No focal hepatic or splenic abnormality is  demonstrated.  The gallbladder, adrenal glands, and pancreas exhibit  normal noncontrasted CT appearance.     There are several 3 mm and smaller bilateral renal nonobstructing  stones. There is no ureteral stone or evidence for obstruction. There is  bilateral perinephric stranding. There is a thin rim of mineralization  surrounding the right kidney that may be associated with an old  subcapsular hemorrhage. There is also chronic appearing fat necrosis  along the right kidney particularly at the lower pole. Urinary bladder  appears within normal limits. There is no bowel dilatation or evidence  for bowel obstruction. There is mild-to-moderate stool throughout the  colon which may represent mild constipation. Atherosclerotic  calcifications are present involving the abdominal aorta and iliac  vasculature. There is no shanda enlargement in the abdomen or pelvis.        LUMBAR SPINE: There is a fracture of the T12 vertebral body with 50%  height loss centrally.  Horizontal fracture plane extends through the  superior aspect of the body and fracture extends into the disc space  superiorly. There is mild retropulsion of the posterior superior aspect  of the T12 with mild narrowing of the central canal.     At T12-L1, the central canal and neural foramina are patent.     At L1-2, there is a mild broad disc bulge mildly effacing the anterior  thecal sac. The neural foramina are patent.     At L2-3, there is a mild broad disc bulge with mild narrowing of the  central canal. The neural foramina are patent.     At L3-4, there is  a broad disc bulge slightly effacing the anterior  thecal sac. The neural foramina are patent.     At L4-5, there is a broad disc bulge and there is mild bilateral facet  arthritis and there is mild bilateral foraminal narrowing. The central  canal is patent.     At L5-S1, there is a broad disc bulge and there is mild facet arthritis.  The neural foramina are patent.     There is bridging anterior spur formation at the right sacroiliac joint.       Impression:      1. T12 compression fracture with 50% height loss centrally. This is  suspected to represent an acute or recent compression fracture. No  further evidence for acute abnormality of the lumbar spine or abdomen.  2. Multiple bilateral nonobstructing renal stones. Thin rim of  mineralization surrounding the right kidney as well as areas of adjacent  fat necrosis most likely related to previous subcapsular hemorrhage that  has resolved.     Radiation dose reduction techniques were utilized, including automated  exposure control and exposure modulation based on body size.     This report was finalized on 7/9/2023 1:27 PM by Dr. Demetrio Pavon M.D.       CT Abdomen Pelvis Without Contrast [[id number]] Collected: 07/09/23 1216     Updated: 07/09/23 1330    Narrative:      CT ABDOMEN AND PELVIS WITHOUT IV CONTRAST, CT LUMBAR SPINE WITHOUT  CONTRAST     HISTORY: Lower abdominal pain. Two falls over the past weekend. Back  pain.     TECHNIQUE:  CT includes axial imaging from the lung bases to the  trochanters without intravenous contrast and without use of oral  contrast. CT lumbar spine includes axial imaging through the lumbar  spine. Data reconstructed in coronal and sagittal planes. Radiation dose  reduction techniques were utilized, including automated exposure control  and exposure modulation based on body size.     COMPARISON: Lumbar spine x-rays 11/09/2017.     FINDINGS:   CT ABDOMEN AND PELVIS: There is minimal gynecomastia. Atherosclerotic  calcifications  are present and there are coronary arterial  calcifications. There are small bilateral pleural effusions and there is  bilateral lower lobe subsegmental atelectasis.     Hepatic and splenic calcifications are present associated with old  granulomatous disease. No focal hepatic or splenic abnormality is  demonstrated.  The gallbladder, adrenal glands, and pancreas exhibit  normal noncontrasted CT appearance.     There are several 3 mm and smaller bilateral renal nonobstructing  stones. There is no ureteral stone or evidence for obstruction. There is  bilateral perinephric stranding. There is a thin rim of mineralization  surrounding the right kidney that may be associated with an old  subcapsular hemorrhage. There is also chronic appearing fat necrosis  along the right kidney particularly at the lower pole. Urinary bladder  appears within normal limits. There is no bowel dilatation or evidence  for bowel obstruction. There is mild-to-moderate stool throughout the  colon which may represent mild constipation. Atherosclerotic  calcifications are present involving the abdominal aorta and iliac  vasculature. There is no shanda enlargement in the abdomen or pelvis.        LUMBAR SPINE: There is a fracture of the T12 vertebral body with 50%  height loss centrally.  Horizontal fracture plane extends through the  superior aspect of the body and fracture extends into the disc space  superiorly. There is mild retropulsion of the posterior superior aspect  of the T12 with mild narrowing of the central canal.     At T12-L1, the central canal and neural foramina are patent.     At L1-2, there is a mild broad disc bulge mildly effacing the anterior  thecal sac. The neural foramina are patent.     At L2-3, there is a mild broad disc bulge with mild narrowing of the  central canal. The neural foramina are patent.     At L3-4, there is a broad disc bulge slightly effacing the anterior  thecal sac. The neural foramina are patent.      At L4-5, there is a broad disc bulge and there is mild bilateral facet  arthritis and there is mild bilateral foraminal narrowing. The central  canal is patent.     At L5-S1, there is a broad disc bulge and there is mild facet arthritis.  The neural foramina are patent.     There is bridging anterior spur formation at the right sacroiliac joint.       Impression:      1. T12 compression fracture with 50% height loss centrally. This is  suspected to represent an acute or recent compression fracture. No  further evidence for acute abnormality of the lumbar spine or abdomen.  2. Multiple bilateral nonobstructing renal stones. Thin rim of  mineralization surrounding the right kidney as well as areas of adjacent  fat necrosis most likely related to previous subcapsular hemorrhage that  has resolved.     Radiation dose reduction techniques were utilized, including automated  exposure control and exposure modulation based on body size.     This report was finalized on 7/9/2023 1:27 PM by Dr. Demetrio Pavon M.D.               Results Review:   I reviewed the patient's new clinical results.    I personally viewed and interpreted the patient's lumbar CT.  There is evidence of a compression deformity at T12 without retropulsion into the spinal canal.  He has mild degenerative changes throughout the lumbar spine.  There is no obvious canal stenosis or cord compression.    Vital Signs:   Temp:  [97.3 °F (36.3 °C)-98 °F (36.7 °C)] 97.3 °F (36.3 °C)  Heart Rate:  [67-93] 70  Resp:  [16-18] 16  BP: (111-181)/() 164/101    Physical Exam:  Mental Status: The patient is awake, alert and oriented x 3. Recent and remote memory functions are normal. The patient is attentive with normal concentration. Language is fluent. Speech is clear. The speech is nondysarthric. Fund of knowledge is normal.  Cranial Nerves III, IV, VI: The pupils are 3 mm, equally round and reactive to light. The extraocular movements are full in all  directions of gaze without nystagmus.  Cranial Nerve VII: Facial movements are symmetric  Motor: Tone is normal in all four extremities without fasciculations, atrophy, or myoclonus. There are no involuntary movements.   Muscle Strength: 5/5 muscle strength in bilateral upper and bilateral lower extremities.   Sensory: The sensory examination is normal for light touch bilaterally and symmetrically.  Spine: No tenderness to palpation over the lower thoracic or lumbar spine.    MEDICAL DECISION MAKING  -Vital signs and nursing notes have all been reviewed by me.  -All laboratory results have been independently interpreted by me.    -Radiology studies have been reviewed and independently interpreted by me and discussed with radiologist dictating the report (see Results Review above).  - Discussed/ obtained information from independent historians: Patient's wife  - External (non-ED) record review: ED encounter  - Chronic or social conditions impacting care: The patient's wife is unable to physically help the patient transfer or ambulate at home  - Shared decision making: I discussed ED evaluation and treatment plan with patient, his wife who is in agreement.  Discussed at length ED testing.     ASSESSMENT/PLAN:    Compression fracture of body of thoracic vertebra    Clear cell carcinoma of kidney    Coronary artery disease    History of adenocarcinoma of prostate    MCI (mild cognitive impairment)    Parkinson disease    Recurrent falls    The patient is a 78-year-old male presenting to the emergency department for evaluation of lower back pain.  The patient has had intermittent episodes of lower back pain in the past, but has had persistent pain over the past week which has failed to improve with a muscle relaxer and steroids.  The patient's lumbar CT suggest that the patient has an acute compression fracture at T12.  The patient has no radicular pain or acute neurologic deficit.  He will need to have MRI imaging of  "the spine for further evaluation of his back pain, specifically since he is not tender to palpation over the lower thoracic or lumbar spine.  Further recommendations will be made after the thoracic/lumbar MRIs are completed and reviewed by our service.    I discussed the patient's findings and my recommendations with patient, family, and Dr. Zavala.    MARY Metzger  07/10/23  11:04 EDT    \"Dictated utilizing Dragon dictation\".      "

## 2023-07-11 LAB
ALBUMIN SERPL-MCNC: 3.9 G/DL (ref 3.5–5.2)
ALBUMIN/GLOB SERPL: 1.4 G/DL
ALP SERPL-CCNC: 140 U/L (ref 39–117)
ALT SERPL W P-5'-P-CCNC: <5 U/L (ref 1–41)
ANION GAP SERPL CALCULATED.3IONS-SCNC: 10.9 MMOL/L (ref 5–15)
AST SERPL-CCNC: 13 U/L (ref 1–40)
BASOPHILS # BLD AUTO: 0.01 10*3/MM3 (ref 0–0.2)
BASOPHILS NFR BLD AUTO: 0.1 % (ref 0–1.5)
BILIRUB SERPL-MCNC: 0.6 MG/DL (ref 0–1.2)
BUN SERPL-MCNC: 26 MG/DL (ref 8–23)
BUN/CREAT SERPL: 31.7 (ref 7–25)
CALCIUM SPEC-SCNC: 9.5 MG/DL (ref 8.6–10.5)
CHLORIDE SERPL-SCNC: 104 MMOL/L (ref 98–107)
CO2 SERPL-SCNC: 26.1 MMOL/L (ref 22–29)
CREAT SERPL-MCNC: 0.82 MG/DL (ref 0.76–1.27)
DEPRECATED RDW RBC AUTO: 36.5 FL (ref 37–54)
EGFRCR SERPLBLD CKD-EPI 2021: 90.5 ML/MIN/1.73
EOSINOPHIL # BLD AUTO: 0 10*3/MM3 (ref 0–0.4)
EOSINOPHIL NFR BLD AUTO: 0 % (ref 0.3–6.2)
ERYTHROCYTE [DISTWIDTH] IN BLOOD BY AUTOMATED COUNT: 10.9 % (ref 12.3–15.4)
GLOBULIN UR ELPH-MCNC: 2.8 GM/DL
GLUCOSE SERPL-MCNC: 134 MG/DL (ref 65–99)
HCT VFR BLD AUTO: 36 % (ref 37.5–51)
HGB BLD-MCNC: 12.8 G/DL (ref 13–17.7)
IMM GRANULOCYTES # BLD AUTO: 0.04 10*3/MM3 (ref 0–0.05)
IMM GRANULOCYTES NFR BLD AUTO: 0.5 % (ref 0–0.5)
LYMPHOCYTES # BLD AUTO: 0.64 10*3/MM3 (ref 0.7–3.1)
LYMPHOCYTES NFR BLD AUTO: 8.5 % (ref 19.6–45.3)
MCH RBC QN AUTO: 32.3 PG (ref 26.6–33)
MCHC RBC AUTO-ENTMCNC: 35.6 G/DL (ref 31.5–35.7)
MCV RBC AUTO: 90.9 FL (ref 79–97)
MONOCYTES # BLD AUTO: 0.49 10*3/MM3 (ref 0.1–0.9)
MONOCYTES NFR BLD AUTO: 6.5 % (ref 5–12)
NEUTROPHILS NFR BLD AUTO: 6.34 10*3/MM3 (ref 1.7–7)
NEUTROPHILS NFR BLD AUTO: 84.4 % (ref 42.7–76)
NRBC BLD AUTO-RTO: 0 /100 WBC (ref 0–0.2)
PLATELET # BLD AUTO: 228 10*3/MM3 (ref 140–450)
PMV BLD AUTO: 9.1 FL (ref 6–12)
POTASSIUM SERPL-SCNC: 4.4 MMOL/L (ref 3.5–5.2)
PROT SERPL-MCNC: 6.7 G/DL (ref 6–8.5)
RBC # BLD AUTO: 3.96 10*6/MM3 (ref 4.14–5.8)
SODIUM SERPL-SCNC: 141 MMOL/L (ref 136–145)
WBC NRBC COR # BLD: 7.52 10*3/MM3 (ref 3.4–10.8)

## 2023-07-11 PROCEDURE — 80053 COMPREHEN METABOLIC PANEL: CPT | Performed by: INTERNAL MEDICINE

## 2023-07-11 PROCEDURE — 25010000002 HYDROMORPHONE PER 4 MG: Performed by: INTERNAL MEDICINE

## 2023-07-11 PROCEDURE — 63710000001 PREDNISONE PER 1 MG: Performed by: INTERNAL MEDICINE

## 2023-07-11 PROCEDURE — G0378 HOSPITAL OBSERVATION PER HR: HCPCS

## 2023-07-11 PROCEDURE — 85025 COMPLETE CBC W/AUTO DIFF WBC: CPT | Performed by: INTERNAL MEDICINE

## 2023-07-11 PROCEDURE — 99214 OFFICE O/P EST MOD 30 MIN: CPT | Performed by: NURSE PRACTITIONER

## 2023-07-11 RX ORDER — METHOCARBAMOL 750 MG/1
750 TABLET, FILM COATED ORAL EVERY 6 HOURS PRN
Status: DISCONTINUED | OUTPATIENT
Start: 2023-07-11 | End: 2023-07-18

## 2023-07-11 RX ORDER — LIDOCAINE 50 MG/G
1 PATCH TOPICAL
Status: DISCONTINUED | OUTPATIENT
Start: 2023-07-11 | End: 2023-07-20 | Stop reason: HOSPADM

## 2023-07-11 RX ADMIN — HYDROMORPHONE HYDROCHLORIDE 0.5 MG: 1 INJECTION, SOLUTION INTRAMUSCULAR; INTRAVENOUS; SUBCUTANEOUS at 06:56

## 2023-07-11 RX ADMIN — CARBIDOPA AND LEVODOPA 2.5 TABLET: 50; 200 TABLET, EXTENDED RELEASE ORAL at 12:11

## 2023-07-11 RX ADMIN — Medication 10 ML: at 09:02

## 2023-07-11 RX ADMIN — METOPROLOL SUCCINATE 12.5 MG: 25 TABLET, EXTENDED RELEASE ORAL at 08:59

## 2023-07-11 RX ADMIN — CARBIDOPA AND LEVODOPA 2.5 TABLET: 50; 200 TABLET, EXTENDED RELEASE ORAL at 19:50

## 2023-07-11 RX ADMIN — HYDROCODONE BITARTRATE AND ACETAMINOPHEN 1 TABLET: 5; 325 TABLET ORAL at 19:20

## 2023-07-11 RX ADMIN — ATORVASTATIN CALCIUM 20 MG: 20 TABLET, FILM COATED ORAL at 19:50

## 2023-07-11 RX ADMIN — CHOLECALCIFEROL TAB 10 MCG (400 UNIT) 1000 UNITS: 10 TAB at 09:00

## 2023-07-11 RX ADMIN — CARBIDOPA AND LEVODOPA 2.5 TABLET: 50; 200 TABLET, EXTENDED RELEASE ORAL at 17:08

## 2023-07-11 RX ADMIN — GABAPENTIN 100 MG: 100 CAPSULE ORAL at 09:00

## 2023-07-11 RX ADMIN — DOXEPIN HYDROCHLORIDE 10 MG: 10 CAPSULE ORAL at 19:52

## 2023-07-11 RX ADMIN — Medication 10 ML: at 19:53

## 2023-07-11 RX ADMIN — LISINOPRIL 2.5 MG: 2.5 TABLET ORAL at 09:00

## 2023-07-11 RX ADMIN — CARBIDOPA AND LEVODOPA 2.5 TABLET: 50; 200 TABLET, EXTENDED RELEASE ORAL at 09:00

## 2023-07-11 RX ADMIN — PREDNISONE 40 MG: 20 TABLET ORAL at 17:08

## 2023-07-11 RX ADMIN — LIDOCAINE 1 PATCH: 700 PATCH TOPICAL at 19:47

## 2023-07-11 NOTE — SIGNIFICANT NOTE
07/11/23 1416   OTHER   Discipline occupational therapist   Rehab Time/Intention   Session Not Performed other (see comments)  (per chart, continues to await KAUSHAL recommendations following MRI.)   Recommendation   OT - Next Appointment 07/12/23

## 2023-07-11 NOTE — SIGNIFICANT NOTE
07/11/23 9840   OTHER   Discipline speech language pathologist   Rehab Time/Intention   Session Not Performed other (see comments)  (Discussed with RN, patient, and patient's wife. Patient and wife report no more difficulty with swallowing than usual. Report occasional cough/throat clear. SLP and patient/family in agreement evaluation not warranted at this time. SLP provided education, if symptoms become persistent notify RN and Speech Therapy will evaluate. SLP to s/o at this time.)

## 2023-07-11 NOTE — PLAN OF CARE
Pt. is A&O X 4. Complained of pain to back sometimes, but refused to take pain medication. Stool softner no given per Pt. required. See MAR. Using call light answer questions. Family at bedside. Call light within reach.   Goal Outcome Evaluation:  Plan of Care Reviewed With: patient, family   Progress: improving

## 2023-07-11 NOTE — PROGRESS NOTES
[facility] THORACIC/LUMBAR NEUROSURGERY PROGRESS NOTE      CC: compression fracture      Subjective     Interval History: had MRI T/L spine without contrast.  Continues to have back pain with movement.  No leg pain.  Daughter states he has had multiple falls likely due to progression of his Parkinson as he is having difficulty turning and worsening shuffling gait.  He was supposed to start physical therapy soon.    ROS:  MS: back pain  Neuro: Balance difficulties  : No difficulty voiding, no incontinence    Objective     Vital signs in last 24 hours:  Temp:  [97.2 °F (36.2 °C)-97.6 °F (36.4 °C)] 97.6 °F (36.4 °C)  Heart Rate:  [70-87] 70  Resp:  [16] 16  BP: ()/() 97/63    Intake/Output this shift:  No intake/output data recorded.    LABS:  Results from last 7 days   Lab Units 07/11/23  0556 07/09/23  0819   WBC 10*3/mm3 7.52 8.32   HEMOGLOBIN g/dL 12.8* 12.7*   HEMATOCRIT % 36.0* 36.6*   PLATELETS 10*3/mm3 228 219     Results from last 7 days   Lab Units 07/11/23  0556 07/09/23  1008   SODIUM mmol/L 141 142   POTASSIUM mmol/L 4.4 4.2   CHLORIDE mmol/L 104 104   CO2 mmol/L 26.1 25.6   BUN mg/dL 26* 23   CREATININE mg/dL 0.82 0.92   CALCIUM mg/dL 9.5 9.1   BILIRUBIN mg/dL 0.6 0.7   ALK PHOS U/L 140* 144*   ALT (SGPT) U/L <5 6   AST (SGOT) U/L 13 15   GLUCOSE mg/dL 134* 113*       IMAGING STUDIES:  MRI lumbar spine reveals acute T12 compression fracture with minimal retropulsion.  This fracture is fairly well demarcated and shows no evidence of adjacent epidural soft tissue abnormality.  Has some edema within the pedicles.  There is also disc bulging at T6/7 resulting in mild canal stenosis.  Other levels of degenerative change but no other levels of fracture or abnormal bone.    I personally viewed and interpreted the patient's MRI lumbar spine.  Also discussed with Dr. Hubbard, radiologist    Meds reviewed/changed: Yes  Gabapentin 100 mg p.o. daily  Prednisone 40 mg every 24 hours  Dilaudid 0.5 mg IV every  2 hours as needed-4 doses    Physical Exam:    General:   Awake, alert.  Sitting up in bed.  Daughter at bedside.  Pleasant and cooperative  Back:    - SLR  Motor:    Normal muscle strength, bulk and tone in lower extremities.  No fasciculations, rigidity, spasticity, or abnormal movements.  Sensation:   Normal to light touch brandyn LEs  Station and Gait:             PT/OT both deferring treatments until seen by our service  Extremities:   Wearing SCD      Assessment & Plan     ASSESSMENT:      Compression fracture of body of thoracic vertebra    Clear cell carcinoma of kidney    Coronary artery disease    History of adenocarcinoma of prostate    MCI (mild cognitive impairment)    Parkinson disease    Primary hypertension    Recurrent falls    Patient with multiple falls recently likely due to his Parkinson syndrome.  T12 fracture on CT does appear acute on MRI.  There is some pedicle edema and patient does have a history of both renal cell and prostate cancer.  Reviewed the imaging with Dr. Hubbard who feels that with the fairly well demarcated fracture with no evidence of visible epidural soft tissue mass, this is likely not a pathologic injury and 1 could consider MRI with contrast but if plan is for possible kyphoplasty, biopsy could be obtained at that time.  Patient denies any pain elsewhere and has not had any recent weight loss.  His malignancies are remote.  Patient's daughter would like to speak to the patient's wife and patient to discuss the treatment options as far as bracing with pain management versus kyphoplasty.  I am going to make him n.p.o. after midnight in the event that they want kyphoplasty and we are able to arrange that with Dr. Gilman; otherwise, we will get him a TLSO brace tomorrow.    PLAN:   NPO MN  Brace vs Kypho/biopsy- family to discuss  OK for PT/OT/OOB  Lidoderm and muscle relaxant for pain  recommend switching to oral pain medication-defer to primary    I discussed the patient's  "findings and my recommendations with patient and family    During patient visit, I utilized appropriate personal protective equipment including  gloves.  Appropriate PPE was worn during the entire visit.  Hand hygiene was completed before and after.      LOS: 0 days       Quyen Corea, APRN  7/11/2023  14:21 EDT    \"Dictated utilizing Dragon dictation\".      "

## 2023-07-11 NOTE — PLAN OF CARE
AAOX4. SR on the monitor. RA. SCD's on. Heels and buttocks blanchable. Up to BSC with AX2. NPO at midnight for possible procedure tomorrow. No complaints of pain this shift.         Goal Outcome Evaluation:  Plan of Care Reviewed With: patient, spouse, daughter        Progress: improving

## 2023-07-11 NOTE — PROGRESS NOTES
Name: Loni Sanford ADMIT: 2023   : 1945  PCP: Zo Estrada MD    MRN: [id number] LOS: 0 days   AGE/SEX: 78 y.o. male  ROOM: [facility]     Subjective   Subjective   No acute events. Patient denies new complaints. Pain is reasonably controlled. Taking PO. No CP/dyspnea/f/c/n/v/d.   No family at bedside.    Objective   Objective   Vital Signs  Temp:  [97.2 °F (36.2 °C)-97.6 °F (36.4 °C)] 97.5 °F (36.4 °C)  Heart Rate:  [70-87] 70  Resp:  [16] 16  BP: ()/() 97/63  SpO2:  [90 %-94 %] 90 %  on   ;   Device (Oxygen Therapy): room air  Body mass index is 28.5 kg/m².  Physical Exam  Vitals and nursing note reviewed.   Constitutional:       General: He is not in acute distress.     Appearance: He is not toxic-appearing or diaphoretic.   HENT:      Head: Normocephalic and atraumatic.      Nose: Nose normal.      Mouth/Throat:      Mouth: Mucous membranes are moist.      Pharynx: Oropharynx is clear.   Eyes:      Conjunctiva/sclera: Conjunctivae normal.      Pupils: Pupils are equal, round, and reactive to light.   Cardiovascular:      Rate and Rhythm: Normal rate and regular rhythm.      Pulses: Normal pulses.   Pulmonary:      Effort: Pulmonary effort is normal.      Breath sounds: Normal breath sounds.   Abdominal:      General: Bowel sounds are normal.      Palpations: Abdomen is soft.      Tenderness: There is no abdominal tenderness.   Musculoskeletal:         General: No swelling or tenderness.      Cervical back: Normal range of motion and neck supple.   Skin:     General: Skin is warm and dry.      Capillary Refill: Capillary refill takes less than 2 seconds.   Neurological:      General: No focal deficit present.      Mental Status: He is alert.   Psychiatric:         Mood and Affect: Mood normal.         Behavior: Behavior normal.     Results Review     I reviewed the patient's new clinical results.  Results from last 7 days   Lab Units 23  0556 23  0819   WBC 10*3/mm3  7.52 8.32   HEMOGLOBIN g/dL 12.8* 12.7*   PLATELETS 10*3/mm3 228 219       Results from last 7 days   Lab Units 07/11/23  0556 07/09/23  1008   SODIUM mmol/L 141 142   POTASSIUM mmol/L 4.4 4.2   CHLORIDE mmol/L 104 104   CO2 mmol/L 26.1 25.6   BUN mg/dL 26* 23   CREATININE mg/dL 0.82 0.92   GLUCOSE mg/dL 134* 113*   EGFR mL/min/1.73 90.5 85.7       Results from last 7 days   Lab Units 07/11/23  0556 07/09/23  1008   ALBUMIN g/dL 3.9 3.9   BILIRUBIN mg/dL 0.6 0.7   ALK PHOS U/L 140* 144*   AST (SGOT) U/L 13 15   ALT (SGPT) U/L <5 6       Results from last 7 days   Lab Units 07/11/23  0556 07/09/23  1008   CALCIUM mg/dL 9.5 9.1   ALBUMIN g/dL 3.9 3.9         No results found for: HGBA1C, POCGLU    MRI Thoracic Spine Without Contrast    Result Date: 7/10/2023   Mild further loss of height at the previous T12 compression deformity. Multilevel spondyloarthropathy, as detailed above.  This report was finalized on 7/10/2023 8:30 PM by Dr. Efrain Cooper M.D.      MRI Lumbar Spine Without Contrast    Result Date: 7/10/2023   Mild further loss of height at the previous T12 compression deformity. Multilevel spondyloarthropathy, as detailed above.  This report was finalized on 7/10/2023 8:30 PM by Dr. Efrain Cooper M.D.     I have personally reviewed all medications:  Scheduled Medications  atorvastatin, 20 mg, Oral, Nightly  carbidopa-levodopa CR, 2.5 tablet, Oral, 4x Daily  cholecalciferol, 1,000 Units, Oral, Daily  doxepin, 10 mg, Oral, Nightly  gabapentin, 100 mg, Oral, Daily  Gabapentin 10% Baclofen 2% Lidocaine 4% Cream, 1 dose, Topical, 4x Daily  lisinopril, 2.5 mg, Oral, Daily  metoprolol succinate XL, 12.5 mg, Oral, Daily  predniSONE, 40 mg, Oral, Q24H  senna-docusate sodium, 2 tablet, Oral, BID  sodium chloride, 10 mL, Intravenous, Q12H    Infusions   Diet  Diet: Regular/House Diet, Cardiac Diets; Healthy Heart (2-3 Na+); Texture: Regular Texture (IDDSI 7); Fluid Consistency: Thin (IDDSI 0)    I have  personally reviewed:  [x]  Laboratory   [x]  Microbiology   [x]  Radiology   [x]  EKG/Telemetry  []  Cardiology/Vascular   []  Pathology    []  Records    Assessment/Plan     Active Hospital Problems    Diagnosis  POA    **Compression fracture of body of thoracic vertebra [S22.000A]  Yes    Recurrent falls [R29.6]  Not Applicable    Primary hypertension [I10]  Yes    Coronary artery disease [I25.10]  Yes    MCI (mild cognitive impairment) [G31.84]  Yes    History of adenocarcinoma of prostate [Z85.46]  Not Applicable    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   Acute T12 Compression Fracture  - continue pain control  - MRI T&L spine noted  - appreciate KAUSHAL recs    Parkinson's Disease  - continue sinemet  - SLP consulted but they have signed off    CAD/HTN  - BP is a little elevated likely secondary to pain, no anginal symptoms  - resume lisinopril, metoprolol, and doxepin  - on lipitor, plavix held pending T12 fracture workup as well as KAUSHAL plans    Hx of Renal Cell Carcinoma  - s/p partial nephrectomy 2018    SCDs for DVT prophylaxis.  Full code.  Discussed with patient, spouse, and nursing staff.  Anticipate discharge home with HH vs SNU facility in 1-2 days..      Dmitriy Patterson MD  [facility]  07/11/23  15:29 EDT

## 2023-07-12 PROBLEM — S22.080A COMPRESSION FRACTURE OF T12 VERTEBRA: Status: ACTIVE | Noted: 2023-07-09

## 2023-07-12 LAB
ALBUMIN SERPL-MCNC: 3.7 G/DL (ref 3.5–5.2)
ALBUMIN/GLOB SERPL: 1.4 G/DL
ALP SERPL-CCNC: 136 U/L (ref 39–117)
ALT SERPL W P-5'-P-CCNC: <5 U/L (ref 1–41)
ANION GAP SERPL CALCULATED.3IONS-SCNC: 9.6 MMOL/L (ref 5–15)
AST SERPL-CCNC: 14 U/L (ref 1–40)
BASOPHILS # BLD AUTO: 0.01 10*3/MM3 (ref 0–0.2)
BASOPHILS NFR BLD AUTO: 0.1 % (ref 0–1.5)
BILIRUB SERPL-MCNC: 0.4 MG/DL (ref 0–1.2)
BUN SERPL-MCNC: 29 MG/DL (ref 8–23)
BUN/CREAT SERPL: 34.9 (ref 7–25)
CALCIUM SPEC-SCNC: 8.9 MG/DL (ref 8.6–10.5)
CHLORIDE SERPL-SCNC: 105 MMOL/L (ref 98–107)
CO2 SERPL-SCNC: 25.4 MMOL/L (ref 22–29)
CREAT SERPL-MCNC: 0.83 MG/DL (ref 0.76–1.27)
DEPRECATED RDW RBC AUTO: 36.9 FL (ref 37–54)
EGFRCR SERPLBLD CKD-EPI 2021: 90.1 ML/MIN/1.73
EOSINOPHIL # BLD AUTO: 0 10*3/MM3 (ref 0–0.4)
EOSINOPHIL NFR BLD AUTO: 0 % (ref 0.3–6.2)
ERYTHROCYTE [DISTWIDTH] IN BLOOD BY AUTOMATED COUNT: 11.2 % (ref 12.3–15.4)
GLOBULIN UR ELPH-MCNC: 2.6 GM/DL
GLUCOSE SERPL-MCNC: 134 MG/DL (ref 65–99)
HCT VFR BLD AUTO: 35 % (ref 37.5–51)
HGB BLD-MCNC: 12.3 G/DL (ref 13–17.7)
IMM GRANULOCYTES # BLD AUTO: 0.04 10*3/MM3 (ref 0–0.05)
IMM GRANULOCYTES NFR BLD AUTO: 0.5 % (ref 0–0.5)
LYMPHOCYTES # BLD AUTO: 0.7 10*3/MM3 (ref 0.7–3.1)
LYMPHOCYTES NFR BLD AUTO: 9.4 % (ref 19.6–45.3)
MCH RBC QN AUTO: 31.9 PG (ref 26.6–33)
MCHC RBC AUTO-ENTMCNC: 35.1 G/DL (ref 31.5–35.7)
MCV RBC AUTO: 90.9 FL (ref 79–97)
MONOCYTES # BLD AUTO: 0.54 10*3/MM3 (ref 0.1–0.9)
MONOCYTES NFR BLD AUTO: 7.3 % (ref 5–12)
NEUTROPHILS NFR BLD AUTO: 6.13 10*3/MM3 (ref 1.7–7)
NEUTROPHILS NFR BLD AUTO: 82.7 % (ref 42.7–76)
NRBC BLD AUTO-RTO: 0 /100 WBC (ref 0–0.2)
PLATELET # BLD AUTO: 241 10*3/MM3 (ref 140–450)
PMV BLD AUTO: 9 FL (ref 6–12)
POTASSIUM SERPL-SCNC: 4.5 MMOL/L (ref 3.5–5.2)
PROT SERPL-MCNC: 6.3 G/DL (ref 6–8.5)
QT INTERVAL: 428 MS
RBC # BLD AUTO: 3.85 10*6/MM3 (ref 4.14–5.8)
SODIUM SERPL-SCNC: 140 MMOL/L (ref 136–145)
WBC NRBC COR # BLD: 7.42 10*3/MM3 (ref 3.4–10.8)

## 2023-07-12 PROCEDURE — 80053 COMPREHEN METABOLIC PANEL: CPT | Performed by: INTERNAL MEDICINE

## 2023-07-12 PROCEDURE — 93010 ELECTROCARDIOGRAM REPORT: CPT | Performed by: INTERNAL MEDICINE

## 2023-07-12 PROCEDURE — 25010000002 HYDROMORPHONE PER 4 MG: Performed by: INTERNAL MEDICINE

## 2023-07-12 PROCEDURE — 85025 COMPLETE CBC W/AUTO DIFF WBC: CPT | Performed by: INTERNAL MEDICINE

## 2023-07-12 PROCEDURE — 97530 THERAPEUTIC ACTIVITIES: CPT

## 2023-07-12 PROCEDURE — 97166 OT EVAL MOD COMPLEX 45 MIN: CPT

## 2023-07-12 PROCEDURE — 99232 SBSQ HOSP IP/OBS MODERATE 35: CPT | Performed by: NURSE PRACTITIONER

## 2023-07-12 PROCEDURE — 93005 ELECTROCARDIOGRAM TRACING: CPT | Performed by: INTERNAL MEDICINE

## 2023-07-12 PROCEDURE — 99222 1ST HOSP IP/OBS MODERATE 55: CPT | Performed by: INTERNAL MEDICINE

## 2023-07-12 RX ORDER — HYDROCODONE BITARTRATE AND ACETAMINOPHEN 5; 325 MG/1; MG/1
1 TABLET ORAL EVERY 4 HOURS PRN
Status: DISCONTINUED | OUTPATIENT
Start: 2023-07-12 | End: 2023-07-17

## 2023-07-12 RX ADMIN — CARBIDOPA AND LEVODOPA 2.5 TABLET: 50; 200 TABLET, EXTENDED RELEASE ORAL at 18:58

## 2023-07-12 RX ADMIN — DOXEPIN HYDROCHLORIDE 10 MG: 10 CAPSULE ORAL at 20:17

## 2023-07-12 RX ADMIN — METOPROLOL SUCCINATE 12.5 MG: 25 TABLET, EXTENDED RELEASE ORAL at 08:32

## 2023-07-12 RX ADMIN — GABAPENTIN 100 MG: 100 CAPSULE ORAL at 08:33

## 2023-07-12 RX ADMIN — HYDROMORPHONE HYDROCHLORIDE 0.5 MG: 1 INJECTION, SOLUTION INTRAMUSCULAR; INTRAVENOUS; SUBCUTANEOUS at 13:33

## 2023-07-12 RX ADMIN — HYDROMORPHONE HYDROCHLORIDE 0.5 MG: 1 INJECTION, SOLUTION INTRAMUSCULAR; INTRAVENOUS; SUBCUTANEOUS at 08:38

## 2023-07-12 RX ADMIN — SENNOSIDES AND DOCUSATE SODIUM 2 TABLET: 50; 8.6 TABLET ORAL at 08:33

## 2023-07-12 RX ADMIN — CARBIDOPA AND LEVODOPA 2.5 TABLET: 50; 200 TABLET, EXTENDED RELEASE ORAL at 13:29

## 2023-07-12 RX ADMIN — Medication 10 ML: at 20:18

## 2023-07-12 RX ADMIN — LISINOPRIL 2.5 MG: 2.5 TABLET ORAL at 08:32

## 2023-07-12 RX ADMIN — Medication 10 ML: at 08:34

## 2023-07-12 RX ADMIN — LIDOCAINE 1 PATCH: 700 PATCH TOPICAL at 08:34

## 2023-07-12 RX ADMIN — HYDROCODONE BITARTRATE AND ACETAMINOPHEN 1 TABLET: 5; 325 TABLET ORAL at 20:16

## 2023-07-12 RX ADMIN — CARBIDOPA AND LEVODOPA 2.5 TABLET: 50; 200 TABLET, EXTENDED RELEASE ORAL at 20:16

## 2023-07-12 RX ADMIN — CHOLECALCIFEROL TAB 10 MCG (400 UNIT) 1000 UNITS: 10 TAB at 08:33

## 2023-07-12 RX ADMIN — ATORVASTATIN CALCIUM 20 MG: 20 TABLET, FILM COATED ORAL at 20:17

## 2023-07-12 RX ADMIN — CARBIDOPA AND LEVODOPA 2.5 TABLET: 50; 200 TABLET, EXTENDED RELEASE ORAL at 08:32

## 2023-07-12 NOTE — PROGRESS NOTES
Name: Loni Sanford ADMIT: 2023   : 1945  PCP: Zo Estrada MD    MRN: [id number] LOS: 0 days   AGE/SEX: 78 y.o. male  ROOM: [facility]     Subjective   Subjective   No acute events. Patient denies new complaints. Pain is reasonably controlled although his activity is decreased. Patient has been NPO for kyphoplasty. No CP/dyspnea/f/c/n/v/d.   His wife is at bedside.    Objective   Objective   Vital Signs  Temp:  [97.4 °F (36.3 °C)-97.9 °F (36.6 °C)] 97.4 °F (36.3 °C)  Heart Rate:  [70-87] 87  Resp:  [16] 16  BP: ()/() 189/117  SpO2:  [90 %-94 %] 94 %  on  Flow (L/min):  [1] 1;   Device (Oxygen Therapy): room air  Body mass index is 28.5 kg/m².  Physical Exam  Vitals and nursing note reviewed.   Constitutional:       General: He is not in acute distress.     Appearance: He is not toxic-appearing or diaphoretic.   HENT:      Head: Normocephalic and atraumatic.      Nose: Nose normal.      Mouth/Throat:      Mouth: Mucous membranes are moist.      Pharynx: Oropharynx is clear.   Eyes:      Conjunctiva/sclera: Conjunctivae normal.      Pupils: Pupils are equal, round, and reactive to light.   Cardiovascular:      Rate and Rhythm: Normal rate and regular rhythm.      Pulses: Normal pulses.   Pulmonary:      Effort: Pulmonary effort is normal.      Breath sounds: Normal breath sounds.   Abdominal:      General: Bowel sounds are normal.      Palpations: Abdomen is soft.      Tenderness: There is no abdominal tenderness.   Musculoskeletal:         General: No swelling or tenderness.      Cervical back: Normal range of motion and neck supple.   Skin:     General: Skin is warm and dry.      Capillary Refill: Capillary refill takes less than 2 seconds.   Neurological:      General: No focal deficit present.      Mental Status: He is alert.   Psychiatric:         Mood and Affect: Mood normal.         Behavior: Behavior normal.     Results Review     I reviewed the patient's new clinical  results.  Results from last 7 days   Lab Units 07/12/23  0525 07/11/23  0556 07/09/23  0819   WBC 10*3/mm3 7.42 7.52 8.32   HEMOGLOBIN g/dL 12.3* 12.8* 12.7*   PLATELETS 10*3/mm3 241 228 219       Results from last 7 days   Lab Units 07/12/23  0525 07/11/23  0556 07/09/23  1008   SODIUM mmol/L 140 141 142   POTASSIUM mmol/L 4.5 4.4 4.2   CHLORIDE mmol/L 105 104 104   CO2 mmol/L 25.4 26.1 25.6   BUN mg/dL 29* 26* 23   CREATININE mg/dL 0.83 0.82 0.92   GLUCOSE mg/dL 134* 134* 113*   EGFR mL/min/1.73 90.1 90.5 85.7       Results from last 7 days   Lab Units 07/12/23  0525 07/11/23  0556 07/09/23  1008   ALBUMIN g/dL 3.7 3.9 3.9   BILIRUBIN mg/dL 0.4 0.6 0.7   ALK PHOS U/L 136* 140* 144*   AST (SGOT) U/L 14 13 15   ALT (SGPT) U/L <5 <5 6       Results from last 7 days   Lab Units 07/12/23  0525 07/11/23  0556 07/09/23  1008   CALCIUM mg/dL 8.9 9.5 9.1   ALBUMIN g/dL 3.7 3.9 3.9         No results found for: HGBA1C, POCGLU    MRI Thoracic Spine Without Contrast    Result Date: 7/10/2023   Mild further loss of height at the previous T12 compression deformity. Multilevel spondyloarthropathy, as detailed above.  This report was finalized on 7/10/2023 8:30 PM by Dr. Efrain Cooper M.D.      MRI Lumbar Spine Without Contrast    Result Date: 7/10/2023   Mild further loss of height at the previous T12 compression deformity. Multilevel spondyloarthropathy, as detailed above.  This report was finalized on 7/10/2023 8:30 PM by Dr. Efrain Cooper M.D.     I have personally reviewed all medications:  Scheduled Medications  atorvastatin, 20 mg, Oral, Nightly  carbidopa-levodopa CR, 2.5 tablet, Oral, 4x Daily  cholecalciferol, 1,000 Units, Oral, Daily  doxepin, 10 mg, Oral, Nightly  gabapentin, 100 mg, Oral, Daily  Gabapentin 10% Baclofen 2% Lidocaine 4% Cream, 1 dose, Topical, 4x Daily  lidocaine, 1 patch, Transdermal, Q24H  lisinopril, 2.5 mg, Oral, Daily  metoprolol succinate XL, 12.5 mg, Oral, Daily  senna-docusate sodium,  2 tablet, Oral, BID  sodium chloride, 10 mL, Intravenous, Q12H    Infusions   Diet  NPO Diet NPO Type: Sips with Meds    I have personally reviewed:  [x]  Laboratory   []  Microbiology   []  Radiology   [x]  EKG/Telemetry  []  Cardiology/Vascular   []  Pathology    []  Records    Assessment/Plan     Active Hospital Problems    Diagnosis  POA    **Compression fracture of body of thoracic vertebra [S22.000A]  Yes    Recurrent falls [R29.6]  Not Applicable    Primary hypertension [I10]  Yes    Coronary artery disease [I25.10]  Yes    MCI (mild cognitive impairment) [G31.84]  Yes    History of adenocarcinoma of prostate [Z85.46]  Not Applicable    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   Acute T12 Compression Fracture  - continue pain control  - MRI T&L spine noted, kyphoplasty planned  - appreciate KAUSHAL recs    Parkinson's Disease  - continue sinemet  - SLP consulted but they have signed off    CAD/HTN  - BP is a little elevated at times probably secondary to pain, no anginal symptoms  - continue lisinopril, metoprolol, and doxepin  - on lipitor, plavix held for kyphoplasty    Hx of Renal Cell Carcinoma  - s/p partial nephrectomy 2018    SCDs for DVT prophylaxis.  Full code.  Discussed with patient, spouse, and nursing staff.  Anticipate discharge home with HH vs SNU facility in 1-2 days..      Dmitriy Patterson MD  [facility]  07/12/23  10:48 EDT

## 2023-07-12 NOTE — THERAPY EVALUATION
Patient Name: Loni Sanford  : 1945    MRN: [id number]                              Today's Date: 2023       Admit Date: 2023    Visit Dx:     ICD-10-CM ICD-9-CM   1. Compression fracture of T12 vertebra, initial encounter  S22.080A 805.2   2. Lower abdominal pain  R10.30 789.09   3. Parkinson's disease  G20 332.0     Patient Active Problem List   Diagnosis    Ureteral calculus, right    Clear cell carcinoma of kidney    Coronary artery disease    History of adenocarcinoma of prostate    Hyperlipidemia LDL goal <70    MCI (mild cognitive impairment)    Neuropathy    Parkinson disease    Stable angina    Primary hypertension    Encounter for screening for malignant neoplasm of colon    History of colon polyps    Compression fracture of body of thoracic vertebra    Recurrent falls    Compression fracture of T12 vertebra     Past Medical History:   Diagnosis Date    Cancer     Prostate cancer     Cancer     kidney dx 2018    Coronary artery disease     Dizziness     occ    Hematuria     History of angina     carries NTG    History of transfusion     Hyperlipidemia     Hypertension     Kidney stones     new one 2018    Kidney tumor     RIGHT removed 2018    Parkinson disease     Renal mass, right     surgically removed 2018     Past Surgical History:   Procedure Laterality Date    APPENDECTOMY      CARDIAC CATHETERIZATION      CATARACT EXTRACTION W/ INTRAOCULAR LENS  IMPLANT, BILATERAL      COLONOSCOPY N/A 2023    Procedure: COLONOSCOPY FOR SCREENING;  Surgeon: Terrell Zhang MD;  Location: [facility] OR;  Service: Gastroenterology;  Laterality: N/A;  poor prep, polyp    EXTRACORPOREAL SHOCKWAVE LITHOTRIPSY (ESWL), STENT INSERTION/REMOVAL Right 2017    Procedure: RIGHT EXTRACORPOREAL SHOCKWAVE LITHOTRIPSY CYSTOSCOPY STENT PLACEMENT;  Surgeon: Marcelo Suarez MD;  Location: [facility];  Service:     EXTRACORPOREAL SHOCKWAVE LITHOTRIPSY (ESWL), STENT  INSERTION/REMOVAL Right 12/28/2018    Procedure: RT EXTRACORPREAL SHOCKWAVE LITHOTRIPSY CYSTOSCOPY  WITH  MANIPULATION OF STONE;  Surgeon: Moe Vasquez MD;  Location: [facility];  Service: Urology    FINGER SURGERY Left 1984    INDEX    HAND SURGERY Right 1998    NEPHRECTOMY PARTIAL Right 4/5/2018    Procedure: RT OPEN PARTIAL NEPHRECTOMY WITH INTRAOPERATIVE DOPPLER, DECORTICATION OF RIGHT RENAL CYST;  Surgeon: Marcelo Suarez MD;  Location: [facility] OR;  Service: Urology    PROSTATECTOMY  2013      General Information       Row Name 07/12/23 [facility]6          OT Time and Intention    Document Type evaluation  -     Mode of Treatment occupational therapy;individual therapy  -       Row Name 07/12/23 1206          General Information    Patient Profile Reviewed yes  -SM     Prior Level of Function --  mostly independent with ADLs, at times min A from spouse for socks/LBD. Pt had had 7 falls in last 6 months. 2 falls this past week.  -       Row Name 07/12/23 1206          Occupational Profile    Environmental Supports and Barriers (Occupational Profile) Home DME includes grab bar in shower, has a shower chair but hasnt been using, he has a SPC but typically doesnt use AD  -       Row Name 07/12/23 1206          Living Environment    People in Home spouse  -       Row Name 07/12/23 1206          Home Main Entrance    Number of Stairs, Main Entrance two  -[facility] Name 07/12/23 1206          Stairs Within Home, Primary    Number of Stairs, Within Home, Primary none  -       Row Name 07/12/23 1206          Cognition    Orientation Status (Cognition) oriented x 3  -       Row Name 07/12/23 1206          Safety Issues, Functional Mobility    Impairments Affecting Function (Mobility) balance;endurance/activity tolerance;strength;coordination;postural/trunk control  -               User Key  (r) = Recorded By, (t) = Taken By, (c) = Cosigned By      Initials Name Provider Type    SM  Lou Alexander OT Occupational Therapist                     Mobility/ADL's       Row Name 07/12/23 1208          Bed Mobility    Comment, (Bed Mobility) pt up in the chair  -[facility] Name 07/12/23 1208          Transfers    Transfers sit-stand transfer  -SM       Row Name 07/12/23 1208          Sit-Stand Transfer    Sit-Stand [place] (Transfers) minimum assist (75% patient effort)  -     Assistive Device (Sit-Stand Transfers) walker, front-wheeled  -SM       Row Name 07/12/23 1208          Functional Mobility    Functional Mobility- Ind. Level minimum assist (75% patient effort)  -     Functional Mobility- Device walker, front-wheeled  -     Functional Mobility-Distance (Feet) --  30  -SM       Row Name 07/12/23 1208          Activities of Daily Living    BADL Assessment/Intervention lower body dressing;feeding;grooming  -SM       Row Name 07/12/23 1208          Lower Body Dressing Assessment/Training    [place] Level (Lower Body Dressing) don;socks;moderate assist (50% patient effort)  -     Comment, (Lower Body Dressing) decreased hip flexability in L hip, OT educated pt on spinal precautions for figure 4 technique  -SM       Row Name 07/12/23 1208          Self-Feeding Assessment/Training    [place] Level (Feeding) independent  -SM     Position (Self-Feeding) supported sitting  -SM     Comment, (Feeding) Pt reports some tremors but can manage well  -SM       Row Name 07/12/23 1208          Grooming Assessment/Training    [place] Level (Grooming) set up  -SM     Position (Grooming) supported sitting  -SM     Comment, (Grooming) pt completed just prior to OT  -SM               User Key  (r) = Recorded By, (t) = Taken By, (c) = Cosigned By      Initials Name Provider Type     Lou Alexander OT Occupational Therapist                   Obj/Interventions       Row Name 07/12/23 1210          Range of Motion Comprehensive    General Range of Motion bilateral upper extremity ROM  WFL  -SM       Row Name 07/12/23 1210          Strength Comprehensive (MMT)    Comment, General Manual Muscle Testing (MMT) Assessment generalized weakness, not formally tested 2/2 acute T spine fx  -SM       Row Name 07/12/23 1210          Motor Skills    Motor Skills functional endurance  -     Functional Endurance fair -  -SM       Row Name 07/12/23 1210          Balance    Balance Assessment standing static balance;standing dynamic balance  -SM     Static Standing Balance contact guard  -     Dynamic Standing Balance minimal assist  -SM     Position/Device Used, Standing Balance supported;walker, rolling  -SM               User Key  (r) = Recorded By, (t) = Taken By, (c) = Cosigned By      Initials Name Provider Type    SM Lou Alexander, OT Occupational Therapist                   Goals/Plan       Row Name 07/12/23 1216          Transfer Goal 1 (OT)    Activity/Assistive Device (Transfer Goal 1, OT) toilet  -SM     [place] Level/Cues Needed (Transfer Goal 1, OT) standby assist  -SM     Time Frame (Transfer Goal 1, OT) short term goal (STG);2 weeks  -SM     Progress/Outcome (Transfer Goal 1, OT) goal ongoing  -SM       Row Name 07/12/23 1216          Dressing Goal 1 (OT)    Activity/Device (Dressing Goal 1, OT) lower body dressing  -SM     [place]/Cues Needed (Dressing Goal 1, OT) standby assist  -SM     Time Frame (Dressing Goal 1, OT) short term goal (STG);2 weeks  -SM     Strategies/Barriers (Dressing Goal 1, OT) may need AE  -SM     Progress/Outcome (Dressing Goal 1, OT) goal ongoing  -SM       Row Name 07/12/23 1216          Toileting Goal 1 (OT)    Activity/Device (Toileting Goal 1, OT) toileting skills, all  -SM     [place] Level/Cues Needed (Toileting Goal 1, OT) standby assist  -SM     Time Frame (Toileting Goal 1, OT) short term goal (STG);2 weeks  -SM     Progress/Outcome (Toileting Goal 1, OT) goal ongoing  -SM       Row Name 07/12/23 1216          Grooming Goal 1 (OT)     Activity/Device (Grooming Goal 1, OT) grooming skills, all  -SM     [place] (Grooming Goal 1, OT) standby assist  -SM     Time Frame (Grooming Goal 1, OT) short term goal (STG);2 weeks  -SM     Strategies/Barriers (Grooming Goal 1, OT) in standing position  -SM     Progress/Outcome (Grooming Goal 1, OT) goal ongoing  -SM       Row Name 07/12/23 1216          Therapy Assessment/Plan (OT)    Planned Therapy Interventions (OT) activity tolerance training;adaptive equipment training;BADL retraining;functional balance retraining;occupation/activity based interventions;patient/caregiver education/training;transfer/mobility retraining;strengthening exercise;ROM/therapeutic exercise  -SM               User Key  (r) = Recorded By, (t) = Taken By, (c) = Cosigned By      Initials Name Provider Type    SM Lou Alexander OT Occupational Therapist                   Clinical Impression       Row Name 07/12/23 1212          Pain Assessment    Pretreatment Pain Rating 1/10  -SM     Posttreatment Pain Rating 5/10  -SM     Pain Intervention(s) Repositioned;Rest  -       Row Name 07/12/23 1212          Plan of Care Review    Plan of Care Reviewed With patient;spouse  -     Outcome Evaluation Pt is a 78 y.o male admitted from home s/p fall resulting in T12 fracture. He has hx of falls and Parkinson's Disease. He has been evaluated by [facility] and cleared for therapy participation and OOB at this time. Per chart pt was weighing options for kyphoplasty but during OT session today he does report he has wished to continue with surgery. He does want to participate in OT eval today so get up and see how to tolerates activity. He requires min A with rwx for short distance in the room. OT educated pt in spinal precautions to maintain pre/post op, ADL safety pre/post op. At this time they are hopeful for rehab placement at [place] to address weakness, balance, decrease risk of falls prior to returning home. OT will continue to follow during  this hospital stay to increase ADL independence and safety. Possible planning surgery today.  -       Row Name 07/12/23 1212          Therapy Assessment/Plan (OT)    Rehab Potential (OT) good, to achieve stated therapy goals  -     Criteria for Skilled Therapeutic Interventions Met (OT) yes;skilled treatment is necessary  -     Therapy Frequency (OT) 5 times/wk  -       Row Name 07/12/23 1212          Therapy Plan Review/Discharge Plan (OT)    Anticipated Discharge Disposition (OT) inpatient rehabilitation facility;skilled nursing facility  -       Row Name 07/12/23 1212          Positioning and Restraints    Pre-Treatment Position sitting in chair/recliner  -     Post Treatment Position chair  -SM     In Chair reclined;call light within reach;encouraged to call for assist;exit alarm on;with family/caregiver;notified ns  -               User Key  (r) = Recorded By, (t) = Taken By, (c) = Cosigned By      Initials Name Provider Type    Lou Cole, OT Occupational Therapist                   Outcome Measures       Row Name 07/12/23 1217          How much help from another is currently needed...    Putting on and taking off regular lower body clothing? 2  -SM     Bathing (including washing, rinsing, and drying) 2  -SM     Toileting (which includes using toilet bed pan or urinal) 2  -SM     Putting on and taking off regular upper body clothing 3  -SM     Taking care of personal grooming (such as brushing teeth) 3  -SM     Eating meals 4  -SM     AM-PAC 6 Clicks Score (OT) 16  -       Row Name 07/12/23 0800          How much help from another person do you currently need...    Turning from your back to your side while in flat bed without using bedrails? 4  -ST     Moving from lying on back to sitting on the side of a flat bed without bedrails? 4  -ST     Moving to and from a bed to a chair (including a wheelchair)? 3  -ST     Standing up from a chair using your arms (e.g., wheelchair, bedside  chair)? 4  -ST     Climbing 3-5 steps with a railing? 2  -ST     To walk in hospital room? 3  -ST     AM-PAC 6 Clicks Score (PT) 20  -ST     Highest level of mobility 6 --> Walked 10 steps or more  -ST       Row Name 07/12/23 1217          Functional Assessment    Outcome Measure Options AM-PAC 6 Clicks Daily Activity (OT)  -               User Key  (r) = Recorded By, (t) = Taken By, (c) = Cosigned By      Initials Name Provider Type    Lou Cole OT Occupational Therapist    Kellie Lobo, RN Registered Nurse                    Occupational Therapy Education       Title: PT OT SLP Therapies (In Progress)       Topic: Occupational Therapy (In Progress)       Point: ADL training (Done)       Description:   Instruct learner(s) on proper safety adaptation and remediation techniques during self care or transfers.   Instruct in proper use of assistive devices.                  Learning Progress Summary             Patient Acceptance, E, VU,DU by  at 7/12/2023 1217    Comment: spinal precautions during mobility and ADLs, educated family on recommended bathroom DME/set up for fall prevention to maximize safety in the home.   Family Acceptance, E, VU,DU by  at 7/12/2023 1217    Comment: spinal precautions during mobility and ADLs, educated family on recommended bathroom DME/set up for fall prevention to maximize safety in the home.                         Point: Home exercise program (Not Started)       Description:   Instruct learner(s) on appropriate technique for monitoring, assisting and/or progressing therapeutic exercises/activities.                  Learner Progress:  Not documented in this visit.              Point: Precautions (Done)       Description:   Instruct learner(s) on prescribed precautions during self-care and functional transfers.                  Learning Progress Summary             Patient Acceptance, E, VU,DU by  at 7/12/2023 1217    Comment: spinal precautions during  mobility and ADLs, educated family on recommended bathroom DME/set up for fall prevention to maximize safety in the home.   Family Acceptance, E, TERRELL,DU by  at 7/12/2023 1217    Comment: spinal precautions during mobility and ADLs, educated family on recommended bathroom DME/set up for fall prevention to maximize safety in the home.                         Point: Body mechanics (Not Started)       Description:   Instruct learner(s) on proper positioning and spine alignment during self-care, functional mobility activities and/or exercises.                  Learner Progress:  Not documented in this visit.                              User Key       Initials Effective Dates Name Provider Type Discipline     04/02/20 -  Lou Alexander OT Occupational Therapist OT                  OT Recommendation and Plan  Planned Therapy Interventions (OT): activity tolerance training, adaptive equipment training, BADL retraining, functional balance retraining, occupation/activity based interventions, patient/caregiver education/training, transfer/mobility retraining, strengthening exercise, ROM/therapeutic exercise  Therapy Frequency (OT): 5 times/wk  Plan of Care Review  Plan of Care Reviewed With: patient, spouse  Outcome Evaluation: Pt is a 78 y.o male admitted from home s/p fall resulting in T12 fracture. He has hx of falls and Parkinson's Disease. He has been evaluated by [facility] and cleared for therapy participation and OOB at this time. Per chart pt was weighing options for kyphoplasty but during OT session today he does report he has wished to continue with surgery. He does want to participate in OT eval today so get up and see how to tolerates activity. He requires min A with rwx for short distance in the room. OT educated pt in spinal precautions to maintain pre/post op, ADL safety pre/post op. At this time they are hopeful for rehab placement at [place] to address weakness, balance, decrease risk of falls prior to returning  home. OT will continue to follow during this hospital stay to increase ADL independence and safety. Possible planning surgery today.     Time Calculation:    Time Calculation- OT       Row Name 07/12/23 1218             Time Calculation- OT    OT Start Time 0933  -      OT Stop Time 0948  -      OT Time Calculation (min) 15 min  -SM      Total Timed Code Minutes- OT 10 minute(s)  -SM      OT Received On 07/12/23  -      OT - Next Appointment 07/13/23  -      OT Goal Re-Cert Due Date 07/26/23  -         Timed Charges    31473 - OT Therapeutic Activity Minutes 10  -SM         Untimed Charges    OT Eval/Re-eval Minutes 5  -SM         Total Minutes    Timed Charges Total Minutes 10  -SM      Untimed Charges Total Minutes 5  -SM       Total Minutes 15  -SM                User Key  (r) = Recorded By, (t) = Taken By, (c) = Cosigned By      Initials Name Provider Type     Lou Alexander, OT Occupational Therapist                  Therapy Charges for Today       Code Description Service Date Service Provider Modifiers Qty    [id number]  OT THERAPEUTIC ACT EA 15 MIN 7/12/2023 Lou Alexander OT GO 1    [id number] HC OT EVAL MOD COMPLEXITY 2 7/12/2023 Lou Alexander OT GO 1                 Lou Alexander OT  7/12/2023

## 2023-07-12 NOTE — PLAN OF CARE
Pt. is A&O X 4. But confused sometimes. Complained of any pain to back. PRN pain pill given. See MAR. SCDs on. NPO after MN. SPO2 is lower sometimes when sleeping. Oxygen applied at 1 L by NC. After MN, Pt. is disoriented to place, time, and situation. Called his spouse per pt. Required. Call light within reach.    Goal Outcome Evaluation:  Plan of Care Reviewed With: patient  Progress: improving

## 2023-07-12 NOTE — PLAN OF CARE
Goal Outcome Evaluation:  Plan of Care Reviewed With: patient, spouse           Outcome Evaluation: Pt is a 78 y.o male admitted from home s/p fall resulting in T12 fracture. He has hx of falls and Parkinson's Disease. He has been evaluated by [facility] and cleared for therapy participation and OOB at this time. Per chart pt was weighing options for kyphoplasty but during OT session today he does report he has wished to continue with surgery. He does want to participate in OT eval today so get up and see how to tolerates activity. He requires min A with rwx for short distance in the room. OT educated pt in spinal precautions to maintain pre/post op, ADL safety pre/post op. At this time they are hopeful for rehab placement at [place] to address weakness, balance, decrease risk of falls prior to returning home. OT will continue to follow during this hospital stay to increase ADL independence and safety. Possible planning surgery today.      Anticipated Discharge Disposition (OT): inpatient rehabilitation facility, skilled nursing facility

## 2023-07-12 NOTE — SIGNIFICANT NOTE
07/12/23 1405   OTHER   Discipline physical therapist   Rehab Time/Intention   Session Not Performed other (see comments)  (up with OT earlier. Just back to bed and given pain medicine. Will follow up tomorrow)   Recommendation   PT - Next Appointment 07/13/23

## 2023-07-12 NOTE — PROGRESS NOTES
[facility] THORACIC/LUMBAR NEUROSURGERY PROGRESS NOTE      CC: compression fracture      Subjective     Interval History: Back pain is better but still present particularly with movement.  3-4/10.  No leg pain.  No urination issues.  Discussed with wife and daughter and would like to proceed with surgery.  Has history of angina followed by [facility].    ROS:  MS: back pain  Neuro: Balance difficulties  : No difficulty voiding, no incontinence    Objective     Vital signs in last 24 hours:  Temp:  [97.4 °F (36.3 °C)-97.9 °F (36.6 °C)] 97.4 °F (36.3 °C)  Heart Rate:  [70-87] 87  Resp:  [16] 16  BP: ()/() 189/117    Intake/Output this shift:  No intake/output data recorded.    LABS:  Results from last 7 days   Lab Units 07/12/23  0525 07/11/23  0556 07/09/23  0819   WBC 10*3/mm3 7.42 7.52 8.32   HEMOGLOBIN g/dL 12.3* 12.8* 12.7*   HEMATOCRIT % 35.0* 36.0* 36.6*   PLATELETS 10*3/mm3 241 228 219       Results from last 7 days   Lab Units 07/12/23  0525 07/11/23  0556 07/09/23  1008   SODIUM mmol/L 140 141 142   POTASSIUM mmol/L 4.5 4.4 4.2   CHLORIDE mmol/L 105 104 104   CO2 mmol/L 25.4 26.1 25.6   BUN mg/dL 29* 26* 23   CREATININE mg/dL 0.83 0.82 0.92   CALCIUM mg/dL 8.9 9.5 9.1   BILIRUBIN mg/dL 0.4 0.6 0.7   ALK PHOS U/L 136* 140* 144*   ALT (SGPT) U/L <5 <5 6   AST (SGOT) U/L 14 13 15   GLUCOSE mg/dL 134* 134* 113*         IMAGING STUDIES:  No new imaging    I personally viewed and interpreted the patient's chart.      Meds reviewed/changed: Yes  Gabapentin 100 mg p.o. daily  Lidoderm patch q 24 hours  Dilaudid 0.5 mg IV every 2 hours as needed-1 dose  Norco 5 mg PO q 4 hrs PRN- 1 dose    Physical Exam:    General:   Awake, alert.  Sitting up in chair.  Wife at bedside.  No acute distress   back:    No focal tenderness to palpation or percussion but has discomfort with turning twisting bending  Motor:    Normal muscle strength, bulk and tone in lower extremities.  No fasciculations, rigidity,  spasticity, or abnormal movements.  Sensation:   Normal to light touch brandyn LEs  Station and Gait:             Not tested.  Awaiting PT eval.  Extremities:   No calf swelling      Assessment & Plan     ASSESSMENT:      Compression fracture of body of thoracic vertebra    Clear cell carcinoma of kidney    Coronary artery disease    History of adenocarcinoma of prostate    MCI (mild cognitive impairment)    Parkinson disease    Primary hypertension    Recurrent falls    Patient with acute T12 fracture due to fall likely from issues related to his Parkinson syndrome.  I reviewed the imaging with Dr. Zavala today and discussed Dr. Hubbard's thoughts.  At this point in time, we feel it is acceptable to consider proceeding with kyphoplasty/biopsy.  Discussed with Dr. Gilman who is agreeable as well.  I spoke with the patient and his wife this morning regarding the imaging and treatment options.  He states that his pain has been present for a while and he has been quite uncomfortable although he is better today.  He would like to consider proceeding with kyphoplasty and his wife concurs.  She is also interested in considering subacute rehab at discharge as she is concerned about his mobility issues and fall risk.  He has a history of angina and has been followed by cardiology as an outpatient.  We will ask our cardiology service to evaluate for risk assessment for surgery but anticipate kyphoplasty/biopsy on Friday as his last dose of Plavix was on or before 7/9.  K to continue working with therapies.  We will ask CCP to assist with rehab options    PLAN:   Kypho/biopsy- Friday  Cardiology consult for surgery risk assessment  OK for PT/OT/OOB  CCP for rehab    I discussed the patient's findings and my recommendations with patient, family, and Dr. Zavala and Dr. Gilman     chief complaint, exam, data copied forward from previous encounters in EMR is unchanged.       During patient visit, I utilized appropriate  "personal protective equipment including  gloves.  Appropriate PPE was worn during the entire visit.  Hand hygiene was completed before and after.      LOS: 0 days       Quyen Corea, APRN  7/12/2023  11:25 EDT    \"Dictated utilizing Dragon dictation\".      "

## 2023-07-12 NOTE — DISCHARGE PLACEMENT REQUEST
"Carlos Greene (78 y.o. Male)       Date of Birth   1945    Social Security Number       Address   [address] [place] [address]    Home Phone   [phone]    MRN   [id number]       Orthodoxy   None    Marital Status                               Admission Date   7/9/23    Admission Type   Emergency    Admitting Provider   Romie Boyle MD    Attending Provider   Dmitriy Patterson MD    Department, Room/Bed   [address], S521/1       Discharge Date       Discharge Disposition       Discharge Destination                                 Attending Provider: Dmitriy Patterson MD    Allergies: Aspirin, Belladonna Alkaloids-opium    Isolation: None   Infection: None   Code Status: CPR    Ht: 175.3 cm (69\")   Wt: 87.5 kg (193 lb)    Admission Cmt: None   Principal Problem: Compression fracture of body of thoracic vertebra [S22.000A]                   Active Insurance as of 7/9/2023       Primary Coverage       Payor Plan Insurance Group Employer/Plan Group    HUMANA MEDICARE REPLACEMENT HUMANA MEDICARE REPLACEMENT [id number]       Payor Plan Address Payor Plan Phone Number Payor Plan Fax Number Effective Dates    [address] [address] [phone]  1/1/2013 - None Entered    [facility] [id number]         Subscriber Name Subscriber Birth Date Member ID       CARLOS GREENE 1945 [id number]                     Emergency Contacts        (Rel.) Home Phone Work Phone Mobile Phone    Zuri Greene (Spouse) -- -- [phone]    SAVITA BOSCH (Daughter) [phone] -- --                "

## 2023-07-12 NOTE — PROGRESS NOTES
Discharge Planning Assessment  [facility]     Patient Name: Loni Sanford  MRN: [id number]  Today's Date: 7/12/2023    Admit Date: 7/9/2023    Plan: Referrals to skilled rehabs 1st choice [facility] in [place] and 2nd choice [place]   Discharge Needs Assessment    No documentation.                  Discharge Plan       Row Name 07/12/23 1639       Plan    Plan Referrals to skilled rehabs 1st choice [facility] in [place] and 2nd choice [place]    Plan Comments Call placed to spouse Zuri Sanford [phone] to discuss discharge planning. She was able to provide skilled rehab choices, [facility]st choice is [facility] in [place], and [place] [facility] in [place]. Patient may need wheel chair van or stretcher transportation at discharge. Packet in [facility] office. Melissa Soto RN                  Continued Care and Services - Admitted Since 7/9/2023       Destination       Service Provider Request Status Selected Services Address Phone Fax Patient Preferred    [facility] Pending - Request Sent N/A [address] [facility] [address] [phone] --    [facility] Pending - Request Sent N/A [address] [facility] [id number] [phone] --                  Expected Discharge Date and Time       Expected Discharge Date Expected Discharge Time    Jul 13, 2023            Demographic Summary    No documentation.                  Functional Status    No documentation.                  Psychosocial    No documentation.                  Abuse/Neglect    No documentation.                  Legal    No documentation.                  Substance Abuse    No documentation.                  Patient Forms    No documentation.                     Melissa Soto RN

## 2023-07-12 NOTE — CONSULTS
Date of Hospital Visit: 23  Encounter Provider: Kennedi Lott MD  Place of Service: [facility] CARDIOLOGY  Patient Name: Loni Sanford  :1945  Referral Provider: Romie Boyle MD    Chief complaint: Back and Abdominal Pain     History of Present Illness:     This is a 78-year-old patient with past medical history significant for prostate and renal cancer, anginal chest pain due to CAD, hyperlipidemia, hypertension, Parkinson's disease.  He presented with complaints of back and abdominal pain of a months duration.  Last Friday he saw his PCP and was given a muscle relaxer and steroid.  On Friday he fell and on Saturday he also fell-he said he tripped and lost his balance.  CT abdomen and pelvis on arrival to the ER showed a T12 compression fracture and multiple bilateral nonobstructive renal calculi.  He could not stand and was having a lot of pain which is why he came to the emergency department and was admitted.    Neurosurgery saw him and felt that he is a candidate for kyphoplasty.  We have been asked to risk assessment.    He has been followed by Dr. Tang with [place] heart specialist.  He is last on 2023.  He had a cardiac catheterization  showing occlusion of the RCA with distal LAD stenosis-this has been medically managed with antianginals.  He has a history of hypertension with orthostatic hypotension worsened by his Parkinson's.  It was recommended that he use compression stockings.    At rest, he has no angina.  If he gets up and walks especially quickly or up an incline, he does get angina.  Typically though it is well controlled.  He does not feels heart racing or skipping and he has no dizziness.  He said his falls were not due to syncope.  He denies orthopnea or PND.  He takes his medications as directed without difficulty.  He lives with his wife who is at bedside.    ECHO 2D 21  Findings   Left Ventricle:           Left  ventricular size is normal.                             Normal left ventricular wall thickness.                             Overall left ventricular systolic function is normal. The ejection fraction biplane was calculated at 52%.                             The estimated ejection fraction is 55-60%.                             Abnormal left ventricular diastolic filling consistent with impaired relaxation.   Left Atrium:              Mild to moderately dilated left atrium.                             There is no evidence of intra-atrial shunting by agitated saline injections.   Right Ventricle:          The right ventricular chamber size and systolic function are within normal limits.   Right Atrium:             The right atrial chamber size appears normal.   Aortic Valve:             The aortic valve is trileaflet.                             There is mild fibrocalcific sclerosis of the aortic valve without evidence of aortic stenosis.   Mitral Valve:             Structurally normal mitral valve.                             Mitral valve leaflet mobility is normal .                             Trace mitral regurgitation is present.   Tricuspid Valve:          Tricuspid valve is structurally normal.                             Trace tricuspid regurgitation.                             There is inadequate tricuspid insufficiency to calculate accurate right ventricular systolic pressure.   Pulmonic Valve:           Pulmonic valve is structurally normal.                             Trace pulmonic insufficiency present.   Pericardium:              There is no evidence of pericardial effusion.   Aorta/Great Vessels: Aortic root dimension within normal limits.                        Mild dilation of the ascending aorta (3.9 cm).     Holter Monitor 11/23/21  Conclusion: Unremarkable Holter monitor.  The study demonstrates sinus rhythm with a nonspecific IVCD.  There were rare ventricular and supraventricular topics with  rare atrial couplets and 1 3 beat run of SVT.  There were no complex or sustained   arrhythmias noted.  There were no episodes of atrial fibrillation.       Past Medical History:   Diagnosis Date    Cancer     Prostate cancer 2013    Cancer     kidney dx 4/2018    Coronary artery disease     Dizziness     occ    Hematuria     History of angina     carries NTG    History of transfusion     Hyperlipidemia     Hypertension     Kidney stones     new one 12/2018    Kidney tumor     RIGHT removed 4/2018    Parkinson disease     Renal mass, right     surgically removed 4/2018       Past Surgical History:   Procedure Laterality Date    APPENDECTOMY  2011    CARDIAC CATHETERIZATION      CATARACT EXTRACTION W/ INTRAOCULAR LENS  IMPLANT, BILATERAL      COLONOSCOPY N/A 5/9/2023    Procedure: COLONOSCOPY FOR SCREENING;  Surgeon: Terrell Zhang MD;  Location: [facility] OR;  Service: Gastroenterology;  Laterality: N/A;  poor prep, polyp    EXTRACORPOREAL SHOCKWAVE LITHOTRIPSY (ESWL), STENT INSERTION/REMOVAL Right 12/29/2017    Procedure: RIGHT EXTRACORPOREAL SHOCKWAVE LITHOTRIPSY CYSTOSCOPY STENT PLACEMENT;  Surgeon: Marcelo Suarez MD;  Location: [facility], operated by [facility];  Service:     EXTRACORPOREAL SHOCKWAVE LITHOTRIPSY (ESWL), STENT INSERTION/REMOVAL Right 12/28/2018    Procedure: RT EXTRACORPREAL SHOCKWAVE LITHOTRIPSY CYSTOSCOPY  WITH  MANIPULATION OF STONE;  Surgeon: Moe Vasquez MD;  Location: [facility], operated [facility];  Service: Urology    FINGER SURGERY Left 1984    INDEX    HAND SURGERY Right 1998    NEPHRECTOMY PARTIAL Right 4/5/2018    Procedure: RT OPEN PARTIAL NEPHRECTOMY WITH INTRAOPERATIVE DOPPLER, DECORTICATION OF RIGHT RENAL CYST;  Surgeon: Marcelo Suarez MD;  Location: [facility];  Service: Urology    PROSTATECTOMY  2013       Medications Prior to Admission   Medication Sig Dispense Refill Last Dose    acetaminophen (TYLENOL) 500 MG tablet Take 2 tablets by mouth Every 6 (Six) Hours As Needed for Mild Pain.        atorvastatin (LIPITOR) 20 MG tablet Take 1 tablet by mouth Every Night.   7/8/2023    baclofen (LIORESAL) 5 MG tablet Take 1 tablet by mouth 3 (Three) Times a Day. 30 tablet 0 7/8/2023    carbidopa-levodopa CR (SINEMET CR)  MG per CR tablet Take 2.5 tablets by mouth 4 (Four) Times a Day.   7/9/2023    cholecalciferol (VITAMIN D3) 1000 units tablet Take 1 tablet by mouth Every Morning.   7/9/2023    clopidogrel (PLAVIX) 75 MG tablet Take 1 tablet by mouth Every Morning.   7/9/2023    doxepin (SINEquan) 10 MG capsule TAKE 1 CAPSULE BY MOUTH AT NIGHT FOR SLEEP 90 capsule 3 7/8/2023    gabapentin (NEURONTIN) 100 MG capsule Take 1 capsule by mouth Daily.   7/8/2023    Gabapentin 10 %, Baclofen 2 %, lidocaine 4 % Apply 1-2 g topically to the appropriate area as directed 3 (Three) to 4 (Four) times daily. 90 g 0     ketoconazole (NIZORAL) 2 % shampoo 1 application  Daily.   7/9/2023    metoprolol succinate XL (TOPROL-XL) 25 MG 24 hr tablet Take 12.5 mg by mouth Every Night.   7/9/2023    Multiple Vitamins-Minerals (CENTRUM ADULTS PO) Take 1 tablet by mouth Every Morning.   7/9/2023    predniSONE (DELTASONE) 20 MG tablet Take 2 tablets by mouth Daily for 5 days. 10 tablet 0 7/8/2023    nitroglycerin (NITROSTAT) 0.4 MG SL tablet DISSOLVE ONE TABLET UNDER THE TONGUE EVERY 5 MINUTES AS NEEDED FOR CHEST PAIN (Patient not taking: Reported on 7/7/2023)          Current Meds  Scheduled Meds:atorvastatin, 20 mg, Oral, Nightly  carbidopa-levodopa CR, 2.5 tablet, Oral, 4x Daily  cholecalciferol, 1,000 Units, Oral, Daily  doxepin, 10 mg, Oral, Nightly  gabapentin, 100 mg, Oral, Daily  Gabapentin 10% Baclofen 2% Lidocaine 4% Cream, 1 dose, Topical, 4x Daily  lidocaine, 1 patch, Transdermal, Q24H  lisinopril, 2.5 mg, Oral, Daily  metoprolol succinate XL, 12.5 mg, Oral, Daily  senna-docusate sodium, 2 tablet, Oral, BID  sodium chloride, 10 mL, Intravenous, Q12H      Continuous Infusions:   PRN Meds:.  acetaminophen **OR**  "acetaminophen **OR** acetaminophen    senna-docusate sodium **AND** polyethylene glycol **AND** bisacodyl **AND** bisacodyl    Calcium Replacement - Follow Nurse / BPA Driven Protocol    HYDROcodone-acetaminophen    HYDROmorphone **AND** naloxone    Magnesium Standard Dose Replacement - Follow Nurse / BPA Driven Protocol    methocarbamol    nitroglycerin    ondansetron    Phosphorus Replacement - Follow Nurse / BPA Driven Protocol    Potassium Replacement - Follow Nurse / BPA Driven Protocol    [COMPLETED] Insert Peripheral IV **AND** sodium chloride    sodium chloride    sodium chloride    Allergies as of 07/09/2023 - Reviewed 07/09/2023   Allergen Reaction Noted    Aspirin Hives 11/09/2017    Belladonna alkaloids-opium Other (See Comments) 12/17/2012       Social History     Socioeconomic History    Marital status:    Tobacco Use    Smoking status: Never     Passive exposure: Never    Smokeless tobacco: Never   Vaping Use    Vaping Use: Never used   Substance and Sexual Activity    Alcohol use: No    Drug use: No    Sexual activity: Defer       Family History   Problem Relation Age of Onset    Heart disease Father     Heart attack Father     Diabetes Brother     Heart attack Brother     Coronary artery disease Brother         CABG    Heart disease Brother     Hypertension Mother     Malig Hyperthermia Neg Hx     Stroke Neg Hx        REVIEW OF SYSTEMS:   12 point ROS was performed and is negative except as outlined in HPI         Objective:   Temp:  [97.4 °F (36.3 °C)-97.9 °F (36.6 °C)] 97.4 °F (36.3 °C)  Heart Rate:  [70-87] 85  Resp:  [16] 16  BP: ()/() 164/91  Body mass index is 28.5 kg/m².  Flowsheet Rows      Flowsheet Row First Filed Value   Admission Height 175.3 cm (69\") Documented at 07/09/2023 0745   Admission Weight 94.3 kg (208 lb) Documented at 07/09/2023 0745          Vitals:    07/12/23 0925   BP: 164/91   Pulse: 85   Resp:    Temp:    SpO2:        General Appearance:    Alert, " cooperative, in no acute distress   Head:    Normocephalic, without obvious abnormality, atraumatic   Eyes:            Lids and lashes normal, conjunctivae and sclerae normal, no   icterus, no pallor, corneas clear   Ears:    Ears appear intact with no abnormalities noted   Throat:   No oral lesions, no thrush, oral mucosa moist   Neck:   No adenopathy, supple, trachea midline, no thyromegaly, no   carotid bruit, no JVD   Back:     No kyphosis present, no scoliosis present, no skin lesions, erythema or scars, no tenderness to palpation, range of motion normal   Lungs:     Clear to auscultation,respirations regular, even and unlabored    Heart:    Regular rhythm and normal rate, normal S1 and S2, no murmur, no gallop, no rub, no click   Chest Wall:    No abnormalities observed   Abdomen:     Normal bowel sounds, no masses, no organomegaly, soft, nontender, nondistended, no guarding, no rebound  tenderness   Extremities:   Moves all extremities well, no edema, no cyanosis, no redness   Pulses:   Pulses palpable and equal bilaterally. Normal radial, carotid,  dorsalis pedis and posterior tibial pulses bilaterally.    Skin:  Psychiatric:   No bleeding, bruising or rash    Alert and oriented x 3, normal mood and affect                 Lab Review:      Results from last 7 days   Lab Units 07/12/23  0525   SODIUM mmol/L 140   POTASSIUM mmol/L 4.5   CHLORIDE mmol/L 105   CO2 mmol/L 25.4   BUN mg/dL 29*   CREATININE mg/dL 0.83   CALCIUM mg/dL 8.9   BILIRUBIN mg/dL 0.4   ALK PHOS U/L 136*   ALT (SGPT) U/L <5   AST (SGOT) U/L 14   GLUCOSE mg/dL 134*         @LABRCNT(bnp)@  Results from last 7 days   Lab Units 07/12/23  0525 07/11/23  0556 07/09/23  0819   WBC 10*3/mm3 7.42 7.52 8.32   HEMOGLOBIN g/dL 12.3* 12.8* 12.7*   HEMATOCRIT % 35.0* 36.0* 36.6*   PLATELETS 10*3/mm3 241 228 219             Lipid Panel          1/30/2023    11:24   Lipid Panel   Total Cholesterol 117    Triglycerides 83    HDL Cholesterol 45    VLDL  Cholesterol 16    LDL Cholesterol  56          I personally viewed and interpreted the patient's EKG/Telemetry data  EKG 7/12/23 - Pending      EKG 8/1/22          Compression fracture of body of thoracic vertebra    Clear cell carcinoma of kidney    Coronary artery disease    History of adenocarcinoma of prostate    MCI (mild cognitive impairment)    Parkinson disease    Primary hypertension    Recurrent falls    Compression fracture of T12 vertebra      Assessment and Plan:    T12 compression fracture, plan for kyphoplasty  CAD with history of stable angina, no angina at this time.  ECG shows no acute changes.  Parkinson's  Hypertension and orthostasis  Hyperlipidemia  History of adenocarcinoma of the prostate and clear-cell carcinoma of the kidney      His perioperative risk score is 0.83% risk of perioperative myocardial infarction or cardiac arrest.  This is low risk.  He may proceed without further testing.    We will see him as needed.    Kennedi Lott MD  07/12/23  13:05 EDT.  Time spent in reviewing chart, discussion and examination:

## 2023-07-13 LAB
ALBUMIN SERPL-MCNC: 3.5 G/DL (ref 3.5–5.2)
ALBUMIN/GLOB SERPL: 1.4 G/DL
ALP SERPL-CCNC: 137 U/L (ref 39–117)
ALT SERPL W P-5'-P-CCNC: <5 U/L (ref 1–41)
ANION GAP SERPL CALCULATED.3IONS-SCNC: 7 MMOL/L (ref 5–15)
AST SERPL-CCNC: 10 U/L (ref 1–40)
BASOPHILS # BLD AUTO: 0.04 10*3/MM3 (ref 0–0.2)
BASOPHILS NFR BLD AUTO: 0.6 % (ref 0–1.5)
BILIRUB SERPL-MCNC: 0.4 MG/DL (ref 0–1.2)
BUN SERPL-MCNC: 28 MG/DL (ref 8–23)
BUN/CREAT SERPL: 32.2 (ref 7–25)
CALCIUM SPEC-SCNC: 9.2 MG/DL (ref 8.6–10.5)
CHLORIDE SERPL-SCNC: 106 MMOL/L (ref 98–107)
CO2 SERPL-SCNC: 28 MMOL/L (ref 22–29)
CREAT SERPL-MCNC: 0.87 MG/DL (ref 0.76–1.27)
DEPRECATED RDW RBC AUTO: 38.6 FL (ref 37–54)
EGFRCR SERPLBLD CKD-EPI 2021: 88.9 ML/MIN/1.73
EOSINOPHIL # BLD AUTO: 0.19 10*3/MM3 (ref 0–0.4)
EOSINOPHIL NFR BLD AUTO: 2.8 % (ref 0.3–6.2)
ERYTHROCYTE [DISTWIDTH] IN BLOOD BY AUTOMATED COUNT: 11.5 % (ref 12.3–15.4)
GLOBULIN UR ELPH-MCNC: 2.5 GM/DL
GLUCOSE SERPL-MCNC: 93 MG/DL (ref 65–99)
HCT VFR BLD AUTO: 36.3 % (ref 37.5–51)
HGB BLD-MCNC: 12.4 G/DL (ref 13–17.7)
IMM GRANULOCYTES # BLD AUTO: 0.04 10*3/MM3 (ref 0–0.05)
IMM GRANULOCYTES NFR BLD AUTO: 0.6 % (ref 0–0.5)
LYMPHOCYTES # BLD AUTO: 1.32 10*3/MM3 (ref 0.7–3.1)
LYMPHOCYTES NFR BLD AUTO: 19.1 % (ref 19.6–45.3)
MCH RBC QN AUTO: 31.9 PG (ref 26.6–33)
MCHC RBC AUTO-ENTMCNC: 34.2 G/DL (ref 31.5–35.7)
MCV RBC AUTO: 93.3 FL (ref 79–97)
MONOCYTES # BLD AUTO: 0.87 10*3/MM3 (ref 0.1–0.9)
MONOCYTES NFR BLD AUTO: 12.6 % (ref 5–12)
NEUTROPHILS NFR BLD AUTO: 4.44 10*3/MM3 (ref 1.7–7)
NEUTROPHILS NFR BLD AUTO: 64.3 % (ref 42.7–76)
NRBC BLD AUTO-RTO: 0 /100 WBC (ref 0–0.2)
PLATELET # BLD AUTO: 233 10*3/MM3 (ref 140–450)
PMV BLD AUTO: 9.1 FL (ref 6–12)
POTASSIUM SERPL-SCNC: 3.9 MMOL/L (ref 3.5–5.2)
PROT SERPL-MCNC: 6 G/DL (ref 6–8.5)
RBC # BLD AUTO: 3.89 10*6/MM3 (ref 4.14–5.8)
SODIUM SERPL-SCNC: 141 MMOL/L (ref 136–145)
WBC NRBC COR # BLD: 6.9 10*3/MM3 (ref 3.4–10.8)

## 2023-07-13 PROCEDURE — 99231 SBSQ HOSP IP/OBS SF/LOW 25: CPT | Performed by: NURSE PRACTITIONER

## 2023-07-13 PROCEDURE — 80053 COMPREHEN METABOLIC PANEL: CPT | Performed by: INTERNAL MEDICINE

## 2023-07-13 PROCEDURE — 85025 COMPLETE CBC W/AUTO DIFF WBC: CPT | Performed by: INTERNAL MEDICINE

## 2023-07-13 PROCEDURE — 97162 PT EVAL MOD COMPLEX 30 MIN: CPT | Performed by: PHYSICAL THERAPIST

## 2023-07-13 PROCEDURE — 97530 THERAPEUTIC ACTIVITIES: CPT | Performed by: PHYSICAL THERAPIST

## 2023-07-13 RX ADMIN — METOPROLOL SUCCINATE 12.5 MG: 25 TABLET, EXTENDED RELEASE ORAL at 09:11

## 2023-07-13 RX ADMIN — Medication 10 ML: at 09:22

## 2023-07-13 RX ADMIN — CARBIDOPA AND LEVODOPA 2.5 TABLET: 50; 200 TABLET, EXTENDED RELEASE ORAL at 17:21

## 2023-07-13 RX ADMIN — DOXEPIN HYDROCHLORIDE 10 MG: 10 CAPSULE ORAL at 20:13

## 2023-07-13 RX ADMIN — HYDROCODONE BITARTRATE AND ACETAMINOPHEN 1 TABLET: 5; 325 TABLET ORAL at 23:49

## 2023-07-13 RX ADMIN — SENNOSIDES AND DOCUSATE SODIUM 2 TABLET: 50; 8.6 TABLET ORAL at 09:28

## 2023-07-13 RX ADMIN — HYDROCODONE BITARTRATE AND ACETAMINOPHEN 1 TABLET: 5; 325 TABLET ORAL at 20:17

## 2023-07-13 RX ADMIN — SENNOSIDES AND DOCUSATE SODIUM 2 TABLET: 50; 8.6 TABLET ORAL at 20:13

## 2023-07-13 RX ADMIN — LIDOCAINE 1 PATCH: 700 PATCH TOPICAL at 09:08

## 2023-07-13 RX ADMIN — HYDROCODONE BITARTRATE AND ACETAMINOPHEN 1 TABLET: 5; 325 TABLET ORAL at 01:32

## 2023-07-13 RX ADMIN — CARBIDOPA AND LEVODOPA 2.5 TABLET: 50; 200 TABLET, EXTENDED RELEASE ORAL at 20:13

## 2023-07-13 RX ADMIN — LISINOPRIL 2.5 MG: 2.5 TABLET ORAL at 09:28

## 2023-07-13 RX ADMIN — CHOLECALCIFEROL TAB 10 MCG (400 UNIT) 1000 UNITS: 10 TAB at 09:11

## 2023-07-13 RX ADMIN — CARBIDOPA AND LEVODOPA 2.5 TABLET: 50; 200 TABLET, EXTENDED RELEASE ORAL at 11:55

## 2023-07-13 RX ADMIN — Medication 10 ML: at 20:14

## 2023-07-13 RX ADMIN — ATORVASTATIN CALCIUM 20 MG: 20 TABLET, FILM COATED ORAL at 20:13

## 2023-07-13 RX ADMIN — CARBIDOPA AND LEVODOPA 2.5 TABLET: 50; 200 TABLET, EXTENDED RELEASE ORAL at 09:11

## 2023-07-13 RX ADMIN — GABAPENTIN 100 MG: 100 CAPSULE ORAL at 09:08

## 2023-07-13 NOTE — THERAPY EVALUATION
Patient Name: Loni Sanford  : 1945    MRN: [id number]                              Today's Date: 2023       Admit Date: 2023    Visit Dx:     ICD-10-CM ICD-9-CM   1. Compression fracture of T12 vertebra, initial encounter  S22.080A 805.2   2. Lower abdominal pain  R10.30 789.09   3. Parkinson's disease  G20 332.0     Patient Active Problem List   Diagnosis    Ureteral calculus, right    Clear cell carcinoma of kidney    Coronary artery disease    History of adenocarcinoma of prostate    Hyperlipidemia LDL goal <70    MCI (mild cognitive impairment)    Neuropathy    Parkinson disease    Stable angina    Primary hypertension    Encounter for screening for malignant neoplasm of colon    History of colon polyps    Compression fracture of body of thoracic vertebra    Recurrent falls    Compression fracture of T12 vertebra     Past Medical History:   Diagnosis Date    Cancer     Prostate cancer     Cancer     kidney dx 2018    Coronary artery disease     Dizziness     occ    Hematuria     History of angina     carries NTG    History of transfusion     Hyperlipidemia     Hypertension     Kidney stones     new one 2018    Kidney tumor     RIGHT removed 2018    Parkinson disease     Renal mass, right     surgically removed 2018     Past Surgical History:   Procedure Laterality Date    APPENDECTOMY      CARDIAC CATHETERIZATION      CATARACT EXTRACTION W/ INTRAOCULAR LENS  IMPLANT, BILATERAL      COLONOSCOPY N/A 2023    Procedure: COLONOSCOPY FOR SCREENING;  Surgeon: Terrell Zhang MD;  [facility];  Service: Gastroenterology;  Laterality: N/A;  poor prep, polyp    EXTRACORPOREAL SHOCKWAVE LITHOTRIPSY (ESWL), STENT INSERTION/REMOVAL Right 2017    Procedure: RIGHT EXTRACORPOREAL SHOCKWAVE LITHOTRIPSY CYSTOSCOPY STENT PLACEMENT;  Surgeon: Marcelo Suarez MD;  Location: [facility];  Service:     EXTRACORPOREAL SHOCKWAVE LITHOTRIPSY (ESWL), STENT  INSERTION/REMOVAL Right 12/28/2018    Procedure: RT EXTRACORPREAL SHOCKWAVE LITHOTRIPSY CYSTOSCOPY  WITH  MANIPULATION OF STONE;  Surgeon: Moe Vasquez MD;  Location: [facility] OR [facility] [place];  Service: Urology    FINGER SURGERY Left 1984    INDEX    HAND SURGERY Right 1998    NEPHRECTOMY PARTIAL Right 4/5/2018    Procedure: RT OPEN PARTIAL NEPHRECTOMY WITH INTRAOPERATIVE DOPPLER, DECORTICATION OF RIGHT RENAL CYST;  Surgeon: Marcelo Suarez MD;  Location: [facility] MAIN OR;  Service: Urology    PROSTATECTOMY  2013      General Information       Row Name 07/13/23 [facility]0          Physical Therapy Time and Intention    Document Type evaluation  -     Mode of Treatment individual therapy;physical therapy  -       Row Name 07/13/23 1350          General Information    Patient Profile Reviewed yes  -     Prior Level of Function independent:  -     Existing Precautions/Restrictions spinal  -     Barriers to Rehab none identified  -       Row Name 07/13/23 1350          Living Environment    People in Home spouse  -       Row Name 07/13/23 [facility]0          Home Main Entrance    Number of Stairs, Main Entrance two  -       Row Name 07/13/23 1350          Cognition    Orientation Status (Cognition) oriented x 3  -       Row Name 07/13/23 1350          Safety Issues, Functional Mobility    Impairments Affecting Function (Mobility) balance;endurance/activity tolerance;strength;coordination;postural/trunk control  -               User Key  (r) = Recorded By, (t) = Taken By, (c) = Cosigned By      Initials Name Provider Type    Shanice Huffman, PT Physical Therapist                   Mobility       Row Name 07/13/23 1357          Bed Mobility    Bed Mobility rolling right;sidelying-sit;sit-sidelying  -     Rolling Right [place] (Bed Mobility) standby assist;verbal cues  -     Sidelying-Sit [place] (Bed Mobility) contact guard  -     Sit-Sidelying [place] (Bed Mobility) minimum assist  (75% patient effort)  -     Assistive Device (Bed Mobility) bed rails;head of bed elevated  -[facility] Name 07/13/23 1350          Sit-Stand Transfer    Sit-Stand [place] (Transfers) minimum assist (75% patient effort)  -     Assistive Device (Sit-Stand Transfers) walker, front-wheeled  -[facility] Name 07/13/23 1350          Gait/Stairs (Locomotion)    [place] Level (Gait) minimum assist (75% patient effort)  -     Assistive Device (Gait) walker, front-wheeled  -     Distance in Feet (Gait) 65  -     Deviations/Abnormal Patterns (Gait) antalgic;gait speed decreased;festinating/shuffling;stride length decreased  -               User Key  (r) = Recorded By, (t) = Taken By, (c) = Cosigned By      Initials Name Provider Type    Shanice Huffman, PT Physical Therapist                   Obj/Interventions       [facility] Name 07/13/23 1351          Range of Motion Comprehensive    Comment, General Range of Motion BLE WFL  -[facility] Name 07/13/23 1351          Strength Comprehensive (MMT)    Comment, General Manual Muscle Testing (MMT) Assessment genealized weakness  -               User Key  (r) = Recorded By, (t) = Taken By, (c) = Cosigned By      Initials Name Provider Type    Shanice Huffman, PT Physical Therapist                   Goals/Plan       [facility] Name 07/13/23 8081          Bed Mobility Goal 1 (PT)    Activity/Assistive Device (Bed Mobility Goal 1, PT) bed mobility activities, all  -     [place] Level/Cues Needed (Bed Mobility Goal 1, PT) independent  -     Time Frame (Bed Mobility Goal 1, PT) 1 week  -[facility] Name 07/13/23 1655          Transfer Goal 1 (PT)    Activity/Assistive Device (Transfer Goal 1, PT) transfers, all;walker, rolling  -     [place] Level/Cues Needed (Transfer Goal 1, PT) standby assist  -[facility] Name 07/13/23 1357          Gait Training Goal 1 (PT)    Activity/Assistive Device (Gait Training Goal 1, PT) gait (walking  locomotion);walker, rolling  -     [place] Level (Gait Training Goal 1, PT) standby assist  -     Distance (Gait Training Goal 1, PT) 100 ft  -     Time Frame (Gait Training Goal 1, PT) 1 week  -       Row Name 07/13/23 1288          Therapy Assessment/Plan (PT)    Planned Therapy Interventions (PT) balance training;bed mobility training;gait training;home exercise program;patient/family education;strengthening;stair training;transfer training  -               User Key  (r) = Recorded By, (t) = Taken By, (c) = Cosigned By      Initials Name Provider Type    Shanice Huffman, PT Physical Therapist                   Clinical Impression       Row Name 07/13/23 2941          Pain    Pretreatment Pain Rating 3/10  -     Posttreatment Pain Rating 3/10  -     Pain Location - back  -     Pain Intervention(s) Repositioned;Rest  -       Row Name 07/13/23 8437          Plan of Care Review    Outcome Evaluation Pt is a 78 y.o male admitted from home s/p fall resulting in T12 fracture. He has hx of falls and Parkinson's Disease. He has been evaluated by [facility] and cleared for therapy participation and OOB at this time. Pt is scheduled for kyphoplasty on Friday and hopes to go to rehab after discharge. Pt presents with generalized weakness, backpain, impaired balance and unsteady gait. Pt would benefit from PT to address these impairments.  -       Row Name 07/13/23 9040          Therapy Assessment/Plan (PT)    Patient/Family Therapy Goals Statement (PT) rehab before returning home  -     Rehab Potential (PT) good, to achieve stated therapy goals  -     Criteria for Skilled Interventions Met (PT) yes  -     Therapy Frequency (PT) 5 times/wk  -       Row Name 07/13/23 5912          Positioning and Restraints    Pre-Treatment Position in bed  -     Post Treatment Position bed  -     In Bed fowlers;call light within reach;encouraged to call for assist;exit alarm on  -               User  Key  (r) = Recorded By, (t) = Taken By, (c) = Cosigned By      Initials Name Provider Type    Shanice Huffman, PT Physical Therapist                   Outcome Measures       Row Name 07/13/23 1355 07/13/23 1200       How much help from another person do you currently need...    Turning from your back to your side while in flat bed without using bedrails? 3  -KH 3  -DUSTY    Moving from lying on back to sitting on the side of a flat bed without bedrails? 3  -KH 3  -DUSTY    Moving to and from a bed to a chair (including a wheelchair)? 3  -KH 3  -DUSTY    Standing up from a chair using your arms (e.g., wheelchair, bedside chair)? 3  -KH 3  -DUSTY    Climbing 3-5 steps with a railing? 2  -KH 2  -DUSTY    To walk in hospital room? 3  -KH 3  -DUSTY    AM-PAC 6 Clicks Score (PT) 17  -KH 17  -DUSTY    Highest level of mobility 5 --> Static standing  -KH 5 --> Static standing  -DUSTY      Row Name 07/13/23 1355          Functional Assessment    Outcome Measure Options AM-PAC 6 Clicks Basic Mobility (PT)  -               User Key  (r) = Recorded By, (t) = Taken By, (c) = Cosigned By      Initials Name Provider Type    Shanice Huffman, HOLLEY Physical Therapist    Mendy Petty, RN Registered Nurse                                 Physical Therapy Education       Title: PT OT SLP Therapies (In Progress)       Topic: Physical Therapy (Not Started)       Point: Mobility training (Not Started)       Learner Progress:  Not documented in this visit.              Point: Home exercise program (Not Started)       Learner Progress:  Not documented in this visit.              Point: Body mechanics (Not Started)       Learner Progress:  Not documented in this visit.              Point: Precautions (Not Started)       Learner Progress:  Not documented in this visit.                                  PT Recommendation and Plan  Planned Therapy Interventions (PT): balance training, bed mobility training, gait training, home exercise program,  patient/family education, strengthening, stair training, transfer training  Outcome Evaluation: Pt is a 78 y.o male admitted from home s/p fall resulting in T12 fracture. He has hx of falls and Parkinson's Disease. He has been evaluated by [facility] and cleared for therapy participation and OOB at this time. Pt is scheduled for kyphoplasty on Friday and hopes to go to rehab after discharge. Pt presents with generalized weakness, backpain, impaired balance and unsteady gait. Pt would benefit from PT to address these impairments.     Time Calculation:    PT Charges       Row Name 07/13/23 1356             Time Calculation    Start Time 0935  -      Stop Time 0958  -KH      Time Calculation (min) 23 min  -KH      PT Received On 07/13/23  -      PT - Next Appointment 07/14/23  -      PT Goal Re-Cert Due Date 07/20/23  -         Time Calculation- PT    Total Timed Code Minutes- PT 13 minute(s)  -KH         Timed Charges    57054 - PT Therapeutic Activity Minutes 13  -KH         Untimed Charges    PT Eval/Re-eval Minutes 10  -KH         Total Minutes    Timed Charges Total Minutes 13  -KH      Untimed Charges Total Minutes 10  -KH       Total Minutes 23  -KH                User Key  (r) = Recorded By, (t) = Taken By, (c) = Cosigned By      Initials Name Provider Type    Shanice Huffman PT Physical Therapist                  Therapy Charges for Today       Code Description Service Date Service Provider Modifiers Qty    09895115895 HC PT THERAPEUTIC ACT EA 15 MIN 7/13/2023 Shanice Lawsno, PT GP 1    08787242003 HC PT EVAL MOD COMPLEXITY 2 7/13/2023 Shanice Lawson, PT GP 1            PT G-Codes  Outcome Measure Options: AM-PAC 6 Clicks Basic Mobility (PT)  AM-PAC 6 Clicks Score (PT): 17  AM-PAC 6 Clicks Score (OT): 16  PT Discharge Summary  Anticipated Discharge Disposition (PT): skilled nursing facility    Shanice Lawson PT  7/13/2023

## 2023-07-13 NOTE — PLAN OF CARE
Goal Outcome Evaluation:      Pt up to chair 3x with assist x1. Brief was changed, with bath and linen change. No skin breakdown noted, pt was reluctant to have SCD's placed, educated on need for SCD's. Noted not to be eating much, offered applesauce, reminded pt that he was NPO after MN. Consent for procedure signed.

## 2023-07-13 NOTE — PLAN OF CARE
Goal Outcome Evaluation:              Outcome Evaluation: Pt is a 78 y.o male admitted from home s/p fall resulting in T12 fracture. He has hx of falls and Parkinson's Disease. He has been evaluated by [facility] and cleared for therapy participation and OOB at this time. Pt is scheduled for kyphoplasty on Friday and hopes to go to rehab after discharge. Pt presents with generalized weakness, backpain, impaired balance and unsteady gait. Pt would benefit from PT to address these impairments.      Anticipated Discharge Disposition (PT): skilled nursing facility

## 2023-07-13 NOTE — PROGRESS NOTES
Name: Loni Sanford ADMIT: 2023   : 1945  PCP: Zo Estrada MD    MRN: [id number] LOS: 1 days   AGE/SEX: 78 y.o. male  ROOM: [facility]     Subjective   Subjective   No acute events. Patient has no new complaints. Taking PO. Pain is reasonably controlled. No CP/dyspnea/f/c/n/v/d.   His daughter is at bedside.    Objective   Objective   Vital Signs  Temp:  [97.5 °F (36.4 °C)-98.1 °F (36.7 °C)] 97.5 °F (36.4 °C)  Heart Rate:  [65-89] 76  Resp:  [16-18] 17  BP: ()/(59-78) 127/78  SpO2:  [92 %-98 %] 95 %  on   ;   Device (Oxygen Therapy): room air  Body mass index is 28.5 kg/m².  Physical Exam  Vitals and nursing note reviewed.   Constitutional:       General: He is not in acute distress.     Appearance: He is not toxic-appearing or diaphoretic.   HENT:      Head: Normocephalic and atraumatic.      Nose: Nose normal.      Mouth/Throat:      Mouth: Mucous membranes are moist.      Pharynx: Oropharynx is clear.   Eyes:      Conjunctiva/sclera: Conjunctivae normal.      Pupils: Pupils are equal, round, and reactive to light.   Cardiovascular:      Rate and Rhythm: Normal rate and regular rhythm.      Pulses: Normal pulses.   Pulmonary:      Effort: Pulmonary effort is normal.      Breath sounds: Normal breath sounds.   Abdominal:      General: Bowel sounds are normal.      Palpations: Abdomen is soft.      Tenderness: There is no abdominal tenderness.   Musculoskeletal:         General: No swelling or tenderness.      Cervical back: Normal range of motion and neck supple.   Skin:     General: Skin is warm and dry.      Capillary Refill: Capillary refill takes less than 2 seconds.   Neurological:      General: No focal deficit present.      Mental Status: He is alert.   Psychiatric:         Mood and Affect: Mood normal.         Behavior: Behavior normal.     Results Review     I reviewed the patient's new clinical results.  Results from last 7 days   Lab Units 23  0709 23  0525  07/11/23  0556 07/09/23  0819   WBC 10*3/mm3 6.90 7.42 7.52 8.32   HEMOGLOBIN g/dL 12.4* 12.3* 12.8* 12.7*   PLATELETS 10*3/mm3 233 241 228 219       Results from last 7 days   Lab Units 07/13/23  0709 07/12/23  0525 07/11/23  0556 07/09/23  1008   SODIUM mmol/L 141 140 141 142   POTASSIUM mmol/L 3.9 4.5 4.4 4.2   CHLORIDE mmol/L 106 105 104 104   CO2 mmol/L 28.0 25.4 26.1 25.6   BUN mg/dL 28* 29* 26* 23   CREATININE mg/dL 0.87 0.83 0.82 0.92   GLUCOSE mg/dL 93 134* 134* 113*   EGFR mL/min/1.73 88.9 90.1 90.5 85.7       Results from last 7 days   Lab Units 07/13/23  0709 07/12/23  0525 07/11/23  0556 07/09/23  1008   ALBUMIN g/dL 3.5 3.7 3.9 3.9   BILIRUBIN mg/dL 0.4 0.4 0.6 0.7   ALK PHOS U/L 137* 136* 140* 144*   AST (SGOT) U/L 10 14 13 15   ALT (SGPT) U/L <5 <5 <5 6       Results from last 7 days   Lab Units 07/13/23  0709 07/12/23  0525 07/11/23  0556 07/09/23  1008   CALCIUM mg/dL 9.2 8.9 9.5 9.1   ALBUMIN g/dL 3.5 3.7 3.9 3.9         No results found for: HGBA1C, POCGLU    No radiology results for the last day  I have personally reviewed all medications:  Scheduled Medications  atorvastatin, 20 mg, Oral, Nightly  carbidopa-levodopa CR, 2.5 tablet, Oral, 4x Daily  cholecalciferol, 1,000 Units, Oral, Daily  doxepin, 10 mg, Oral, Nightly  gabapentin, 100 mg, Oral, Daily  Gabapentin 10% Baclofen 2% Lidocaine 4% Cream, 1 dose, Topical, 4x Daily  lidocaine, 1 patch, Transdermal, Q24H  lisinopril, 2.5 mg, Oral, Daily  metoprolol succinate XL, 12.5 mg, Oral, Daily  senna-docusate sodium, 2 tablet, Oral, BID  sodium chloride, 10 mL, Intravenous, Q12H    Infusions   Diet  NPO Diet NPO Type: Sips with Meds  Diet: Regular/House Diet, Cardiac Diets; Healthy Heart (2-3 Na+); Texture: Regular Texture (IDDSI 7); Fluid Consistency: Thin (IDDSI 0)    I have personally reviewed:  [x]  Laboratory   []  Microbiology   []  Radiology   [x]  EKG/Telemetry  []  Cardiology/Vascular   []  Pathology    []  Records    Assessment/Plan      Active Hospital Problems    Diagnosis  POA    **Compression fracture of body of thoracic vertebra [S22.000A]  Yes    Recurrent falls [R29.6]  Not Applicable    Compression fracture of T12 vertebra [S22.080A]  Unknown    Primary hypertension [I10]  Yes    Coronary artery disease [I25.10]  Yes    MCI (mild cognitive impairment) [G31.84]  Yes    History of adenocarcinoma of prostate [Z85.46]  Not Applicable    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   Acute T12 Compression Fracture  - continue pain control  - MRI T&L spine noted, kyphoplasty planned for tomorrow  - appreciate [facility] recs    Parkinson's Disease  - continue sinemet  - SLP consulted but they have signed off    CAD/HTN  - BP is a little elevated at times probably secondary to pain, no anginal symptoms  - continue metoprolol and doxepin-BP is on the low side, will hold lisinopril  - on lipitor, plavix held for kyphoplasty  - appreciate cardiology evaluation    Hx of Renal Cell Carcinoma  - s/p partial nephrectomy 2018    SCDs for DVT prophylaxis.  Full code.  Discussed with patient, family, and nursing staff.  Anticipate discharge home with HH vs SNU facility in 1-2 days..      Dmitriy Patterson MD  [facility]  07/13/23  18:31 EDT

## 2023-07-13 NOTE — PROGRESS NOTES
[facility] THORACIC/LUMBAR NEUROSURGERY PROGRESS NOTE      CC: compression fracture      Subjective     Interval History: Reports ongoing back pain although improved some.  Is slightly uncomfortable in his current position in the bed.  Denies any leg pain or incontinence.  Seen by cardiology    ROS:  MS: back pain  Neuro: Balance difficulties  : No difficulty voiding, no incontinence    Objective     Vital signs in last 24 hours:  Temp:  [97.7 °F (36.5 °C)-98.1 °F (36.7 °C)] 97.9 °F (36.6 °C)  Heart Rate:  [65-89] 89  Resp:  [16-18] 16  BP: ()/(59-76) 131/76    Intake/Output this shift:  No intake/output data recorded.    LABS:  Results from last 7 days   Lab Units 07/13/23  0709 07/12/23  0525 07/11/23  0556   WBC 10*3/mm3 6.90 7.42 7.52   HEMOGLOBIN g/dL 12.4* 12.3* 12.8*   HEMATOCRIT % 36.3* 35.0* 36.0*   PLATELETS 10*3/mm3 233 241 228       Results from last 7 days   Lab Units 07/13/23  0709 07/12/23  0525 07/11/23  0556   SODIUM mmol/L 141 140 141   POTASSIUM mmol/L 3.9 4.5 4.4   CHLORIDE mmol/L 106 105 104   CO2 mmol/L 28.0 25.4 26.1   BUN mg/dL 28* 29* 26*   CREATININE mg/dL 0.87 0.83 0.82   CALCIUM mg/dL 9.2 8.9 9.5   BILIRUBIN mg/dL 0.4 0.4 0.6   ALK PHOS U/L 137* 136* 140*   ALT (SGPT) U/L <5 <5 <5   AST (SGOT) U/L 10 14 13   GLUCOSE mg/dL 93 134* 134*         IMAGING STUDIES:  No new imaging    I personally viewed and interpreted the patient's chart.      Meds reviewed/changed: Yes  Gabapentin 100 mg p.o. daily  Lidoderm patch q 24 hours  Dilaudid 0.5 mg IV every 2 hours as needed-1 dose  Norco 5 mg PO q 4 hrs PRN- 2 dose    Physical Exam:    General:   Awake, alert.  Sitting up in chair.  Wife at bedside.  No acute distress   back:    No focal tenderness to palpation or percussion but has discomfort with turning twisting bending  Motor:    Normal muscle strength, bulk and tone in lower extremities.  No fasciculations, rigidity, spasticity, or abnormal movements.  Sensation:   Normal to light touch  "brandyn LEs  Station and Gait:             Not tested.  Awaiting PT eval.    Extremities:   No calf swelling      Assessment & Plan     ASSESSMENT:      Compression fracture of body of thoracic vertebra    Clear cell carcinoma of kidney    Coronary artery disease    History of adenocarcinoma of prostate    MCI (mild cognitive impairment)    Parkinson disease    Primary hypertension    Recurrent falls    Compression fracture of T12 vertebra    Patient with acute T12 fracture and back pain after fall.  He has some baseline Parkinson's disease and some progressive balance issues of late being considered for further PT.  He also has a history of renal cell and prostate cancer.  Continues to have ongoing back pain.  After discussion with patient and family they would like to proceed with kyphoplasty.  He has been cleared by cardiology.  A biopsy will be taken at the time of the procedure due to his malignant history.  His current imaging does not suggest that this is pathologic, but these were noncontrast.  If malignancy seen on pathology, will need further work-up.  Dr. Bush will be the proceduralist for the T12 hypoplasty tomorrow.  N.p.o. after midnight. last dose of Plavix was on or before 7/9.  OK to continue working with therapies.  CCP assisting with rehab at discharge.    PLAN:   Kypho/biopsy- Friday  NPO MN  OK for PT/OT/OOB    I discussed the patient's findings and my recommendations with patient and Dr. Gilman    During patient visit, I utilized appropriate personal protective equipment including  gloves.  Appropriate PPE was worn during the entire visit.  Hand hygiene was completed before and after.      LOS: 1 day       Quyen Corea, APRN  7/13/2023  13:08 EDT    \"Dictated utilizing Dragon dictation\".      "

## 2023-07-13 NOTE — PLAN OF CARE
Problem: Fall Injury Risk  Goal: Absence of Fall and Fall-Related Injury  Intervention: Identify and Manage Contributors  Description: Develop a fall prevention plan with the patient and caregiver/family.  Provide reorientation, appropriate sensory stimulation and routines with changes in mental status to decrease risk of fall.  Promote use of personal vision and auditory aids.  Assess assistance level required for safe and effective self-care; provide support as needed, such as toileting, mobilization. For age 65 and older, implement timed toileting with assistance.  Encourage physical activity, such as performance of mobility and self-care at highest level of patient ability, multicomponent exercise program and provision of appropriate assistive devices.  If fall occurs, assess the severity of injury; implement fall injury protocol. Determine the cause and revise fall injury prevention plan.  Regularly review medication contribution to fall risk; adjust medication administration times to minimize risk of falling.  Consider risk related to polypharmacy and age.  Balance adequate pain management with potential for oversedation.  Recent Flowsheet Documentation  Taken 7/13/2023 0415 by Shaw Soto RN  Medication Review/Management: medications reviewed  Taken 7/13/2023 0212 by Shaw Soto RN  Medication Review/Management: medications reviewed  Taken 7/13/2023 0040 by Shaw Soto RN  Medication Review/Management: medications reviewed  Taken 7/12/2023 2205 by Shaw Soto RN  Medication Review/Management: medications reviewed  Taken 7/12/2023 2015 by Shaw Soto RN  Medication Review/Management: medications reviewed  Intervention: Promote Injury-Free Environment  Description: Provide a safe, barrier-free environment that encourages independent activity.  Keep care area uncluttered and well-lighted.  Determine need for increased observation or monitoring.  Avoid use of devices that minimize  mobility, such as restraints or indwelling urinary catheter.  Recent Flowsheet Documentation  Taken 7/13/2023 0415 by Shaw Soto, RN  Safety Promotion/Fall Prevention:   activity supervised   assistive device/personal items within reach   clutter free environment maintained   safety round/check completed   room organization consistent  Taken 7/13/2023 0212 by Shaw Soto RN  Safety Promotion/Fall Prevention:   activity supervised   assistive device/personal items within reach   clutter free environment maintained   safety round/check completed   room organization consistent  Taken 7/13/2023 0040 by Shaw Soto RN  Safety Promotion/Fall Prevention:   activity supervised   assistive device/personal items within reach   clutter free environment maintained   safety round/check completed   room organization consistent  Taken 7/12/2023 2205 by Shaw Soto RN  Safety Promotion/Fall Prevention:   activity supervised   assistive device/personal items within reach   clutter free environment maintained   safety round/check completed   room organization consistent  Taken 7/12/2023 2015 by Shaw Soto, RN  Safety Promotion/Fall Prevention:   assistive device/personal items within reach   activity supervised   clutter free environment maintained   room organization consistent   safety round/check completed   Goal Outcome Evaluation:   Patient requiring pain medication x2 this shift for back pain. Patient becoming confused later in shift but easily redirected. Vsss, bed alarm set, call light in reach, wife at bedside.

## 2023-07-14 ENCOUNTER — APPOINTMENT (OUTPATIENT)
Dept: GENERAL RADIOLOGY | Facility: HOSPITAL | Age: 78
DRG: 515 | End: 2023-07-14
Payer: MEDICARE

## 2023-07-14 LAB
ALBUMIN SERPL-MCNC: 3.5 G/DL (ref 3.5–5.2)
ALBUMIN/GLOB SERPL: 1.4 G/DL
ALP SERPL-CCNC: 138 U/L (ref 39–117)
ALT SERPL W P-5'-P-CCNC: <5 U/L (ref 1–41)
ANION GAP SERPL CALCULATED.3IONS-SCNC: 6.8 MMOL/L (ref 5–15)
AST SERPL-CCNC: 12 U/L (ref 1–40)
BASOPHILS # BLD AUTO: 0.03 10*3/MM3 (ref 0–0.2)
BASOPHILS NFR BLD AUTO: 0.4 % (ref 0–1.5)
BILIRUB SERPL-MCNC: 0.6 MG/DL (ref 0–1.2)
BUN SERPL-MCNC: 25 MG/DL (ref 8–23)
BUN/CREAT SERPL: 36.2 (ref 7–25)
CALCIUM SPEC-SCNC: 9 MG/DL (ref 8.6–10.5)
CHLORIDE SERPL-SCNC: 101 MMOL/L (ref 98–107)
CO2 SERPL-SCNC: 26.2 MMOL/L (ref 22–29)
CREAT SERPL-MCNC: 0.69 MG/DL (ref 0.76–1.27)
DEPRECATED RDW RBC AUTO: 39 FL (ref 37–54)
EGFRCR SERPLBLD CKD-EPI 2021: 95.3 ML/MIN/1.73
EOSINOPHIL # BLD AUTO: 0.21 10*3/MM3 (ref 0–0.4)
EOSINOPHIL NFR BLD AUTO: 3.1 % (ref 0.3–6.2)
ERYTHROCYTE [DISTWIDTH] IN BLOOD BY AUTOMATED COUNT: 11.4 % (ref 12.3–15.4)
GLOBULIN UR ELPH-MCNC: 2.5 GM/DL
GLUCOSE SERPL-MCNC: 88 MG/DL (ref 65–99)
HCT VFR BLD AUTO: 37.2 % (ref 37.5–51)
HGB BLD-MCNC: 12.9 G/DL (ref 13–17.7)
IMM GRANULOCYTES # BLD AUTO: 0.03 10*3/MM3 (ref 0–0.05)
IMM GRANULOCYTES NFR BLD AUTO: 0.4 % (ref 0–0.5)
LYMPHOCYTES # BLD AUTO: 0.95 10*3/MM3 (ref 0.7–3.1)
LYMPHOCYTES NFR BLD AUTO: 14.2 % (ref 19.6–45.3)
MCH RBC QN AUTO: 32.2 PG (ref 26.6–33)
MCHC RBC AUTO-ENTMCNC: 34.7 G/DL (ref 31.5–35.7)
MCV RBC AUTO: 92.8 FL (ref 79–97)
MONOCYTES # BLD AUTO: 0.71 10*3/MM3 (ref 0.1–0.9)
MONOCYTES NFR BLD AUTO: 10.6 % (ref 5–12)
NEUTROPHILS NFR BLD AUTO: 4.76 10*3/MM3 (ref 1.7–7)
NEUTROPHILS NFR BLD AUTO: 71.3 % (ref 42.7–76)
NRBC BLD AUTO-RTO: 0 /100 WBC (ref 0–0.2)
PLATELET # BLD AUTO: 210 10*3/MM3 (ref 140–450)
PMV BLD AUTO: 8.9 FL (ref 6–12)
POTASSIUM SERPL-SCNC: 3.9 MMOL/L (ref 3.5–5.2)
PROT SERPL-MCNC: 6 G/DL (ref 6–8.5)
RBC # BLD AUTO: 4.01 10*6/MM3 (ref 4.14–5.8)
SODIUM SERPL-SCNC: 134 MMOL/L (ref 136–145)
WBC NRBC COR # BLD: 6.69 10*3/MM3 (ref 3.4–10.8)

## 2023-07-14 PROCEDURE — 25010000002 CEFAZOLIN IN DEXTROSE 2-4 GM/100ML-% SOLUTION: Performed by: RADIOLOGY

## 2023-07-14 PROCEDURE — 0PS43ZZ REPOSITION THORACIC VERTEBRA, PERCUTANEOUS APPROACH: ICD-10-PCS | Performed by: RADIOLOGY

## 2023-07-14 PROCEDURE — 88342 IMHCHEM/IMCYTCHM 1ST ANTB: CPT | Performed by: RADIOLOGY

## 2023-07-14 PROCEDURE — 80053 COMPREHEN METABOLIC PANEL: CPT | Performed by: INTERNAL MEDICINE

## 2023-07-14 PROCEDURE — 22513 PERQ VERTEBRAL AUGMENTATION: CPT | Performed by: RADIOLOGY

## 2023-07-14 PROCEDURE — 0PU43JZ SUPPLEMENT THORACIC VERTEBRA WITH SYNTHETIC SUBSTITUTE, PERCUTANEOUS APPROACH: ICD-10-PCS | Performed by: RADIOLOGY

## 2023-07-14 PROCEDURE — 88307 TISSUE EXAM BY PATHOLOGIST: CPT | Performed by: RADIOLOGY

## 2023-07-14 PROCEDURE — 25510000001 IOPAMIDOL 41 % SOLUTION: Performed by: RADIOLOGY

## 2023-07-14 PROCEDURE — 0 LIDOCAINE 1 % SOLUTION: Performed by: RADIOLOGY

## 2023-07-14 PROCEDURE — C1713 ANCHOR/SCREW BN/BN,TIS/BN: HCPCS | Performed by: RADIOLOGY

## 2023-07-14 PROCEDURE — 22514 PERQ VERTEBRAL AUGMENTATION: CPT | Performed by: RADIOLOGY

## 2023-07-14 PROCEDURE — 85025 COMPLETE CBC W/AUTO DIFF WBC: CPT | Performed by: INTERNAL MEDICINE

## 2023-07-14 DEVICE — CEMENT C01A KYPHX HV-R BONE CEMENT EN
Type: IMPLANTABLE DEVICE | Site: SPINE LUMBAR | Status: FUNCTIONAL
Brand: KYPHON® HV-R® BONE CEMENT

## 2023-07-14 RX ORDER — PROMETHAZINE HYDROCHLORIDE 25 MG/1
25 SUPPOSITORY RECTAL ONCE AS NEEDED
Status: DISCONTINUED | OUTPATIENT
Start: 2023-07-14 | End: 2023-07-14 | Stop reason: HOSPADM

## 2023-07-14 RX ORDER — FLUMAZENIL 0.1 MG/ML
0.2 INJECTION INTRAVENOUS AS NEEDED
Status: DISCONTINUED | OUTPATIENT
Start: 2023-07-14 | End: 2023-07-14 | Stop reason: HOSPADM

## 2023-07-14 RX ORDER — MIDAZOLAM HYDROCHLORIDE 1 MG/ML
0.5 INJECTION INTRAMUSCULAR; INTRAVENOUS
Status: DISCONTINUED | OUTPATIENT
Start: 2023-07-14 | End: 2023-07-14 | Stop reason: HOSPADM

## 2023-07-14 RX ORDER — DROPERIDOL 2.5 MG/ML
0.62 INJECTION, SOLUTION INTRAMUSCULAR; INTRAVENOUS
Status: DISCONTINUED | OUTPATIENT
Start: 2023-07-14 | End: 2023-07-14 | Stop reason: HOSPADM

## 2023-07-14 RX ORDER — SODIUM CHLORIDE 0.9 % (FLUSH) 0.9 %
3 SYRINGE (ML) INJECTION EVERY 12 HOURS SCHEDULED
Status: DISCONTINUED | OUTPATIENT
Start: 2023-07-14 | End: 2023-07-14 | Stop reason: HOSPADM

## 2023-07-14 RX ORDER — IPRATROPIUM BROMIDE AND ALBUTEROL SULFATE 2.5; .5 MG/3ML; MG/3ML
3 SOLUTION RESPIRATORY (INHALATION) ONCE AS NEEDED
Status: DISCONTINUED | OUTPATIENT
Start: 2023-07-14 | End: 2023-07-14 | Stop reason: HOSPADM

## 2023-07-14 RX ORDER — NALOXONE HCL 0.4 MG/ML
0.2 VIAL (ML) INJECTION AS NEEDED
Status: DISCONTINUED | OUTPATIENT
Start: 2023-07-14 | End: 2023-07-14 | Stop reason: HOSPADM

## 2023-07-14 RX ORDER — HYDRALAZINE HYDROCHLORIDE 20 MG/ML
5 INJECTION INTRAMUSCULAR; INTRAVENOUS
Status: DISCONTINUED | OUTPATIENT
Start: 2023-07-14 | End: 2023-07-14 | Stop reason: HOSPADM

## 2023-07-14 RX ORDER — OLANZAPINE 5 MG/1
5 TABLET, ORALLY DISINTEGRATING ORAL ONCE
Status: COMPLETED | OUTPATIENT
Start: 2023-07-14 | End: 2023-07-15

## 2023-07-14 RX ORDER — FENTANYL CITRATE 50 UG/ML
25 INJECTION, SOLUTION INTRAMUSCULAR; INTRAVENOUS
Status: DISCONTINUED | OUTPATIENT
Start: 2023-07-14 | End: 2023-07-14 | Stop reason: HOSPADM

## 2023-07-14 RX ORDER — SODIUM CHLORIDE 9 MG/ML
40 INJECTION, SOLUTION INTRAVENOUS AS NEEDED
Status: DISCONTINUED | OUTPATIENT
Start: 2023-07-14 | End: 2023-07-14 | Stop reason: HOSPADM

## 2023-07-14 RX ORDER — EPHEDRINE SULFATE 50 MG/ML
5 INJECTION, SOLUTION INTRAVENOUS ONCE AS NEEDED
Status: DISCONTINUED | OUTPATIENT
Start: 2023-07-14 | End: 2023-07-14 | Stop reason: HOSPADM

## 2023-07-14 RX ORDER — ONDANSETRON 2 MG/ML
4 INJECTION INTRAMUSCULAR; INTRAVENOUS ONCE AS NEEDED
Status: DISCONTINUED | OUTPATIENT
Start: 2023-07-14 | End: 2023-07-14 | Stop reason: HOSPADM

## 2023-07-14 RX ORDER — LIDOCAINE HYDROCHLORIDE 10 MG/ML
INJECTION, SOLUTION INFILTRATION; PERINEURAL AS NEEDED
Status: DISCONTINUED | OUTPATIENT
Start: 2023-07-14 | End: 2023-07-14 | Stop reason: HOSPADM

## 2023-07-14 RX ORDER — LISINOPRIL 5 MG/1
5 TABLET ORAL DAILY
Status: DISCONTINUED | OUTPATIENT
Start: 2023-07-14 | End: 2023-07-20 | Stop reason: HOSPADM

## 2023-07-14 RX ORDER — SODIUM CHLORIDE 0.9 % (FLUSH) 0.9 %
10 SYRINGE (ML) INJECTION AS NEEDED
Status: DISCONTINUED | OUTPATIENT
Start: 2023-07-14 | End: 2023-07-20 | Stop reason: HOSPADM

## 2023-07-14 RX ORDER — LABETALOL HYDROCHLORIDE 5 MG/ML
5 INJECTION, SOLUTION INTRAVENOUS
Status: DISCONTINUED | OUTPATIENT
Start: 2023-07-14 | End: 2023-07-14 | Stop reason: HOSPADM

## 2023-07-14 RX ORDER — FAMOTIDINE 10 MG/ML
20 INJECTION, SOLUTION INTRAVENOUS ONCE
Status: COMPLETED | OUTPATIENT
Start: 2023-07-14 | End: 2023-07-14

## 2023-07-14 RX ORDER — SODIUM CHLORIDE, SODIUM LACTATE, POTASSIUM CHLORIDE, CALCIUM CHLORIDE 600; 310; 30; 20 MG/100ML; MG/100ML; MG/100ML; MG/100ML
9 INJECTION, SOLUTION INTRAVENOUS CONTINUOUS
Status: DISCONTINUED | OUTPATIENT
Start: 2023-07-14 | End: 2023-07-20 | Stop reason: HOSPADM

## 2023-07-14 RX ORDER — LIDOCAINE HYDROCHLORIDE 10 MG/ML
0.5 INJECTION, SOLUTION EPIDURAL; INFILTRATION; INTRACAUDAL; PERINEURAL ONCE AS NEEDED
Status: DISCONTINUED | OUTPATIENT
Start: 2023-07-14 | End: 2023-07-14 | Stop reason: HOSPADM

## 2023-07-14 RX ORDER — CEFAZOLIN SODIUM 2 G/100ML
2000 INJECTION, SOLUTION INTRAVENOUS ONCE
Status: COMPLETED | OUTPATIENT
Start: 2023-07-14 | End: 2023-07-14

## 2023-07-14 RX ORDER — SODIUM CHLORIDE 0.9 % (FLUSH) 0.9 %
10 SYRINGE (ML) INJECTION EVERY 12 HOURS SCHEDULED
Status: DISCONTINUED | OUTPATIENT
Start: 2023-07-14 | End: 2023-07-20 | Stop reason: HOSPADM

## 2023-07-14 RX ORDER — SODIUM CHLORIDE 0.9 % (FLUSH) 0.9 %
3-10 SYRINGE (ML) INJECTION AS NEEDED
Status: DISCONTINUED | OUTPATIENT
Start: 2023-07-14 | End: 2023-07-14 | Stop reason: HOSPADM

## 2023-07-14 RX ORDER — DIPHENHYDRAMINE HYDROCHLORIDE 50 MG/ML
12.5 INJECTION INTRAMUSCULAR; INTRAVENOUS
Status: DISCONTINUED | OUTPATIENT
Start: 2023-07-14 | End: 2023-07-14 | Stop reason: HOSPADM

## 2023-07-14 RX ORDER — CLONIDINE HYDROCHLORIDE 0.1 MG/1
0.1 TABLET ORAL 3 TIMES DAILY PRN
Status: DISCONTINUED | OUTPATIENT
Start: 2023-07-14 | End: 2023-07-14

## 2023-07-14 RX ORDER — CLONIDINE HYDROCHLORIDE 0.1 MG/1
0.1 TABLET ORAL 3 TIMES DAILY PRN
Status: DISCONTINUED | OUTPATIENT
Start: 2023-07-14 | End: 2023-07-20 | Stop reason: HOSPADM

## 2023-07-14 RX ORDER — PROMETHAZINE HYDROCHLORIDE 25 MG/1
25 TABLET ORAL ONCE AS NEEDED
Status: DISCONTINUED | OUTPATIENT
Start: 2023-07-14 | End: 2023-07-14 | Stop reason: HOSPADM

## 2023-07-14 RX ADMIN — METOPROLOL SUCCINATE 12.5 MG: 25 TABLET, EXTENDED RELEASE ORAL at 08:02

## 2023-07-14 RX ADMIN — CHOLECALCIFEROL TAB 10 MCG (400 UNIT) 1000 UNITS: 10 TAB at 08:01

## 2023-07-14 RX ADMIN — LISINOPRIL 5 MG: 5 TABLET ORAL at 17:17

## 2023-07-14 RX ADMIN — CARBIDOPA AND LEVODOPA 2.5 TABLET: 50; 200 TABLET, EXTENDED RELEASE ORAL at 17:17

## 2023-07-14 RX ADMIN — CARBIDOPA AND LEVODOPA 2.5 TABLET: 50; 200 TABLET, EXTENDED RELEASE ORAL at 20:27

## 2023-07-14 RX ADMIN — CARBIDOPA AND LEVODOPA 2.5 TABLET: 50; 200 TABLET, EXTENDED RELEASE ORAL at 12:03

## 2023-07-14 RX ADMIN — SENNOSIDES AND DOCUSATE SODIUM 2 TABLET: 50; 8.6 TABLET ORAL at 08:02

## 2023-07-14 RX ADMIN — CEFAZOLIN SODIUM 2000 MG: 2 INJECTION, SOLUTION INTRAVENOUS at 09:50

## 2023-07-14 RX ADMIN — ATORVASTATIN CALCIUM 20 MG: 20 TABLET, FILM COATED ORAL at 20:28

## 2023-07-14 RX ADMIN — GABAPENTIN 100 MG: 100 CAPSULE ORAL at 08:02

## 2023-07-14 RX ADMIN — SODIUM CHLORIDE, POTASSIUM CHLORIDE, SODIUM LACTATE AND CALCIUM CHLORIDE 9 ML/HR: 600; 310; 30; 20 INJECTION, SOLUTION INTRAVENOUS at 09:14

## 2023-07-14 RX ADMIN — CARBIDOPA AND LEVODOPA 2.5 TABLET: 50; 200 TABLET, EXTENDED RELEASE ORAL at 08:02

## 2023-07-14 RX ADMIN — FAMOTIDINE 20 MG: 10 INJECTION INTRAVENOUS at 09:15

## 2023-07-14 RX ADMIN — Medication 10 ML: at 08:03

## 2023-07-14 RX ADMIN — Medication 10 ML: at 21:38

## 2023-07-14 RX ADMIN — SENNOSIDES AND DOCUSATE SODIUM 2 TABLET: 50; 8.6 TABLET ORAL at 20:28

## 2023-07-14 RX ADMIN — DOXEPIN HYDROCHLORIDE 10 MG: 10 CAPSULE ORAL at 20:27

## 2023-07-14 RX ADMIN — Medication 10 ML: at 12:06

## 2023-07-14 RX ADMIN — CLONIDINE HYDROCHLORIDE 0.1 MG: 0.1 TABLET ORAL at 13:11

## 2023-07-14 NOTE — PLAN OF CARE
Goal Outcome Evaluation:  Plan of Care Reviewed With: patient        Progress: improving  Outcome Evaluation: Pt VSS except for BP. Yesenia LAW notified, new orders placed. Will recheck. PT A&Ox4. Occasional moments of confusion towards end of day. Kyphoplasty completed in OR today. Small BL incisions on mid-back, dressings C/D/L. Pulses palpable. No numbness, tingling, from extremities. No complaints of pain. Patient sleeping, no complaints.

## 2023-07-14 NOTE — PLAN OF CARE
Problem: Fall Injury Risk  Goal: Absence of Fall and Fall-Related Injury  Intervention: Promote Injury-Free Environment  Description: Provide a safe, barrier-free environment that encourages independent activity.  Keep care area uncluttered and well-lighted.  Determine need for increased observation or monitoring.  Avoid use of devices that minimize mobility, such as restraints or indwelling urinary catheter.  Recent Flowsheet Documentation  Taken 7/14/2023 0442 by Shaw Soto, RN  Safety Promotion/Fall Prevention:   activity supervised   assistive device/personal items within reach   clutter free environment maintained   room organization consistent   safety round/check completed  Taken 7/14/2023 0221 by Shaw Soto, RN  Safety Promotion/Fall Prevention:   activity supervised   assistive device/personal items within reach   clutter free environment maintained   safety round/check completed   room organization consistent  Taken 7/14/2023 0016 by Shaw Soto, RN  Safety Promotion/Fall Prevention:   activity supervised   assistive device/personal items within reach   clutter free environment maintained   room organization consistent   safety round/check completed  Taken 7/13/2023 2043 by Shaw Soto, RN  Safety Promotion/Fall Prevention:   activity supervised   assistive device/personal items within reach   clutter free environment maintained   safety round/check completed   room organization consistent   Goal Outcome Evaluation:   Patient resting in bed most of the shift, having some confusion this shift. Patient NPO since midnight for procedure in the morning. VSS, bed alarm set, call light in reach.

## 2023-07-14 NOTE — PROGRESS NOTES
Name: Loni Sanford ADMIT: 2023   : 1945  PCP: Zo Estrada MD    MRN: [id number] LOS: 2 days   AGE/SEX: 78 y.o. male  ROOM: [facility]     Subjective   Subjective   No acute events. Had kyphoplasty today and tolerated well. Patient denies new complaints. Pain is reasonably controlled. No CP/dyspnea/f/c/n/v/d.   His wife and daughter are at bedside.    Objective   Objective   Vital Signs  Temp:  [97.3 °F (36.3 °C)-98.1 °F (36.7 °C)] 97.3 °F (36.3 °C)  Heart Rate:  [63-82] 64  Resp:  [14-18] 16  BP: (146-191)/() 191/106  SpO2:  [91 %-100 %] 97 %  on  Flow (L/min):  [2-4] 2;   Device (Oxygen Therapy): nasal cannula  Body mass index is 28.5 kg/m².  Physical Exam  Vitals and nursing note reviewed.   Constitutional:       General: He is not in acute distress.     Appearance: He is not toxic-appearing or diaphoretic.   HENT:      Head: Normocephalic and atraumatic.      Nose: Nose normal.      Mouth/Throat:      Mouth: Mucous membranes are moist.      Pharynx: Oropharynx is clear.   Eyes:      Conjunctiva/sclera: Conjunctivae normal.      Pupils: Pupils are equal, round, and reactive to light.   Cardiovascular:      Rate and Rhythm: Normal rate and regular rhythm.      Pulses: Normal pulses.   Pulmonary:      Effort: Pulmonary effort is normal.      Breath sounds: Normal breath sounds.   Abdominal:      General: Bowel sounds are normal.      Palpations: Abdomen is soft.      Tenderness: There is no abdominal tenderness.   Musculoskeletal:         General: No swelling or tenderness.      Cervical back: Normal range of motion and neck supple.   Skin:     General: Skin is warm and dry.      Capillary Refill: Capillary refill takes less than 2 seconds.   Neurological:      General: No focal deficit present.      Mental Status: He is alert.   Psychiatric:         Mood and Affect: Mood normal.         Behavior: Behavior normal.     Results Review     I reviewed the patient's new clinical  results.  Results from last 7 days   Lab Units 07/14/23  0545 07/13/23  0709 07/12/23  0525 07/11/23  0556   WBC 10*3/mm3 6.69 6.90 7.42 7.52   HEMOGLOBIN g/dL 12.9* 12.4* 12.3* 12.8*   PLATELETS 10*3/mm3 210 233 241 228       Results from last 7 days   Lab Units 07/14/23  0545 07/13/23  0709 07/12/23  0525 07/11/23  0556   SODIUM mmol/L 134* 141 140 141   POTASSIUM mmol/L 3.9 3.9 4.5 4.4   CHLORIDE mmol/L 101 106 105 104   CO2 mmol/L 26.2 28.0 25.4 26.1   BUN mg/dL 25* 28* 29* 26*   CREATININE mg/dL 0.69* 0.87 0.83 0.82   GLUCOSE mg/dL 88 93 134* 134*   EGFR mL/min/1.73 95.3 88.9 90.1 90.5       Results from last 7 days   Lab Units 07/14/23  0545 07/13/23  0709 07/12/23  0525 07/11/23  0556   ALBUMIN g/dL 3.5 3.5 3.7 3.9   BILIRUBIN mg/dL 0.6 0.4 0.4 0.6   ALK PHOS U/L 138* 137* 136* 140*   AST (SGOT) U/L 12 10 14 13   ALT (SGPT) U/L <5 <5 <5 <5       Results from last 7 days   Lab Units 07/14/23  0545 07/13/23  0709 07/12/23  0525 07/11/23  0556   CALCIUM mg/dL 9.0 9.2 8.9 9.5   ALBUMIN g/dL 3.5 3.5 3.7 3.9         No results found for: HGBA1C, POCGLU    No radiology results for the last day  I have personally reviewed all medications:  Scheduled Medications  atorvastatin, 20 mg, Oral, Nightly  carbidopa-levodopa CR, 2.5 tablet, Oral, 4x Daily  cholecalciferol, 1,000 Units, Oral, Daily  doxepin, 10 mg, Oral, Nightly  gabapentin, 100 mg, Oral, Daily  Gabapentin 10% Baclofen 2% Lidocaine 4% Cream, 1 dose, Topical, 4x Daily  lidocaine, 1 patch, Transdermal, Q24H  metoprolol succinate XL, 12.5 mg, Oral, Daily  senna-docusate sodium, 2 tablet, Oral, BID  sodium chloride, 10 mL, Intravenous, Q12H  sodium chloride, 10 mL, Intravenous, Q12H    Infusions  lactated ringers, 9 mL/hr, Last Rate: 9 mL/hr (07/14/23 0956)    Diet  Diet: Regular/House Diet; Texture: Regular Texture (IDDSI 7); Fluid Consistency: Thin (IDDSI 0)    I have personally reviewed:  [x]  Laboratory   []  Microbiology   []  Radiology   [x]   EKG/Telemetry  []  Cardiology/Vascular   []  Pathology    []  Records    Assessment/Plan     Active Hospital Problems    Diagnosis  POA    **Compression fracture of body of thoracic vertebra [S22.000A]  Yes    Recurrent falls [R29.6]  Not Applicable    Compression fracture of T12 vertebra [S22.080A]  Yes    Primary hypertension [I10]  Yes    Coronary artery disease [I25.10]  Yes    MCI (mild cognitive impairment) [G31.84]  Yes    History of adenocarcinoma of prostate [Z85.46]  Not Applicable    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   Acute T12 Compression Fracture  - s/p kyphoplasty 7/14/23  - continue pain control  - appreciate KAUSHAL recs    Parkinson's Disease  - continue sinemet  - SLP consulted but they have signed off    CAD/HTN  - BP is a little elevated at times probably secondary to pain, no anginal symptoms  - continue metoprolol and doxepin  - BP is elevated, resume lisinopril and add PRN clonidine  - on lipitor, plavix held for kyphoplasty-resume when okay with KAUSHAL  - appreciate cardiology evaluation    Hx of Renal Cell Carcinoma  - s/p partial nephrectomy 2018    SCDs for DVT prophylaxis.  Full code.  Discussed with patient, family, and nursing staff.  Anticipate discharge home with HH vs SNU facility in 1-2 days..      Dmitriy Patterson MD  [facility]  07/14/23  13:48 EDT

## 2023-07-14 NOTE — SIGNIFICANT NOTE
07/14/23 1413   OTHER   Discipline physical therapist   Rehab Time/Intention   Session Not Performed other (see comments)  (Kypho earlier today with bedrest order now noted on chart. Will hold today and follow up tomorrow)   Recommendation   PT - Next Appointment 07/15/23

## 2023-07-14 NOTE — BRIEF OP NOTE
"KYPHOPLASTY 1-2 LEVELS  Progress Note    Loni Sanford  7/14/2023    Pre-op Diagnosis:   Compression fracture of T12 vertebra, initial encounter [S22.080A]       Post-Op Diagnosis Codes:     * Compression fracture of T12 vertebra, initial encounter [S22.080A]    Procedure/CPT® Codes:    Procedure(s):  KYPHOPLASTY 1-2 LEVELS    Surgeon(s):  Jeison Gilman MD    Anesthesia: General    Staff:   Circulator: Kristina Sharpe RN  Radiology Technologist: Mary George  Scrub Person: Alex Israel  Vendor Representative: Melva Ashley         Estimated Blood Loss: minimal    Urine Voided: * No values recorded between 7/14/2023  9:55 AM and 7/14/2023 10:39 AM *    Specimens:                Specimens       ID Source Type Tests Collected By Collected At Frozen?    A Spine, Thoracic Bone TISSUE PATHOLOGY EXAM   Jeison Gilman MD 7/14/23 1039                   Drains: * No LDAs found *    Findings: Bilateral balloon kyphoplasty at T12 with good \"cement\" deposition. No significant extravasation seen. Official report to follow.        Complications: None encountered.          Jeison Gilman MD     Date: 7/14/2023  Time: 10:45 EDT        "

## 2023-07-15 ENCOUNTER — APPOINTMENT (OUTPATIENT)
Dept: CT IMAGING | Facility: HOSPITAL | Age: 78
DRG: 515 | End: 2023-07-15
Payer: MEDICARE

## 2023-07-15 LAB
ALBUMIN SERPL-MCNC: 3.8 G/DL (ref 3.5–5.2)
ALBUMIN/GLOB SERPL: 1.5 G/DL
ALP SERPL-CCNC: 161 U/L (ref 39–117)
ALT SERPL W P-5'-P-CCNC: <5 U/L (ref 1–41)
ANION GAP SERPL CALCULATED.3IONS-SCNC: 8 MMOL/L (ref 5–15)
ARTERIAL PATENCY WRIST A: POSITIVE
AST SERPL-CCNC: 12 U/L (ref 1–40)
ATMOSPHERIC PRESS: 745.8 MMHG
BASE EXCESS BLDA CALC-SCNC: 3.5 MMOL/L (ref 0–2)
BASOPHILS # BLD AUTO: 0.01 10*3/MM3 (ref 0–0.2)
BASOPHILS NFR BLD AUTO: 0.1 % (ref 0–1.5)
BDY SITE: ABNORMAL
BILIRUB SERPL-MCNC: 0.6 MG/DL (ref 0–1.2)
BUN SERPL-MCNC: 23 MG/DL (ref 8–23)
BUN/CREAT SERPL: 23.2 (ref 7–25)
CALCIUM SPEC-SCNC: 9.4 MG/DL (ref 8.6–10.5)
CHLORIDE SERPL-SCNC: 104 MMOL/L (ref 98–107)
CO2 SERPL-SCNC: 28 MMOL/L (ref 22–29)
CREAT SERPL-MCNC: 0.99 MG/DL (ref 0.76–1.27)
DEPRECATED RDW RBC AUTO: 38.7 FL (ref 37–54)
EGFRCR SERPLBLD CKD-EPI 2021: 78.5 ML/MIN/1.73
EOSINOPHIL # BLD AUTO: 0.05 10*3/MM3 (ref 0–0.4)
EOSINOPHIL NFR BLD AUTO: 0.5 % (ref 0.3–6.2)
ERYTHROCYTE [DISTWIDTH] IN BLOOD BY AUTOMATED COUNT: 11.4 % (ref 12.3–15.4)
GAS FLOW AIRWAY: 2 LPM
GLOBULIN UR ELPH-MCNC: 2.6 GM/DL
GLUCOSE SERPL-MCNC: 99 MG/DL (ref 65–99)
HCO3 BLDA-SCNC: 27.7 MMOL/L (ref 22–28)
HCT VFR BLD AUTO: 37.3 % (ref 37.5–51)
HGB BLD-MCNC: 13.1 G/DL (ref 13–17.7)
IMM GRANULOCYTES # BLD AUTO: 0.05 10*3/MM3 (ref 0–0.05)
IMM GRANULOCYTES NFR BLD AUTO: 0.5 % (ref 0–0.5)
LAB AP CASE REPORT: NORMAL
LAB AP SPECIAL STAINS: NORMAL
LYMPHOCYTES # BLD AUTO: 1 10*3/MM3 (ref 0.7–3.1)
LYMPHOCYTES NFR BLD AUTO: 10.4 % (ref 19.6–45.3)
MCH RBC QN AUTO: 32.3 PG (ref 26.6–33)
MCHC RBC AUTO-ENTMCNC: 35.1 G/DL (ref 31.5–35.7)
MCV RBC AUTO: 92.1 FL (ref 79–97)
MODALITY: ABNORMAL
MONOCYTES # BLD AUTO: 1.01 10*3/MM3 (ref 0.1–0.9)
MONOCYTES NFR BLD AUTO: 10.5 % (ref 5–12)
NEUTROPHILS NFR BLD AUTO: 7.52 10*3/MM3 (ref 1.7–7)
NEUTROPHILS NFR BLD AUTO: 78 % (ref 42.7–76)
NRBC BLD AUTO-RTO: 0 /100 WBC (ref 0–0.2)
PATH REPORT.FINAL DX SPEC: NORMAL
PATH REPORT.GROSS SPEC: NORMAL
PCO2 BLDA: 39.4 MM HG (ref 35–45)
PH BLDA: 7.46 PH UNITS (ref 7.35–7.45)
PLATELET # BLD AUTO: 231 10*3/MM3 (ref 140–450)
PMV BLD AUTO: 9 FL (ref 6–12)
PO2 BLDA: 70.3 MM HG (ref 80–100)
POTASSIUM SERPL-SCNC: 4.1 MMOL/L (ref 3.5–5.2)
PROT SERPL-MCNC: 6.4 G/DL (ref 6–8.5)
RBC # BLD AUTO: 4.05 10*6/MM3 (ref 4.14–5.8)
SAO2 % BLDCOA: 94.7 % (ref 92–99)
SODIUM SERPL-SCNC: 140 MMOL/L (ref 136–145)
TOTAL RATE: 22 BREATHS/MINUTE
WBC NRBC COR # BLD: 9.64 10*3/MM3 (ref 3.4–10.8)

## 2023-07-15 PROCEDURE — 80053 COMPREHEN METABOLIC PANEL: CPT | Performed by: RADIOLOGY

## 2023-07-15 PROCEDURE — 85025 COMPLETE CBC W/AUTO DIFF WBC: CPT | Performed by: RADIOLOGY

## 2023-07-15 PROCEDURE — 82803 BLOOD GASES ANY COMBINATION: CPT

## 2023-07-15 PROCEDURE — 70450 CT HEAD/BRAIN W/O DYE: CPT

## 2023-07-15 PROCEDURE — 36600 WITHDRAWAL OF ARTERIAL BLOOD: CPT

## 2023-07-15 RX ADMIN — CARBIDOPA AND LEVODOPA 2.5 TABLET: 50; 200 TABLET, EXTENDED RELEASE ORAL at 21:28

## 2023-07-15 RX ADMIN — Medication 10 ML: at 07:44

## 2023-07-15 RX ADMIN — LISINOPRIL 5 MG: 5 TABLET ORAL at 07:43

## 2023-07-15 RX ADMIN — CARBIDOPA AND LEVODOPA 2.5 TABLET: 50; 200 TABLET, EXTENDED RELEASE ORAL at 07:43

## 2023-07-15 RX ADMIN — CARBIDOPA AND LEVODOPA 2.5 TABLET: 50; 200 TABLET, EXTENDED RELEASE ORAL at 17:01

## 2023-07-15 RX ADMIN — OLANZAPINE 5 MG: 5 TABLET, ORALLY DISINTEGRATING ORAL at 01:18

## 2023-07-15 RX ADMIN — ATORVASTATIN CALCIUM 20 MG: 20 TABLET, FILM COATED ORAL at 21:28

## 2023-07-15 RX ADMIN — CARBIDOPA AND LEVODOPA 2.5 TABLET: 50; 200 TABLET, EXTENDED RELEASE ORAL at 11:55

## 2023-07-15 RX ADMIN — SENNOSIDES AND DOCUSATE SODIUM 2 TABLET: 50; 8.6 TABLET ORAL at 07:44

## 2023-07-15 RX ADMIN — DOXEPIN HYDROCHLORIDE 10 MG: 10 CAPSULE ORAL at 21:29

## 2023-07-15 RX ADMIN — SENNOSIDES AND DOCUSATE SODIUM 2 TABLET: 50; 8.6 TABLET ORAL at 21:29

## 2023-07-15 RX ADMIN — HYDROCODONE BITARTRATE AND ACETAMINOPHEN 1 TABLET: 5; 325 TABLET ORAL at 17:02

## 2023-07-15 RX ADMIN — CHOLECALCIFEROL TAB 10 MCG (400 UNIT) 1000 UNITS: 10 TAB at 07:42

## 2023-07-15 RX ADMIN — GABAPENTIN 100 MG: 100 CAPSULE ORAL at 07:44

## 2023-07-15 RX ADMIN — Medication 10 ML: at 21:29

## 2023-07-15 RX ADMIN — METOPROLOL SUCCINATE 12.5 MG: 25 TABLET, EXTENDED RELEASE ORAL at 07:43

## 2023-07-15 NOTE — PROGRESS NOTES
Name: Loni Sanford ADMIT: 2023   : 1945  PCP: Zo Estrada MD    MRN: [id number] LOS: 3 days   AGE/SEX: 78 y.o. male  ROOM: [facility]     Subjective   Subjective   Patient had agitation last night and was given zyprexa. He was doing reasonably well this AM but more recently he has been lethargic and not responding/following commands.     Objective   Objective   Vital Signs  Temp:  [97.3 °F (36.3 °C)-98.2 °F (36.8 °C)] 97.9 °F (36.6 °C)  Heart Rate:  [] 83  Resp:  [16-18] 18  BP: (101-191)/() 143/92  SpO2:  [91 %-97 %] 95 %  on  Flow (L/min):  [2] 2;   Device (Oxygen Therapy): room air  Body mass index is 28.5 kg/m².  Physical Exam  Vitals and nursing note reviewed.   Constitutional:       General: He is not in acute distress.     Appearance: He is not toxic-appearing or diaphoretic.   HENT:      Head: Normocephalic and atraumatic.      Nose: Nose normal.      Mouth/Throat:      Mouth: Mucous membranes are moist.      Pharynx: Oropharynx is clear.   Eyes:      Conjunctiva/sclera: Conjunctivae normal.      Pupils: Pupils are equal, round, and reactive to light.   Cardiovascular:      Rate and Rhythm: Normal rate and regular rhythm.      Pulses: Normal pulses.   Pulmonary:      Effort: Pulmonary effort is normal.      Breath sounds: Normal breath sounds.   Abdominal:      General: Bowel sounds are normal.      Palpations: Abdomen is soft.      Tenderness: There is no abdominal tenderness.   Musculoskeletal:         General: No swelling or tenderness.      Cervical back: Normal range of motion and neck supple.   Skin:     General: Skin is warm and dry.      Capillary Refill: Capillary refill takes less than 2 seconds.   Neurological:      General: No focal deficit present.      Mental Status: He is alert.   Psychiatric:         Attention and Perception: He is inattentive.         Speech: He is noncommunicative.         Cognition and Memory: Cognition is impaired.     Results Review      I reviewed the patient's new clinical results.  Results from last 7 days   Lab Units 07/15/23  0632 07/14/23  0545 07/13/23  0709 07/12/23  0525   WBC 10*3/mm3 9.64 6.69 6.90 7.42   HEMOGLOBIN g/dL 13.1 12.9* 12.4* 12.3*   PLATELETS 10*3/mm3 231 210 233 241       Results from last 7 days   Lab Units 07/15/23  0632 07/14/23  0545 07/13/23  0709 07/12/23  0525   SODIUM mmol/L 140 134* 141 140   POTASSIUM mmol/L 4.1 3.9 3.9 4.5   CHLORIDE mmol/L 104 101 106 105   CO2 mmol/L 28.0 26.2 28.0 25.4   BUN mg/dL 23 25* 28* 29*   CREATININE mg/dL 0.99 0.69* 0.87 0.83   GLUCOSE mg/dL 99 88 93 134*   EGFR mL/min/1.73 78.5 95.3 88.9 90.1       Results from last 7 days   Lab Units 07/15/23  0632 07/14/23  0545 07/13/23  0709 07/12/23  0525   ALBUMIN g/dL 3.8 3.5 3.5 3.7   BILIRUBIN mg/dL 0.6 0.6 0.4 0.4   ALK PHOS U/L 161* 138* 137* 136*   AST (SGOT) U/L 12 12 10 14   ALT (SGPT) U/L <5 <5 <5 <5       Results from last 7 days   Lab Units 07/15/23  0632 07/14/23  0545 07/13/23  0709 07/12/23  0525   CALCIUM mg/dL 9.4 9.0 9.2 8.9   ALBUMIN g/dL 3.8 3.5 3.5 3.7         No results found for: HGBA1C, POCGLU    No radiology results for the last day  I have personally reviewed all medications:  Scheduled Medications  atorvastatin, 20 mg, Oral, Nightly  carbidopa-levodopa CR, 2.5 tablet, Oral, 4x Daily  cholecalciferol, 1,000 Units, Oral, Daily  doxepin, 10 mg, Oral, Nightly  Gabapentin 10% Baclofen 2% Lidocaine 4% Cream, 1 dose, Topical, 4x Daily  lidocaine, 1 patch, Transdermal, Q24H  lisinopril, 5 mg, Oral, Daily  metoprolol succinate XL, 12.5 mg, Oral, Daily  senna-docusate sodium, 2 tablet, Oral, BID  sodium chloride, 10 mL, Intravenous, Q12H  sodium chloride, 10 mL, Intravenous, Q12H    Infusions  lactated ringers, 9 mL/hr, Last Rate: 9 mL/hr (07/14/23 0956)    Diet  Diet: Regular/House Diet; Texture: Regular Texture (IDDSI 7); Fluid Consistency: Thin (IDDSI 0)    I have personally reviewed:  [x]  Laboratory   []  Microbiology   []   Radiology   [x]  EKG/Telemetry  []  Cardiology/Vascular   []  Pathology    []  Records    Assessment/Plan     Active Hospital Problems    Diagnosis  POA    **Compression fracture of body of thoracic vertebra [S22.000A]  Yes    Recurrent falls [R29.6]  Not Applicable    Compression fracture of T12 vertebra [S22.080A]  Yes    Primary hypertension [I10]  Yes    Coronary artery disease [I25.10]  Yes    MCI (mild cognitive impairment) [G31.84]  Yes    History of adenocarcinoma of prostate [Z85.46]  Not Applicable    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   Acute T12 Compression Fracture  - s/p kyphoplasty 7/14/23  - continue pain control as needed  - appreciate KAUSHAL recs    Parkinson's Disease  - continue sinemet  - SLP consulted but they have signed off    AMS  - patient had agitation last night/early this AM and received zyprexa-he had been doing reasonably well until recently  - no focal deficits, no gaze deviation, labs and vitals look good  - hold gabapentin, check ABG and head CT scan  - check bladder scan  - NPO for now  - consult neurology  - no more neuroleptics    CAD/HTN  - BP is a little elevated at times probably secondary to pain, no anginal symptoms  - continue metoprolol and doxepin  - BP is elevated, resume lisinopril and add PRN clonidine  - on lipitor, plavix held for kyphoplasty-resume when okay with KAUSHAL  - appreciate cardiology evaluation    Hx of Renal Cell Carcinoma  - s/p partial nephrectomy 2018    SCDs for DVT prophylaxis.  Full code.  Discussed with patient, family, and nursing staff.  Anticipate discharge home with HH vs SNU facility in 1-2 days..      Dmitriy Patterson MD  [facility]  07/15/23  12:23 EDT

## 2023-07-15 NOTE — PLAN OF CARE
Goal Outcome Evaluation:  Plan of Care Reviewed With: patient        Progress: no change  Outcome Evaluation: Pt VSS. PT A&Ox3. Oral zyprexa was given last shift, and patient seems to still be weaning off those effects. CT ordered for decreased alertness. Becoming more alert as day progresses. No complaints of pain. Voiding without difficulty.

## 2023-07-15 NOTE — PROGRESS NOTES
Neurosurgery Progress Note    Date: 07/15/23     Patient: Loni Sanford   Sex: male   : 1945    POD-1: T12 kyphoplasty    SUBJECTIVE    Interval history:  No adverse events overnight.  Resting well for the past few hours.  Back pain somewhat improved.  No new complaints.        Current Medications:  Scheduled Meds:atorvastatin, 20 mg, Oral, Nightly  carbidopa-levodopa CR, 2.5 tablet, Oral, 4x Daily  cholecalciferol, 1,000 Units, Oral, Daily  doxepin, 10 mg, Oral, Nightly  Gabapentin 10% Baclofen 2% Lidocaine 4% Cream, 1 dose, Topical, 4x Daily  lidocaine, 1 patch, Transdermal, Q24H  lisinopril, 5 mg, Oral, Daily  metoprolol succinate XL, 12.5 mg, Oral, Daily  senna-docusate sodium, 2 tablet, Oral, BID  sodium chloride, 10 mL, Intravenous, Q12H  sodium chloride, 10 mL, Intravenous, Q12H      Continuous Infusions:lactated ringers, 9 mL/hr, Last Rate: 9 mL/hr (23 0956)      PRN Meds:   acetaminophen **OR** acetaminophen **OR** acetaminophen    senna-docusate sodium **AND** polyethylene glycol **AND** bisacodyl **AND** bisacodyl    Calcium Replacement - Follow Nurse / BPA Driven Protocol    cloNIDine    HYDROcodone-acetaminophen    HYDROmorphone **AND** naloxone    Magnesium Standard Dose Replacement - Follow Nurse / BPA Driven Protocol    methocarbamol    nitroglycerin    ondansetron    Phosphorus Replacement - Follow Nurse / BPA Driven Protocol    Potassium Replacement - Follow Nurse / BPA Driven Protocol    [COMPLETED] Insert Peripheral IV **AND** sodium chloride    sodium chloride    sodium chloride    sodium chloride      OBJECTIVE  Vitals:    23 2338 07/15/23 0752 07/15/23 1200 07/15/23 1358   BP: 148/97 161/95 143/92 126/76   BP Location: Right arm Right arm  Right arm   Patient Position: Lying Lying  Lying   Pulse: 82 83  70   Resp: 16 18  18   Temp: 98.2 °F (36.8 °C) 97.9 °F (36.6 °C)  97.3 °F (36.3 °C)   TempSrc: Oral Oral  Oral   SpO2: 95% 95%  95%   Weight:       Height:            Physical exam    General  - WD/WN male, appears their stated age  Respiratory  - Normal respiratory rate and effort  Skin  - Thoracic surgical site CDI no swelling redness or drainage  NEUROLOGIC  - Moves all extremities symmetrically and w/ 5/5 strength  - Sensation intact throughout      Results review  CBC          7/13/2023    07:09 7/14/2023    05:45 7/15/2023    06:32   CBC   WBC 6.90  6.69  9.64    RBC 3.89  4.01  4.05    Hemoglobin 12.4  12.9  13.1    Hematocrit 36.3  37.2  37.3    MCV 93.3  92.8  92.1    MCH 31.9  32.2  32.3    MCHC 34.2  34.7  35.1    RDW 11.5  11.4  11.4    Platelets 233  210  231        BMP          7/13/2023    07:09 7/14/2023    05:45 7/15/2023    06:32   BMP   BUN 28  25  23    Creatinine 0.87  0.69  0.99    Sodium 141  134  140    Potassium 3.9  3.9  4.1    Chloride 106  101  104    CO2 28.0  26.2  28.0    Calcium 9.2  9.0  9.4             CT Lumbar Spine Without Contrast    Result Date: 7/9/2023  CT ABDOMEN AND PELVIS WITHOUT IV CONTRAST, CT LUMBAR SPINE WITHOUT CONTRAST  HISTORY: Lower abdominal pain. Two falls over the past weekend. Back pain.  TECHNIQUE:  CT includes axial imaging from the lung bases to the trochanters without intravenous contrast and without use of oral contrast. CT lumbar spine includes axial imaging through the lumbar spine. Data reconstructed in coronal and sagittal planes. Radiation dose reduction techniques were utilized, including automated exposure control and exposure modulation based on body size.  COMPARISON: Lumbar spine x-rays 11/09/2017.  FINDINGS: CT ABDOMEN AND PELVIS: There is minimal gynecomastia. Atherosclerotic calcifications are present and there are coronary arterial calcifications. There are small bilateral pleural effusions and there is bilateral lower lobe subsegmental atelectasis.  Hepatic and splenic calcifications are present associated with old granulomatous disease. No focal hepatic or splenic abnormality is demonstrated.  The  gallbladder, adrenal glands, and pancreas exhibit normal noncontrasted CT appearance.  There are several 3 mm and smaller bilateral renal nonobstructing stones. There is no ureteral stone or evidence for obstruction. There is bilateral perinephric stranding. There is a thin rim of mineralization surrounding the right kidney that may be associated with an old subcapsular hemorrhage. There is also chronic appearing fat necrosis along the right kidney particularly at the lower pole. Urinary bladder appears within normal limits. There is no bowel dilatation or evidence for bowel obstruction. There is mild-to-moderate stool throughout the colon which may represent mild constipation. Atherosclerotic calcifications are present involving the abdominal aorta and iliac vasculature. There is no shanda enlargement in the abdomen or pelvis.   LUMBAR SPINE: There is a fracture of the T12 vertebral body with 50% height loss centrally.  Horizontal fracture plane extends through the superior aspect of the body and fracture extends into the disc space superiorly. There is mild retropulsion of the posterior superior aspect of the T12 with mild narrowing of the central canal.  At T12-L1, the central canal and neural foramina are patent.  At L1-2, there is a mild broad disc bulge mildly effacing the anterior thecal sac. The neural foramina are patent.  At L2-3, there is a mild broad disc bulge with mild narrowing of the central canal. The neural foramina are patent.  At L3-4, there is a broad disc bulge slightly effacing the anterior thecal sac. The neural foramina are patent.  At L4-5, there is a broad disc bulge and there is mild bilateral facet arthritis and there is mild bilateral foraminal narrowing. The central canal is patent.  At L5-S1, there is a broad disc bulge and there is mild facet arthritis. The neural foramina are patent.  There is bridging anterior spur formation at the right sacroiliac joint.      Impression: 1. T12  compression fracture with 50% height loss centrally. This is suspected to represent an acute or recent compression fracture. No further evidence for acute abnormality of the lumbar spine or abdomen. 2. Multiple bilateral nonobstructing renal stones. Thin rim of mineralization surrounding the right kidney as well as areas of adjacent fat necrosis most likely related to previous subcapsular hemorrhage that has resolved.  Radiation dose reduction techniques were utilized, including automated exposure control and exposure modulation based on body size.  This report was finalized on 7/9/2023 1:27 PM by Dr. Demetrio Pavon M.D.     MRI Thoracic Spine Without Contrast    Result Date: 7/10/2023  MRI THORACIC SPINE WO CONTRAST-, MRI LUMBAR SPINE WO CONTRAST-  INDICATIONS: T12 compression fracture. Pain.  TECHNIQUE: Noncontrast thoracic and lumbar spine MRI.  COMPARISON: CT from 07/09/2023  FINDINGS:  T12 compression deformity is again demonstrated, with increased loss of height centrally, estimated 60% loss of height, previously about 50% loss of height. Slight posterior retropulsion of the upper margin of the T12 vertebral body slightly narrows the anterior thecal space. No other fractures are noted. Probable hemangioma in T4, L2 vertebrae. Alignment is otherwise in range of normal.  No intracanalicular collections or lesions are identified by unenhanced imaging. Spinal cord signal is in range of normal with slight/borderline prominence of the central canal spinal cord at the level of T6.  On the sagittal images, disc osteophyte complex appears to result in moderate to prominent central stenosis at C5/6, with slight flattening the anterior aspect of the cervical cord.  Axial levels:  In the thoracic spine, central-right paracentral disc protrusion at T6/7 anterior thecal space and slightly flattens the anterior right aspect of this thoracic cord. Facet hypertrophy narrows the posterior thecal space on the left at T9/10,  and on the right T10/11. Facet hypertrophy mildly narrows the right neural foramina at T9/10, T10/11, neuroforamina at T7/8, T9/10.  In the lumbar spine, with disc bulging is noted at L3/4, L4/5, without significant central stenosis. On the right, disc bulge and facet ligamentum flavum hypertrophy contribute to mild neural foraminal narrowing at L4/5. On the left, disc bulge and facet and ligamentum flavum hypertrophy contribute to neuroforaminal narrowing that is mild on L2/3, moderate at L3/4, L4/5, and mild at L5/S1.      Impression:  Mild further loss of height at the previous T12 compression deformity. Multilevel spondyloarthropathy, as detailed above.  This report was finalized on 7/10/2023 8:30 PM by Dr. Efrain Cooper M.D.      MRI Lumbar Spine Without Contrast    Result Date: 7/10/2023  MRI THORACIC SPINE WO CONTRAST-, MRI LUMBAR SPINE WO CONTRAST-  INDICATIONS: T12 compression fracture. Pain.  TECHNIQUE: Noncontrast thoracic and lumbar spine MRI.  COMPARISON: CT from 07/09/2023  FINDINGS:  T12 compression deformity is again demonstrated, with increased loss of height centrally, estimated 60% loss of height, previously about 50% loss of height. Slight posterior retropulsion of the upper margin of the T12 vertebral body slightly narrows the anterior thecal space. No other fractures are noted. Probable hemangioma in T4, L2 vertebrae. Alignment is otherwise in range of normal.  No intracanalicular collections or lesions are identified by unenhanced imaging. Spinal cord signal is in range of normal with slight/borderline prominence of the central canal spinal cord at the level of T6.  On the sagittal images, disc osteophyte complex appears to result in moderate to prominent central stenosis at C5/6, with slight flattening the anterior aspect of the cervical cord.  Axial levels:  In the thoracic spine, central-right paracentral disc protrusion at T6/7 anterior thecal space and slightly flattens the anterior  right aspect of this thoracic cord. Facet hypertrophy narrows the posterior thecal space on the left at T9/10, and on the right T10/11. Facet hypertrophy mildly narrows the right neural foramina at T9/10, T10/11, neuroforamina at T7/8, T9/10.  In the lumbar spine, with disc bulging is noted at L3/4, L4/5, without significant central stenosis. On the right, disc bulge and facet ligamentum flavum hypertrophy contribute to mild neural foraminal narrowing at L4/5. On the left, disc bulge and facet and ligamentum flavum hypertrophy contribute to neuroforaminal narrowing that is mild on L2/3, moderate at L3/4, L4/5, and mild at L5/S1.      Impression:  Mild further loss of height at the previous T12 compression deformity. Multilevel spondyloarthropathy, as detailed above.  This report was finalized on 7/10/2023 8:30 PM by Dr. Efrain Cooper M.D.               ASSESSMENT/PLAN  This is a 78 y.o. male who underwent a T12 kyphoplasty with Dr. Gilman yesterday.  Doing well postoperatively with some initial improvement in back pain.  No focal neurologic deficits and no new complaints.  Hold Plavix for 7 days postoperatively.  PT/OT eval.  Okay to discharge from neurosurgery perspective once cleared by PT and medicine.  We will have him follow-up with Dr. Gilman in 2 weeks.    - Hold Plavix x7 days postoperatively  - PT/OT eval  - Okay for DC from neurosurgical perspective  - Follow-up with Dr. Gilman in 2 weeks  - Call with any questions or concerns    We will follow peripherally while he remains in the hospital.    I discussed my assessment and recommendations with Dr. Lucas Kirby PA-C  07/09/2023  15:16 EDT      Part of this note may be an electronic transcription/translation of spoken language to printed text using the Dragon Dictation System.

## 2023-07-15 NOTE — PLAN OF CARE
Problem: Adult Inpatient Plan of Care  Goal: Plan of Care Review  Outcome: Ongoing, Progressing  Goal: Patient-Specific Goal (Individualized)  Outcome: Ongoing, Progressing  Goal: Absence of Hospital-Acquired Illness or Injury  Outcome: Ongoing, Progressing  Intervention: Identify and Manage Fall Risk  Recent Flowsheet Documentation  Taken 7/15/2023 0004 by Braden Macdonald RN  Safety Promotion/Fall Prevention:   nonskid shoes/slippers when out of bed   activity supervised   assistive device/personal items within reach   clutter free environment maintained   fall prevention program maintained  Taken 7/14/2023 2010 by Braden Macdonald RN  Safety Promotion/Fall Prevention:   activity supervised   safety round/check completed   nonskid shoes/slippers when out of bed  Intervention: Prevent Skin Injury  Recent Flowsheet Documentation  Taken 7/15/2023 0004 by Braden Macdonald RN  Body Position: position changed independently  Taken 7/14/2023 2010 by Braden Macdonald RN  Body Position: position changed independently  Skin Protection:   adhesive use limited   tubing/devices free from skin contact  Intervention: Prevent Infection  Recent Flowsheet Documentation  Taken 7/15/2023 0004 by Braden Macdonald RN  Infection Prevention: rest/sleep promoted  Taken 7/14/2023 2010 by Braden Macdonald RN  Infection Prevention:   rest/sleep promoted   single patient room provided  Goal: Optimal Comfort and Wellbeing  Outcome: Ongoing, Progressing  Intervention: Monitor Pain and Promote Comfort  Recent Flowsheet Documentation  Taken 7/14/2023 2010 by Braden Macdonald RN  Pain Management Interventions:   quiet environment facilitated   relaxation techniques promoted  Intervention: Provide Person-Centered Care  Recent Flowsheet Documentation  Taken 7/15/2023 0004 by Braden Macdonald RN  Trust Relationship/Rapport:   thoughts/feelings acknowledged   care explained  Taken 7/14/2023 2010 by Braden Macdonald RN  Trust Relationship/Rapport:    thoughts/feelings acknowledged   choices provided   questions answered   questions encouraged  Goal: Readiness for Transition of Care  Outcome: Ongoing, Progressing     Problem: Fall Injury Risk  Goal: Absence of Fall and Fall-Related Injury  Outcome: Ongoing, Progressing  Intervention: Identify and Manage Contributors  Recent Flowsheet Documentation  Taken 7/15/2023 0004 by Braden Macdonald RN  Medication Review/Management: medications reviewed  Taken 7/14/2023 2010 by Braden Macdonald RN  Medication Review/Management: medications reviewed  Intervention: Promote Injury-Free Environment  Recent Flowsheet Documentation  Taken 7/15/2023 0004 by Braden Macdonald RN  Safety Promotion/Fall Prevention:   nonskid shoes/slippers when out of bed   activity supervised   assistive device/personal items within reach   clutter free environment maintained   fall prevention program maintained  Taken 7/14/2023 2010 by Braden Macdonald RN  Safety Promotion/Fall Prevention:   activity supervised   safety round/check completed   nonskid shoes/slippers when out of bed     Problem: Skin Injury Risk Increased  Goal: Skin Health and Integrity  Outcome: Ongoing, Progressing  Intervention: Optimize Skin Protection  Recent Flowsheet Documentation  Taken 7/15/2023 0004 by Braden Macdonald RN  Head of Bed (HOB) Positioning: HOB at 20-30 degrees  Pressure Reduction Devices: heel offloading device utilized  Taken 7/14/2023 2010 by Braden Macdonald RN  Pressure Reduction Techniques:   frequent weight shift encouraged   heels elevated off bed  Head of Bed (HOB) Positioning: HOB at 45 degrees  Pressure Reduction Devices: heel offloading device utilized  Skin Protection:   adhesive use limited   tubing/devices free from skin contact   Goal Outcome Evaluation:    Patient was confused and agitated overnight. Patient was calling the RN names and attempted to hit the RN. On call physician was contacted and gave one dose of zyprexa which worked great and  patient slept for the rest of the night.

## 2023-07-16 LAB
ALBUMIN SERPL-MCNC: 3.6 G/DL (ref 3.5–5.2)
ALBUMIN/GLOB SERPL: 1.4 G/DL
ALP SERPL-CCNC: 145 U/L (ref 39–117)
ALT SERPL W P-5'-P-CCNC: <5 U/L (ref 1–41)
ANION GAP SERPL CALCULATED.3IONS-SCNC: 10.6 MMOL/L (ref 5–15)
AST SERPL-CCNC: 7 U/L (ref 1–40)
BASOPHILS # BLD AUTO: 0.02 10*3/MM3 (ref 0–0.2)
BASOPHILS NFR BLD AUTO: 0.3 % (ref 0–1.5)
BILIRUB SERPL-MCNC: 0.6 MG/DL (ref 0–1.2)
BUN SERPL-MCNC: 25 MG/DL (ref 8–23)
BUN/CREAT SERPL: 28.1 (ref 7–25)
CALCIUM SPEC-SCNC: 9.2 MG/DL (ref 8.6–10.5)
CHLORIDE SERPL-SCNC: 107 MMOL/L (ref 98–107)
CO2 SERPL-SCNC: 26.4 MMOL/L (ref 22–29)
CREAT SERPL-MCNC: 0.89 MG/DL (ref 0.76–1.27)
DEPRECATED RDW RBC AUTO: 37.6 FL (ref 37–54)
EGFRCR SERPLBLD CKD-EPI 2021: 88.3 ML/MIN/1.73
EOSINOPHIL # BLD AUTO: 0.14 10*3/MM3 (ref 0–0.4)
EOSINOPHIL NFR BLD AUTO: 2.1 % (ref 0.3–6.2)
ERYTHROCYTE [DISTWIDTH] IN BLOOD BY AUTOMATED COUNT: 11.3 % (ref 12.3–15.4)
GLOBULIN UR ELPH-MCNC: 2.5 GM/DL
GLUCOSE SERPL-MCNC: 86 MG/DL (ref 65–99)
HCT VFR BLD AUTO: 35 % (ref 37.5–51)
HGB BLD-MCNC: 12.3 G/DL (ref 13–17.7)
IMM GRANULOCYTES # BLD AUTO: 0.03 10*3/MM3 (ref 0–0.05)
IMM GRANULOCYTES NFR BLD AUTO: 0.4 % (ref 0–0.5)
LYMPHOCYTES # BLD AUTO: 0.94 10*3/MM3 (ref 0.7–3.1)
LYMPHOCYTES NFR BLD AUTO: 13.9 % (ref 19.6–45.3)
MCH RBC QN AUTO: 32.1 PG (ref 26.6–33)
MCHC RBC AUTO-ENTMCNC: 35.1 G/DL (ref 31.5–35.7)
MCV RBC AUTO: 91.4 FL (ref 79–97)
MONOCYTES # BLD AUTO: 0.85 10*3/MM3 (ref 0.1–0.9)
MONOCYTES NFR BLD AUTO: 12.6 % (ref 5–12)
NEUTROPHILS NFR BLD AUTO: 4.78 10*3/MM3 (ref 1.7–7)
NEUTROPHILS NFR BLD AUTO: 70.7 % (ref 42.7–76)
NRBC BLD AUTO-RTO: 0 /100 WBC (ref 0–0.2)
PLATELET # BLD AUTO: 197 10*3/MM3 (ref 140–450)
PMV BLD AUTO: 8.9 FL (ref 6–12)
POTASSIUM SERPL-SCNC: 4 MMOL/L (ref 3.5–5.2)
PROT SERPL-MCNC: 6.1 G/DL (ref 6–8.5)
RBC # BLD AUTO: 3.83 10*6/MM3 (ref 4.14–5.8)
SODIUM SERPL-SCNC: 144 MMOL/L (ref 136–145)
WBC NRBC COR # BLD: 6.76 10*3/MM3 (ref 3.4–10.8)

## 2023-07-16 PROCEDURE — 97164 PT RE-EVAL EST PLAN CARE: CPT

## 2023-07-16 PROCEDURE — 97530 THERAPEUTIC ACTIVITIES: CPT

## 2023-07-16 PROCEDURE — 80053 COMPREHEN METABOLIC PANEL: CPT | Performed by: RADIOLOGY

## 2023-07-16 PROCEDURE — 85025 COMPLETE CBC W/AUTO DIFF WBC: CPT | Performed by: RADIOLOGY

## 2023-07-16 RX ADMIN — DOXEPIN HYDROCHLORIDE 10 MG: 10 CAPSULE ORAL at 20:00

## 2023-07-16 RX ADMIN — CARBIDOPA AND LEVODOPA 2.5 TABLET: 50; 200 TABLET, EXTENDED RELEASE ORAL at 08:26

## 2023-07-16 RX ADMIN — HYDROCODONE BITARTRATE AND ACETAMINOPHEN 1 TABLET: 5; 325 TABLET ORAL at 03:04

## 2023-07-16 RX ADMIN — Medication 10 ML: at 09:17

## 2023-07-16 RX ADMIN — CARBIDOPA AND LEVODOPA 2.5 TABLET: 50; 200 TABLET, EXTENDED RELEASE ORAL at 20:00

## 2023-07-16 RX ADMIN — METOPROLOL SUCCINATE 12.5 MG: 25 TABLET, EXTENDED RELEASE ORAL at 08:26

## 2023-07-16 RX ADMIN — CARBIDOPA AND LEVODOPA 2.5 TABLET: 50; 200 TABLET, EXTENDED RELEASE ORAL at 11:10

## 2023-07-16 RX ADMIN — ATORVASTATIN CALCIUM 20 MG: 20 TABLET, FILM COATED ORAL at 20:00

## 2023-07-16 RX ADMIN — CHOLECALCIFEROL TAB 10 MCG (400 UNIT) 1000 UNITS: 10 TAB at 08:25

## 2023-07-16 RX ADMIN — CARBIDOPA AND LEVODOPA 2.5 TABLET: 50; 200 TABLET, EXTENDED RELEASE ORAL at 17:17

## 2023-07-16 RX ADMIN — LISINOPRIL 5 MG: 5 TABLET ORAL at 08:25

## 2023-07-16 NOTE — THERAPY RE-EVALUATION
Patient Name: Loni Sanford  : 1945    MRN: [id number]                              Today's Date: 2023       Admit Date: 2023    Visit Dx:     ICD-10-CM ICD-9-CM   1. Compression fracture of T12 vertebra, initial encounter  S22.080A 805.2   2. Lower abdominal pain  R10.30 789.09   3. Parkinson's disease  G20 332.0     Patient Active Problem List   Diagnosis    Ureteral calculus, right    Clear cell carcinoma of kidney    Coronary artery disease    History of adenocarcinoma of prostate    Hyperlipidemia LDL goal <70    MCI (mild cognitive impairment)    Neuropathy    Parkinson disease    Stable angina    Primary hypertension    Encounter for screening for malignant neoplasm of colon    History of colon polyps    Compression fracture of body of thoracic vertebra    Recurrent falls    Compression fracture of T12 vertebra     Past Medical History:   Diagnosis Date    Cancer     Prostate cancer     Cancer     kidney dx 2018    Coronary artery disease     Dizziness     occ    Hematuria     History of angina     carries NTG    History of transfusion     Hyperlipidemia     Hypertension     Kidney stones     new one 2018    Kidney tumor     RIGHT removed 2018    Parkinson disease     Renal mass, right     surgically removed 2018     Past Surgical History:   Procedure Laterality Date    APPENDECTOMY      CARDIAC CATHETERIZATION      CATARACT EXTRACTION W/ INTRAOCULAR LENS  IMPLANT, BILATERAL      COLONOSCOPY N/A 2023    Procedure: COLONOSCOPY FOR SCREENING;  Surgeon: Terrell Zhang MD;  Location: [facility] OR;  Service: Gastroenterology;  Laterality: N/A;  poor prep, polyp    EXTRACORPOREAL SHOCKWAVE LITHOTRIPSY (ESWL), STENT INSERTION/REMOVAL Right 2017    Procedure: RIGHT EXTRACORPOREAL SHOCKWAVE LITHOTRIPSY CYSTOSCOPY STENT PLACEMENT;  Surgeon: Marcelo Suarez MD;  Location: [facility];  Service:     EXTRACORPOREAL SHOCKWAVE LITHOTRIPSY (ESWL), STENT  INSERTION/REMOVAL Right 12/28/2018    Procedure: RT EXTRACORPREAL SHOCKWAVE LITHOTRIPSY CYSTOSCOPY  WITH  MANIPULATION OF STONE;  Surgeon: Moe Vasquez MD;  Location: [facility];  Service: Urology    FINGER SURGERY Left 1984    INDEX    HAND SURGERY Right 1998    NEPHRECTOMY PARTIAL Right 4/5/2018    Procedure: RT OPEN PARTIAL NEPHRECTOMY WITH INTRAOPERATIVE DOPPLER, DECORTICATION OF RIGHT RENAL CYST;  Surgeon: Marcelo Suarez MD;  Location: [facility];  Service: Urology    PROSTATECTOMY  2013      General Information       Row Name 07/16/23 1043          Physical Therapy Time and Intention    Document Type re-evaluation  -     Mode of Treatment physical therapy  -               User Key  (r) = Recorded By, (t) = Taken By, (c) = Cosigned By      Initials Name Provider Type    Coy Day, PT Physical Therapist                   Mobility       Row Name 07/16/23 1045          Bed Mobility    Bed Mobility bed mobility (all) activities  -     All Activities, [place] (Bed Mobility) minimum assist (75% patient effort);verbal cues;1 person assist  -       Row Name 07/16/23 1045          Sit-Stand Transfer    Sit-Stand [place] (Transfers) minimum assist (75% patient effort);2 person assist;verbal cues  -     Assistive Device (Sit-Stand Transfers) walker, front-wheeled  -       Row Name 07/16/23 1045          Gait/Stairs (Locomotion)    Bilateral Gait Deviations forward flexed posture  -               User Key  (r) = Recorded By, (t) = Taken By, (c) = Cosigned By      Initials Name Provider Type    Coy Day, PT Physical Therapist                   Obj/Interventions       Row Name 07/16/23 1046          Range of Motion Comprehensive    General Range of Motion bilateral lower extremity ROM WFL  -       Row Name 07/16/23 1046          Strength Comprehensive (MMT)    Comment, General Manual Muscle Testing (MMT) Assessment generalized weakness  -       Row Name  07/16/23 1046          Motor Skills    Therapeutic Exercise --  5 reps LE exs  -DR       Row Name 07/16/23 1046          Balance    Balance Assessment sitting static balance;standing dynamic balance  -DR     Static Sitting Balance contact guard;1-person assist  -DR     Position, Sitting Balance sitting edge of bed;supported  -DR     Static Standing Balance contact guard;minimal assist;1-person assist  -DR     Position/Device Used, Standing Balance walker, front-wheeled  -DR               User Key  (r) = Recorded By, (t) = Taken By, (c) = Cosigned By      Initials Name Provider Type    Coy Day, PT Physical Therapist                   Goals/Plan       Row Name 07/16/23 1054          Bed Mobility Goal 1 (PT)    Activity/Assistive Device (Bed Mobility Goal 1, PT) bed mobility activities, all  -DR     [place] Level/Cues Needed (Bed Mobility Goal 1, PT) contact guard required  -DR     Time Frame (Bed Mobility Goal 1, PT) 1 week  -DR       Row Name 07/16/23 1054          Transfer Goal 1 (PT)    Activity/Assistive Device (Transfer Goal 1, PT) transfers, all;walker, rolling  -DR     [place] Level/Cues Needed (Transfer Goal 1, PT) contact guard required  -DR     Time Frame (Transfer Goal 1, PT) 1 week  -DR       Row Name 07/16/23 1054          Gait Training Goal 1 (PT)    Activity/Assistive Device (Gait Training Goal 1, PT) gait (walking locomotion);walker, rolling  -DR     [place] Level (Gait Training Goal 1, PT) contact guard required  -DR     Distance (Gait Training Goal 1, PT) 50  -DR     Time Frame (Gait Training Goal 1, PT) 1 week  -DR               User Key  (r) = Recorded By, (t) = Taken By, (c) = Cosigned By      Initials Name Provider Type    Coy Day, PT Physical Therapist                   Clinical Impression       Row Name 07/16/23 1049          Pain    Pretreatment Pain Rating 0/10 - no pain  -DR     Posttreatment Pain Rating 0/10 - no pain  -DR       Row Name 07/16/23 1048           Plan of Care Review    Progress declining  -DR     Outcome Evaluation Pt agreeable to re-evaluation this AM. Pt was extremely slow with processing verbal and physical tasks. AAOx4 with inc delay. Required min Ax1 for supine > sit EOB with inc cues, min Ax2 for STS. Pt stated feeling dizzy when sitting EOB, monitoring O2 sats at 2L/O2 through nasal canula. Able to stand for ~2min with RWx but unable to take side steps. Wife was in room during evaluation, stating he normally is not this slow to process. Pt will continue to benefit from acute PT to assist with functional mobility. PT recommends SNF at time of d/c.  -DR Redd Name 07/16/23 1049          Therapy Assessment/Plan (PT)    Rehab Potential (PT) good, to achieve stated therapy goals  -     Criteria for Skilled Interventions Met (PT) skilled treatment is necessary  -     Therapy Frequency (PT) 6 times/wk  -DR Redd Name 07/16/23 1049          Positioning and Restraints    Pre-Treatment Position in bed  -     Post Treatment Position bed  -DR     In Bed notified nsg;supine;call light within reach;encouraged to call for assist;exit alarm on;with family/caregiver  -               User Key  (r) = Recorded By, (t) = Taken By, (c) = Cosigned By      Initials Name Provider Type    Coy Day, PT Physical Therapist                   Outcome Measures       Row Name 07/16/23 1055 07/16/23 0800       How much help from another person do you currently need...    Turning from your back to your side while in flat bed without using bedrails? 3  -DR 3  -TL    Moving from lying on back to sitting on the side of a flat bed without bedrails? 2  -DR 3  -TL    Moving to and from a bed to a chair (including a wheelchair)? 2  -DR 3  -TL    Standing up from a chair using your arms (e.g., wheelchair, bedside chair)? 2  -DR 3  -TL    Climbing 3-5 steps with a railing? 2  -DR 2  -TL    To walk in hospital room? 2  -DR 3  -TL    AM-PAC 6 Clicks Score (PT)  13  - 17  -TL    Highest level of mobility 4 --> Transferred to chair/commode  - 5 --> Static standing  -TL      Row Name 07/16/23 1055          Functional Assessment    Outcome Measure Options AM-PAC 6 Clicks Basic Mobility (PT)  -               User Key  (r) = Recorded By, (t) = Taken By, (c) = Cosigned By      Initials Name Provider Type    TL Devaughn Bran RN Registered Nurse    Coy Day, PT Physical Therapist                                 Physical Therapy Education       Title: PT OT SLP Therapies (Done)       Topic: Physical Therapy (Done)       Point: Mobility training (Done)       Learning Progress Summary             Patient Acceptance, E,TB, VU by  at 7/16/2023 1057    Eager, E, VU by TL at 7/15/2023 1326    Acceptance, E, VU by TL at 7/14/2023 1344   Family Acceptance, E,TB, VU by  at 7/16/2023 1057    Acceptance, E, VU by TL at 7/14/2023 1344                         Point: Home exercise program (Done)       Learning Progress Summary             Patient Acceptance, E,TB, VU by  at 7/16/2023 1057    Eager, E, VU by TL at 7/15/2023 1326    Acceptance, E, VU by TL at 7/14/2023 1344   Family Acceptance, E,TB, VU by  at 7/16/2023 1057    Acceptance, E, VU by TL at 7/14/2023 1344                         Point: Body mechanics (Done)       Learning Progress Summary             Patient Acceptance, E,TB, VU by  at 7/16/2023 1057    Eager, E, VU by TL at 7/15/2023 1326    Acceptance, E, VU by TL at 7/14/2023 1344   Family Acceptance, E,TB, VU by  at 7/16/2023 1057    Acceptance, E, VU by TL at 7/14/2023 1344                         Point: Precautions (Done)       Learning Progress Summary             Patient Acceptance, E,TB, VU by  at 7/16/2023 1057    Eager, E, VU by TL at 7/15/2023 1326    Acceptance, E, VU by TL at 7/14/2023 1344   Family Acceptance, E,TB, VU by  at 7/16/2023 1057    Acceptance, E, VU by TL at 7/14/2023 1344                                         User Key        Initials Effective Dates Name Provider Type Discipline    TL 12/27/22 -  Devaughn Bran, RN Registered Nurse Nurse     05/24/23 -  Coy Santos, PT Physical Therapist PT                  PT Recommendation and Plan     Progress: declining  Outcome Evaluation: Pt agreeable to re-evaluation this AM. Pt was extremely slow with processing verbal and physical tasks. AAOx4 with inc delay. Required min Ax1 for supine > sit EOB with inc cues, min Ax2 for STS. Pt stated feeling dizzy when sitting EOB, monitoring O2 sats at 2L/O2 through nasal canula. Able to stand for ~2min with RWx but unable to take side steps. Wife was in room during evaluation, stating he normally is not this slow to process. Pt will continue to benefit from acute PT to assist with functional mobility. PT recommends SNF at time of d/c.     Time Calculation:    PT Charges       Row Name 07/16/23 1058             Time Calculation    Start Time 0929  -DR      Stop Time 0949  -DR      Time Calculation (min) 20 min  -DR      PT Received On 07/16/23  -      PT - Next Appointment 07/17/23  -      PT Goal Re-Cert Due Date 07/23/23  -DR         Time Calculation- PT    Total Timed Code Minutes- PT 15 minute(s)  -DR         Timed Charges    36251 - PT Therapeutic Activity Minutes 15  -DR         Total Minutes    Timed Charges Total Minutes 15  -DR       Total Minutes 15  -DR                User Key  (r) = Recorded By, (t) = Taken By, (c) = Cosigned By      Initials Name Provider Type    Coy Day, PT Physical Therapist                      PT G-Codes  Outcome Measure Options: AM-PAC 6 Clicks Basic Mobility (PT)  AM-PAC 6 Clicks Score (PT): 13  AM-PAC 6 Clicks Score (OT): 16  PT Discharge Summary  Anticipated Discharge Disposition (PT): skilled nursing facility    Coy Santos, HOLLEY  7/16/2023

## 2023-07-16 NOTE — PLAN OF CARE
Problem: Adult Inpatient Plan of Care  Goal: Absence of Hospital-Acquired Illness or Injury  Intervention: Identify and Manage Fall Risk  Description: Perform standard risk assessment on admission using a validated tool or comprehensive approach appropriate to the patient; reassess fall risk frequently, with change in status or transfer to another level of care.  Communicate fall injury risk to interprofessional healthcare team.  Determine need for increased observation, equipment and environmental modification, such as low bed, signage and supportive, nonskid footwear.  Adjust safety measures to individual developmental age, stage and identified risk factors.  Reinforce the importance of safety and physical activity with patient and family.  Perform regular intentional rounding to assess need for position change, pain assessment and personal needs, including assistance with toileting.  Recent Flowsheet Documentation  Taken 7/16/2023 0441 by Shaw Soto RN  Safety Promotion/Fall Prevention:   activity supervised   assistive device/personal items within reach   clutter free environment maintained   room organization consistent   safety round/check completed  Taken 7/16/2023 0243 by Shaw Soto RN  Safety Promotion/Fall Prevention:   activity supervised   assistive device/personal items within reach   clutter free environment maintained   room organization consistent   safety round/check completed  Taken 7/16/2023 0221 by Shaw Soto RN  Safety Promotion/Fall Prevention:   activity supervised   assistive device/personal items within reach   clutter free environment maintained   room organization consistent   safety round/check completed  Taken 7/16/2023 0046 by Shaw Soto RN  Safety Promotion/Fall Prevention:   activity supervised   assistive device/personal items within reach   clutter free environment maintained  Taken 7/15/2023 2218 by Shaw Soto RN  Safety Promotion/Fall Prevention:    activity supervised   assistive device/personal items within reach   clutter free environment maintained   safety round/check completed   room organization consistent  Taken 7/15/2023 2052 by Shaw Soto RN  Safety Promotion/Fall Prevention:   assistive device/personal items within reach   activity supervised   clutter free environment maintained   safety round/check completed   room organization consistent   Goal Outcome Evaluation:   Patient ax4 at the beginning of shift but became more confused throughout the night. Patient trying to get up several times, easily redirected. VSS, bed alarm set, wife at bed side call light in reach,

## 2023-07-16 NOTE — PLAN OF CARE
Goal Outcome Evaluation:           Progress: declining  Outcome Evaluation: Pt agreeable to re-evaluation this AM. Pt was extremely slow with processing verbal and physical tasks. AAOx4 with inc delay. Required min Ax1 for supine > sit EOB with inc cues, min Ax2 for STS. Pt stated feeling dizzy when sitting EOB, monitoring O2 sats at 2L/O2 through nasal canula. Able to stand for ~2min with RWx but unable to take side steps. Wife was in room during evaluation, stating he normally is not this slow to process. Pt will continue to benefit from acute PT to assist with functional mobility. PT recommends SNF at time of d/c.      Anticipated Discharge Disposition (PT): skilled nursing facility

## 2023-07-16 NOTE — PROGRESS NOTES
Name: Loni Sanford ADMIT: 2023   : 1945  PCP: Zo Estrada MD    MRN: [id number] LOS: 4 days   AGE/SEX: 78 y.o. male  ROOM: [facility]     Subjective   Subjective   No acute events. Patient denies new complaints. No more lethargy. Taking PO. No Cp/dyspnea/f/c/n/v/d.  No family at bedside.    Objective   Objective   Vital Signs  Temp:  [97.5 °F (36.4 °C)-98.4 °F (36.9 °C)] 97.9 °F (36.6 °C)  Heart Rate:  [63-83] 76  Resp:  [18] 18  BP: ()/(55-92) 122/86  SpO2:  [89 %-95 %] 95 %  on   ;   Device (Oxygen Therapy): room air  Body mass index is 28.5 kg/m².  Physical Exam  Vitals and nursing note reviewed.   Constitutional:       General: He is not in acute distress.     Appearance: He is not toxic-appearing or diaphoretic.   HENT:      Head: Normocephalic and atraumatic.      Nose: Nose normal.      Mouth/Throat:      Mouth: Mucous membranes are moist.      Pharynx: Oropharynx is clear.   Eyes:      Conjunctiva/sclera: Conjunctivae normal.      Pupils: Pupils are equal, round, and reactive to light.   Cardiovascular:      Rate and Rhythm: Normal rate and regular rhythm.      Pulses: Normal pulses.   Pulmonary:      Effort: Pulmonary effort is normal.      Breath sounds: Normal breath sounds.   Abdominal:      General: Bowel sounds are normal.      Palpations: Abdomen is soft.      Tenderness: There is no abdominal tenderness.   Musculoskeletal:         General: No swelling or tenderness.      Cervical back: Normal range of motion and neck supple.   Skin:     General: Skin is warm and dry.      Capillary Refill: Capillary refill takes less than 2 seconds.   Neurological:      General: No focal deficit present.      Mental Status: He is alert.   Psychiatric:         Mood and Affect: Mood and affect normal.     Results Review     I reviewed the patient's new clinical results.  Results from last 7 days   Lab Units 23  0750 07/15/23  0632 23  0545 23  0709   WBC 10*3/mm3 6.76 9.64  6.69 6.90   HEMOGLOBIN g/dL 12.3* 13.1 12.9* 12.4*   PLATELETS 10*3/mm3 197 231 210 233       Results from last 7 days   Lab Units 07/16/23  0750 07/15/23  0632 07/14/23  0545 07/13/23  0709   SODIUM mmol/L 144 140 134* 141   POTASSIUM mmol/L 4.0 4.1 3.9 3.9   CHLORIDE mmol/L 107 104 101 106   CO2 mmol/L 26.4 28.0 26.2 28.0   BUN mg/dL 25* 23 25* 28*   CREATININE mg/dL 0.89 0.99 0.69* 0.87   GLUCOSE mg/dL 86 99 88 93   EGFR mL/min/1.73 88.3 78.5 95.3 88.9       Results from last 7 days   Lab Units 07/16/23  0750 07/15/23  0632 07/14/23  0545 07/13/23  0709   ALBUMIN g/dL 3.6 3.8 3.5 3.5   BILIRUBIN mg/dL 0.6 0.6 0.6 0.4   ALK PHOS U/L 145* 161* 138* 137*   AST (SGOT) U/L 7 12 12 10   ALT (SGPT) U/L <5 <5 <5 <5       Results from last 7 days   Lab Units 07/16/23  0750 07/15/23  0632 07/14/23  0545 07/13/23  0709   CALCIUM mg/dL 9.2 9.4 9.0 9.2   ALBUMIN g/dL 3.6 3.8 3.5 3.5         No results found for: HGBA1C, POCGLU    CT Head Without Contrast    Result Date: 7/15/2023  1.  No noncontrast CT findings of acute intracranial abnormality.   This report was finalized on 7/15/2023 5:25 PM by Dr. Clayton Johnson M.D.     I have personally reviewed all medications:  Scheduled Medications  atorvastatin, 20 mg, Oral, Nightly  carbidopa-levodopa CR, 2.5 tablet, Oral, 4x Daily  cholecalciferol, 1,000 Units, Oral, Daily  doxepin, 10 mg, Oral, Nightly  Gabapentin 10% Baclofen 2% Lidocaine 4% Cream, 1 dose, Topical, 4x Daily  lidocaine, 1 patch, Transdermal, Q24H  lisinopril, 5 mg, Oral, Daily  metoprolol succinate XL, 12.5 mg, Oral, Daily  senna-docusate sodium, 2 tablet, Oral, BID  sodium chloride, 10 mL, Intravenous, Q12H  sodium chloride, 10 mL, Intravenous, Q12H    Infusions  lactated ringers, 9 mL/hr, Last Rate: 9 mL/hr (07/14/23 0956)    Diet  Diet: Regular/House Diet; Texture: Regular Texture (IDDSI 7); Fluid Consistency: Thin (IDDSI 0)    I have personally reviewed:  [x]  Laboratory   []  Microbiology   [x]  Radiology    [x]  EKG/Telemetry  []  Cardiology/Vascular   []  Pathology    []  Records    Assessment/Plan     Active Hospital Problems    Diagnosis  POA   • **Compression fracture of body of thoracic vertebra [S22.000A]  Yes   • Recurrent falls [R29.6]  Not Applicable   • Compression fracture of T12 vertebra [S22.080A]  Yes   • Primary hypertension [I10]  Yes   • Coronary artery disease [I25.10]  Yes   • MCI (mild cognitive impairment) [G31.84]  Yes   • History of adenocarcinoma of prostate [Z85.46]  Not Applicable   • 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]  Yes   • Parkinson disease [G20]  Yes      Resolved Hospital Problems   No resolved problems to display.   Acute T12 Compression Fracture  - s/p kyphoplasty 7/14/23  - continue pain control as needed  - appreciate KAUSHAL recs    Parkinson's Disease  - continue sinemet  - SLP consulted but they have signed off    AMS  - much better today, suspected toxic encephalopathy due to  medications  - resolved today, no focal deficits and head CT negative  - appreciate neurology recs  - hold further neuroleptic medications    CAD/HTN  - BP is a little elevated at times probably secondary to pain, no anginal symptoms  - BP acceptable-continue lisinopril, metoprolol and doxepin  - on lipitor, plavix held for kyphoplasty-resume on 7/22/23 per KAUSHAL  - appreciate cardiology evaluation    Hx of Renal Cell Carcinoma  - s/p partial nephrectomy 2018    SCDs for DVT prophylaxis.  Full code.  Discussed with patient and nursing staff.  Anticipate discharge to SNU facility once arrangements have been made..      Dmitriy Patterson MD  [facility]  07/16/23  14:17 EDT

## 2023-07-16 NOTE — CONSULTS
"Neurology Consult Note    Consult Date: 7/16/2023    Referring MD: Romie Boyle MD    Reason for Consult I have been asked to see the patient in neurological consultation to render advice and opinion regarding confusion     Loni Sanford is a 78 y.o. male with past medical history significant for prostate and renal cancer, anginal chest pain due to CAD, hyperlipidemia, hypertension, Parkinson's disease. Patient presented after a fall on the 10th then CT spine showed a T12 compression fracture and was operated for T12 Kyphoplasty on the 13th. Neurology was consulted for Altered mental status while in the hospital.As per the nurse yesterday morning patient was agitated and received Zyprexa after around 5 hrs later he was less responsive so neurology was called.    Patient has a history of Parkinson's disease with orthostatic hypotension, supine hypertension, sialorrhea, constipation, and sensory polyneuropathy     Ropinarole was recently weaned for RLS and was started on Gabapentin PRN for the same       Past Medical History:   Diagnosis Date    Cancer     Prostate cancer 2013    Cancer     kidney dx 4/2018    Coronary artery disease     Dizziness     occ    Hematuria     History of angina     carries NTG    History of transfusion     Hyperlipidemia     Hypertension     Kidney stones     new one 12/2018    Kidney tumor     RIGHT removed 4/2018    Parkinson disease     Renal mass, right     surgically removed 4/2018       Exam  /92 (BP Location: Right arm, Patient Position: Lying)   Pulse 83   Temp 97.7 °F (36.5 °C) (Oral)   Resp 18   Ht 175.3 cm (69\")   Wt 87.5 kg (193 lb)   SpO2 (!) 89%   BMI 28.50 kg/m²   Gen: NAD, vitals reviewed  MS: Alert, oriented to time place and person, normal attention/concentration, language intact, no neglect.  CN: visual acuity grossly normal, PERRL, EOMI, no facial droop, no dysarthria  Motor: 5/5 throughout upper and lower extremities, normal tone  Mild bradykinesia " Left>Rt  Increased tone in left UE   No resting pill rolling tremor present   Postural tremor more on the Left UE     DATA:    Lab Results   Component Value Date    GLUCOSE 86 07/16/2023    CALCIUM 9.2 07/16/2023     07/16/2023    K 4.0 07/16/2023    CO2 26.4 07/16/2023     07/16/2023    BUN 25 (H) 07/16/2023    CREATININE 0.89 07/16/2023    EGFRIFAFRI 75 07/01/2021    EGFRIFNONA 62 07/01/2021    BCR 28.1 (H) 07/16/2023    ANIONGAP 10.6 07/16/2023     Lab Results   Component Value Date    WBC 6.76 07/16/2023    HGB 12.3 (L) 07/16/2023    HCT 35.0 (L) 07/16/2023    MCV 91.4 07/16/2023     07/16/2023       Lab review: Na 144, Cr 0.89, Wbc 6.76    Imaging review: CT head wo 7/15 no acute hypodensity of hyperdensity    Diagnoses:  Acute toxic metabolic encephalopathy   Hospital induced delirium     PLAN:  -Will continue home dose sinemet 25/250 4 times daily  QID (dose checked with last Neuro note from [place]) and carbidopa-levodopa (SINEMET CR)  MG  nightly  - Limit use of diluadid , may use gabapentin low dose 100mg TID as alternative  - Will need to follow up with outpt Parkinsons doctor at [place].  - No new exam finding concerning for stroke do no further brain imaging   - No concern for clinical or subclinical seizures  - Neurology will sign off, call us with any questions.     -Parkinson's disease   -Restless leg syndrome with augmentation  -Sensory polyneuropathy     Jeffry Bai MD  Neuro hospitalist

## 2023-07-16 NOTE — PLAN OF CARE
Goal Outcome Evaluation:  Plan of Care Reviewed With: patient        Progress: improving  Outcome Evaluation: Pt VSS. Patient answering orientation questions correctly, but overall seems confused. Occasionally forgetful of location. Voiding without difficulty. No complaints of pain. No drainage from surgical sites. Ambulated with physical therapy.

## 2023-07-17 PROBLEM — G25.81 RLS (RESTLESS LEGS SYNDROME): Status: ACTIVE | Noted: 2023-07-17

## 2023-07-17 LAB
ALBUMIN SERPL-MCNC: 3.6 G/DL (ref 3.5–5.2)
ALBUMIN/GLOB SERPL: 1.4 G/DL
ALP SERPL-CCNC: 154 U/L (ref 39–117)
ALT SERPL W P-5'-P-CCNC: <5 U/L (ref 1–41)
ANION GAP SERPL CALCULATED.3IONS-SCNC: 10.9 MMOL/L (ref 5–15)
AST SERPL-CCNC: 10 U/L (ref 1–40)
BASOPHILS # BLD AUTO: 0.03 10*3/MM3 (ref 0–0.2)
BASOPHILS NFR BLD AUTO: 0.4 % (ref 0–1.5)
BILIRUB SERPL-MCNC: 0.7 MG/DL (ref 0–1.2)
BUN SERPL-MCNC: 26 MG/DL (ref 8–23)
BUN/CREAT SERPL: 29.5 (ref 7–25)
CALCIUM SPEC-SCNC: 9.2 MG/DL (ref 8.6–10.5)
CHLORIDE SERPL-SCNC: 104 MMOL/L (ref 98–107)
CO2 SERPL-SCNC: 26.1 MMOL/L (ref 22–29)
CREAT SERPL-MCNC: 0.88 MG/DL (ref 0.76–1.27)
DEPRECATED RDW RBC AUTO: 37.8 FL (ref 37–54)
EGFRCR SERPLBLD CKD-EPI 2021: 88.6 ML/MIN/1.73
EOSINOPHIL # BLD AUTO: 0.15 10*3/MM3 (ref 0–0.4)
EOSINOPHIL NFR BLD AUTO: 2 % (ref 0.3–6.2)
ERYTHROCYTE [DISTWIDTH] IN BLOOD BY AUTOMATED COUNT: 11.4 % (ref 12.3–15.4)
GLOBULIN UR ELPH-MCNC: 2.6 GM/DL
GLUCOSE SERPL-MCNC: 92 MG/DL (ref 65–99)
HCT VFR BLD AUTO: 35.8 % (ref 37.5–51)
HGB BLD-MCNC: 12.4 G/DL (ref 13–17.7)
IMM GRANULOCYTES # BLD AUTO: 0.03 10*3/MM3 (ref 0–0.05)
IMM GRANULOCYTES NFR BLD AUTO: 0.4 % (ref 0–0.5)
LYMPHOCYTES # BLD AUTO: 0.95 10*3/MM3 (ref 0.7–3.1)
LYMPHOCYTES NFR BLD AUTO: 12.6 % (ref 19.6–45.3)
MCH RBC QN AUTO: 32 PG (ref 26.6–33)
MCHC RBC AUTO-ENTMCNC: 34.6 G/DL (ref 31.5–35.7)
MCV RBC AUTO: 92.5 FL (ref 79–97)
MONOCYTES # BLD AUTO: 0.87 10*3/MM3 (ref 0.1–0.9)
MONOCYTES NFR BLD AUTO: 11.5 % (ref 5–12)
NEUTROPHILS NFR BLD AUTO: 5.51 10*3/MM3 (ref 1.7–7)
NEUTROPHILS NFR BLD AUTO: 73.1 % (ref 42.7–76)
NRBC BLD AUTO-RTO: 0 /100 WBC (ref 0–0.2)
PLATELET # BLD AUTO: 200 10*3/MM3 (ref 140–450)
PMV BLD AUTO: 9.3 FL (ref 6–12)
POTASSIUM SERPL-SCNC: 3.9 MMOL/L (ref 3.5–5.2)
PROT SERPL-MCNC: 6.2 G/DL (ref 6–8.5)
RBC # BLD AUTO: 3.87 10*6/MM3 (ref 4.14–5.8)
SODIUM SERPL-SCNC: 141 MMOL/L (ref 136–145)
WBC NRBC COR # BLD: 7.54 10*3/MM3 (ref 3.4–10.8)

## 2023-07-17 PROCEDURE — 80053 COMPREHEN METABOLIC PANEL: CPT | Performed by: RADIOLOGY

## 2023-07-17 PROCEDURE — 97530 THERAPEUTIC ACTIVITIES: CPT

## 2023-07-17 PROCEDURE — 85025 COMPLETE CBC W/AUTO DIFF WBC: CPT | Performed by: RADIOLOGY

## 2023-07-17 PROCEDURE — 99232 SBSQ HOSP IP/OBS MODERATE 35: CPT | Performed by: PHYSICIAN ASSISTANT

## 2023-07-17 RX ORDER — CARBIDOPA AND LEVODOPA 25; 100 MG/1; MG/1
1 TABLET, EXTENDED RELEASE ORAL NIGHTLY
Status: DISCONTINUED | OUTPATIENT
Start: 2023-07-18 | End: 2023-07-18

## 2023-07-17 RX ORDER — CARBIDOPA/LEVODOPA 25MG-250MG
1 TABLET ORAL 4 TIMES DAILY
Status: DISCONTINUED | OUTPATIENT
Start: 2023-07-17 | End: 2023-07-20 | Stop reason: HOSPADM

## 2023-07-17 RX ORDER — CARBIDOPA/LEVODOPA 25MG-250MG
1 TABLET ORAL NIGHTLY
Status: DISCONTINUED | OUTPATIENT
Start: 2023-07-18 | End: 2023-07-17

## 2023-07-17 RX ORDER — CARBIDOPA AND LEVODOPA 25; 100 MG/1; MG/1
1 TABLET, EXTENDED RELEASE ORAL ONCE
Status: DISCONTINUED | OUTPATIENT
Start: 2023-07-17 | End: 2023-07-17

## 2023-07-17 RX ORDER — CARBIDOPA/LEVODOPA 25MG-250MG
1 TABLET ORAL 4 TIMES DAILY
COMMUNITY

## 2023-07-17 RX ORDER — HYDROCODONE BITARTRATE AND ACETAMINOPHEN 5; 325 MG/1; MG/1
1 TABLET ORAL EVERY 6 HOURS PRN
Status: DISCONTINUED | OUTPATIENT
Start: 2023-07-17 | End: 2023-07-18

## 2023-07-17 RX ORDER — CARBIDOPA AND LEVODOPA 25; 100 MG/1; MG/1
1 TABLET, EXTENDED RELEASE ORAL NIGHTLY
Status: DISCONTINUED | OUTPATIENT
Start: 2023-07-17 | End: 2023-07-17

## 2023-07-17 RX ADMIN — METOPROLOL SUCCINATE 12.5 MG: 25 TABLET, EXTENDED RELEASE ORAL at 08:10

## 2023-07-17 RX ADMIN — Medication 10 ML: at 20:07

## 2023-07-17 RX ADMIN — ATORVASTATIN CALCIUM 20 MG: 20 TABLET, FILM COATED ORAL at 20:04

## 2023-07-17 RX ADMIN — CARBIDOPA AND LEVODOPA 2.5 TABLET: 50; 200 TABLET, EXTENDED RELEASE ORAL at 13:15

## 2023-07-17 RX ADMIN — LIDOCAINE 1 PATCH: 700 PATCH TOPICAL at 08:11

## 2023-07-17 RX ADMIN — Medication 10 ML: at 20:06

## 2023-07-17 RX ADMIN — LISINOPRIL 5 MG: 5 TABLET ORAL at 08:10

## 2023-07-17 RX ADMIN — Medication 10 ML: at 08:13

## 2023-07-17 RX ADMIN — CARBIDOPA AND LEVODOPA 2.5 TABLET: 50; 200 TABLET, EXTENDED RELEASE ORAL at 17:02

## 2023-07-17 RX ADMIN — CHOLECALCIFEROL TAB 10 MCG (400 UNIT) 1000 UNITS: 10 TAB at 08:09

## 2023-07-17 RX ADMIN — DOXEPIN HYDROCHLORIDE 10 MG: 10 CAPSULE ORAL at 20:04

## 2023-07-17 RX ADMIN — HYDROCODONE BITARTRATE AND ACETAMINOPHEN 1 TABLET: 5; 325 TABLET ORAL at 08:09

## 2023-07-17 RX ADMIN — CARBIDOPA AND LEVODOPA 1 TABLET: 25; 250 TABLET ORAL at 20:04

## 2023-07-17 RX ADMIN — CARBIDOPA AND LEVODOPA 2.5 TABLET: 50; 200 TABLET, EXTENDED RELEASE ORAL at 08:09

## 2023-07-17 NOTE — PROGRESS NOTES
"DOS: 2023  NAME: Loni Sanford   : 1945  PCP: Zo Estrada MD  Chief Complaint   Patient presents with    Abdominal Pain    Back Pain       Chief complaint: AMS  Subjective: No family at bedside.  Pt is alert, conversant, fully oriented.  He does not tell me specifically how he takes is sinemet or the dose.  Discussed with wife and she tells he has been intermittently confused, susan since MRI    Objective:  Vital signs: /88 (BP Location: Right arm, Patient Position: Lying)   Pulse 80   Temp 97.9 °F (36.6 °C) (Oral)   Resp 18   Ht 175.3 cm (69\")   Wt 87.5 kg (193 lb)   SpO2 94%   BMI 28.50 kg/m²      Gen: NAD, vitals reviewed, masked face  MS: oriented x3, recent/remote memory intact, normal attention/concentration, language intact, no neglect.  CN: visual acuity grossly normal, PERRL, EOMI, no facial droop, no dysarthria, hypophonia  Motor: resting tremor R>L, cogwheeling, bradykinesia, symmetric mvmts throughout   Sensory: intact to light touch all 4 ext.    ROS:  No weakness, numbness  No fevers, chills      Laboratory results:  Lab Results   Component Value Date    GLUCOSE 92 2023    CALCIUM 9.2 2023     2023    K 3.9 2023    CO2 26.1 2023     2023    BUN 26 (H) 2023    CREATININE 0.88 2023    EGFRIFAFRI 75 2021    EGFRIFNONA 62 2021    BCR 29.5 (H) 2023    ANIONGAP 10.9 2023     Lab Results   Component Value Date    WBC 7.54 2023    HGB 12.4 (L) 2023    HCT 35.8 (L) 2023    MCV 92.5 2023     2023     Lab Results   Component Value Date    LDL 56 2023    LDL 54 2022    LDL 44 2021            Review of labs:     Review and interpretation of imaging: MRI T/L spine on 7/10 with compression fx T12 with normal spinal cord signal, central canal stenosis at C5/6, thoracic spondylosis with foraminal stenosis at multiple levels, buldging disc at L3/4, " L4/5 with mild foraminal stenosis    Workup to date:    Diagnoses:  Encephalopathy  PD  Thoracic compression fracture    Impression: 79yo M with PMH prostate and renal cancer, CAD, HLD, HTN, PD for four years and follows at [facility].  He presented after a fall and underwent kyphoplasty on the 13th.  Neuro consulted for AMS.  His exam seems consistent with delirium.  He has been receiving opioids and zyprexa.  He is especially parkinsonian today despite CR of 500mg of levadopa.  This is not his home dose.  Discussed with wife and pharmacist.  I have corrected his MAR and will initiate his CR dosing tomorrow evening    Plan:  1 sinemet 25/250 four times daily at 7, 11, 1500, 1900.  Avoid delays in dosing  2 sinemet CR at bedtime-first dose ordered for tomorrow  3 Nonpharmacologic delirium precautions: avoid restraints and Wright catheters if able, make personal eyeglasses and hearing aids accessible to pt, encourage family visitation or hospital sitter if able, provide frequent re-orientation, reassurance.  If indicated Haldol 0.5 mg or 1 mg may be used to control agitation.        I spent at least 40 minutes interviewing, examining, and counseling patient.  I independently reviewed documentation, laboratory and diagnostic findings, external documentation where applicable, and formulated treatment plan which was discussed with the patient.      Thank you for this consultation.  Discussed above plan with neuro attending, Dr. Pickard who agrees with above plan.  Neurology team is available for concerns or questions.

## 2023-07-17 NOTE — CASE MANAGEMENT/SOCIAL WORK
Continued Stay Note  [facility]     Patient Name: Loni Sanford  MRN: [id number]  Today's Date: 7/17/2023    Admit Date: 7/9/2023    Plan: [facility] PENDING precert.   Discharge Plan       Row Name 07/17/23 1309       Plan    Plan [facility] PENDING precert.    Plan Comments S/W Lai/ [facility] states pt is accepted pending precert.  S/w pt's spouse Zuri who is in agreement w/ [facility].  Will initiate precert. ..........Debbie YBARRA/ YODIT                   Discharge Codes    No documentation.                 Expected Discharge Date and Time       Expected Discharge Date Expected Discharge Time    Jul 17, 2023               Debbie Ayala RN

## 2023-07-17 NOTE — PROGRESS NOTES
Name: Loni Sanford ADMIT: 2023   : 1945  PCP: Zo Estrada MD    MRN: [id number] LOS: 5 days   AGE/SEX: 78 y.o. male  ROOM: [facility]     Subjective   Subjective   Feeling okay today--tired. Confused and somewhat agitated this AM but no longer sedated to the point of unresponsiveness as he was yesterday. Tolerating po but not interested in eating. No CP or SOA. No pain. Voiding well.       Objective   Objective   Vital Signs  Temp:  [97.9 °F (36.6 °C)-98.2 °F (36.8 °C)] 98.1 °F (36.7 °C)  Heart Rate:  [71-93] 93  Resp:  [18-20] 18  BP: (117-185)/(78-94) 185/94  SpO2:  [89 %-95 %] 95 %  on   ;   Device (Oxygen Therapy): room air  Body mass index is 28.5 kg/m².  Physical Exam  Vitals and nursing note reviewed. Exam conducted with a chaperone present (Wife).   Constitutional:       General: He is not in acute distress.     Appearance: He is ill-appearing (chronically). He is not toxic-appearing or diaphoretic.   HENT:      Head: Normocephalic.      Nose: Nose normal.      Mouth/Throat:      Mouth: Mucous membranes are moist.      Pharynx: Oropharynx is clear.   Eyes:      General: No scleral icterus.        Right eye: No discharge.         Left eye: No discharge.      Extraocular Movements: Extraocular movements intact.      Conjunctiva/sclera: Conjunctivae normal.   Cardiovascular:      Rate and Rhythm: Normal rate and regular rhythm.      Pulses: Normal pulses.   Pulmonary:      Effort: Pulmonary effort is normal. No respiratory distress.      Breath sounds: Normal breath sounds. No wheezing or rales.      Comments: Anteriorly   Abdominal:      General: Bowel sounds are normal. There is no distension.      Palpations: Abdomen is soft.      Tenderness: There is no abdominal tenderness.   Musculoskeletal:         General: No swelling or deformity. Normal range of motion.      Cervical back: Neck supple. No rigidity.   Lymphadenopathy:      Cervical: No cervical adenopathy.   Skin:     General:  Skin is warm and dry.      Capillary Refill: Capillary refill takes less than 2 seconds.      Coloration: Skin is not jaundiced.   Neurological:      Mental Status: He is alert. Mental status is at baseline.      Cranial Nerves: No cranial nerve deficit.      Comments: Oriented to person  Pill-rolling tremor bilaterally  Voice soft   Psychiatric:         Mood and Affect: Mood normal.     Results Review     I reviewed the patient's new clinical results.  Results from last 7 days   Lab Units 07/17/23  0535 07/16/23  0750 07/15/23  0632 07/14/23  0545   WBC 10*3/mm3 7.54 6.76 9.64 6.69   HEMOGLOBIN g/dL 12.4* 12.3* 13.1 12.9*   PLATELETS 10*3/mm3 200 197 231 210     Results from last 7 days   Lab Units 07/17/23  0535 07/16/23  0750 07/15/23  0632 07/14/23  0545   SODIUM mmol/L 141 144 140 134*   POTASSIUM mmol/L 3.9 4.0 4.1 3.9   CHLORIDE mmol/L 104 107 104 101   CO2 mmol/L 26.1 26.4 28.0 26.2   BUN mg/dL 26* 25* 23 25*   CREATININE mg/dL 0.88 0.89 0.99 0.69*   GLUCOSE mg/dL 92 86 99 88   EGFR mL/min/1.73 88.6 88.3 78.5 95.3     Results from last 7 days   Lab Units 07/17/23  0535 07/16/23  0750 07/15/23  0632 07/14/23  0545   ALBUMIN g/dL 3.6 3.6 3.8 3.5   BILIRUBIN mg/dL 0.7 0.6 0.6 0.6   ALK PHOS U/L 154* 145* 161* 138*   AST (SGOT) U/L 10 7 12 12   ALT (SGPT) U/L <5 <5 <5 <5     Results from last 7 days   Lab Units 07/17/23  0535 07/16/23  0750 07/15/23  0632 07/14/23  0545   CALCIUM mg/dL 9.2 9.2 9.4 9.0   ALBUMIN g/dL 3.6 3.6 3.8 3.5       No results found for: HGBA1C, POCGLU    CT Head Without Contrast    Result Date: 7/15/2023  1.  No noncontrast CT findings of acute intracranial abnormality.   This report was finalized on 7/15/2023 5:25 PM by Dr. Clayton Johnson M.D.     I have personally reviewed all medications:  Scheduled Medications  atorvastatin, 20 mg, Oral, Nightly  carbidopa-levodopa CR, 2.5 tablet, Oral, 4x Daily  cholecalciferol, 1,000 Units, Oral, Daily  doxepin, 10 mg, Oral, Nightly  Gabapentin 10%  Baclofen 2% Lidocaine 4% Cream, 1 dose, Topical, 4x Daily  lidocaine, 1 patch, Transdermal, Q24H  lisinopril, 5 mg, Oral, Daily  metoprolol succinate XL, 12.5 mg, Oral, Daily  senna-docusate sodium, 2 tablet, Oral, BID  sodium chloride, 10 mL, Intravenous, Q12H  sodium chloride, 10 mL, Intravenous, Q12H    Infusions  lactated ringers, 9 mL/hr, Last Rate: 9 mL/hr (07/14/23 0956)    Diet  Diet: Regular/House Diet; Texture: Regular Texture (IDDSI 7); Fluid Consistency: Thin (IDDSI 0)    I have personally reviewed:  [x]  Laboratory   []  Microbiology   []  Radiology   [x]  EKG/Telemetry  []  Cardiology/Vascular   []  Pathology    [x]  Records       Assessment/Plan     Active Hospital Problems    Diagnosis  POA   • **Compression fracture of T12 vertebra [S22.080A]  Yes   • RLS (restless legs syndrome) [G25.81]  Yes   • Recurrent falls [R29.6]  Not Applicable   • Primary hypertension [I10]  Yes   • Coronary artery disease [I25.10]  Yes   • MCI (mild cognitive impairment) [G31.84]  Yes   • Neuropathy [G62.9]  Yes   • History of adenocarcinoma of prostate [Z85.46]  Not Applicable   • 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]  Yes   • Parkinson disease [G20]  Yes      Resolved Hospital Problems   No resolved problems to display.       78 y.o. male admitted with Compression fracture of T12 vertebra after multiple falls in setting of Parkinson's, peripheral neuropathy, and chronic mobility/balance issues.    Acute T12 Compression Fracture  - s/p kyphoplasty 7/14 by Dr. Gilman  - continue Lidoderm and PRN Lortab  - appreciate KAUSHAL attention to pt  - outpt f/u in 2 weeks     Parkinson's Disease  Sensory polyneuropathy  Chronic balance/mobility issues  RLS  Mild cognitive impairment  - continue Sinemet, there is some confusion as to his appropriate regimen--have asked Neuro for assistance clarifying  - Limit opioid analgesia as able  - Requip recently changed to Gabapentin PRN     AMS  - suspected toxic encephalopathy due to  Zyprexa  - no focal deficits and head CT negative  - appreciate Neuro recs  - hold further neuroleptic medications     CAD/HTN  - BP quite labile at times, probably secondary to pain/agitation, no anginal symptoms  - continue lisinopril, metoprolol, and doxepin  - on lipitor, plavix held for kyphoplasty--can resume on 7/22/23 per KAUSHAL  - appreciate Cardiology evaluation     Hx of Renal Cell Carcinoma  - s/p partial right nephrectomy 2018      SCDs for DVT prophylaxis.  Full code.  Discussed with patient and CCP. D/w wife at bedside. D/w Neuro PA (Elvin).  Anticipate discharge to SNU facility, timing yet to be determined. Hopefully can have ready for dc tomorrow.      Jaylon Faulkner MD  [facility]  07/17/23  10:52 EDT

## 2023-07-17 NOTE — PLAN OF CARE
Goal Outcome Evaluation:  Plan of Care Reviewed With: patient        Progress: improving  Outcome Evaluation: Pt confused and irritable overnight. Pt agitated this morning. Attempted to refused labs. Pt encouraged to have lab work done in order to prevent any delays in discharge. Pts wife at bedside agreed. Pt educated to please remain in bed and to call nursing staff for assistance prior to getting up. Bed alarm set. Pt turns self. SCDs applied for a brief period of time but removed by pt. Nursing staff increased frequency of rounds. Will pass information on to day shift nursing staff and continue to monitor.

## 2023-07-17 NOTE — THERAPY TREATMENT NOTE
Patient Name: Loni Sanford  : 1945    MRN: [id number]                              Today's Date: 2023       Admit Date: 2023    Visit Dx:     ICD-10-CM ICD-9-CM   1. Compression fracture of T12 vertebra, initial encounter  S22.080A 805.2   2. Lower abdominal pain  R10.30 789.09   3. Parkinson's disease  G20 332.0     Patient Active Problem List   Diagnosis    Ureteral calculus, right    Clear cell carcinoma of kidney    Coronary artery disease    History of adenocarcinoma of prostate    Hyperlipidemia LDL goal <70    MCI (mild cognitive impairment)    Neuropathy    Parkinson disease    Stable angina    Primary hypertension    Encounter for screening for malignant neoplasm of colon    History of colon polyps    Recurrent falls    Compression fracture of T12 vertebra    RLS (restless legs syndrome)     Past Medical History:   Diagnosis Date    Cancer     Prostate cancer     Cancer     kidney dx 2018    Coronary artery disease     Dizziness     occ    Hematuria     History of angina     carries NTG    History of transfusion     Hyperlipidemia     Hypertension     Kidney stones     new one 2018    Kidney tumor     RIGHT removed 2018    Parkinson disease     Renal mass, right     surgically removed 2018     Past Surgical History:   Procedure Laterality Date    APPENDECTOMY      CARDIAC CATHETERIZATION      CATARACT EXTRACTION W/ INTRAOCULAR LENS  IMPLANT, BILATERAL      COLONOSCOPY N/A 2023    Procedure: COLONOSCOPY FOR SCREENING;  Surgeon: Terrell Zhang MD;  Location: [facility] MAIN OR;  Service: Gastroenterology;  Laterality: N/A;  poor prep, polyp    EXTRACORPOREAL SHOCKWAVE LITHOTRIPSY (ESWL), STENT INSERTION/REMOVAL Right 2017    Procedure: RIGHT EXTRACORPOREAL SHOCKWAVE LITHOTRIPSY CYSTOSCOPY STENT PLACEMENT;  Surgeon: Marcelo Suarez MD;  Location: [facility];  Service:     EXTRACORPOREAL SHOCKWAVE LITHOTRIPSY (ESWL), STENT INSERTION/REMOVAL Right 2018     Procedure: RT EXTRACORPREAL SHOCKWAVE LITHOTRIPSY CYSTOSCOPY  WITH  MANIPULATION OF STONE;  Surgeon: Moe Vasquez MD;  Location: [facility];  Service: Urology    FINGER SURGERY Left 1984    INDEX    HAND SURGERY Right 1998    KYPHOPLASTY N/A 7/14/2023    Procedure: KYPHOPLASTY 1-2 LEVELS;  Surgeon: Jeison Gilman MD;  Location: [facility] OR 18/19;  Service: Neurosurgery;  Laterality: N/A;    NEPHRECTOMY PARTIAL Right 4/5/2018    Procedure: RT OPEN PARTIAL NEPHRECTOMY WITH INTRAOPERATIVE DOPPLER, DECORTICATION OF RIGHT RENAL CYST;  Surgeon: Marcelo Suarez MD;  Location: [facility] OR;  Service: Urology    PROSTATECTOMY  2013      General Information       Row Name 07/17/23 1404          Physical Therapy Time and Intention    Document Type therapy note (daily note)  -CH (r) AB (t) CH (c)     Mode of Treatment physical therapy;individual therapy  -CH (r) AB (t) CH (c)       Row Name 07/17/23 1404          General Information    Patient Profile Reviewed yes  -CH (r) AB (t) CH (c)     Prior Level of Function independent:  -CH (r) AB (t) CH (c)     Existing Precautions/Restrictions spinal;fall  -CH (r) AB (t) CH (c)     Barriers to Rehab none identified  -CH (r) AB (t) CH (c)       Row Name 07/17/23 1400          Cognition    Orientation Status (Cognition) oriented x 3  -CH (r) AB (t) CH (c)       Row Name 07/17/23 140          Safety Issues, Functional Mobility    Impairments Affecting Function (Mobility) balance;endurance/activity tolerance;strength;coordination;postural/trunk control  -CH (r) AB (t) CH (c)               User Key  (r) = Recorded By, (t) = Taken By, (c) = Cosigned By      Initials Name Provider Type    CH Ciarra Nesbitt, PT Physical Therapist    Shaunna Lorenzo PT Student PT Student                   Mobility       Row Name 07/17/23 1402          Bed Mobility    Bed Mobility sit-supine;supine-sit;scooting/bridging  -CH (r) AB (t) CH (c)     Scooting/Bridging [place]  (Bed Mobility) minimum assist (75% patient effort)  -CH (r) AB (t) CH (c)     Supine-Sit [place] (Bed Mobility) minimum assist (75% patient effort)  -CH (r) AB (t) CH (c)     Sit-Supine [place] (Bed Mobility) minimum assist (75% patient effort)  -CH (r) AB (t) CH (c)     Assistive Device (Bed Mobility) bed rails;head of bed elevated  -CH (r) AB (t) CH (c)       Row Name 07/17/23 1408          Sit-Stand Transfer    Sit-Stand [place] (Transfers) minimum assist (75% patient effort)  -CH (r) AB (t) CH (c)     Assistive Device (Sit-Stand Transfers) walker, front-wheeled  -CH (r) AB (t) CH (c)       Row Name 07/17/23 1400          Gait/Stairs (Locomotion)    [place] Level (Gait) contact guard  -CH (r) AB (t) CH (c)     Assistive Device (Gait) walker, front-wheeled  -CH (r) AB (t) CH (c)     Distance in Feet (Gait) 30  -CH (r) AB (t) CH (c)     Deviations/Abnormal Patterns (Gait) gait speed decreased;festinating/shuffling;stride length decreased;anne decreased  -CH (r) AB (t) CH (c)     Bilateral Gait Deviations forward flexed posture  -CH (r) AB (t) CH (c)               User Key  (r) = Recorded By, (t) = Taken By, (c) = Cosigned By      Initials Name Provider Type     Ciarra Nesbitt PT Physical Therapist    Shaunna Lorenzo PT Student PT Student                   Obj/Interventions       Row Name 07/17/23 1409          Motor Skills    Therapeutic Exercise hip;ankle;knee  10x ankle pumps, LAQ, seated marches, and standing marches.  -CH (r) AB (t) CH (c)       Row Name 07/17/23 1403          Balance    Balance Assessment sitting dynamic balance;sitting static balance;standing static balance;standing dynamic balance  -CH (r) AB (t) CH (c)     Static Sitting Balance supervision  -CH (r) AB (t) CH (c)     Dynamic Sitting Balance supervision  -CH (r) AB (t) CH (c)     Position, Sitting Balance sitting in chair  -CH (r) AB (t) CH (c)     Static Standing Balance contact guard  -CH (r) AB (t) CH (c)      Dynamic Standing Balance contact guard  -CH (r) AB (t) CH (c)     Position/Device Used, Standing Balance walker, front-wheeled  -CH (r) AB (t) CH (c)     Balance Interventions sitting;standing;dynamic reaching;sit to stand;static;dynamic;minimal challenge  -CH (r) AB (t) CH (c)               User Key  (r) = Recorded By, (t) = Taken By, (c) = Cosigned By      Initials Name Provider Type    Ciarra House, PT Physical Therapist    Shaunna Lorenzo, PT Student PT Student                   Goals/Plan    No documentation.                  Clinical Impression       Row Name 07/17/23 1411          Pain    Pretreatment Pain Rating 0/10 - no pain  -CH (r) AB (t) CH (c)     Posttreatment Pain Rating 0/10 - no pain  -CH (r) AB (t) CH (c)     Pre/Posttreatment Pain Comment Pt did not state any pain  -CH (r) AB (t) CH (c)       Row Name 07/17/23 1411          Plan of Care Review    Plan of Care Reviewed With patient  -CH (r) AB (t) CH (c)     Outcome Evaluation Pt presented to PT with improvement in functional mobility and balance. Pt was Gail for supine <-> sit, and for sit <-> stand. Pt was able to ambulate with CGA and FWW for 30ft. Pt performed 10x ankle pumps, LAQ, seated marches, and standing marches. PT will continue to follow.  -CH (r) AB (t) CH (c)       Row Name 07/17/23 1411          Positioning and Restraints    Pre-Treatment Position in bed  -CH (r) AB (t) CH (c)     In Chair reclined;call light within reach;encouraged to call for assist;exit alarm on  -CH (r) AB (t) CH (c)               User Key  (r) = Recorded By, (t) = Taken By, (c) = Cosigned By      Initials Name Provider Type    Ciarra House, PT Physical Therapist    Shaunna Lorenzo, PT Student PT Student                   Outcome Measures       Row Name 07/17/23 1418          How much help from another person do you currently need...    Turning from your back to your side while in flat bed without using bedrails? 3  -CH (r) AB (t) CH (c)      Moving from lying on back to sitting on the side of a flat bed without bedrails? 3  -CH (r) AB (t) CH (c)     Moving to and from a bed to a chair (including a wheelchair)? 3  -CH (r) AB (t) CH (c)     Standing up from a chair using your arms (e.g., wheelchair, bedside chair)? 3  -CH (r) AB (t) CH (c)     Climbing 3-5 steps with a railing? 2  -CH (r) AB (t) CH (c)     To walk in hospital room? 3  -CH (r) AB (t) CH (c)     AM-PAC 6 Clicks Score (PT) 17  -CH (r) AB (t)     Highest level of mobility 5 --> Static standing  -CH (r) AB (t)               User Key  (r) = Recorded By, (t) = Taken By, (c) = Cosigned By      Initials Name Provider Type    CH Ciarra Nesbitt, PT Physical Therapist    Shaunna Lorenzo PT Student PT Student                                 Physical Therapy Education       Title: PT OT SLP Therapies (Done)       Topic: Physical Therapy (Done)       Point: Mobility training (Done)       Learning Progress Summary             Patient Acceptance, E, VU,NR by AB at 7/17/2023 1418    Acceptance, E,TB, VU by  at 7/16/2023 1057    Eager, E, VU by TL at 7/15/2023 1326    Acceptance, E, VU by TL at 7/14/2023 1344   Family Acceptance, E,TB, VU by  at 7/16/2023 1057    Acceptance, E, VU by TL at 7/14/2023 1344                         Point: Home exercise program (Done)       Learning Progress Summary             Patient Acceptance, E, VU,NR by AB at 7/17/2023 1418    Acceptance, E,TB, VU by  at 7/16/2023 1057    Eager, E, VU by TL at 7/15/2023 1326    Acceptance, E, VU by TL at 7/14/2023 1344   Family Acceptance, E,TB, VU by  at 7/16/2023 1057    Acceptance, E, VU by TL at 7/14/2023 1344                         Point: Body mechanics (Done)       Learning Progress Summary             Patient Acceptance, E, VU,NR by AB at 7/17/2023 1418    Acceptance, E,TB, VU by DR at 7/16/2023 1057    Eager, E, VU by TL at 7/15/2023 1326    Acceptance, E, VU by TL at 7/14/2023 1344   Family Acceptance, E,TB, VU  by  at 7/16/2023 1057    Acceptance, E, VU by TL at 7/14/2023 1344                         Point: Precautions (Done)       Learning Progress Summary             Patient Acceptance, E, VU,NR by AB at 7/17/2023 1418    Acceptance, E,TB, VU by DR at 7/16/2023 1057    Eager, E, VU by TL at 7/15/2023 1326    Acceptance, E, VU by TL at 7/14/2023 1344   Family Acceptance, E,TB, VU by  at 7/16/2023 1057    Acceptance, E, VU by TL at 7/14/2023 1344                                         User Key       Initials Effective Dates Name Provider Type Discipline    TL 12/27/22 -  Devaughn Bran, RN Registered Nurse Nurse     05/24/23 -  Coy Santos, PT Physical Therapist PT    AB 06/16/23 -  Shaunna Catalan, HOLLEY Student PT Student PT                  PT Recommendation and Plan     Plan of Care Reviewed With: patient  Outcome Evaluation: Pt presented to PT with improvement in functional mobility and balance. Pt was Gail for supine <-> sit, and for sit <-> stand. Pt was able to ambulate with CGA and FWW for 30ft. Pt performed 10x ankle pumps, LAQ, seated marches, and standing marches. PT will continue to follow.     Time Calculation:    PT Charges       Row Name 07/17/23 1418             Time Calculation    Start Time 1342  -CH (r) AB (t) CH (c)      Stop Time 1357  -CH (r) AB (t) CH (c)      Time Calculation (min) 15 min  -CH (r) AB (t)      PT Received On 07/17/23  -CH (r) AB (t) CH (c)      PT - Next Appointment 07/18/23  -CH (r) AB (t) CH (c)         Time Calculation- PT    Total Timed Code Minutes- PT 15 minute(s)  -CH (r) AB (t) CH (c)         Timed Charges    58853 - PT Therapeutic Activity Minutes 15  -CH (r) AB (t) CH (c)         Total Minutes    Timed Charges Total Minutes 15  -CH (r) AB (t)       Total Minutes 15  -CH (r) AB (t)                User Key  (r) = Recorded By, (t) = Taken By, (c) = Cosigned By      Initials Name Provider Type    CH Ciarra Nesbitt, PT Physical Therapist    Shaunna Lorenzo, PT Student  PT Student                  Therapy Charges for Today       Code Description Service Date Service Provider Modifiers Qty    44814086999 HC PT THERAPEUTIC ACT EA 15 MIN 7/17/2023 Shaunna Catalan, PT Student GP 1            PT G-Codes  Outcome Measure Options: AM-PAC 6 Clicks Basic Mobility (PT)  AM-PAC 6 Clicks Score (PT): 17  AM-PAC 6 Clicks Score (OT): 16  PT Discharge Summary  Anticipated Discharge Disposition (PT): skilled nursing facility    Shaunna Catalan PT Student  7/17/2023

## 2023-07-17 NOTE — PLAN OF CARE
Goal Outcome Evaluation:  Plan of Care Reviewed With: patient           Outcome Evaluation: Pt presented to PT with improvement in functional mobility and balance. Pt was Gail for supine <-> sit, and for sit <-> stand. Pt was able to ambulate with CGA and FWW for 30ft. Pt performed 10x ankle pumps, LAQ, seated marches, and standing marches. PT will continue to follow.      Anticipated Discharge Disposition (PT): skilled nursing facility

## 2023-07-18 ENCOUNTER — APPOINTMENT (OUTPATIENT)
Dept: CT IMAGING | Facility: HOSPITAL | Age: 78
DRG: 515 | End: 2023-07-18
Payer: MEDICARE

## 2023-07-18 LAB
ALBUMIN SERPL-MCNC: 3.7 G/DL (ref 3.5–5.2)
ALBUMIN/GLOB SERPL: 1.6 G/DL
ALP SERPL-CCNC: 153 U/L (ref 39–117)
ALT SERPL W P-5'-P-CCNC: 6 U/L (ref 1–41)
ANION GAP SERPL CALCULATED.3IONS-SCNC: 10.7 MMOL/L (ref 5–15)
ARTERIAL PATENCY WRIST A: POSITIVE
AST SERPL-CCNC: 8 U/L (ref 1–40)
ATMOSPHERIC PRESS: 745.4 MMHG
BASE EXCESS BLDA CALC-SCNC: 0.3 MMOL/L (ref 0–2)
BASOPHILS # BLD AUTO: 0.04 10*3/MM3 (ref 0–0.2)
BASOPHILS NFR BLD AUTO: 0.5 % (ref 0–1.5)
BDY SITE: NORMAL
BILIRUB SERPL-MCNC: 0.7 MG/DL (ref 0–1.2)
BUN SERPL-MCNC: 24 MG/DL (ref 8–23)
BUN/CREAT SERPL: 29.3 (ref 7–25)
CALCIUM SPEC-SCNC: 9.1 MG/DL (ref 8.6–10.5)
CHLORIDE SERPL-SCNC: 107 MMOL/L (ref 98–107)
CO2 SERPL-SCNC: 25.3 MMOL/L (ref 22–29)
CREAT SERPL-MCNC: 0.82 MG/DL (ref 0.76–1.27)
DEPRECATED RDW RBC AUTO: 38.8 FL (ref 37–54)
EGFRCR SERPLBLD CKD-EPI 2021: 90.5 ML/MIN/1.73
EOSINOPHIL # BLD AUTO: 0.1 10*3/MM3 (ref 0–0.4)
EOSINOPHIL NFR BLD AUTO: 1.3 % (ref 0.3–6.2)
ERYTHROCYTE [DISTWIDTH] IN BLOOD BY AUTOMATED COUNT: 11.5 % (ref 12.3–15.4)
GAS FLOW AIRWAY: 2.5 LPM
GLOBULIN UR ELPH-MCNC: 2.3 GM/DL
GLUCOSE BLDC GLUCOMTR-MCNC: 82 MG/DL (ref 70–130)
GLUCOSE SERPL-MCNC: 99 MG/DL (ref 65–99)
HCO3 BLDA-SCNC: 24.7 MMOL/L (ref 22–28)
HCT VFR BLD AUTO: 34.7 % (ref 37.5–51)
HGB BLD-MCNC: 12.3 G/DL (ref 13–17.7)
IMM GRANULOCYTES # BLD AUTO: 0.04 10*3/MM3 (ref 0–0.05)
IMM GRANULOCYTES NFR BLD AUTO: 0.5 % (ref 0–0.5)
LYMPHOCYTES # BLD AUTO: 0.64 10*3/MM3 (ref 0.7–3.1)
LYMPHOCYTES NFR BLD AUTO: 8.2 % (ref 19.6–45.3)
MAGNESIUM SERPL-MCNC: 2.2 MG/DL (ref 1.6–2.4)
MCH RBC QN AUTO: 32.8 PG (ref 26.6–33)
MCHC RBC AUTO-ENTMCNC: 35.4 G/DL (ref 31.5–35.7)
MCV RBC AUTO: 92.5 FL (ref 79–97)
MODALITY: NORMAL
MONOCYTES # BLD AUTO: 0.88 10*3/MM3 (ref 0.1–0.9)
MONOCYTES NFR BLD AUTO: 11.3 % (ref 5–12)
NEUTROPHILS NFR BLD AUTO: 6.12 10*3/MM3 (ref 1.7–7)
NEUTROPHILS NFR BLD AUTO: 78.2 % (ref 42.7–76)
NRBC BLD AUTO-RTO: 0 /100 WBC (ref 0–0.2)
PCO2 BLDA: 38.4 MM HG (ref 35–45)
PH BLDA: 7.42 PH UNITS (ref 7.35–7.45)
PLATELET # BLD AUTO: 177 10*3/MM3 (ref 140–450)
PMV BLD AUTO: 9.3 FL (ref 6–12)
PO2 BLDA: 81.5 MM HG (ref 80–100)
POTASSIUM SERPL-SCNC: 3.6 MMOL/L (ref 3.5–5.2)
POTASSIUM SERPL-SCNC: 4.4 MMOL/L (ref 3.5–5.2)
PROT SERPL-MCNC: 6 G/DL (ref 6–8.5)
RBC # BLD AUTO: 3.75 10*6/MM3 (ref 4.14–5.8)
SAO2 % BLDCOA: 96.2 % (ref 92–99)
SET MECH RESP RATE: 12
SODIUM SERPL-SCNC: 143 MMOL/L (ref 136–145)
TOTAL RATE: 12 BREATHS/MINUTE
WBC NRBC COR # BLD: 7.82 10*3/MM3 (ref 3.4–10.8)

## 2023-07-18 PROCEDURE — 80053 COMPREHEN METABOLIC PANEL: CPT | Performed by: RADIOLOGY

## 2023-07-18 PROCEDURE — 85025 COMPLETE CBC W/AUTO DIFF WBC: CPT | Performed by: RADIOLOGY

## 2023-07-18 PROCEDURE — 82803 BLOOD GASES ANY COMBINATION: CPT

## 2023-07-18 PROCEDURE — 25010000002 NALOXONE PER 1 MG: Performed by: HOSPITALIST

## 2023-07-18 PROCEDURE — 84132 ASSAY OF SERUM POTASSIUM: CPT | Performed by: HOSPITALIST

## 2023-07-18 PROCEDURE — 83735 ASSAY OF MAGNESIUM: CPT | Performed by: HOSPITALIST

## 2023-07-18 PROCEDURE — 82948 REAGENT STRIP/BLOOD GLUCOSE: CPT

## 2023-07-18 PROCEDURE — 99232 SBSQ HOSP IP/OBS MODERATE 35: CPT | Performed by: PHYSICIAN ASSISTANT

## 2023-07-18 PROCEDURE — 70450 CT HEAD/BRAIN W/O DYE: CPT

## 2023-07-18 PROCEDURE — 36600 WITHDRAWAL OF ARTERIAL BLOOD: CPT

## 2023-07-18 PROCEDURE — 0 POTASSIUM CHLORIDE 10 MEQ/100ML SOLUTION: Performed by: HOSPITALIST

## 2023-07-18 RX ORDER — CARBIDOPA AND LEVODOPA 50; 200 MG/1; MG/1
1 TABLET, EXTENDED RELEASE ORAL NIGHTLY
Status: DISCONTINUED | OUTPATIENT
Start: 2023-07-18 | End: 2023-07-20 | Stop reason: HOSPADM

## 2023-07-18 RX ORDER — POTASSIUM CHLORIDE 7.45 MG/ML
10 INJECTION INTRAVENOUS
Status: COMPLETED | OUTPATIENT
Start: 2023-07-18 | End: 2023-07-18

## 2023-07-18 RX ORDER — SODIUM CHLORIDE 9 MG/ML
75 INJECTION, SOLUTION INTRAVENOUS CONTINUOUS
Status: DISCONTINUED | OUTPATIENT
Start: 2023-07-18 | End: 2023-07-19

## 2023-07-18 RX ORDER — NALOXONE HCL 0.4 MG/ML
0.2 VIAL (ML) INJECTION ONCE
Status: COMPLETED | OUTPATIENT
Start: 2023-07-18 | End: 2023-07-18

## 2023-07-18 RX ORDER — CARBIDOPA AND LEVODOPA 25; 100 MG/1; MG/1
2 TABLET, ORALLY DISINTEGRATING ORAL ONCE
Status: DISCONTINUED | OUTPATIENT
Start: 2023-07-18 | End: 2023-07-20 | Stop reason: HOSPADM

## 2023-07-18 RX ADMIN — POTASSIUM CHLORIDE 10 MEQ: 7.46 INJECTION, SOLUTION INTRAVENOUS at 14:41

## 2023-07-18 RX ADMIN — POTASSIUM CHLORIDE 10 MEQ: 7.46 INJECTION, SOLUTION INTRAVENOUS at 11:46

## 2023-07-18 RX ADMIN — CARBIDOPA AND LEVODOPA 1 TABLET: 25; 250 TABLET ORAL at 06:30

## 2023-07-18 RX ADMIN — Medication 10 ML: at 09:43

## 2023-07-18 RX ADMIN — POTASSIUM CHLORIDE 10 MEQ: 7.46 INJECTION, SOLUTION INTRAVENOUS at 16:00

## 2023-07-18 RX ADMIN — SODIUM CHLORIDE 75 ML/HR: 9 INJECTION, SOLUTION INTRAVENOUS at 16:36

## 2023-07-18 RX ADMIN — HYDROCODONE BITARTRATE AND ACETAMINOPHEN 1 TABLET: 5; 325 TABLET ORAL at 06:31

## 2023-07-18 RX ADMIN — CARBIDOPA AND LEVODOPA 1 TABLET: 50; 200 TABLET, EXTENDED RELEASE ORAL at 22:59

## 2023-07-18 RX ADMIN — POTASSIUM CHLORIDE 10 MEQ: 7.46 INJECTION, SOLUTION INTRAVENOUS at 12:50

## 2023-07-18 RX ADMIN — SENNOSIDES AND DOCUSATE SODIUM 2 TABLET: 50; 8.6 TABLET ORAL at 20:13

## 2023-07-18 RX ADMIN — Medication 10 ML: at 10:09

## 2023-07-18 RX ADMIN — CARBIDOPA AND LEVODOPA 1 TABLET: 25; 250 TABLET ORAL at 20:13

## 2023-07-18 RX ADMIN — ATORVASTATIN CALCIUM 20 MG: 20 TABLET, FILM COATED ORAL at 20:13

## 2023-07-18 RX ADMIN — NALXONE HYDROCHLORIDE 0.2 MG: 0.4 INJECTION INTRAMUSCULAR; INTRAVENOUS; SUBCUTANEOUS at 16:35

## 2023-07-18 RX ADMIN — Medication 10 ML: at 20:22

## 2023-07-18 NOTE — PROGRESS NOTES
Discharge Planning Assessment  [facility]     Patient Name: Loni Sanford  MRN: [id number]  Today's Date: 7/18/2023    Admit Date: 7/9/2023    Plan: [facility]; precert obtained, [facility] arranged for tomorrow 7/19 at 1500.   Discharge Needs Assessment    No documentation.                  Discharge Plan       Row Name 07/18/23 1414       Plan    Plan [facility]; precert obtained, [facility] arranged for tomorrow 7/19 at 1500.    Plan Comments Canceled [facility] transport today, rescheduled [facility] for July 19th at 1500. Call placed to [facility] [phone], to update her on possible discharge tomorrow. [facility] stated if he doesn't come tomorrow, updates will need to be sent to "[email or web address] in [facility] office. Malika BRIGHT                  Continued Care and Services - Admitted Since 7/9/2023       Destination Coordination complete.      Service Provider Request Status Selected Services Address Phone Fax Patient Preferred    [facility]  Selected Skilled Nursing [address] [address] [phone] --    [facility] Considering  Pending bed availability N/A [address] [address] [phone] --                  Expected Discharge Date and Time       Expected Discharge Date Expected Discharge Time    Jul 18, 2023            Demographic Summary    No documentation.                  Functional Status    No documentation.                  Psychosocial    No documentation.                  Abuse/Neglect    No documentation.                  Legal    No documentation.                  Substance Abuse    No documentation.                  Patient Forms    No documentation.                     Melissa Soto, RN

## 2023-07-18 NOTE — PROGRESS NOTES
"DOS: 2023  NAME: Loni Sanford   : 1945  PCP: Zo Estrada MD  Chief Complaint   Patient presents with    Abdominal Pain    Back Pain       Chief complaint: AMS  Subjective: Wife at bedside.  She said that he did not sleep well last night.  He was complaining of pain due to restless legs.  She thinks this is why she tried to get out of bed at night.  This is the only thing that he is really been complaining of.  She thinks that he does not have any back pain.  He missed his Sinemet dose this morning because he was sleeping and arousable to sternal rub    Objective:  Vital signs: /73   Pulse 70   Temp 98.8 °F (37.1 °C)   Resp 16   Ht 175.3 cm (69\")   Wt 87.5 kg (193 lb)   SpO2 91%   BMI 28.50 kg/m²      Gen: NAD, vitals reviewed  MS: Drowsy moans with sternal rub   CN: visual acuity grossly normal, PERRL, EOMI, no facial droop, no dysarthria  Motor: Slight right resting tremor, cogwheeling at elbows but not as profound as yesterday's exam.  Withdrawal throughout   Sensory: intact to light touch all 4 ext.    ROS:  No weakness, numbness  No fevers, chills      Laboratory results:  Lab Results   Component Value Date    GLUCOSE 99 2023    CALCIUM 9.1 2023     2023    K 3.6 2023    CO2 25.3 2023     2023    BUN 24 (H) 2023    CREATININE 0.82 2023    EGFRIFAFRI 75 2021    EGFRIFNONA 62 2021    BCR 29.3 (H) 2023    ANIONGAP 10.7 2023     Lab Results   Component Value Date    WBC 7.82 2023    HGB 12.3 (L) 2023    HCT 34.7 (L) 2023    MCV 92.5 2023     2023     Lab Results   Component Value Date    LDL 56 2023    LDL 54 2022    LDL 44 2021            Review of labs: Glucose 99, BUN 24, creatinine 0.82, potassium 3.6, sodium 143, ALT and AST normal WBC 7000, hemoglobin 12, platelets 177    Review and interpretation of imaging:     Workup to " date:    Diagnoses:  Encephalopathy  PD  Thoracic compression fracture     Impression: 77yo M with PMH prostate and renal cancer, CAD, HLD, HTN, PD for four years and follows at [facility].  He presented after a fall and underwent kyphoplasty on the 13th.  Neuro consulted for AMS.  His exam seems consistent with delirium.  He has been receiving opioids and zyprexa.  He is especially parkinsonian today despite CR of 500mg of levadopa.  This is not his home dose.  Discussed with wife and pharmacist.  I have corrected his MAR and will initiate his CR dosing tonight    Patient obtunded drowsy received Norco and Robaxin this morning.  Sounds like his pain stems from restless leg per his wife and this was partly why his nightly extended release was added earlier this month per his neurologist.  Have DC'd CNS acting meds and will reassess but if not improving appropriately will need additional investigation before discharge     HCT done.  No overt acute finding, final report pending.  Wife ok with feeding tube which he needs at this point to avoid any more missed sinemet    Plan:   sinemet 25/250 four times daily at 7, 11, 1500, 1900.  Avoid delays in dosing.  Verified with pharmacy and he is prescribed ER 50-200mg nightly -will initiate tonight    Nonpharmacologic delirium precautions: avoid restraints and Wright catheters if able, make personal eyeglasses and hearing aids accessible to pt, encourage family visitation or hospital sitter if able, provide frequent re-orientation, reassurance.          I spent at least 60 minutes interviewing, examining, and counseling patient.  I independently reviewed documentation, laboratory and diagnostic findings, external documentation where applicable, and formulated treatment plan which was discussed with the patient.      Thank you for this consultation.  Discussed above plan with neuro attending, Dr. Pickard who agrees with above plan.  Neurology team is available for concerns or  questions.

## 2023-07-18 NOTE — PROGRESS NOTES
Name: Loni Sanford ADMIT: 2023   : 1945  PCP: Zo Estrada MD    MRN: [id number] LOS: 6 days   AGE/SEX: 78 y.o. male  ROOM: [facility]     Subjective   Subjective   Pt again very somnolent today. Was awake most of the night. Given dose of Norco early this AM. Now difficult to awaken.        Objective   Objective   Vital Signs  Temp:  [97.7 °F (36.5 °C)-98.8 °F (37.1 °C)] 98.8 °F (37.1 °C)  Heart Rate:  [64-80] 70  Resp:  [16-18] 16  BP: (114-140)/(70-88) 126/73  SpO2:  [91 %-96 %] 91 %  on  Flow (L/min):  [0] 0;   Device (Oxygen Therapy): room air  Body mass index is 28.5 kg/m².  Physical Exam  Vitals and nursing note reviewed. Exam conducted with a chaperone present (Wife).   Constitutional:       General: He is not in acute distress.     Appearance: He is ill-appearing (chronically). He is not toxic-appearing or diaphoretic.   HENT:      Head: Normocephalic.      Nose: Nose normal.      Mouth/Throat:      Mouth: Mucous membranes are moist.      Pharynx: Oropharynx is clear.   Eyes:      General: No scleral icterus.        Right eye: No discharge.         Left eye: No discharge.      Extraocular Movements: Extraocular movements intact.      Conjunctiva/sclera: Conjunctivae normal.   Cardiovascular:      Rate and Rhythm: Normal rate and regular rhythm.      Pulses: Normal pulses.   Pulmonary:      Effort: Pulmonary effort is normal. No respiratory distress.      Breath sounds: Normal breath sounds. No wheezing or rales.      Comments: Anteriorly   Abdominal:      General: Bowel sounds are normal. There is no distension.      Palpations: Abdomen is soft.   Musculoskeletal:         General: No swelling or deformity. Normal range of motion.      Cervical back: Neck supple. No rigidity.   Lymphadenopathy:      Cervical: No cervical adenopathy.   Skin:     General: Skin is warm and dry.      Capillary Refill: Capillary refill takes less than 2 seconds.      Coloration: Skin is not jaundiced.    Neurological:      Mental Status: He is alert.      Cranial Nerves: No cranial nerve deficit.      Comments: Somnolent  +Myoclonic jerks   Psychiatric:      Comments: Unable to assess     Results Review     I reviewed the patient's new clinical results.  Results from last 7 days   Lab Units 07/18/23  0748 07/17/23  0535 07/16/23  0750 07/15/23  0632   WBC 10*3/mm3 7.82 7.54 6.76 9.64   HEMOGLOBIN g/dL 12.3* 12.4* 12.3* 13.1   PLATELETS 10*3/mm3 177 200 197 231       Results from last 7 days   Lab Units 07/18/23  0748 07/17/23  0535 07/16/23  0750 07/15/23  0632   SODIUM mmol/L 143 141 144 140   POTASSIUM mmol/L 3.6 3.9 4.0 4.1   CHLORIDE mmol/L 107 104 107 104   CO2 mmol/L 25.3 26.1 26.4 28.0   BUN mg/dL 24* 26* 25* 23   CREATININE mg/dL 0.82 0.88 0.89 0.99   GLUCOSE mg/dL 99 92 86 99   EGFR mL/min/1.73 90.5 88.6 88.3 78.5       Results from last 7 days   Lab Units 07/18/23  0748 07/17/23  0535 07/16/23  0750 07/15/23  0632   ALBUMIN g/dL 3.7 3.6 3.6 3.8   BILIRUBIN mg/dL 0.7 0.7 0.6 0.6   ALK PHOS U/L 153* 154* 145* 161*   AST (SGOT) U/L 8 10 7 12   ALT (SGPT) U/L 6 <5 <5 <5       Results from last 7 days   Lab Units 07/18/23  0748 07/17/23  0535 07/16/23  0750 07/15/23  0632   CALCIUM mg/dL 9.1 9.2 9.2 9.4   ALBUMIN g/dL 3.7 3.6 3.6 3.8   MAGNESIUM mg/dL 2.2  --   --   --          No results found for: HGBA1C, POCGLU    No radiology results for the last day  I have personally reviewed all medications:  Scheduled Medications  atorvastatin, 20 mg, Oral, Nightly  carbidopa-levodopa, 1 tablet, Oral, 4x Daily  carbidopa-levodopa ER, 1 tablet, Oral, Nightly  cholecalciferol, 1,000 Units, Oral, Daily  Gabapentin 10% Baclofen 2% Lidocaine 4% Cream, 1 dose, Topical, 4x Daily  lidocaine, 1 patch, Transdermal, Q24H  lisinopril, 5 mg, Oral, Daily  metoprolol succinate XL, 12.5 mg, Oral, Daily  potassium chloride, 10 mEq, Intravenous, Q1H  senna-docusate sodium, 2 tablet, Oral, BID  sodium chloride, 10 mL, Intravenous,  Q12H  sodium chloride, 10 mL, Intravenous, Q12H    Infusions  lactated ringers, 9 mL/hr, Last Rate: 9 mL/hr (07/14/23 0956)    Diet  Diet: Regular/House Diet; Texture: Regular Texture (IDDSI 7); Fluid Consistency: Thin (IDDSI 0)    I have personally reviewed:  [x]  Laboratory   []  Microbiology   []  Radiology   [x]  EKG/Telemetry  []  Cardiology/Vascular   []  Pathology    []  Records       Assessment/Plan     Active Hospital Problems    Diagnosis  POA    **Compression fracture of T12 vertebra [S22.080A]  Yes    RLS (restless legs syndrome) [G25.81]  Yes    Recurrent falls [R29.6]  Not Applicable    Primary hypertension [I10]  Yes    Coronary artery disease [I25.10]  Yes    MCI (mild cognitive impairment) [G31.84]  Yes    Neuropathy [G62.9]  Yes    History of adenocarcinoma of prostate [Z85.46]  Not Applicable    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       78 y.o. male admitted with Compression fracture of T12 vertebra after multiple falls in setting of Parkinson's, peripheral neuropathy, and chronic mobility/balance issues.    Acute T12 Compression Fracture  - s/p kyphoplasty 7/14 by Dr. Gilman  - continue Lidoderm, PRN Lortab dc'd today given sedation  - appreciate KAUSHAL attention to pt  - outpt f/u in 2 weeks     Parkinson's Disease  Sensory polyneuropathy  Chronic balance/mobility issues  RLS  Mild cognitive impairment  - continue Sinemet, there was some confusion as to his appropriate regimen--asked Neuro for assistance clarifying  - he is to continue Sinemet 4 times daily as well as a dose of Sinemet CR at bedtime (first bedtime dose tonight)  - Limit opioid analgesia as able  - Requip recently changed to Gabapentin PRN prior to admission, not receiving Gabapentin here     AMS, delirium  - suspected toxic encephalopathy (recurrent) due to Zyprexa initially and opioids now  - no focal deficits and head CT  negative  - appreciate Neuro eval/recs today  - holding any further neuroleptic medications or opioids  - check CT Head today     CAD/HTN  - BP quite labile, probably secondary to pain/agitation, no anginal symptoms  - continue lisinopril and metoprolol when able to take PO  - on lipitor, plavix held for kyphoplasty--can resume on 7/22/23 per KAUSHAL  - appreciate Cardiology evaluation here     Hx of Renal Cell Carcinoma  - s/p partial right nephrectomy 2018      SCDs for DVT prophylaxis.  Full code.  Discussed with patient and CCP. D/w wife at bedside. D/w CCP.  Anticipate discharge to SNU facility, timing yet to be determined. Hopefully can have ready for dc tomorrow if he is more awake.      Jaylon Faulkner MD  [facility]  07/18/23  12:50 EDT

## 2023-07-18 NOTE — CONSULTS
Nutrition Services    Patient Name:  Loni Sanford  YOB: 1945  MRN: [id number]  Admit Date:  7/9/2023    Cortrak tube placement attempted. Unable to get tube to advance past 50cm without tube coiling up in esophagus. Placement unsuccessful. RN aware and contacting MD. Recommend placement in IR/fluoro.     Electronically signed by:  Myra Squires RD  07/18/23 16:11 EDT

## 2023-07-18 NOTE — PLAN OF CARE
Goal Outcome Evaluation:      Pt alert to self at baseline, vitals signs WDL, turned q2. RRT called today, see previous note. Pt unable to receive medications dt decreased LOC. Pt in need on cavadopa levadopa if able to swallow. Pt in bed resting with wife at bedside, this rn will continue to monitor   Problem: Adult Inpatient Plan of Care  Goal: Plan of Care Review  Outcome: Ongoing, Progressing

## 2023-07-18 NOTE — CODE DOCUMENTATION
Pt incontinent of urne bladder scaned 5 cc present wifehad stated he had not gone all day NA sated he did use the urinal earlier today. NP Neurology called agrees probably decreased LOC related to not getting hiskinsons meds, attempted NG but unable to pass.  Care over to Primary RN who is contacting Dr Faulkner with update.  Pt now answering questions follows commands but remains very sleepy.  CG

## 2023-07-18 NOTE — PROGRESS NOTES
Pt still poorly responsive  CT head NAD  BG fine  ABG fine  No sign of infection  No improvement with dose of Narcan  Have d/w RN and Neuro all afternoon  Unable to pass CorTrak or NG tube  Neuro feels decreased LOC could be due to not receiving Parkinson's meds  D/w RN--Neuro offered to convert Sinemet to ODT formulation, I think this is best course of action at present, should be safest possible course  Continue to monitor closely for any changes in condition

## 2023-07-18 NOTE — PLAN OF CARE
Problem: Adult Inpatient Plan of Care  Goal: Plan of Care Review  Outcome: Ongoing, Progressing   Goal Outcome Evaluation:  VSS. BIGG. SR-ST. Alert and oriented x4. Pt does sundown at night time, has intermittent confusion with this. Pt was calm and cooperative all night last night, did not try to get out of bed. Spouse stayed the night with pt. Pt rested throughout the night with no issues or complaints.

## 2023-07-18 NOTE — PROGRESS NOTES
"Physicians Statement of Medical Necessity for  Ambulance Transportation    GENERAL INFORMATION     Name: Loni Sanford  YOB: 1945  Medicare #: [employer] [id number]  Transport Date: 07/19/23     (Origin: [facility]   Destination [facility]   Is the Patient's stay covered under Medicare Part A (PPS/DRG?)Yes  Closest appropriate facility? Yes  If this a hosp-hosp transfer? No  Is this a hospice patient? No    MEDICAL NECESSITY QUESTIONAIRE    Ambulance Transportation is medically necessary only if other means of transportation are contraindicated or would be potentially harmful to the patient.  To meet this requirement, the patient must be either \"bed confined\" or suffer from a condition such that transport by means other than an ambulance is contraindicated by the patient's condition.  The following questions must be answered by the healthcare professional signing below for this form to be valid:     1) Describe the MEDICAL CONDITION (physical and/or mental) of this patient AT THE TIME OF AMBULANCE TRANSPORT that requires the patient to be transported in an ambulance, and why transport by other means is contraindicated by the patient's condition  Patient Active Problem List    Diagnosis     *Compression fracture of T12 vertebra [S22.080A]     RLS (restless legs syndrome) [G25.81]     Recurrent falls [R29.6]     Encounter for screening for malignant neoplasm of colon [Z12.11]     History of colon polyps [Z86.010]     Primary hypertension [I10]     Coronary artery disease [I25.10]     Hyperlipidemia LDL goal <70 [E78.5]     Stable angina [I20.8]     MCI (mild cognitive impairment) [G31.84]     Neuropathy [G62.9]     History of adenocarcinoma of prostate [Z85.46]     Ureteral calculus, right [N20.1]     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]     Parkinson disease [G20]        Past Medical History:   Diagnosis Date    Cancer     Prostate cancer 2013    Cancer     kidney dx " "4/2018    Coronary artery disease     Dizziness     occ    Hematuria     History of angina     carries NTG    History of transfusion     Hyperlipidemia     Hypertension     Kidney stones     new one 12/2018    Kidney tumor     RIGHT removed 4/2018    Parkinson disease     Renal mass, right     surgically removed 4/2018      Past Surgical History:   Procedure Laterality Date    APPENDECTOMY  2011    CARDIAC CATHETERIZATION      CATARACT EXTRACTION W/ INTRAOCULAR LENS  IMPLANT, BILATERAL      COLONOSCOPY N/A 5/9/2023    Procedure: COLONOSCOPY FOR SCREENING;  Surgeon: Terrell Zhang MD;  Location: [facility] OR;  Service: Gastroenterology;  Laterality: N/A;  poor prep, polyp    EXTRACORPOREAL SHOCKWAVE LITHOTRIPSY (ESWL), STENT INSERTION/REMOVAL Right 12/29/2017    Procedure: RIGHT EXTRACORPOREAL SHOCKWAVE LITHOTRIPSY CYSTOSCOPY STENT PLACEMENT;  Surgeon: Marcelo Suarez MD;  Location: [facility];  Service:     EXTRACORPOREAL SHOCKWAVE LITHOTRIPSY (ESWL), STENT INSERTION/REMOVAL Right 12/28/2018    Procedure: RT EXTRACORPREAL SHOCKWAVE LITHOTRIPSY CYSTOSCOPY  WITH  MANIPULATION OF STONE;  Surgeon: Moe Vasquez MD;  Location: [facility] OR [facility];  Service: Urology    FINGER SURGERY Left 1984    INDEX    HAND SURGERY Right 1998    KYPHOPLASTY N/A 7/14/2023    Procedure: KYPHOPLASTY 1-2 LEVELS;  Surgeon: Jeison Gilman MD;  Location: [facility] OR 18/19;  Service: Neurosurgery;  Laterality: N/A;    NEPHRECTOMY PARTIAL Right 4/5/2018    Procedure: RT OPEN PARTIAL NEPHRECTOMY WITH INTRAOPERATIVE DOPPLER, DECORTICATION OF RIGHT RENAL CYST;  Surgeon: Marcelo Suarez MD;  Location: [facility];  Service: Urology    PROSTATECTOMY  2013      2) Is this patient \"bed confined\" as defined below?Yes   To be \"bed confined\" the patient must satisfy all three of the following criteria:  (1) unable to get up from bed without assistance; AND (2) unable to ambulate;  AND (3) unable to sit in a chair or " wheelchair.  3) Can this patient safely be transported by car or wheelchair van (I.e., may safely sit during transport, without an attendant or monitoring?)No   4. In addition to completing questions 1-3 above, please check any of the following conditions that apply*:          *Note: supporting documentation for any boxes checked must be maintained in the patient's medical records Non-healed fractures, Medical attendant required, and Unable to tolerate seated position for time needed to transport      SIGNATURE OF PHYSICIAN OR OTHER AUTHORIZED HEALTHCARE PROFESSIONAL    I certify that the above information is true and correct based on my evaluation of this patient, and represent that the patient requires transport by ambulance and that other forms of transport are contraindicated.  I understand that this information will be used by the Centers for Medicare and Medicaid Services (CMS) to support the determiniation of medical necessity for ambulance services, and I represent that I have personal knowledge of the patient's condition at the time of transport.       If this box is checked, I also certify that the patient is physically or mentally incapable of signing the ambulance service's claim form and that the institution with which I am affiliated has furnished care, services or assistance to the patient.  My signature below is made on behalf of the patient pursuant to 42 .36(b)(4). In accordance with 42 .37, the specific reason(s) that the patient is physically or mentally incapable of signing the claim for is as follows:     Signature of Physician or Healthcare Professional  Date/Time:        (For Scheduled repetitive transport, this form is not valid for transports performed more than 60 days after this date).                                                                                                                                             --------------------------------------------------------------------------------------------  Printed Name and Credentials of Physician or Authorized Healthcare Professional     *Form must be signed by patient's attending physician for scheduled, repetitive transports,.  For non-repetitive ambulance transports, if unable to obtain the signature of the attending physician, any of the following may sign (please select below):     Physician  Clinical Nurse Specialist  Registered Nurse     Physician Assistant  Discharge Planner  Licensed Practical Nurse     Nurse Practitioner

## 2023-07-18 NOTE — PLAN OF CARE
Goal Outcome Evaluation:              Outcome Evaluation: Alert and oriented x4 has periods of confusion that increases in the evening. Up walking with bed alarm sounding in hallway. Awaiting placement for rehab. Helped back to bed and bed alarm on. Wife at bedside.

## 2023-07-18 NOTE — CODE DOCUMENTATION
Blood sugar 84 RR 8 at 98 on 2.5 liters n/c  /91 afebrile with skin pink warm dry.  After ABG done now breathing 12 min.   Abg within normal.  Attempts to squeeze hand brandyn = 1+ as well as wiggle toes = 1+ brandyn resistant to open eyes does not open eyes to verbal or tactile stimuli.  Primary RN calling NP back  [place].  CG

## 2023-07-18 NOTE — SIGNIFICANT NOTE
07/18/23 1304   OTHER   Discipline occupational therapist   Rehab Time/Intention   Session Not Performed patient/family declined treatment  (Pt observed sleep and unable to awake. Pt's wife declines tx this date but agreeable to follow up tomorrrow.)   Therapy Assessment/Plan (PT)   Criteria for Skilled Interventions Met (PT) skilled treatment is necessary;yes   Recommendation   OT - Next Appointment 07/18/23

## 2023-07-18 NOTE — CASE MANAGEMENT/SOCIAL WORK
Continued Stay Note  [facility]     Patient Name: Loni Sanford  MRN: [id number]  Today's Date: 7/18/2023    Admit Date: 7/9/2023    Plan: [facility] precert obtained   Discharge Plan        Precert submitted and auto-approved for admission to [facility] on 7/18/2023. YODIT Lewis and natalia Fitzgerald. Approval faxed to Lia with [facility] at [phone]. Estelle JACKSON                     Discharge Codes    No documentation.                 Expected Discharge Date and Time       Expected Discharge Date Expected Discharge Time    Jul 18, 2023               Estelle Atkins RN

## 2023-07-19 LAB
ALBUMIN SERPL-MCNC: 3.3 G/DL (ref 3.5–5.2)
ALBUMIN/GLOB SERPL: 1.2 G/DL
ALP SERPL-CCNC: 155 U/L (ref 39–117)
ALT SERPL W P-5'-P-CCNC: <5 U/L (ref 1–41)
ANION GAP SERPL CALCULATED.3IONS-SCNC: 12.4 MMOL/L (ref 5–15)
AST SERPL-CCNC: 9 U/L (ref 1–40)
BASOPHILS # BLD AUTO: 0.02 10*3/MM3 (ref 0–0.2)
BASOPHILS NFR BLD AUTO: 0.3 % (ref 0–1.5)
BILIRUB SERPL-MCNC: 0.9 MG/DL (ref 0–1.2)
BUN SERPL-MCNC: 26 MG/DL (ref 8–23)
BUN/CREAT SERPL: 33.3 (ref 7–25)
CALCIUM SPEC-SCNC: 8.9 MG/DL (ref 8.6–10.5)
CHLORIDE SERPL-SCNC: 107 MMOL/L (ref 98–107)
CO2 SERPL-SCNC: 22.6 MMOL/L (ref 22–29)
CREAT SERPL-MCNC: 0.78 MG/DL (ref 0.76–1.27)
DEPRECATED RDW RBC AUTO: 38.9 FL (ref 37–54)
EGFRCR SERPLBLD CKD-EPI 2021: 91.9 ML/MIN/1.73
EOSINOPHIL # BLD AUTO: 0.1 10*3/MM3 (ref 0–0.4)
EOSINOPHIL NFR BLD AUTO: 1.3 % (ref 0.3–6.2)
ERYTHROCYTE [DISTWIDTH] IN BLOOD BY AUTOMATED COUNT: 11.6 % (ref 12.3–15.4)
GLOBULIN UR ELPH-MCNC: 2.7 GM/DL
GLUCOSE SERPL-MCNC: 74 MG/DL (ref 65–99)
HCT VFR BLD AUTO: 36.2 % (ref 37.5–51)
HGB BLD-MCNC: 12.6 G/DL (ref 13–17.7)
IMM GRANULOCYTES # BLD AUTO: 0.04 10*3/MM3 (ref 0–0.05)
IMM GRANULOCYTES NFR BLD AUTO: 0.5 % (ref 0–0.5)
LYMPHOCYTES # BLD AUTO: 0.86 10*3/MM3 (ref 0.7–3.1)
LYMPHOCYTES NFR BLD AUTO: 10.9 % (ref 19.6–45.3)
MAGNESIUM SERPL-MCNC: 2.2 MG/DL (ref 1.6–2.4)
MCH RBC QN AUTO: 32.3 PG (ref 26.6–33)
MCHC RBC AUTO-ENTMCNC: 34.8 G/DL (ref 31.5–35.7)
MCV RBC AUTO: 92.8 FL (ref 79–97)
MONOCYTES # BLD AUTO: 0.8 10*3/MM3 (ref 0.1–0.9)
MONOCYTES NFR BLD AUTO: 10.2 % (ref 5–12)
NEUTROPHILS NFR BLD AUTO: 6.04 10*3/MM3 (ref 1.7–7)
NEUTROPHILS NFR BLD AUTO: 76.8 % (ref 42.7–76)
NRBC BLD AUTO-RTO: 0 /100 WBC (ref 0–0.2)
PLATELET # BLD AUTO: 175 10*3/MM3 (ref 140–450)
PMV BLD AUTO: 9.2 FL (ref 6–12)
POTASSIUM SERPL-SCNC: 4.2 MMOL/L (ref 3.5–5.2)
PROT SERPL-MCNC: 6 G/DL (ref 6–8.5)
RBC # BLD AUTO: 3.9 10*6/MM3 (ref 4.14–5.8)
SODIUM SERPL-SCNC: 142 MMOL/L (ref 136–145)
WBC NRBC COR # BLD: 7.86 10*3/MM3 (ref 3.4–10.8)

## 2023-07-19 PROCEDURE — 80053 COMPREHEN METABOLIC PANEL: CPT | Performed by: RADIOLOGY

## 2023-07-19 PROCEDURE — 97530 THERAPEUTIC ACTIVITIES: CPT

## 2023-07-19 PROCEDURE — 85025 COMPLETE CBC W/AUTO DIFF WBC: CPT | Performed by: RADIOLOGY

## 2023-07-19 PROCEDURE — 83735 ASSAY OF MAGNESIUM: CPT | Performed by: HOSPITALIST

## 2023-07-19 PROCEDURE — 97110 THERAPEUTIC EXERCISES: CPT

## 2023-07-19 PROCEDURE — 99232 SBSQ HOSP IP/OBS MODERATE 35: CPT | Performed by: PHYSICIAN ASSISTANT

## 2023-07-19 RX ADMIN — LIDOCAINE 1 PATCH: 700 PATCH TOPICAL at 09:12

## 2023-07-19 RX ADMIN — CARBIDOPA AND LEVODOPA 1 TABLET: 50; 200 TABLET, EXTENDED RELEASE ORAL at 20:37

## 2023-07-19 RX ADMIN — LISINOPRIL 5 MG: 5 TABLET ORAL at 09:12

## 2023-07-19 RX ADMIN — Medication 10 ML: at 20:37

## 2023-07-19 RX ADMIN — CARBIDOPA AND LEVODOPA 1 TABLET: 25; 250 TABLET ORAL at 18:00

## 2023-07-19 RX ADMIN — CHOLECALCIFEROL TAB 10 MCG (400 UNIT) 1000 UNITS: 10 TAB at 09:11

## 2023-07-19 RX ADMIN — METOPROLOL SUCCINATE 12.5 MG: 25 TABLET, EXTENDED RELEASE ORAL at 09:12

## 2023-07-19 RX ADMIN — SODIUM CHLORIDE 75 ML/HR: 9 INJECTION, SOLUTION INTRAVENOUS at 05:00

## 2023-07-19 RX ADMIN — ACETAMINOPHEN 650 MG: 325 TABLET, FILM COATED ORAL at 20:36

## 2023-07-19 RX ADMIN — CARBIDOPA AND LEVODOPA 1 TABLET: 25; 250 TABLET ORAL at 11:30

## 2023-07-19 RX ADMIN — SENNOSIDES AND DOCUSATE SODIUM 2 TABLET: 50; 8.6 TABLET ORAL at 20:36

## 2023-07-19 RX ADMIN — CARBIDOPA AND LEVODOPA 1 TABLET: 25; 250 TABLET ORAL at 09:11

## 2023-07-19 RX ADMIN — ATORVASTATIN CALCIUM 20 MG: 20 TABLET, FILM COATED ORAL at 20:37

## 2023-07-19 RX ADMIN — CARBIDOPA AND LEVODOPA 1 TABLET: 25; 250 TABLET ORAL at 20:36

## 2023-07-19 NOTE — PROGRESS NOTES
"DOS: 2023  NAME: Loni Sanford   : 1945  PCP: Zo Estrada MD  Chief Complaint   Patient presents with    Abdominal Pain    Back Pain       Chief complaint: AMS  Subjective: Patient alert this morning.  He actually got his p.o. meds last night and this morning.  He says that he is hurting and shaky \"stop.  No headache or other neurologic complaint    Objective:  Vital signs: /93 (BP Location: Right arm, Patient Position: Lying)   Pulse 84   Temp 98.4 °F (36.9 °C) (Oral)   Resp 16   Ht 175.3 cm (69\")   Wt 87.5 kg (193 lb)   SpO2 97%   BMI 28.50 kg/m²      Gen: NAD, vitals reviewed  MS: oriented x3, recent/remote memory intact, normal attention/concentration, language intact, no neglect.  CN: visual acuity grossly normal, PERRL, EOMI, no facial droop, no dysarthria, hypophonia  Motor: Slowed movements and increased tone especially of right leg and arm resting tremor here, antigravity and symmetric strength Sensory: intact to light touch all 4 ext.    ROS:  No weakness, numbness  No fevers, chills      Laboratory results:  Lab Results   Component Value Date    GLUCOSE 74 2023    CALCIUM 8.9 2023     2023    K 4.2 2023    CO2 22.6 2023     2023    BUN 26 (H) 2023    CREATININE 0.78 2023    EGFRIFAFRI 75 2021    EGFRIFNONA 62 2021    BCR 33.3 (H) 2023    ANIONGAP 12.4 2023     Lab Results   Component Value Date    WBC 7.86 2023    HGB 12.6 (L) 2023    HCT 36.2 (L) 2023    MCV 92.8 2023     2023     Lab Results   Component Value Date    LDL 56 2023    LDL 54 2022    LDL 44 2021            Review of labs:     Review and interpretation of imaging:     Workup to date:    Diagnoses:  Encephalopathy  PD  Thoracic compression fracture     Impression: 79yo M with PMH prostate and renal cancer, CAD, HLD, HTN, PD for four years and follows at [facility].  He " presented after a fall and underwent kyphoplasty on the 13th.  Neuro consulted for AMS.  His exam seems consistent with delirium.  He has been receiving opioids and zyprexa.  He is especially parkinsonian today despite CR of 500mg of levadopa.  This is not his home dose.  Discussed with wife and pharmacist.  I have corrected his MAR and will initiate his CR dosing      Plan:  1 sinemet 25/250 four times daily at 7, 11, 1500, 1900, and CR tablet 50/200 at bedtime.  Avoid delays in dosing     Nonpharmacologic delirium precautions: avoid restraints and Wright catheters if able, make personal eyeglasses and hearing aids accessible to pt, encourage family visitation or hospital sitter if able, provide frequent re-orientation, reassurance.  If indicated Haldol 0.5 mg or 1 mg may be used to control agitation.    Patient improved this morning.  He is alert and oriented, cognitively appropriate.  He still appears very Parkinson but I will continue his usual home dose and hopefully this improves the longer he receives his usual maintenance.  I agree with a judicious restart of  his medications like gabapentin and even doxepin if primary team feels appropriate    No further neurologic recommendations.  We will be signing off at this time.  Do not hesitate to contact our team if questions or concerns      Thank you for this consultation.  Discussed above plan with neuro attending, Dr. Pickard who agrees with above plan.  Neurology team is available for concerns or questions.

## 2023-07-19 NOTE — PLAN OF CARE
Goal Outcome Evaluation:      Noted rapid response team alerted prior to shift change. At the beginning of shift, Pt was arousable but needed constant verbal cues to open eyes. Following commands and was able to lift BUE & BLE for 5s. Performs dorsiflexion and plantarflexion as well. Pt was able to answer all orientation questions including reason why he was admitted to the hospital. Dysphagia screen performed and pt passed with no signs of aspirating nor pocketing. Pt took medications well crushed and tolerated whole pill of sinemet CR scheduled at 2300. Pt still need frequent verbal cueing to open eyes this AM; noted fluttering of eyelids as an attempt to open eyes but still able to answer questions appropriately. Voiding well. Was able to request for assistance to use the urinal. Oral care performed this AM. No further changes noted taken from baseline assessment at start of shift.

## 2023-07-19 NOTE — PLAN OF CARE
Goal Outcome Evaluation:  Plan of Care Reviewed With: patient        Progress: improving  Outcome Evaluation: Pt seen for PT treatment today. Pt is ModAX2 with bed mobility and completed 2 STS transfers. Pt initially needed MoDAX2 with first transfer and MinAX2 with second transfer. Pt ambulated about 3ft with rwx, CGA. Pt started to c/o dizziness, declined futher ambulation today but did get up to the chair. Will continue to progress pt as able.

## 2023-07-19 NOTE — THERAPY TREATMENT NOTE
Patient Name: Loni Sanford  : 1945    MRN: [id number]                              Today's Date: 2023       Admit Date: 2023    Visit Dx:     ICD-10-CM ICD-9-CM   1. Compression fracture of T12 vertebra, initial encounter  S22.080A 805.2   2. Lower abdominal pain  R10.30 789.09   3. Parkinson's disease  G20 332.0     Patient Active Problem List   Diagnosis    Ureteral calculus, right    Clear cell carcinoma of kidney    Coronary artery disease    History of adenocarcinoma of prostate    Hyperlipidemia LDL goal <70    MCI (mild cognitive impairment)    Neuropathy    Parkinson disease    Stable angina    Primary hypertension    Encounter for screening for malignant neoplasm of colon    History of colon polyps    Recurrent falls    Compression fracture of T12 vertebra    RLS (restless legs syndrome)     Past Medical History:   Diagnosis Date    Cancer     Prostate cancer     Cancer     kidney dx 2018    Coronary artery disease     Dizziness     occ    Hematuria     History of angina     carries NTG    History of transfusion     Hyperlipidemia     Hypertension     Kidney stones     new one 2018    Kidney tumor     RIGHT removed 2018    Parkinson disease     Renal mass, right     surgically removed 2018     Past Surgical History:   Procedure Laterality Date    APPENDECTOMY      CARDIAC CATHETERIZATION      CATARACT EXTRACTION W/ INTRAOCULAR LENS  IMPLANT, BILATERAL      COLONOSCOPY N/A 2023    Procedure: COLONOSCOPY FOR SCREENING;  Surgeon: Terrell Zhang MD;  Location: [facility] MAIN OR;  Service: Gastroenterology;  Laterality: N/A;  poor prep, polyp    EXTRACORPOREAL SHOCKWAVE LITHOTRIPSY (ESWL), STENT INSERTION/REMOVAL Right 2017    Procedure: RIGHT EXTRACORPOREAL SHOCKWAVE LITHOTRIPSY CYSTOSCOPY STENT PLACEMENT;  Surgeon: Marcelo Suarez MD;  Location: [facility];  Service:     EXTRACORPOREAL SHOCKWAVE LITHOTRIPSY (ESWL), STENT INSERTION/REMOVAL Right 2018     Procedure: RT EXTRACORPREAL SHOCKWAVE LITHOTRIPSY CYSTOSCOPY  WITH  MANIPULATION OF STONE;  Surgeon: Moe Vasquez MD;  Location: [facility];  Service: Urology    FINGER SURGERY Left 1984    INDEX    HAND SURGERY Right 1998    KYPHOPLASTY N/A 7/14/2023    Procedure: KYPHOPLASTY 1-2 LEVELS;  Surgeon: Jeison Gilman MD;  Location: [facility] OR 18/19;  Service: Neurosurgery;  Laterality: N/A;    NEPHRECTOMY PARTIAL Right 4/5/2018    Procedure: RT OPEN PARTIAL NEPHRECTOMY WITH INTRAOPERATIVE DOPPLER, DECORTICATION OF RIGHT RENAL CYST;  Surgeon: Marcelo Suarez MD;  Location: [facility] MAIN OR;  Service: Urology    PROSTATECTOMY  2013      General Information       Row Name 07/19/23 1357          OT Time and Intention    Document Type therapy note (daily note)  -PP     Mode of Treatment co-treatment;occupational therapy;physical therapy  d/t pt decreased endurance and act. tolerance.  -PP       Row Name 07/19/23 135          General Information    Patient Profile Reviewed yes  -PP     Existing Precautions/Restrictions spinal;fall  -PP       Row Name 07/19/23 0580          Cognition    Orientation Status (Cognition) oriented x 3  -PP       Row Name 07/19/23 1355          Safety Issues, Functional Mobility    Impairments Affecting Function (Mobility) balance;endurance/activity tolerance;strength;coordination;postural/trunk control  -PP               User Key  (r) = Recorded By, (t) = Taken By, (c) = Cosigned By      Initials Name Provider Type    PP Herson Gonzalez OT Occupational Therapist                     Mobility/ADL's       Row Name 07/19/23 1401          Bed Mobility    Bed Mobility sit-supine;supine-sit;scooting/bridging  -PP     All Activities, [place] (Bed Mobility) moderate assist (50% patient effort);2 person assist  -PP     Scooting/Bridging [place] (Bed Mobility) 2 person assist;moderate assist (50% patient effort);verbal cues  -PP     Supine-Sit [place] (Bed Mobility)  2 person assist;moderate assist (50% patient effort);verbal cues  -PP     Sit-Supine [place] (Bed Mobility) moderate assist (50% patient effort);2 person assist  -PP     Assistive Device (Bed Mobility) bed rails;head of bed elevated;draw sheet  -PP       Row Name 07/19/23 1401          Transfers    Transfers sit-stand transfer;bed-chair transfer;stand-sit transfer  -PP       Row Name 07/19/23 1401          Bed-Chair Transfer    Bed-Chair [place] (Transfers) minimum assist (75% patient effort);2 person assist  -PP     Assistive Device (Bed-Chair Transfers) walker, 4-wheeled  -PP     Comment, (Bed-Chair Transfer) VC for correct hand and foot placement during xfer.  -PP       Row Name 07/19/23 1401          Sit-Stand Transfer    Sit-Stand [place] (Transfers) moderate assist (50% patient effort);2 person assist;minimum assist (75% patient effort)  -PP     Comment, (Sit-Stand Transfer) inital STS MODAx 2 and second STS MIN Ax2  -PP       Row Name 07/19/23 1401          Stand-Sit Transfer    Stand-Sit [place] (Transfers) minimum assist (75% patient effort);2 person assist  -PP       Row Name 07/19/23 1401          Functional Mobility    Functional Mobility- Ind. Level minimum assist (75% patient effort)  -PP     Functional Mobility- Device walker, front-wheeled  -PP     Functional Mobility- Comment from bed to chair for short household distance  -PP       Row Name 07/19/23 1401          Activities of Daily Living    BADL Assessment/Intervention lower body dressing  -PP       Row Name 07/19/23 1401          Lower Body Dressing Assessment/Training    [place] Level (Lower Body Dressing) don;socks;dependent (less than 25% patient effort)  -PP     Position (Lower Body Dressing) supine  -PP       Row Name 07/19/23 1401          Self-Feeding Assessment/Training    Comment, (Feeding) pt decreased endurance this date.  -PP               User Key  (r) = Recorded By, (t) = Taken By, (c) = Cosigned By       Initials Name Provider Type    PP Herson Gonzalez OT Occupational Therapist                   Obj/Interventions       Row Name 07/19/23 1445          Balance    Balance Assessment sitting static balance;sitting dynamic balance;sit to stand dynamic balance;standing static balance;standing dynamic balance  -PP     Static Sitting Balance standby assist;verbal cues  -PP     Dynamic Sitting Balance standby assist;verbal cues  -PP     Position, Sitting Balance sitting edge of bed;unsupported  -PP     Sit to Stand Dynamic Balance minimal assist;moderate assist;2-person assist;verbal cues  -PP     Static Standing Balance contact guard;minimal assist;2-person assist  -PP     Dynamic Standing Balance minimal assist;verbal cues;2-person assist  -PP     Position/Device Used, Standing Balance walker, front-wheeled  -PP     Balance Interventions sitting;standing;static;supported;sit to stand;dynamic;minimal challenge  -PP               User Key  (r) = Recorded By, (t) = Taken By, (c) = Cosigned By      Initials Name Provider Type    PP Herson Gonzalez OT Occupational Therapist                   Goals/Plan    No documentation.                  Clinical Impression       Row Name 07/19/23 1450          Pain Assessment    Pretreatment Pain Rating 0/10 - no pain  -PP     Posttreatment Pain Rating 0/10 - no pain  -PP     Pre/Posttreatment Pain Comment no c/o pain this date  -PP       Row Name 07/19/23 1450          Plan of Care Review    Plan of Care Reviewed With patient  -PP     Progress improving  -PP     Outcome Evaluation Pt is A&Ox 3 and agreeable. Pt MOD Ax2 for bed mob, scooting up in bed, supine to sit EOB, sit to supine using bed rails, draw sheet and VC. Pt sat EOB SBA. Pt was DEP for donning socks supine in bed d/t limited ROM and function resulting from c/o dizziness w/ activity. Pt MIN Ax 2 for bed to chair stand pivot step sit xfer using RWX. Pt left UIC at session end. Pt declined self care tasks and exercise  d/t decreased endurance/ activity tolerance and c/o dizziness this date. DC rec SNF or IP rehb, OT will continue to monitor for progress.  -PP       Row Name 07/19/23 1450          Therapy Plan Review/Discharge Plan (OT)    Anticipated Discharge Disposition (OT) inpatient rehabilitation facility;skilled nursing facility  -PP       Row Name 07/19/23 1450          Positioning and Restraints    Pre-Treatment Position in bed  -PP     Post Treatment Position chair  -PP     In Chair reclined;call light within reach;encouraged to call for assist;exit alarm on;notified nsg  -PP               User Key  (r) = Recorded By, (t) = Taken By, (c) = Cosigned By      Initials Name Provider Type    PP Herson Gonzalez, ISELA Occupational Therapist                   Outcome Measures       Row Name 07/19/23 1500          How much help from another is currently needed...    Putting on and taking off regular lower body clothing? 2  -PP     Bathing (including washing, rinsing, and drying) 2  -PP     Toileting (which includes using toilet bed pan or urinal) 2  -PP     Putting on and taking off regular upper body clothing 3  -PP     Taking care of personal grooming (such as brushing teeth) 3  -PP     Eating meals 4  -PP     AM-PAC 6 Clicks Score (OT) 16  -PP       Row Name 07/19/23 1333          How much help from another person do you currently need...    Turning from your back to your side while in flat bed without using bedrails? 3  -EB     Moving from lying on back to sitting on the side of a flat bed without bedrails? 2  -EB     Moving to and from a bed to a chair (including a wheelchair)? 3  -EB     Standing up from a chair using your arms (e.g., wheelchair, bedside chair)? 3  -EB     Climbing 3-5 steps with a railing? 1  -EB     To walk in hospital room? 2  -EB     AM-PAC 6 Clicks Score (PT) 14  -EB     Highest level of mobility 4 --> Transferred to chair/commode  -EB       Row Name 07/19/23 1500          Functional Assessment     Outcome Measure Options AM-PAC 6 Clicks Daily Activity (OT)  -PP               User Key  (r) = Recorded By, (t) = Taken By, (c) = Cosigned By      Initials Name Provider Type    Darlin De Jesus PTA Physical Therapist Assistant    Herson Quiroz OT Occupational Therapist                    Occupational Therapy Education       Title: PT OT SLP Therapies (Done)       Topic: Occupational Therapy (Done)       Point: ADL training (Done)       Description:   Instruct learner(s) on proper safety adaptation and remediation techniques during self care or transfers.   Instruct in proper use of assistive devices.                  Learning Progress Summary             Patient Acceptance, E,TB, VU by DR at 7/16/2023 1057    Eager, E, VU by TL at 7/15/2023 1326    Acceptance, E, VU by TL at 7/14/2023 1344    Acceptance, E, VU,DU by  at 7/12/2023 1217    Comment: spinal precautions during mobility and ADLs, educated family on recommended bathroom DME/set up for fall prevention to maximize safety in the home.   Family Acceptance, E,TB, VU by DR at 7/16/2023 1057    Acceptance, E, VU by TL at 7/14/2023 1344    Acceptance, E, VU,DU by  at 7/12/2023 1217    Comment: spinal precautions during mobility and ADLs, educated family on recommended bathroom DME/set up for fall prevention to maximize safety in the home.                         Point: Home exercise program (Done)       Description:   Instruct learner(s) on appropriate technique for monitoring, assisting and/or progressing therapeutic exercises/activities.                  Learning Progress Summary             Patient Acceptance, E,TB, VU by DR at 7/16/2023 1057    Eager, E, VU by TL at 7/15/2023 1326    Acceptance, E, VU by TL at 7/14/2023 1344   Family Acceptance, E,TB, VU by DR at 7/16/2023 1057    Acceptance, E, VU by TL at 7/14/2023 1344                         Point: Precautions (Done)       Description:   Instruct learner(s) on prescribed precautions during  self-care and functional transfers.                  Learning Progress Summary             Patient Acceptance, E,TB, VU by  at 7/16/2023 1057    Eager, E, VU by TL at 7/15/2023 1326    Acceptance, E, VU by TL at 7/14/2023 1344    Acceptance, E, VU,DU by  at 7/12/2023 1217    Comment: spinal precautions during mobility and ADLs, educated family on recommended bathroom DME/set up for fall prevention to maximize safety in the home.   Family Acceptance, E,TB, VU by  at 7/16/2023 1057    Acceptance, E, VU by TL at 7/14/2023 1344    Acceptance, E, VU,DU by  at 7/12/2023 1217    Comment: spinal precautions during mobility and ADLs, educated family on recommended bathroom DME/set up for fall prevention to maximize safety in the home.                         Point: Body mechanics (Done)       Description:   Instruct learner(s) on proper positioning and spine alignment during self-care, functional mobility activities and/or exercises.                  Learning Progress Summary             Patient Acceptance, E,TB, VU by  at 7/16/2023 1057    Eager, E, VU by TL at 7/15/2023 1326    Acceptance, E, VU by TL at 7/14/2023 1344   Family Acceptance, E,TB, VU by  at 7/16/2023 1057    Acceptance, E, VU by  at 7/14/2023 1344                                         User Key       Initials Effective Dates Name Provider Type Discipline    MELANI 04/02/20 -  Lou Alexander OT Occupational Therapist OT     12/27/22 -  Devaughn Bran, RN Registered Nurse Nurse     05/24/23 -  Coy Santos, HOLLEY Physical Therapist PT                  OT Recommendation and Plan     Plan of Care Review  Plan of Care Reviewed With: patient  Progress: improving  Outcome Evaluation: Pt is A&Ox 3 and agreeable. Pt MOD Ax2 for bed mob, scooting up in bed, supine to sit EOB, sit to supine using bed rails, draw sheet and VC. Pt sat EOB SBA. Pt was DEP for donning socks supine in bed d/t limited ROM and function resulting from c/o dizziness w/ activity.  Pt MIN Ax 2 for bed to chair stand pivot step sit xfer using RWX. Pt left UIC at session end. Pt declined self care tasks and exercise d/t decreased endurance/ activity tolerance and c/o dizziness this date. DC rec SNF or IP rehb, OT will continue to monitor for progress.     Time Calculation:         Time Calculation- OT       Row Name 07/19/23 1453             Time Calculation- OT    OT Start Time 1015  -PP      OT Stop Time 1041  -PP      OT Time Calculation (min) 26 min  -PP      Total Timed Code Minutes- OT 26 minute(s)  -PP      OT Received On 07/19/23  -PP      OT - Next Appointment 07/20/23  -PP         Timed Charges    99017 - OT Therapeutic Exercise Minutes --  -PP      69858 - OT Therapeutic Activity Minutes 26  -PP         Total Minutes    Timed Charges Total Minutes 26  -PP       Total Minutes 26  -PP                User Key  (r) = Recorded By, (t) = Taken By, (c) = Cosigned By      Initials Name Provider Type    PP Herson Gonzalez, OT Occupational Therapist                  Therapy Charges for Today       Code Description Service Date Service Provider Modifiers Qty    [id number]  OT THERAPEUTIC ACT EA 15 MIN 7/19/2023 Herson Gonzalez, OT GO 2                 Porluly Gonzalez, OT  7/19/2023

## 2023-07-19 NOTE — PLAN OF CARE
Goal Outcome Evaluation:  Plan of Care Reviewed With: patient        Progress: improving  Outcome Evaluation: Pt is A&Ox 3 and agreeable. Pt MOD Ax2 for bed mob, scooting up in bed, supine to sit EOB, sit to supine using bed rails, draw sheet and VC. Pt sat EOB SBA. Pt was DEP for donning socks supine in bed d/t limited ROM and function resulting from c/o dizziness w/ activity. Pt MIN Ax 2 for bed to chair stand pivot step sit xfer using RWX. Pt left UIC at session end. Pt declined self care tasks and exercise d/t decreased endurance/ activity tolerance and c/o dizziness this date. DC rec SNF or IP rehb, OT will continue to monitor for progress.      Anticipated Discharge Disposition (OT): inpatient rehabilitation facility, skilled nursing facility

## 2023-07-19 NOTE — THERAPY TREATMENT NOTE
Patient Name: Loni Sanford  : 1945    MRN: [id number]                              Today's Date: 2023       Admit Date: 2023    Visit Dx:     ICD-10-CM ICD-9-CM   1. Compression fracture of T12 vertebra, initial encounter  S22.080A 805.2   2. Lower abdominal pain  R10.30 789.09   3. Parkinson's disease  G20 332.0     Patient Active Problem List   Diagnosis    Ureteral calculus, right    Clear cell carcinoma of kidney    Coronary artery disease    History of adenocarcinoma of prostate    Hyperlipidemia LDL goal <70    MCI (mild cognitive impairment)    Neuropathy    Parkinson disease    Stable angina    Primary hypertension    Encounter for screening for malignant neoplasm of colon    History of colon polyps    Recurrent falls    Compression fracture of T12 vertebra    RLS (restless legs syndrome)     Past Medical History:   Diagnosis Date    Cancer     Prostate cancer     Cancer     kidney dx 2018    Coronary artery disease     Dizziness     occ    Hematuria     History of angina     carries NTG    History of transfusion     Hyperlipidemia     Hypertension     Kidney stones     new one 2018    Kidney tumor     RIGHT removed 2018    Parkinson disease     Renal mass, right     surgically removed 2018     Past Surgical History:   Procedure Laterality Date    APPENDECTOMY      CARDIAC CATHETERIZATION      CATARACT EXTRACTION W/ INTRAOCULAR LENS  IMPLANT, BILATERAL      COLONOSCOPY N/A 2023    Procedure: COLONOSCOPY FOR SCREENING;  Surgeon: Terrell Zhang MD;  Location: [facility] MAIN OR;  Service: Gastroenterology;  Laterality: N/A;  poor prep, polyp    EXTRACORPOREAL SHOCKWAVE LITHOTRIPSY (ESWL), STENT INSERTION/REMOVAL Right 2017    Procedure: RIGHT EXTRACORPOREAL SHOCKWAVE LITHOTRIPSY CYSTOSCOPY STENT PLACEMENT;  Surgeon: Marcelo Suarez MD;  Location: [facility];  Service:     EXTRACORPOREAL SHOCKWAVE LITHOTRIPSY (ESWL), STENT INSERTION/REMOVAL Right 2018     Procedure: RT EXTRACORPREAL SHOCKWAVE LITHOTRIPSY CYSTOSCOPY  WITH  MANIPULATION OF STONE;  Surgeon: Moe Vasquez MD;  Location: [facility] [place], operated by [facility];  Service: Urology    FINGER SURGERY Left 1984    INDEX    HAND SURGERY Right 1998    KYPHOPLASTY N/A 7/14/2023    Procedure: KYPHOPLASTY 1-2 LEVELS;  Surgeon: Jeison Gilman MD;  Location: [facility] OR 18/19;  Service: Neurosurgery;  Laterality: N/A;    NEPHRECTOMY PARTIAL Right 4/5/2018    Procedure: RT OPEN PARTIAL NEPHRECTOMY WITH INTRAOPERATIVE DOPPLER, DECORTICATION OF RIGHT RENAL CYST;  Surgeon: Marcelo Suarez MD;  Location: [facility] OR;  Service: Urology    PROSTATECTOMY  2013      General Information       Row Name 07/19/23 1326          Physical Therapy Time and Intention    Document Type therapy note (daily note)  -     Mode of Treatment physical therapy;co-treatment  -       Row Name 07/19/23 1326          General Information    Patient Profile Reviewed yes  -     Existing Precautions/Restrictions spinal;fall  -       Row Name 07/19/23 1326          Cognition    Orientation Status (Cognition) oriented x 3  -       Row Name 07/19/23 1326          Safety Issues, Functional Mobility    Impairments Affecting Function (Mobility) balance;endurance/activity tolerance;strength;coordination;postural/trunk control  -               User Key  (r) = Recorded By, (t) = Taken By, (c) = Cosigned By      Initials Name Provider Type    EB Darlin Smith PTA Physical Therapist Assistant                   Mobility       Row Name 07/19/23 1327          Bed Mobility    Sidelying-Sit [place] (Bed Mobility) moderate assist (50% patient effort);2 person assist;verbal cues;nonverbal cues (demo/gesture)  -     Sit-Sidelying [place] (Bed Mobility) minimum assist (75% patient effort)  -     Assistive Device (Bed Mobility) bed rails;head of bed elevated  -       Row Name 07/19/23 1325          Sit-Stand Transfer    Sit-Stand [place]  (Transfers) moderate assist (50% patient effort);2 person assist;minimum assist (75% patient effort)  -EB     Assistive Device (Sit-Stand Transfers) walker, front-wheeled  -EB     Comment, (Sit-Stand Transfer) initial stand ModAX2, second stand from EOB MinAX2  -EB       Row Name 07/19/23 1327          Gait/Stairs (Locomotion)    [place] Level (Gait) contact guard  -EB     Assistive Device (Gait) walker, front-wheeled  -EB     Distance in Feet (Gait) 3ft total.  -EB     Deviations/Abnormal Patterns (Gait) gait speed decreased;festinating/shuffling;stride length decreased;anne decreased  -EB     Bilateral Gait Deviations forward flexed posture  -EB     Comment, (Gait/Stairs) pt took a few steps away from bed then c/o feeling dizzy. Had pt sit back down on EOB then got up to the chair. deferred further ambulation due to dizziness.  -EB               User Key  (r) = Recorded By, (t) = Taken By, (c) = Cosigned By      Initials Name Provider Type    Darlin De Jesus PTA Physical Therapist Assistant                   Obj/Interventions       Row Name 07/19/23 1329          Motor Skills    Therapeutic Exercise --  BLE: AP and LAQs (X10)  -EB               User Key  (r) = Recorded By, (t) = Taken By, (c) = Cosigned By      Initials Name Provider Type    Darlin De Jesus PTA Physical Therapist Assistant                   Goals/Plan    No documentation.                  Clinical Impression       Row Name 07/19/23 4405          Plan of Care Review    Plan of Care Reviewed With patient  -EB     Progress improving  -EB     Outcome Evaluation Pt seen for PT treatment today. Pt is ModAX2 with bed mobility and completed 2 STS transfers. Pt initially needed MoDAX2 with first transfer and MinAX2 with second transfer. Pt ambulated about 3ft with rwx, CGA. Pt started to c/o dizziness, declined futher ambulation today but did get up to the chair. Will continue to progress pt as able.  -EB       Row Name 07/19/23 2744           Therapy Assessment/Plan (PT)    Therapy Frequency (PT) 6 times/wk  -EB       Row Name 07/19/23 1330          Positioning and Restraints    Pre-Treatment Position in bed  -EB     Post Treatment Position chair  -EB     In Chair reclined;call light within reach;encouraged to call for assist;exit alarm on  -EB               User Key  (r) = Recorded By, (t) = Taken By, (c) = Cosigned By      Initials Name Provider Type    Darlin De Jesus PTA Physical Therapist Assistant                   Outcome Measures       Row Name 07/19/23 1333          How much help from another person do you currently need...    Turning from your back to your side while in flat bed without using bedrails? 3  -EB     Moving from lying on back to sitting on the side of a flat bed without bedrails? 2  -EB     Moving to and from a bed to a chair (including a wheelchair)? 3  -EB     Standing up from a chair using your arms (e.g., wheelchair, bedside chair)? 3  -EB     Climbing 3-5 steps with a railing? 1  -EB     To walk in hospital room? 2  -EB     AM-PAC 6 Clicks Score (PT) 14  -EB     Highest level of mobility 4 --> Transferred to chair/commode  -EB               User Key  (r) = Recorded By, (t) = Taken By, (c) = Cosigned By      Initials Name Provider Type    Darlin De Jesus PTA Physical Therapist Assistant                                 Physical Therapy Education       Title: PT OT SLP Therapies (Done)       Topic: Physical Therapy (Done)       Point: Mobility training (Done)       Learning Progress Summary             Patient Acceptance, E,D, VU,NR by CHARLES at 7/19/2023 1334    Acceptance, E, VU,NR by AB at 7/17/2023 1418    Acceptance, E,TB, VU by  at 7/16/2023 1057    Eager, E, VU by TL at 7/15/2023 1326    Acceptance, E, VU by TL at 7/14/2023 1344   Family Acceptance, E,TB, VU by  at 7/16/2023 1057    Acceptance, E, VU by TL at 7/14/2023 1344                         Point: Home exercise program (Done)       Learning Progress Summary              Patient Acceptance, E, VU,NR by AB at 7/17/2023 1418    Acceptance, E,TB, VU by DR at 7/16/2023 1057    Eager, E, VU by TL at 7/15/2023 1326    Acceptance, E, VU by TL at 7/14/2023 1344   Family Acceptance, E,TB, VU by DR at 7/16/2023 1057    Acceptance, E, VU by TL at 7/14/2023 1344                         Point: Body mechanics (Done)       Learning Progress Summary             Patient Acceptance, E,D, VU,NR by EB at 7/19/2023 1334    Acceptance, E, VU,NR by AB at 7/17/2023 1418    Acceptance, E,TB, VU by  at 7/16/2023 1057    Eager, E, VU by TL at 7/15/2023 1326    Acceptance, E, VU by TL at 7/14/2023 1344   Family Acceptance, E,TB, VU by DR at 7/16/2023 1057    Acceptance, E, VU by TL at 7/14/2023 1344                         Point: Precautions (Done)       Learning Progress Summary             Patient Acceptance, E,D, VU,NR by EB at 7/19/2023 1334    Acceptance, E, VU,NR by AB at 7/17/2023 1418    Acceptance, E,TB, VU by DR at 7/16/2023 1057    Eager, E, VU by TL at 7/15/2023 1326    Acceptance, E, VU by TL at 7/14/2023 1344   Family Acceptance, E,TB, VU by DR at 7/16/2023 1057    Acceptance, E, VU by TL at 7/14/2023 1344                                         User Key       Initials Effective Dates Name Provider Type Discipline    EB 02/14/23 -  Darlin Smith, PTA Physical Therapist Assistant PT    TL 12/27/22 -  Devaughn Bran, RN Registered Nurse Nurse     05/24/23 -  Coy Santos, PT Physical Therapist PT    AB 06/16/23 -  Shaunna Catalan, PT Student PT Student PT                  PT Recommendation and Plan     Plan of Care Reviewed With: patient  Progress: improving  Outcome Evaluation: Pt seen for PT treatment today. Pt is ModAX2 with bed mobility and completed 2 STS transfers. Pt initially needed MoDAX2 with first transfer and MinAX2 with second transfer. Pt ambulated about 3ft with rwx, CGA. Pt started to c/o dizziness, declined futher ambulation today but did get up to the chair. Will  continue to progress pt as able.     Time Calculation:         PT Charges       Row Name 07/19/23 1326             Time Calculation    Start Time 1015  -EB      Stop Time 1042  -EB      Time Calculation (min) 27 min  -EB      PT Received On 07/19/23  -EB      PT - Next Appointment 07/20/23  -EB         Time Calculation- PT    Total Timed Code Minutes- PT 27 minute(s)  -EB                User Key  (r) = Recorded By, (t) = Taken By, (c) = Cosigned By      Initials Name Provider Type    EB Darlin Smith PTA Physical Therapist Assistant                  Therapy Charges for Today       Code Description Service Date Service Provider Modifiers Qty    18235246140 HC PT THER PROC EA 15 MIN 7/19/2023 Darlin Smith, MARCEL GP 1    70619269845 HC PT THERAPEUTIC ACT EA 15 MIN 7/19/2023 Darlin Smith PTA GP 1    78392265366 HC PT THER SUPP EA 15 MIN 7/19/2023 Darlin Smith PTA GP 1            PT G-Codes  Outcome Measure Options: AM-PAC 6 Clicks Basic Mobility (PT)  AM-PAC 6 Clicks Score (PT): 14  AM-PAC 6 Clicks Score (OT): 16       Darlin Smith PTA  7/19/2023

## 2023-07-19 NOTE — PROGRESS NOTES
Name: Loni Sanford ADMIT: 2023   : 1945  PCP: Zo Estrada MD    MRN: [id number] LOS: 7 days   AGE/SEX: 78 y.o. male  ROOM: [facility]     Subjective   Subjective   Yesterday had rapid response d/t poorly responsiove. Today alert denies complaint. No chest pain or shortness of breath or abdominal pain. Oriented to self, year, location, and situation (back surgery)     Objective   Objective   Vital Signs  Temp:  [97.7 °F (36.5 °C)-99.3 °F (37.4 °C)] 98.4 °F (36.9 °C)  Heart Rate:  [] 84  Resp:  [8-18] 16  BP: (125-179)/() 153/93  SpO2:  [92 %-100 %] 97 %  on  Flow (L/min):  [2] 2;   Device (Oxygen Therapy): nasal cannula  Body mass index is 28.5 kg/m².    Physical Exam  Constitutional:       General: He is not in acute distress.     Appearance: He is ill-appearing (chronic). He is not toxic-appearing.   HENT:      Head: Normocephalic and atraumatic.   Eyes:      Extraocular Movements: Extraocular movements intact.   Cardiovascular:      Rate and Rhythm: Normal rate and regular rhythm.   Pulmonary:      Effort: Pulmonary effort is normal. No respiratory distress.      Breath sounds: Normal breath sounds. No wheezing or rhonchi.   Abdominal:      General: Bowel sounds are normal.      Palpations: Abdomen is soft.      Tenderness: There is no abdominal tenderness. There is no guarding or rebound.   Musculoskeletal:         General: No swelling.      Cervical back: Normal range of motion.   Skin:     General: Skin is warm and dry.   Neurological:      General: No focal deficit present.      Mental Status: He is alert and oriented to person, place, and time.     Results Review  I reviewed the patient's new clinical results.  Results from last 7 days   Lab Units 23  0606 23  0748 23  0535 23  0750   WBC 10*3/mm3 7.86 7.82 7.54 6.76   HEMOGLOBIN g/dL 12.6* 12.3* 12.4* 12.3*   PLATELETS 10*3/mm3 175 177 200 197     Results from last 7 days   Lab Units 23  0606  07/18/23 2034 07/18/23 0748 07/17/23  0535 07/16/23  0750   SODIUM mmol/L 142  --  143 141 144   POTASSIUM mmol/L 4.2 4.4 3.6 3.9 4.0   CHLORIDE mmol/L 107  --  107 104 107   CO2 mmol/L 22.6  --  25.3 26.1 26.4   BUN mg/dL 26*  --  24* 26* 25*   CREATININE mg/dL 0.78  --  0.82 0.88 0.89   GLUCOSE mg/dL 74  --  99 92 86     Lab Results   Component Value Date    ANIONGAP 12.4 07/19/2023     Estimated Creatinine Clearance: 86.8 mL/min (by C-G formula based on SCr of 0.78 mg/dL).    Results from last 7 days   Lab Units 07/19/23 0606 07/18/23 0748 07/17/23 0535 07/16/23  0750   ALBUMIN g/dL 3.3* 3.7 3.6 3.6   BILIRUBIN mg/dL 0.9 0.7 0.7 0.6   ALK PHOS U/L 155* 153* 154* 145*   AST (SGOT) U/L 9 8 10 7   ALT (SGPT) U/L <5 6 <5 <5     Results from last 7 days   Lab Units 07/19/23 0606 07/18/23 0748 07/17/23 0535 07/16/23  0750   CALCIUM mg/dL 8.9 9.1 9.2 9.2   ALBUMIN g/dL 3.3* 3.7 3.6 3.6   MAGNESIUM mg/dL 2.2 2.2  --   --        Glucose   Date/Time Value Ref Range Status   07/18/2023 1644 82 70 - 130 mg/dL Final     Comment:     Meter: [id number] : [id number] Lillie Rangel RN       CT Head Without Contrast    Result Date: 7/18/2023  1. No acute process. 2. Left maxillary sinus disease as described  Radiation dose reduction techniques were utilized, including automated exposure control and exposure modulation based on body size.  This report was finalized on 7/18/2023 2:57 PM by Dr. Kaleb Santoro M.D.       Scheduled Meds  atorvastatin, 20 mg, Oral, Nightly  carbidopa-levodopa, 2 tablet, Oral, Once  carbidopa-levodopa, 1 tablet, Oral, 4x Daily  carbidopa-levodopa CR, 1 tablet, Oral, Nightly  cholecalciferol, 1,000 Units, Oral, Daily  Gabapentin 10% Baclofen 2% Lidocaine 4% Cream, 1 dose, Topical, 4x Daily  lidocaine, 1 patch, Transdermal, Q24H  lisinopril, 5 mg, Oral, Daily  metoprolol succinate XL, 12.5 mg, Oral, Daily  senna-docusate sodium, 2 tablet, Oral, BID  sodium chloride, 10 mL, Intravenous,  Q12H  sodium chloride, 10 mL, Intravenous, Q12H    Continuous Infusions  lactated ringers, 9 mL/hr, Last Rate: 9 mL/hr (07/14/23 0956)  sodium chloride, 75 mL/hr, Last Rate: 75 mL/hr (07/19/23 0500)    PRN Meds    acetaminophen **OR** acetaminophen **OR** acetaminophen    senna-docusate sodium **AND** polyethylene glycol **AND** bisacodyl **AND** bisacodyl    Calcium Replacement - Follow Nurse / BPA Driven Protocol    cloNIDine    Magnesium Standard Dose Replacement - Follow Nurse / BPA Driven Protocol    nitroglycerin    ondansetron    Phosphorus Replacement - Follow Nurse / BPA Driven Protocol    Potassium Replacement - Follow Nurse / BPA Driven Protocol    [COMPLETED] Insert Peripheral IV **AND** sodium chloride    sodium chloride    sodium chloride    sodium chloride    lactated ringers, 9 mL/hr, Last Rate: 9 mL/hr (07/14/23 0956)  sodium chloride, 75 mL/hr, Last Rate: 75 mL/hr (07/19/23 0500)    Diet  Diet: Regular/House Diet; Texture: Regular Texture (IDDSI 7); Fluid Consistency: Thin (IDDSI 0)    I have personally reviewed:  [x]  Medications  [x]  Laboratory   []  Microbiology   [x]  Radiology   [x]  EKG/Telemetry SR on tele  []  Cardiology/Vascular   []  Pathology   []  Records      Assessment/Plan     Active Hospital Problems    Diagnosis  POA    **Compression fracture of T12 vertebra [S22.080A]  Yes    RLS (restless legs syndrome) [G25.81]  Yes    Recurrent falls [R29.6]  Not Applicable    Primary hypertension [I10]  Yes    Coronary artery disease [I25.10]  Yes    MCI (mild cognitive impairment) [G31.84]  Yes    Neuropathy [G62.9]  Yes    History of adenocarcinoma of prostate [Z85.46]  Not Applicable    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     78 y.o. male admitted with Compression fracture of T12 vertebra after multiple falls in setting of Parkinson's, peripheral neuropathy, and chronic  mobility/balance issues.     Acute T12 Compression Fracture  - s/p kyphoplasty 7/14 by Dr. Gilman  - continue Lidoderm, PRN Lortab dc'd d/t sedation  - outpt f/u in 2 weeks     Parkinson's Disease  Sensory polyneuropathy  Chronic balance/mobility issues  RLS  Mild cognitive impairment  - continue carbidopa/levodopa dosing corrected per neurology  - Requip recently changed to Gabapentin PRN prior to admission, not receiving Gabapentin here     AMS, delirium  - suspected toxic encephalopathy (recurrent) due to Zyprexa initially and opioids now  - CT negative  -neurology stopped CNS acting meds  - improved today     CAD/HTN  - BP improved today  - continue lisinopril and metoprolol   - on lipitor, plavix held for kyphoplasty--can resume on 7/22/23 per KAUSHAL  - appreciate Cardiology evaluation here     Hx of Renal Cell Carcinoma  - s/p partial right nephrectomy 2018     SCDs for DVT prophylaxis    Discussed with patient and nursing staff    Discharge: SNF tomorrow. watch today    Mat Cordero MD  [facility]  07/19/23

## 2023-07-20 VITALS
RESPIRATION RATE: 16 BRPM | OXYGEN SATURATION: 94 % | HEART RATE: 90 BPM | HEIGHT: 69 IN | SYSTOLIC BLOOD PRESSURE: 138 MMHG | TEMPERATURE: 98 F | BODY MASS INDEX: 28.58 KG/M2 | WEIGHT: 193 LBS | DIASTOLIC BLOOD PRESSURE: 74 MMHG

## 2023-07-20 LAB
ALBUMIN SERPL-MCNC: 3.6 G/DL (ref 3.5–5.2)
ALBUMIN/GLOB SERPL: 1.6 G/DL
ALP SERPL-CCNC: 163 U/L (ref 39–117)
ALT SERPL W P-5'-P-CCNC: <5 U/L (ref 1–41)
ANION GAP SERPL CALCULATED.3IONS-SCNC: 10 MMOL/L (ref 5–15)
AST SERPL-CCNC: 12 U/L (ref 1–40)
BASOPHILS # BLD AUTO: 0.02 10*3/MM3 (ref 0–0.2)
BASOPHILS NFR BLD AUTO: 0.4 % (ref 0–1.5)
BILIRUB SERPL-MCNC: 0.7 MG/DL (ref 0–1.2)
BUN SERPL-MCNC: 23 MG/DL (ref 8–23)
BUN/CREAT SERPL: 28.4 (ref 7–25)
CALCIUM SPEC-SCNC: 8.9 MG/DL (ref 8.6–10.5)
CHLORIDE SERPL-SCNC: 106 MMOL/L (ref 98–107)
CO2 SERPL-SCNC: 26 MMOL/L (ref 22–29)
CREAT SERPL-MCNC: 0.81 MG/DL (ref 0.76–1.27)
DEPRECATED RDW RBC AUTO: 38 FL (ref 37–54)
EGFRCR SERPLBLD CKD-EPI 2021: 90.8 ML/MIN/1.73
EOSINOPHIL # BLD AUTO: 0.19 10*3/MM3 (ref 0–0.4)
EOSINOPHIL NFR BLD AUTO: 3.4 % (ref 0.3–6.2)
ERYTHROCYTE [DISTWIDTH] IN BLOOD BY AUTOMATED COUNT: 11.4 % (ref 12.3–15.4)
GLOBULIN UR ELPH-MCNC: 2.3 GM/DL
GLUCOSE SERPL-MCNC: 94 MG/DL (ref 65–99)
HCT VFR BLD AUTO: 36.1 % (ref 37.5–51)
HGB BLD-MCNC: 12.3 G/DL (ref 13–17.7)
IMM GRANULOCYTES # BLD AUTO: 0.02 10*3/MM3 (ref 0–0.05)
IMM GRANULOCYTES NFR BLD AUTO: 0.4 % (ref 0–0.5)
LYMPHOCYTES # BLD AUTO: 0.98 10*3/MM3 (ref 0.7–3.1)
LYMPHOCYTES NFR BLD AUTO: 17.8 % (ref 19.6–45.3)
MCH RBC QN AUTO: 31.5 PG (ref 26.6–33)
MCHC RBC AUTO-ENTMCNC: 34.1 G/DL (ref 31.5–35.7)
MCV RBC AUTO: 92.3 FL (ref 79–97)
MONOCYTES # BLD AUTO: 0.68 10*3/MM3 (ref 0.1–0.9)
MONOCYTES NFR BLD AUTO: 12.3 % (ref 5–12)
NEUTROPHILS NFR BLD AUTO: 3.62 10*3/MM3 (ref 1.7–7)
NEUTROPHILS NFR BLD AUTO: 65.7 % (ref 42.7–76)
NRBC BLD AUTO-RTO: 0 /100 WBC (ref 0–0.2)
PLATELET # BLD AUTO: 162 10*3/MM3 (ref 140–450)
PMV BLD AUTO: 9.5 FL (ref 6–12)
POTASSIUM SERPL-SCNC: 4 MMOL/L (ref 3.5–5.2)
PROT SERPL-MCNC: 5.9 G/DL (ref 6–8.5)
RBC # BLD AUTO: 3.91 10*6/MM3 (ref 4.14–5.8)
SODIUM SERPL-SCNC: 142 MMOL/L (ref 136–145)
WBC NRBC COR # BLD: 5.51 10*3/MM3 (ref 3.4–10.8)

## 2023-07-20 PROCEDURE — 80053 COMPREHEN METABOLIC PANEL: CPT | Performed by: RADIOLOGY

## 2023-07-20 PROCEDURE — 85025 COMPLETE CBC W/AUTO DIFF WBC: CPT | Performed by: RADIOLOGY

## 2023-07-20 RX ORDER — CLOPIDOGREL BISULFATE 75 MG/1
75 TABLET ORAL EVERY MORNING
Qty: 30 TABLET
Start: 2023-07-22

## 2023-07-20 RX ORDER — CARBIDOPA AND LEVODOPA 50; 200 MG/1; MG/1
1 TABLET, EXTENDED RELEASE ORAL NIGHTLY
Start: 2023-07-20

## 2023-07-20 RX ADMIN — ACETAMINOPHEN 650 MG: 325 TABLET, FILM COATED ORAL at 06:27

## 2023-07-20 RX ADMIN — LISINOPRIL 5 MG: 5 TABLET ORAL at 10:15

## 2023-07-20 RX ADMIN — LIDOCAINE 1 PATCH: 700 PATCH TOPICAL at 10:15

## 2023-07-20 RX ADMIN — Medication 10 ML: at 10:18

## 2023-07-20 RX ADMIN — CARBIDOPA AND LEVODOPA 1 TABLET: 25; 250 TABLET ORAL at 07:30

## 2023-07-20 RX ADMIN — METOPROLOL SUCCINATE 12.5 MG: 25 TABLET, EXTENDED RELEASE ORAL at 10:15

## 2023-07-20 RX ADMIN — CHOLECALCIFEROL TAB 10 MCG (400 UNIT) 1000 UNITS: 10 TAB at 10:16

## 2023-07-20 RX ADMIN — CARBIDOPA AND LEVODOPA 1 TABLET: 25; 250 TABLET ORAL at 12:16

## 2023-07-20 NOTE — CASE MANAGEMENT/SOCIAL WORK
Continued Stay Note  [facility]     Patient Name: Loni Sanford  MRN: [id number]  Today's Date: 7/20/2023    Admit Date: 7/9/2023       Discharge Plan       Row Name 07/20/23 1213       Plan    Plan Comments DC orders noted.  S/W [facility]/ [facility] - bed remains available and precert is still good.  S/W pt's spouse Zuri who is in agreement w/ DC to skilled bed at [facility] today.  DC summary and DC pacekt given to RN.  [facility] arranged for today at 1300.    Final Note DC to skilled bed at [facility]. ............Debbie YBARRA/ CCP                   Discharge Codes    No documentation.                 Expected Discharge Date and Time       Expected Discharge Date Expected Discharge Time    Jul 20, 2023               Debbie Ayala RN

## 2023-07-20 NOTE — DISCHARGE SUMMARY
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     Procedure(s):  KYPHOPLASTY 1-2 LEVELS     Consults       Date and Time Order Name Status Description    7/15/2023 12:15 PM Inpatient Neurology Consult General Completed     7/12/2023 11:23 AM Inpatient Cardiology Consult Completed     7/9/2023  3:34 PM Inpatient Neurosurgery Consult Completed     7/9/2023  1:02 PM [facility] (on-call MD unless specified) Details            Hospital Course  78 y.o. male with a history of Parkinson disease has had more recent decline admitted with a T12 compression fracture. On 7/14/2023 he underwent kyphoplasty. Postoperatively he had confusion/decreased responsiveness due to medications including hydrocodone and other CNS acting medications. Neurology recommended judicious restarting of medications like gabapentin and even doxepin. Currently he is not on them. He was previously on Requip and it was changed to gabapentin as needed prior to admission and he has not been getting any gabapentin in the hospital. He has been monitored and has remained alert and appropriate. He is going to go to SNF today.    I discussed the patient's findings and my recommendations with patient and nursing staff and CCP.    Temp:  [97.5 °F (36.4 °C)-98.2 °F (36.8 °C)] 97.9 °F (36.6 °C)  Heart Rate:  [62-86]  86  Resp:  [16-18] 16  BP: (108-180)/() 180/100  Body mass index is 28.5 kg/m².    Physical Exam  Constitutional:       General: He is not in acute distress.     Appearance: Normal appearance. He is not toxic-appearing.   HENT:      Head: Normocephalic and atraumatic.   Eyes:      Extraocular Movements: Extraocular movements intact.   Cardiovascular:      Rate and Rhythm: Normal rate and regular rhythm.   Pulmonary:      Effort: Pulmonary effort is normal. No respiratory distress.      Breath sounds: Normal breath sounds. No wheezing or rhonchi.   Abdominal:      General: Bowel sounds are normal.      Palpations: Abdomen is soft.      Tenderness: There is no abdominal tenderness. There is no guarding or rebound.   Musculoskeletal:         General: No swelling.      Cervical back: Normal range of motion.   Skin:     General: Skin is warm and dry.   Neurological:      Mental Status: He is alert.     Disposition: Skilled Nursing Facility (DC - External)       Discharge Medications        Changes to Medications        Instructions Start Date   carbidopa-levodopa  MG per tablet  Commonly known as: SINEMET  What changed: Another medication with the same name was added. Make sure you understand how and when to take each.   1 tablet, Oral, 4 Times Daily      carbidopa-levodopa CR  MG per CR tablet  Commonly known as: SINEMET CR  What changed: You were already taking a medication with the same name, and this prescription was added. Make sure you understand how and when to take each.   1 tablet, Oral, Nightly      clopidogrel 75 MG tablet  Commonly known as: PLAVIX  What changed: These instructions start on July 22, 2023. If you are unsure what to do until then, ask your doctor or other care provider.   75 mg, Oral, Every Morning   Start Date: July 22, 2023     lisinopril 5 MG tablet  Commonly known as: PRINIVIL,ZESTRIL  What changed:   medication strength  how much to take   5 mg, Oral, Daily, Blood  pressure             Continue These Medications        Instructions Start Date   acetaminophen 500 MG tablet  Commonly known as: TYLENOL   1,000 mg, Oral, Every 6 Hours PRN      atorvastatin 20 MG tablet  Commonly known as: LIPITOR   20 mg, Oral, Nightly      cholecalciferol 25 MCG (1000 UT) tablet  Commonly known as: VITAMIN D3   1,000 Units, Oral, Every Morning      Gabapentin 10 %, Baclofen 2 %, lidocaine 4 %   1-2 g, Topical, 3 to 4 Times Daily      ketoconazole 2 % shampoo  Commonly known as: NIZORAL   1 application  Daily.      metoprolol succinate XL 25 MG 24 hr tablet  Commonly known as: TOPROL-XL   12.5 mg, Oral, Nightly      multivitamin with minerals tablet tablet   1 tablet, Oral, Every Morning      nitroglycerin 0.4 MG SL tablet  Commonly known as: NITROSTAT   DISSOLVE ONE TABLET UNDER THE TONGUE EVERY 5 MINUTES AS NEEDED FOR CHEST PAIN             Stop These Medications      Baclofen 5 MG tablet  Commonly known as: LIORESAL     doxepin 10 MG capsule  Commonly known as: SINEquan     gabapentin 100 MG capsule  Commonly known as: NEURONTIN            ASK your doctor about these medications        Instructions Start Date   predniSONE 20 MG tablet  Commonly known as: DELTASONE  Ask about: Should I take this medication?   40 mg, Oral, Daily              Diet Instructions       Diet: Regular/House Diet      Discharge Diet: Regular/House Diet    Texture: Regular Texture (IDDSI 7)    Fluid Consistency: Thin (IDDSI 0)           Activity Instructions       Activity as Tolerated      per PT           Additional Instructions for the Follow-ups that You Need to Schedule       Call MD for problems / concerns.   As directed             Contact information for follow-up providers       Zo Estrada MD .    Specialty: Family Medicine  Why: post hospital follow up  Contact information:  [address] [facility] [address] [phone]                       Contact information for after-discharge care        Destination       [facility] .    Service: Skilled Nursing  Contact information:  [address]  [facility] [address] [phone]                                  Future Appointments   Date Time Provider Department Center   9/12/2023  9:15 AM Zo Estrada MD [facility]        [facility] MD Consuel[facility]  [facility]  07/20/23    Discharge time spent greater than 30 minutes.

## 2023-07-20 NOTE — PLAN OF CARE
Goal Outcome Evaluation:         Pt A/Ox4 through the shift. Resting well and voiding well. Encouraged to increase mobility. No acute events overnight.

## 2023-07-24 ENCOUNTER — TELEPHONE (OUTPATIENT)
Dept: NEUROLOGY | Facility: CLINIC | Age: 78
End: 2023-07-24
Payer: MEDICARE

## 2023-08-07 ENCOUNTER — OFFICE VISIT (OUTPATIENT)
Dept: FAMILY MEDICINE CLINIC | Facility: CLINIC | Age: 78
End: 2023-08-07
Payer: MEDICARE

## 2023-08-07 VITALS
HEART RATE: 76 BPM | DIASTOLIC BLOOD PRESSURE: 54 MMHG | SYSTOLIC BLOOD PRESSURE: 90 MMHG | OXYGEN SATURATION: 94 % | HEIGHT: 69 IN | BODY MASS INDEX: 28.73 KG/M2 | TEMPERATURE: 97.1 F | WEIGHT: 194 LBS

## 2023-08-07 DIAGNOSIS — Z98.890 HISTORY OF KYPHOPLASTY: ICD-10-CM

## 2023-08-07 DIAGNOSIS — Z09 HOSPITAL DISCHARGE FOLLOW-UP: Primary | ICD-10-CM

## 2023-08-07 DIAGNOSIS — I95.2 HYPOTENSION DUE TO DRUGS: ICD-10-CM

## 2023-08-07 DIAGNOSIS — S22.080S COMPRESSION FRACTURE OF T12 VERTEBRA, SEQUELA: ICD-10-CM

## 2023-08-07 NOTE — PROGRESS NOTES
Transitional Care Follow Up Visit  Subjective     Loni Sanford is a 78 y.o. male who presents for a transitional care management visit.    Within 48 business hours after discharge our office contacted him via telephone to coordinate his care and needs.      I reviewed and discussed the details of that call along with the discharge summary, hospital problems, inpatient lab results, inpatient diagnostic studies, and consultation reports with Loni.     Current outpatient and discharge medications have been reconciled for the patient.  Reviewed by: IVONNE Pereira           No data to display              Risk for Readmission (LACE)   No data recorded    History of Present Illness   Course During Hospital Stay: Patient was admitted to [facility] on 7/9/2023 and discharged on 7/20/2023.  Patient was admitted with a T12 compression fracture and underwent kyphoplasty on 7/14/2023.  Postoperatively, patient reportedly had confusion and decreased responsiveness due to medications including hydrocodone and other CNS acting medications.  He underwent CT scan of the head that showed no acute process.  Neurology was following the patient and recommended holding CNS acting medications.  Baclofen, doxepin, gabapentin were stopped. He went to rehab for about 2 weeks after his discharge from the hospital and was discharged 4 days ago.  He will start PT and OT this week.  Since being discharged, patient's wife has noticed his blood pressure has been lower.  He is currently taking lisinopril 5 mg daily, which was increased in the hospital as he was hypertensive.  He is also taking metoprolol 12.5 mg nightly.  He does have some orthostatic dizziness.  His have some back pain today.  He is taking Tylenol only as needed.  He denies saddle paresthesia or lower extremity numbness or incontinence.  He denies falls since his discharge.       The following portions of the patient's history were reviewed and updated  as appropriate: allergies, current medications, past family history, past medical history, past social history, past surgical history, and problem list.  Discharge Summary by Mat Cordero MD (07/20/2023 11:14)   CT Head Without Contrast (07/18/2023 14:20)  CT Head Without Contrast (07/15/2023 16:23)  Arteriogram ([facility]) (07/14/2023 11:01)  MRI Thoracic Spine Without Contrast (07/10/2023 19:59)  MRI Lumbar Spine Without Contrast (07/10/2023 19:59)  CT Abdomen Pelvis Without Contrast (07/09/2023 11:10)  CT Lumbar Spine Without Contrast (07/09/2023 11:10)  Review of Systems   Constitutional:  Negative for chills, fatigue and fever.   Musculoskeletal:  Positive for arthralgias.     Objective   Physical Exam  Vitals reviewed.   Constitutional:       General: He is not in acute distress.     Appearance: Normal appearance. He is not ill-appearing, toxic-appearing or diaphoretic.   HENT:      Head: Normocephalic and atraumatic.   Eyes:      General: No scleral icterus.        Right eye: No discharge.         Left eye: No discharge.   Cardiovascular:      Rate and Rhythm: Normal rate.   Pulmonary:      Effort: Pulmonary effort is normal. No respiratory distress.   Musculoskeletal:      Right lower leg: No edema.      Left lower leg: No edema.      Comments: Ambulating with walker   Neurological:      General: No focal deficit present.      Mental Status: He is alert and oriented to person, place, and time.      Motor: Weakness present.      Gait: Gait normal.   Psychiatric:         Mood and Affect: Mood normal.         Behavior: Behavior normal.       Assessment & Plan   Diagnoses and all orders for this visit:    1. Hospital discharge follow-up (Primary)    2. Compression fracture of T12 vertebra, sequela    3. History of kyphoplasty    4. Hypotension due to drugs      Feeling well after his surgery.  Recommended taking Tylenol every 8 hours as needed.  Patient will be seeing physical therapy and occupational  therapy at home.  He does have evidence of hypotension.  His lisinopril was increased in the hospital.  Recommended holding lisinopril and checking his blood pressure in the morning at the same time every day.  Patient's wife will record these readings and notify our office of her findings.  If blood pressure greater than 140/90 consistently, will consider restarting lisinopril at 2.5 mg daily, which was his previous dosage. Hemoglobin was stable in the hospital. He has a follow-up with his PCP in 5 weeks.          Electronically signed by IVONNE Donahue, 08/10/23, 8:05 AM EDT.

## 2023-08-08 ENCOUNTER — TELEPHONE (OUTPATIENT)
Dept: FAMILY MEDICINE CLINIC | Facility: CLINIC | Age: 78
End: 2023-08-08
Payer: MEDICARE

## 2023-08-10 ENCOUNTER — HOSPITAL ENCOUNTER (EMERGENCY)
Facility: HOSPITAL | Age: 78
Discharge: HOME | End: 2023-08-10
Attending: EMERGENCY MEDICINE
Payer: MEDICARE

## 2023-08-10 VITALS
SYSTOLIC BLOOD PRESSURE: 109 MMHG | TEMPERATURE: 98 F | HEART RATE: 85 BPM | HEIGHT: 69 IN | RESPIRATION RATE: 20 BRPM | WEIGHT: 188 LBS | BODY MASS INDEX: 27.85 KG/M2 | OXYGEN SATURATION: 90 % | DIASTOLIC BLOOD PRESSURE: 78 MMHG

## 2023-08-10 DIAGNOSIS — G20 PARKINSON'S DISEASE: ICD-10-CM

## 2023-08-10 DIAGNOSIS — G90.9 AUTONOMIC DYSFUNCTION: Primary | ICD-10-CM

## 2023-08-10 LAB
ANION GAP SERPL CALCULATED.3IONS-SCNC: 9.9 MMOL/L (ref 5–15)
BACTERIA UR QL AUTO: ABNORMAL /HPF
BASOPHILS # BLD AUTO: 0.02 10*3/MM3 (ref 0–0.2)
BASOPHILS NFR BLD AUTO: 0.3 % (ref 0–1.5)
BILIRUB UR QL STRIP: NEGATIVE
BUN SERPL-MCNC: 20 MG/DL (ref 8–23)
BUN/CREAT SERPL: 18.7 (ref 7–25)
CALCIUM SPEC-SCNC: 9.9 MG/DL (ref 8.6–10.5)
CHLORIDE SERPL-SCNC: 104 MMOL/L (ref 98–107)
CLARITY UR: CLEAR
CO2 SERPL-SCNC: 28.1 MMOL/L (ref 22–29)
COLOR UR: YELLOW
CREAT SERPL-MCNC: 1.07 MG/DL (ref 0.76–1.27)
DEPRECATED RDW RBC AUTO: 41.9 FL (ref 37–54)
EGFRCR SERPLBLD CKD-EPI 2021: 71.5 ML/MIN/1.73
EOSINOPHIL # BLD AUTO: 0.09 10*3/MM3 (ref 0–0.4)
EOSINOPHIL NFR BLD AUTO: 1.5 % (ref 0.3–6.2)
ERYTHROCYTE [DISTWIDTH] IN BLOOD BY AUTOMATED COUNT: 12.3 % (ref 12.3–15.4)
GLUCOSE SERPL-MCNC: 123 MG/DL (ref 65–99)
GLUCOSE UR STRIP-MCNC: NEGATIVE MG/DL
HCT VFR BLD AUTO: 36 % (ref 37.5–51)
HGB BLD-MCNC: 12.4 G/DL (ref 13–17.7)
HGB UR QL STRIP.AUTO: ABNORMAL
HYALINE CASTS UR QL AUTO: ABNORMAL /LPF
IMM GRANULOCYTES # BLD AUTO: 0.02 10*3/MM3 (ref 0–0.05)
IMM GRANULOCYTES NFR BLD AUTO: 0.3 % (ref 0–0.5)
KETONES UR QL STRIP: NEGATIVE
LEUKOCYTE ESTERASE UR QL STRIP.AUTO: ABNORMAL
LYMPHOCYTES # BLD AUTO: 0.89 10*3/MM3 (ref 0.7–3.1)
LYMPHOCYTES NFR BLD AUTO: 15.2 % (ref 19.6–45.3)
MAGNESIUM SERPL-MCNC: 2.5 MG/DL (ref 1.6–2.4)
MCH RBC QN AUTO: 32.2 PG (ref 26.6–33)
MCHC RBC AUTO-ENTMCNC: 34.4 G/DL (ref 31.5–35.7)
MCV RBC AUTO: 93.5 FL (ref 79–97)
MONOCYTES # BLD AUTO: 0.7 10*3/MM3 (ref 0.1–0.9)
MONOCYTES NFR BLD AUTO: 12 % (ref 5–12)
NEUTROPHILS NFR BLD AUTO: 4.13 10*3/MM3 (ref 1.7–7)
NEUTROPHILS NFR BLD AUTO: 70.7 % (ref 42.7–76)
NITRITE UR QL STRIP: NEGATIVE
NRBC BLD AUTO-RTO: 0.2 /100 WBC (ref 0–0.2)
PH UR STRIP.AUTO: 6.5 [PH] (ref 5–8)
PLATELET # BLD AUTO: 165 10*3/MM3 (ref 140–450)
PMV BLD AUTO: 9.1 FL (ref 6–12)
POTASSIUM SERPL-SCNC: 4.5 MMOL/L (ref 3.5–5.2)
PROT UR QL STRIP: NEGATIVE
QT INTERVAL: 429 MS
RBC # BLD AUTO: 3.85 10*6/MM3 (ref 4.14–5.8)
RBC # UR STRIP: ABNORMAL /HPF
REF LAB TEST METHOD: ABNORMAL
SODIUM SERPL-SCNC: 142 MMOL/L (ref 136–145)
SP GR UR STRIP: 1.01 (ref 1–1.03)
SQUAMOUS #/AREA URNS HPF: ABNORMAL /HPF
UROBILINOGEN UR QL STRIP: ABNORMAL
WBC # UR STRIP: ABNORMAL /HPF
WBC NRBC COR # BLD: 5.85 10*3/MM3 (ref 3.4–10.8)

## 2023-08-10 PROCEDURE — 99283 EMERGENCY DEPT VISIT LOW MDM: CPT

## 2023-08-10 PROCEDURE — 80048 BASIC METABOLIC PNL TOTAL CA: CPT | Performed by: EMERGENCY MEDICINE

## 2023-08-10 PROCEDURE — 93005 ELECTROCARDIOGRAM TRACING: CPT | Performed by: EMERGENCY MEDICINE

## 2023-08-10 PROCEDURE — 81001 URINALYSIS AUTO W/SCOPE: CPT | Performed by: EMERGENCY MEDICINE

## 2023-08-10 PROCEDURE — 85025 COMPLETE CBC W/AUTO DIFF WBC: CPT | Performed by: EMERGENCY MEDICINE

## 2023-08-10 PROCEDURE — 83735 ASSAY OF MAGNESIUM: CPT | Performed by: EMERGENCY MEDICINE

## 2023-08-10 PROCEDURE — 93010 ELECTROCARDIOGRAM REPORT: CPT | Performed by: INTERNAL MEDICINE

## 2023-08-10 RX ADMIN — SODIUM CHLORIDE 1000 ML: 9 INJECTION, SOLUTION INTRAVENOUS at 15:40

## 2023-08-10 NOTE — ED TRIAGE NOTES
Pt to Er from home via [facility]. Pt reports increasing weakness over the last day and that while working with physical therapy today, he began to feel dizzy, nearly passing out.     EMS reports initial BP on scene was 80/60 but improved to 110/50 while transporting. Pt report has hx of orthostatic hypotension.

## 2023-08-10 NOTE — ED PROVIDER NOTES
EMERGENCY DEPARTMENT ENCOUNTER    Room Number:  10/10  PCP: Zo Estrada MD  Historian: Patient, wife      HPI:  Chief Complaint: Generalized weakness, hypotension  A complete HPI/ROS/PMH/PSH/SH/FH are unobtainable due to: None    Context: Loni Sanford is a 78 y.o. male who presents to the ED via [facility] from home with increasing generalized weakness over the last 24 hours, positional lightheadedness and dizziness with positional hypotension also noted down into the 80s today.  This is not necessarily unusual for him given his history of Parkinson's disease.  Has had decreased p.o. intake recently, but no fevers, chills, cough, shortness of breath, vomiting, diarrhea, dysuria or urinary urgency or frequency.  Denies any pain.  Recently stopped a blood pressure medication within the last day or 2, also had his carbidopa levodopa increased within the last couple weeks.      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    Coronary artery disease 06/29/2021    History of adenocarcinoma of prostate 06/17/2019    Hyperlipidemia LDL goal <70 06/29/2021    MCI (mild cognitive impairment) 11/15/2019    Neuropathy 11/15/2019    Parkinson disease 12/17/2018    Stable angina 11/20/2020    Primary hypertension 04/15/2022    Encounter for screening for malignant neoplasm of colon 05/03/2023    History of colon polyps 05/03/2023    Recurrent falls 07/09/2023    Compression fracture of T12 vertebra 07/09/2023    RLS (restless legs syndrome) 07/17/2023     Resolved Ambulatory Problems     Diagnosis Date Noted    Renal mass, right 04/05/2018     Past Medical History:   Diagnosis Date    Cancer      Cancer     Dizziness     Hematuria     History of angina     History of transfusion     Hyperlipidemia     Hypertension     Kidney stones     Kidney tumor          PAST SURGICAL HISTORY  Past Surgical History:   Procedure Laterality Date    APPENDECTOMY  2011    CARDIAC CATHETERIZATION      CATARACT EXTRACTION W/ INTRAOCULAR LENS  IMPLANT, BILATERAL      COLONOSCOPY N/A 05/09/2023    Procedure: COLONOSCOPY FOR SCREENING;  Surgeon: Terrell Zhang MD;  Location: [facility] OR;  Service: Gastroenterology;  Laterality: N/A;  poor prep, polyp    EXTRACORPOREAL SHOCKWAVE LITHOTRIPSY (ESWL), STENT INSERTION/REMOVAL Right 12/29/2017    Procedure: RIGHT EXTRACORPOREAL SHOCKWAVE LITHOTRIPSY CYSTOSCOPY STENT PLACEMENT;  Surgeon: Marcelo Suarez MD;  Location: [facility];  Service:     EXTRACORPOREAL SHOCKWAVE LITHOTRIPSY (ESWL), STENT INSERTION/REMOVAL Right 12/28/2018    Procedure: RT EXTRACORPREAL SHOCKWAVE LITHOTRIPSY CYSTOSCOPY  WITH  MANIPULATION OF STONE;  Surgeon: Moe Vasquez MD;  Location: [facility];  Service: Urology    FINGER SURGERY Left 1984    INDEX    HAND SURGERY Right 1998    KYPHOPLASTY N/A 07/14/2023    Procedure: KYPHOPLASTY 1-2 LEVELS;  Surgeon: Jeison Gilman MD;  Location: [facility] OR 18/19;  Service: Neurosurgery;  Laterality: N/A;    NEPHRECTOMY PARTIAL Right 04/05/2018    Procedure: RT OPEN PARTIAL NEPHRECTOMY WITH INTRAOPERATIVE DOPPLER, DECORTICATION OF RIGHT RENAL CYST;  Surgeon: Marcelo Suarez MD;  Location: [facility];  Service: Urology    PROSTATECTOMY  2013         FAMILY HISTORY  Family History   Problem Relation Age of Onset    Heart disease Father     Heart attack Father     Diabetes Brother     Heart attack Brother     Coronary artery disease Brother         CABG    Heart disease Brother     Hypertension Mother     Malig Hyperthermia Neg Hx     Stroke Neg Hx          SOCIAL HISTORY  Social History     Socioeconomic History    Marital status:     Tobacco Use    Smoking status: Never     Passive exposure: Never    Smokeless tobacco: Never   Vaping Use    Vaping Use: Never used   Substance and Sexual Activity    Alcohol use: No    Drug use: No    Sexual activity: Defer         ALLERGIES  Aspirin and Belladonna alkaloids-opium        REVIEW OF SYSTEMS  Review of Systems     All systems reviewed and negative except for those discussed in HPI.       PHYSICAL EXAM    I have reviewed the triage vital signs and nursing notes.    ED Triage Vitals   Temp Heart Rate Resp BP SpO2   08/10/23 1438 08/10/23 1435 08/10/23 1435 08/10/23 1435 08/10/23 1435   97.1 øF (36.2 øC) 70 16 110/50 93 %      Temp src Heart Rate Source Patient Position BP Location FiO2 (%)   08/10/23 1438 08/10/23 1518 -- 08/10/23 1518 --   Tympanic Monitor  Left arm        Physical Exam  General: No acute distress, nontoxic  HEENT: Mucous membranes tacky, atraumatic, EOMI  Neck: Full ROM  Pulm: Symmetric chest rise, nonlabored, lungs CTAB  Cardiovascular: Regular rate and rhythm, intact distal pulses  GI: Soft, nontender, nondistended, no rebound, no guarding, bowel sounds present  MSK: Full ROM, no deformity  Skin: Warm, dry  Neuro: Awake, alert, oriented x 4, GCS 15, moving all extremities, no focal deficits  Psych: Calm, cooperative      LAB RESULTS  Recent Results (from the past 24 hour(s))   ECG 12 Lead Other; near syncope    Collection Time: 08/10/23  3:13 PM   Result Value Ref Range    QT Interval 429 ms   Basic Metabolic Panel    Collection Time: 08/10/23  3:35 PM    Specimen: Arm, Left; Blood   Result Value Ref Range    Glucose 123 (H) 65 - 99 mg/dL    BUN 20 8 - 23 mg/dL    Creatinine 1.07 0.76 - 1.27 mg/dL    Sodium 142 136 - 145 mmol/L    Potassium 4.5 3.5 - 5.2 mmol/L    Chloride 104 98 - 107 mmol/L    CO2 28.1 22.0 - 29.0 mmol/L    Calcium 9.9 8.6 - 10.5 mg/dL    BUN/Creatinine Ratio 18.7 7.0 - 25.0    Anion Gap 9.9 5.0 - 15.0 mmol/L    eGFR 71.5 >60.0 mL/min/1.73   Magnesium     Collection Time: 08/10/23  3:35 PM    Specimen: Arm, Left; Blood   Result Value Ref Range    Magnesium 2.5 (H) 1.6 - 2.4 mg/dL   CBC Auto Differential    Collection Time: 08/10/23  3:35 PM    Specimen: Arm, Left; Blood   Result Value Ref Range    WBC 5.85 3.40 - 10.80 10*3/mm3    RBC 3.85 (L) 4.14 - 5.80 10*6/mm3    Hemoglobin 12.4 (L) 13.0 - 17.7 g/dL    Hematocrit 36.0 (L) 37.5 - 51.0 %    MCV 93.5 79.0 - 97.0 fL    MCH 32.2 26.6 - 33.0 pg    MCHC 34.4 31.5 - 35.7 g/dL    RDW 12.3 12.3 - 15.4 %    RDW-SD 41.9 37.0 - 54.0 fl    MPV 9.1 6.0 - 12.0 fL    Platelets 165 140 - 450 10*3/mm3    Neutrophil % 70.7 42.7 - 76.0 %    Lymphocyte % 15.2 (L) 19.6 - 45.3 %    Monocyte % 12.0 5.0 - 12.0 %    Eosinophil % 1.5 0.3 - 6.2 %    Basophil % 0.3 0.0 - 1.5 %    Immature Grans % 0.3 0.0 - 0.5 %    Neutrophils, Absolute 4.13 1.70 - 7.00 10*3/mm3    Lymphocytes, Absolute 0.89 0.70 - 3.10 10*3/mm3    Monocytes, Absolute 0.70 0.10 - 0.90 10*3/mm3    Eosinophils, Absolute 0.09 0.00 - 0.40 10*3/mm3    Basophils, Absolute 0.02 0.00 - 0.20 10*3/mm3    Immature Grans, Absolute 0.02 0.00 - 0.05 10*3/mm3    nRBC 0.2 0.0 - 0.2 /100 WBC   Urinalysis With Microscopic If Indicated (No Culture) - Urine, Clean Catch    Collection Time: 08/10/23  5:25 PM    Specimen: Urine, Clean Catch   Result Value Ref Range    Color, UA Yellow Yellow, Straw    Appearance, UA Clear Clear    pH, UA 6.5 5.0 - 8.0    Specific Gravity, UA 1.012 1.005 - 1.030    Glucose, UA Negative Negative    Ketones, UA Negative Negative    Bilirubin, UA Negative Negative    Blood, UA Trace (A) Negative    Protein, UA Negative Negative    Leuk Esterase, UA Trace (A) Negative    Nitrite, UA Negative Negative    Urobilinogen, UA 1.0 E.U./dL 0.2 - 1.0 E.U./dL   Urinalysis, Microscopic Only - Urine, Clean Catch    Collection Time: 08/10/23  5:25 PM    Specimen: Urine, Clean Catch   Result Value Ref Range    RBC, UA 3-5 (A) None Seen, 0-2 /HPF    WBC, UA 3-5 (A) None Seen, 0-2  /HPF    Bacteria, UA None Seen None Seen /HPF    Squamous Epithelial Cells, UA 0-2 None Seen, 0-2 /HPF    Hyaline Casts, UA 3-6 None Seen /LPF    Methodology Automated Microscopy        Ordered the above labs and independently interpreted results. My findings will be discussed in the medical decision making section below        RADIOLOGY  No Radiology Exams Resulted Within Past 24 Hours    Ordered the above noted radiological studies.  Independently interpreted by me and my independent review of findings can be found in the ED Course.  See dictation for official radiology interpretation.      PROCEDURES    Procedures  EKG - Per my independent interpretation:    EKG Time: 1513  Rhythm/Rate: Sinus rhythm with rate of 72  Normal axis  Nonspecific IVCD with QRS of 118  No acute ischemic changes  No STEMI       No emergent changes as compared to July 12, 2023      MEDICATIONS GIVEN IN ER    Medications   sodium chloride 0.9 % bolus 1,000 mL (1,000 mL Intravenous New Bag 8/10/23 1540)         PROGRESS, DATA ANALYSIS, CONSULTS, AND MEDICAL DECISION MAKING    Please note that this section constitutes my independent interpretation of clinical data including lab results, radiology, EKG's.  This constitutes my independent professional opinion regarding differential diagnosis and management of this patient.  It may include any factors such as history from outside sources, review of external records, social determinants of health, management of medications, response to those treatments, and discussions with other providers.    Differential Diagnosis and Plan: Initial concern for dehydration, renal failure, electrolyte abnormalities, UTI, viral process, autonomic dysfunction related to his Parkinson's, among others.  Plan for labs, IV fluids, and reevaluation with results.      Additional sources:  - Discussed/ obtained information from independent historians: Wife states that he has been able to ambulate with a walker though  only short distances, symptoms really started aggravate yesterday.     - Chronic or social conditions impacting care:      - Shared decision making:  Patient and wife at bedside fully updated on and in agreement with the course and plan moving forward    ED Course as of 08/10/23 1848   u Aug 10, 2023   1558 WBC: 5.85 [DC]   1558 Hemoglobin(!): 12.4 [DC]   1558 Platelets: 165 [DC]   1614 Glucose(!): 123 [DC]   1614 BUN: 20 [DC]   1614 Creatinine: 1.07 [DC]   1614 Sodium: 142 [DC]   1614 Potassium: 4.5 [DC]   1614 Magnesium(!): 2.5 [DC]   1744 Nitrite, UA: Negative [DC]   1744 Leukocytes, UA(!): Trace [DC]   1744 Blood, UA(!): Trace [DC]   1744 Ketones, UA: Negative [DC]   1744 Bacteria, UA: None Seen [DC]   1744 WBC, UA(!): 3-5 [DC]   1846 Patient ambulated well without difficulty, patient and wife are comfortable with plan for discharge.  Discussed improved hydration, outpatient follow-up with his PCP and Parkinson's team, and ED return for worsening symptoms as needed.  Suspect autonomic dysfunction related to his Parkinson's for the blood pressure swings, no other acute emergent findings otherwise. [DC]      ED Course User Index  [DC] Alex Cornejo MD       Hospitalization Considered?:     Orders Placed During This Visit:  Orders Placed This Encounter   Procedures    Basic Metabolic Panel    Urinalysis With Microscopic If Indicated (No Culture) - Urine, Clean Catch    Magnesium    CBC Auto Differential    Urinalysis, Microscopic Only - Urine, Clean Catch    ECG 12 Lead Other; near syncope    CBC & Differential       Additional orders considered but not placed:      Independent interpretation of labs, radiology studies, and discussions with consultants: See ED Course        AS OF 18:48 EDT VITALS:    BP - 109/78  HR - 85  TEMP - 98 øF (36.7 øC) (Oral)  02 SATS - 90%        DIAGNOSIS  Final diagnoses:   Autonomic dysfunction   Parkinson's disease         DISPOSITION  DISCHARGE    Patient discharged in stable  condition.    Reviewed implications of results, diagnosis, meds, responsibility to follow up, warning signs and symptoms of possible worsening, potential complications and reasons to return to ER. If your blood pressure > 120/80 please follow up with your primary care provider for further management.    Patient/Family voiced understanding of above instructions.    Discussed plan for discharge, as there is no emergent indication for admission. Pt/family is agreeable and understands need for follow up and repeat testing.  Pt is aware that discharge does not mean that nothing is wrong but it indicates no emergency is present that requires admission and they must continue care with follow-up as given below or physician of their choice.     FOLLOW-UP  [facility] EMERGENCY DEPARTMENT  [address] [address] [phone]    As needed, If symptoms worsen    Zo Estrada MD  [address] [id number] [phone]    Schedule an appointment as soon as possible for a visit            Medication List      No changes were made to your prescriptions during this visit.                       --    Please note that portions of this were completed with a voice recognition program.       Note Disclaimer: At [facility], we believe that sharing information builds trust and better relationships. You are receiving this note because you are receiving care at [facility] or recently visited. It is possible you will see health information before a provider has talked with you about it. This kind of information can be easy to misunderstand. To help you fully understand what it means for your health, we urge you to discuss this note with your provider.           Alex Cornejo MD  08/10/23 9448

## 2023-08-15 ENCOUNTER — TELEPHONE (OUTPATIENT)
Dept: FAMILY MEDICINE CLINIC | Facility: CLINIC | Age: 78
End: 2023-08-15
Payer: MEDICARE

## 2023-08-15 NOTE — TELEPHONE ENCOUNTER
SPOKE TO CHANDA ZUÑIGA OK FOR ADDITIONAL 4 VISITS SHE ALSO STATES HE DOES HAVE UPCOMING APPT WITH HIS CARDIOLOGIST JUST GIVING YOU AN FYI AWARE YOU ARE OUT

## 2023-08-15 NOTE — TELEPHONE ENCOUNTER
Caller: CHANDA    Relationship: [facility]-OCCUPATIONAL THERAPIST    Best call back number: [phone]    What orders are you requesting (i.e. lab or imaging): 4 ADDITIONAL OT VISITS    Additional notes: CHANDA FROM [facility] STATED THAT SHE HAD AN OCCUPATIONAL THERAPY EVALUATION WITH PATIENT TODAY 8/15/23 AND IS REQUESTING 4 ADDITIONAL VISITS.. SHE WANTED TO NOTIFY DR FISHMAN THAT WHEN PATIENT WAS OPENING THE DOOR TO LET HER IN HE FELL ON HIS STEPS WHICH WERE CARPETED AND HE DID NOT APPEAR TO BE HURT. SHE STATED THAT HIS BLOOD PRESSURE WAS LOW AT AROUND 82/42. SHE STATES ONCE IT WAS HIGHER SHE WAS ABLE TO GET HIM UP AND OVER TO HIS RECLINER AND  IT WAS AROUND 92/62 WHEN SHE LEFT.

## 2023-08-16 NOTE — TELEPHONE ENCOUNTER
I am happy to approve for additional visits, and it sounds like he is already Taking the metoprolol to 12.5 mg nightly, if his blood pressure is below 90/50 I would recommend that he hold the metoprolol.  And discuss this with his cardiologist at the next visit.  Also to maintain adequate hydration, over 60 ounces per day of water or Gatorade.

## 2023-09-12 ENCOUNTER — OFFICE VISIT (OUTPATIENT)
Dept: FAMILY MEDICINE CLINIC | Facility: CLINIC | Age: 78
End: 2023-09-12
Payer: MEDICARE

## 2023-09-12 VITALS
HEART RATE: 79 BPM | OXYGEN SATURATION: 98 % | DIASTOLIC BLOOD PRESSURE: 94 MMHG | TEMPERATURE: 97.7 F | BODY MASS INDEX: 28.73 KG/M2 | WEIGHT: 194 LBS | HEIGHT: 69 IN | SYSTOLIC BLOOD PRESSURE: 126 MMHG

## 2023-09-12 DIAGNOSIS — G20 PARKINSON DISEASE: ICD-10-CM

## 2023-09-12 DIAGNOSIS — Z51.81 THERAPEUTIC DRUG MONITORING: ICD-10-CM

## 2023-09-12 DIAGNOSIS — Z00.00 MEDICARE ANNUAL WELLNESS VISIT, SUBSEQUENT: Primary | ICD-10-CM

## 2023-09-12 DIAGNOSIS — G89.29 CHRONIC BILATERAL THORACIC BACK PAIN: ICD-10-CM

## 2023-09-12 DIAGNOSIS — G62.9 NEUROPATHY: ICD-10-CM

## 2023-09-12 DIAGNOSIS — M54.6 CHRONIC BILATERAL THORACIC BACK PAIN: ICD-10-CM

## 2023-09-12 DIAGNOSIS — S22.080S COMPRESSION FRACTURE OF T12 VERTEBRA, SEQUELA: ICD-10-CM

## 2023-09-12 PROBLEM — G25.81 RLS (RESTLESS LEGS SYNDROME): Status: ACTIVE | Noted: 2022-07-08

## 2023-09-12 PROBLEM — I95.1 ORTHOSTATIC HYPOTENSION: Status: ACTIVE | Noted: 2023-04-11

## 2023-09-12 PROBLEM — R09.89 LABILE HYPERTENSION: Status: ACTIVE | Noted: 2023-04-11

## 2023-09-12 PROBLEM — K59.00 CONSTIPATION: Status: ACTIVE | Noted: 2022-07-08

## 2023-09-12 RX ORDER — PREGABALIN 50 MG/1
50 CAPSULE ORAL 3 TIMES DAILY PRN
Qty: 90 CAPSULE | Refills: 2 | Status: SHIPPED | OUTPATIENT
Start: 2023-09-12

## 2023-09-12 NOTE — Clinical Note
Please call the patient's neurologist, Dr. Paul Sams.  He does have Parkinson's and neuropathy with chronic lower back pain.  Previously on gabapentin but had some significant adverse effects when he was hospitalized in July.  He was stopped at this time.  I sent a prescription for Lyrica 50 mg twice a day and wanted to make sure he was not in disagreement with that.  If there is any concerns please feel free to make me aware  Thank you  Zo Estrada MD

## 2023-09-12 NOTE — PROGRESS NOTES
The ABCs of the Annual Wellness Visit  Subsequent Medicare Wellness Visit    Subjective      Loni Sanford is a 78 y.o. male who presents for a Subsequent Medicare Wellness Visit.    The following portions of the patient's history were reviewed and   updated as appropriate: allergies, current medications, past family history, past medical history, past social history, past surgical history, and problem list.    Compared to one year ago, the patient feels his physical   health is worse.    Compared to one year ago, the patient feels his mental   health is the same.    Recent Hospitalizations:  This patient has had a [facility] admission record on file within the last 365 days.    Current Medical Providers:  Patient Care Team:  Zo Estrada MD as PCP - General (Family Medicine)  Robert Tang MD as Consulting Physician (Cardiology)    Outpatient Medications Prior to Visit   Medication Sig Dispense Refill    acetaminophen (TYLENOL) 500 MG tablet Take 2 tablets by mouth Every 6 (Six) Hours As Needed for Mild Pain.      atorvastatin (LIPITOR) 20 MG tablet Take 1 tablet by mouth Every Night.      carbidopa-levodopa (SINEMET)  MG per tablet Take 1 tablet by mouth 4 (Four) Times a Day.      carbidopa-levodopa CR (SINEMET CR)  MG per CR tablet Take 1 tablet by mouth Every Night.      cholecalciferol (VITAMIN D3) 1000 units tablet Take 1 tablet by mouth Every Morning.      clopidogrel (PLAVIX) 75 MG tablet Take 1 tablet by mouth Every Morning. 30 tablet     ketoconazole (NIZORAL) 2 % shampoo 1 application  Daily.      metoprolol succinate XL (TOPROL-XL) 25 MG 24 hr tablet Take 0.5 tablets by mouth Every Night.      Multiple Vitamins-Minerals (CENTRUM ADULTS PO) Take 1 tablet by mouth Every Morning.      nitroglycerin (NITROSTAT) 0.4 MG SL tablet        No facility-administered medications prior to visit.       No opioid medication identified on active medication list. I have reviewed chart for  "other potential  high risk medication/s and harmful drug interactions in the elderly.        Aspirin is not on active medication list.  Aspirin use is not indicated based on review of current medical condition/s. Risk of harm outweighs potential benefits.  .    Patient Active Problem List   Diagnosis    Ureteral calculus, right    Clear cell carcinoma of kidney    Coronary artery disease    History of adenocarcinoma of prostate    Hyperlipidemia LDL goal <70    MCI (mild cognitive impairment)    Neuropathy    Parkinson disease    Stable angina    Primary hypertension    Encounter for screening for malignant neoplasm of colon    History of colon polyps    Recurrent falls    Compression fracture of T12 vertebra    RLS (restless legs syndrome)    Constipation    Labile hypertension    Orthostatic hypotension    RLS (restless legs syndrome)     Advance Care Planning   Advance Care Planning     Advance Directive is not on file.  ACP discussion was held with the patient during this visit. Patient has an advance directive (not in EMR), copy requested.     Objective    Vitals:    09/12/23 0921   BP: 126/94   Pulse: 79   Temp: 97.7 °F (36.5 °C)   SpO2: 98%   Weight: 88 kg (194 lb)   Height: 175.3 cm (69\")   PainSc:   4   PainLoc: Back     Estimated body mass index is 28.65 kg/m² as calculated from the following:    Height as of this encounter: 175.3 cm (69\").    Weight as of this encounter: 88 kg (194 lb).    BMI is >= 25 and <30. (Overweight) The following options were offered after discussion;: exercise counseling/recommendations and nutrition counseling/recommendations      Does the patient have evidence of cognitive impairment?   Yes: Mild, currently on treatment for Parkinson's with neurology.            HEALTH RISK ASSESSMENT    Smoking Status:  Social History     Tobacco Use   Smoking Status Never    Passive exposure: Never   Smokeless Tobacco Never     Alcohol Consumption:  Social History     Substance and Sexual " Activity   Alcohol Use No     Fall Risk Screen:    CORTEZADI Fall Risk Assessment was completed, and patient is at HIGH risk for falls. Assessment completed on:2023    Depression Screenin/12/2023     9:26 AM   PHQ-2/PHQ-9 Depression Screening   Little Interest or Pleasure in Doing Things 0-->not at all   Feeling Down, Depressed or Hopeless 0-->not at all   PHQ-9: Brief Depression Severity Measure Score 0       Health Habits and Functional and Cognitive Screenin/12/2023     9:28 AM   Functional & Cognitive Status   Do you have difficulty preparing food and eating? Yes   Do you have difficulty bathing yourself, getting dressed or grooming yourself? No   Do you have difficulty using the toilet? No   Do you have difficulty moving around from place to place? Yes   Do you have trouble with steps or getting out of a bed or a chair? Yes   Current Diet Well Balanced Diet   Dental Exam Up to date   Eye Exam Up to date   Exercise (times per week) 2 times per week        Exercise Frequency Comment PT   Do you need help using the phone?  No   Are you deaf or do you have serious difficulty hearing?  No   Do you need help to go to places out of walking distance? Yes   Do you need help shopping? Yes   Do you need help preparing meals?  Yes   Do you need help with housework?  Yes   Do you need help with laundry? Yes   Do you need help managing money? No   Do you ever drive or ride in a car without wearing a seat belt? No   Have you felt unusual stress, anger or loneliness in the last month? No   Who do you live with? Spouse   If you need help, do you have trouble finding someone available to you? No   Have you been bothered in the last four weeks by sexual problems? No   Do you have difficulty concentrating, remembering or making decisions? No       Age-appropriate Screening Schedule:  Refer to the list below for future screening recommendations based on patient's age, sex and/or medical conditions. Orders for  these recommended tests are listed in the plan section. The patient has been provided with a written plan.    Health Maintenance   Topic Date Due    TDAP/TD VACCINES (3 - Tdap) 07/11/2011    COVID-19 Vaccine (6 - Moderna series) 02/28/2023    ANNUAL WELLNESS VISIT  03/08/2023    INFLUENZA VACCINE  10/01/2023    LIPID PANEL  01/30/2024    BMI FOLLOWUP  09/12/2024    COLORECTAL CANCER SCREENING  05/09/2026    HEPATITIS C SCREENING  Completed    Pneumococcal Vaccine 65+  Completed    ZOSTER VACCINE  Completed                  CMS Preventative Services Quick Reference  Risk Factors Identified During Encounter:    Chronic Pain:  Due with follow-up with neurosurgery and discussed further below  Depression/Dysphoria:  Overall he thinks it is related to his chronic pain, hopeful that it will improve  Fall Risk-High or Moderate: Discussed Fall Prevention in the home and currently doing physical therapy and Occupational Therapy  Inactivity/Sedentary: Patient was advised to exercise at least 150 minutes a week per CDC recommendations.  Dental Screening Recommended  Vision Screening Recommended    The above risks/problems have been discussed with the patient.  Pertinent information has been shared with the patient in the After Visit Summary.    Diagnoses and all orders for this visit:    1. Medicare annual wellness visit, subsequent (Primary)    2. Neuropathy    3. Parkinson disease    4. Compression fracture of T12 vertebra, sequela  -     pregabalin (Lyrica) 50 MG capsule; Take 1 capsule by mouth 3 (Three) Times a Day As Needed (back pain).  Dispense: 90 capsule; Refill: 2    5. Chronic bilateral thoracic back pain  -     pregabalin (Lyrica) 50 MG capsule; Take 1 capsule by mouth 3 (Three) Times a Day As Needed (back pain).  Dispense: 90 capsule; Refill: 2    6. Therapeutic drug monitoring  -     ToxASSURE Select 13 (MW) - Urine, Clean Catch        Follow Up:   Next Medicare Wellness visit to be scheduled in 1 year.      An  "After Visit Summary and PPPS were made available to the patient.      Chief Complaint  Medicare Wellness-subsequent (No complains )    Subjective        Loni Sanford presents to [facility]  History of Present Illness  Back pain not well controlled, underwent a kyphoplasty at T12 on July 17, 2023.  He has been having ongoing pain since then, if he stands or moves for more than 5 minutes and experiences substantial pain and often sits down.  He spends most of his time in his recliner.  He was previously on gabapentin which did improve his symptoms but was stopped as he had some adverse effects when he was hospitalized.    Objective   Vital Signs:  /94   Pulse 79   Temp 97.7 °F (36.5 °C)   Ht 175.3 cm (69\")   Wt 88 kg (194 lb)   SpO2 98%   BMI 28.65 kg/m²   Estimated body mass index is 28.65 kg/m² as calculated from the following:    Height as of this encounter: 175.3 cm (69\").    Weight as of this encounter: 88 kg (194 lb).       BMI is >= 25 and <30. (Overweight) The following options were offered after discussion;: exercise counseling/recommendations and nutrition counseling/recommendations      Physical Exam  Vitals and nursing note reviewed.   Constitutional:       General: He is not in acute distress.     Appearance: Normal appearance. He is well-developed.      Comments: Some rigidity and general slow to respond and move.   HENT:      Head: Normocephalic.      Right Ear: Tympanic membrane and ear canal normal.      Left Ear: Tympanic membrane and ear canal normal.      Nose: Nose normal.   Cardiovascular:      Rate and Rhythm: Normal rate and regular rhythm.      Heart sounds: Normal heart sounds. No murmur heard.  Pulmonary:      Effort: Pulmonary effort is normal. No respiratory distress.      Breath sounds: Normal breath sounds.   Abdominal:      General: Abdomen is flat. There is no distension.      Palpations: Abdomen is soft.      Tenderness: There is no " abdominal tenderness.   Musculoskeletal:         General: Normal range of motion.   Skin:     General: Skin is warm and dry.      Findings: No rash.   Neurological:      Mental Status: He is alert and oriented to person, place, and time.   Psychiatric:         Mood and Affect: Mood normal.         Behavior: Behavior normal.         Thought Content: Thought content normal.         Judgment: Judgment normal.      Result Review :  The following data was reviewed by: Zo Estrada MD on 09/12/2023:  CMP          7/19/2023    06:06 7/20/2023    06:03 8/10/2023    15:35   CMP   Glucose 74  94  123    BUN 26  23  20    Creatinine 0.78  0.81  1.07    EGFR 91.9  90.8  71.5    Sodium 142  142  142    Potassium 4.2  4.0  4.5    Chloride 107  106  104    Calcium 8.9  8.9  9.9    Total Protein 6.0  5.9     Albumin 3.3  3.6     Globulin 2.7  2.3     Total Bilirubin 0.9  0.7     Alkaline Phosphatase 155  163     AST (SGOT) 9  12     ALT (SGPT) <5  <5     Albumin/Globulin Ratio 1.2  1.6     BUN/Creatinine Ratio 33.3  28.4  18.7    Anion Gap 12.4  10.0  9.9      CBC          7/19/2023    06:06 7/20/2023    06:03 8/10/2023    15:35   CBC   WBC 7.86  5.51  5.85    RBC 3.90  3.91  3.85    Hemoglobin 12.6  12.3  12.4    Hematocrit 36.2  36.1  36.0    MCV 92.8  92.3  93.5    MCH 32.3  31.5  32.2    MCHC 34.8  34.1  34.4    RDW 11.6  11.4  12.3    Platelets 175  162  165      Lipid Panel          1/30/2023    11:24   Lipid Panel   Total Cholesterol 117    Triglycerides 83    HDL Cholesterol 45    VLDL Cholesterol 16    LDL Cholesterol  56                     Assessment and Plan   Diagnoses and all orders for this visit:    1. Medicare annual wellness visit, subsequent (Primary)    2. Neuropathy    3. Parkinson disease    4. Compression fracture of T12 vertebra, sequela  -     pregabalin (Lyrica) 50 MG capsule; Take 1 capsule by mouth 3 (Three) Times a Day As Needed (back pain).  Dispense: 90 capsule; Refill: 2    5. Chronic bilateral  thoracic back pain  -     pregabalin (Lyrica) 50 MG capsule; Take 1 capsule by mouth 3 (Three) Times a Day As Needed (back pain).  Dispense: 90 capsule; Refill: 2    6. Therapeutic drug monitoring  -     ToxASSURE Select 13 (MW) - Urine, Clean Catch    Medicare wellness visit today, discussed getting a Tdap vaccine at the pharmacy, up-to-date on remaining screening, reviewed recent labs that have been done with his recent hospitalization, no further blood work needed at this time.    Parkinson's disease stable, managed by neurology, he does have some symptoms of neuropathy as well.  He was previously on gabapentin, he was prescribed this by his neurologist Dr. Sams.  He sees him every 6 months and is thankfully stable.    Chronic lower back pain, not well controlled, status post kyphoplasty this July, he is not having substantial improvement in pain but I do attribute some of this to his overall very sedentary habits.  It is hard for him to move for more than 5 minutes.  He is getting home PT and OT which I think he should continue, I do think adding Lyrica in to help with some of his symptoms will help him become more mobile.  I will check with Dr. Sams to make sure there is no concern with this worsening Parkinson's-specialist he was prescribing the gabapentin previously.  Urine drug screen collected today, agreement collected and Be reviewed and appropriate.         Follow Up   Return in about 3 months (around 12/12/2023), or if symptoms worsen or fail to improve, for Recheck lower back pain.  Patient was given instructions and counseling regarding his condition or for health maintenance advice. Please see specific information pulled into the AVS if appropriate.     Zo Estrada MD

## 2023-09-13 ENCOUNTER — TELEPHONE (OUTPATIENT)
Dept: FAMILY MEDICINE CLINIC | Facility: CLINIC | Age: 78
End: 2023-09-13
Payer: MEDICARE

## 2023-09-13 NOTE — TELEPHONE ENCOUNTER
Caller: CHANDA    Relationship: [facility]    Best call back number: [phone]    What orders are you requesting (i.e. lab or imaging): VERBAL ORDERS    Additional notes:CHANDA FROM [facility] IS REQUESTING VERBAL ORDERS FOR  2 ADDITIONAL OCCUPATIONAL THERAPY VISITS

## 2023-09-17 LAB — DRUGS UR: NORMAL

## 2023-11-17 ENCOUNTER — APPOINTMENT (OUTPATIENT)
Dept: GENERAL RADIOLOGY | Facility: HOSPITAL | Age: 78
End: 2023-11-17
Payer: MEDICARE

## 2023-11-17 ENCOUNTER — APPOINTMENT (OUTPATIENT)
Dept: CT IMAGING | Facility: HOSPITAL | Age: 78
End: 2023-11-17
Payer: MEDICARE

## 2023-11-17 ENCOUNTER — HOSPITAL ENCOUNTER (OUTPATIENT)
Facility: HOSPITAL | Age: 78
Setting detail: OBSERVATION
Discharge: HOME | End: 2023-11-19
Attending: EMERGENCY MEDICINE | Admitting: HOSPITALIST
Payer: MEDICARE

## 2023-11-17 DIAGNOSIS — R91.1 LUNG NODULE SEEN ON IMAGING STUDY: ICD-10-CM

## 2023-11-17 DIAGNOSIS — S09.90XA CLOSED HEAD INJURY, INITIAL ENCOUNTER: ICD-10-CM

## 2023-11-17 DIAGNOSIS — R55 SYNCOPE, UNSPECIFIED SYNCOPE TYPE: Primary | ICD-10-CM

## 2023-11-17 DIAGNOSIS — S22.42XA CLOSED FRACTURE OF MULTIPLE RIBS OF LEFT SIDE, INITIAL ENCOUNTER: ICD-10-CM

## 2023-11-17 DIAGNOSIS — S22.010A CLOSED WEDGE COMPRESSION FRACTURE OF T1 VERTEBRA, INITIAL ENCOUNTER: ICD-10-CM

## 2023-11-17 DIAGNOSIS — J18.9 COMMUNITY ACQUIRED PNEUMONIA OF LEFT LOWER LOBE OF LUNG: ICD-10-CM

## 2023-11-17 PROBLEM — S22.32XA CLOSED FRACTURE OF ONE RIB OF LEFT SIDE: Status: ACTIVE | Noted: 2023-11-17

## 2023-11-17 PROBLEM — Z87.81 HISTORY OF VERTEBRAL COMPRESSION FRACTURE: Status: ACTIVE | Noted: 2023-11-17

## 2023-11-17 PROBLEM — S22.000A THORACIC COMPRESSION FRACTURE, CLOSED, INITIAL ENCOUNTER: Status: ACTIVE | Noted: 2023-11-17

## 2023-11-17 LAB
ALBUMIN SERPL-MCNC: 4.5 G/DL (ref 3.5–5.2)
ALBUMIN/GLOB SERPL: 2.1 G/DL
ALP SERPL-CCNC: 126 U/L (ref 39–117)
ALT SERPL W P-5'-P-CCNC: <5 U/L (ref 1–41)
ANION GAP SERPL CALCULATED.3IONS-SCNC: 7.1 MMOL/L (ref 5–15)
AST SERPL-CCNC: 18 U/L (ref 1–40)
B PARAPERT DNA SPEC QL NAA+PROBE: NOT DETECTED
B PERT DNA SPEC QL NAA+PROBE: NOT DETECTED
BASOPHILS # BLD AUTO: 0.02 10*3/MM3 (ref 0–0.2)
BASOPHILS NFR BLD AUTO: 0.3 % (ref 0–1.5)
BILIRUB SERPL-MCNC: 0.6 MG/DL (ref 0–1.2)
BUN SERPL-MCNC: 23 MG/DL (ref 8–23)
BUN/CREAT SERPL: 23 (ref 7–25)
C PNEUM DNA NPH QL NAA+NON-PROBE: NOT DETECTED
CALCIUM SPEC-SCNC: 9.8 MG/DL (ref 8.6–10.5)
CHLORIDE SERPL-SCNC: 104 MMOL/L (ref 98–107)
CO2 SERPL-SCNC: 29.9 MMOL/L (ref 22–29)
CREAT SERPL-MCNC: 1 MG/DL (ref 0.76–1.27)
D-LACTATE SERPL-SCNC: 1.3 MMOL/L (ref 0.5–2)
DEPRECATED RDW RBC AUTO: 40.3 FL (ref 37–54)
EGFRCR SERPLBLD CKD-EPI 2021: 77.5 ML/MIN/1.73
EOSINOPHIL # BLD AUTO: 0.05 10*3/MM3 (ref 0–0.4)
EOSINOPHIL NFR BLD AUTO: 0.8 % (ref 0.3–6.2)
ERYTHROCYTE [DISTWIDTH] IN BLOOD BY AUTOMATED COUNT: 11.5 % (ref 12.3–15.4)
FLUAV SUBTYP SPEC NAA+PROBE: NOT DETECTED
FLUBV RNA ISLT QL NAA+PROBE: NOT DETECTED
GEN 5 2HR TROPONIN T REFLEX: 28 NG/L
GLOBULIN UR ELPH-MCNC: 2.1 GM/DL
GLUCOSE SERPL-MCNC: 108 MG/DL (ref 65–99)
HADV DNA SPEC NAA+PROBE: NOT DETECTED
HCOV 229E RNA SPEC QL NAA+PROBE: NOT DETECTED
HCOV HKU1 RNA SPEC QL NAA+PROBE: NOT DETECTED
HCOV NL63 RNA SPEC QL NAA+PROBE: NOT DETECTED
HCOV OC43 RNA SPEC QL NAA+PROBE: NOT DETECTED
HCT VFR BLD AUTO: 38.6 % (ref 37.5–51)
HGB BLD-MCNC: 13.2 G/DL (ref 13–17.7)
HMPV RNA NPH QL NAA+NON-PROBE: NOT DETECTED
HPIV1 RNA ISLT QL NAA+PROBE: NOT DETECTED
HPIV2 RNA SPEC QL NAA+PROBE: NOT DETECTED
HPIV3 RNA NPH QL NAA+PROBE: NOT DETECTED
HPIV4 P GENE NPH QL NAA+PROBE: NOT DETECTED
IMM GRANULOCYTES # BLD AUTO: 0.04 10*3/MM3 (ref 0–0.05)
IMM GRANULOCYTES NFR BLD AUTO: 0.6 % (ref 0–0.5)
LYMPHOCYTES # BLD AUTO: 0.66 10*3/MM3 (ref 0.7–3.1)
LYMPHOCYTES NFR BLD AUTO: 10.6 % (ref 19.6–45.3)
M PNEUMO IGG SER IA-ACNC: NOT DETECTED
MCH RBC QN AUTO: 32.8 PG (ref 26.6–33)
MCHC RBC AUTO-ENTMCNC: 34.2 G/DL (ref 31.5–35.7)
MCV RBC AUTO: 96 FL (ref 79–97)
MONOCYTES # BLD AUTO: 0.63 10*3/MM3 (ref 0.1–0.9)
MONOCYTES NFR BLD AUTO: 10.1 % (ref 5–12)
NEUTROPHILS NFR BLD AUTO: 4.83 10*3/MM3 (ref 1.7–7)
NEUTROPHILS NFR BLD AUTO: 77.6 % (ref 42.7–76)
NRBC BLD AUTO-RTO: 0 /100 WBC (ref 0–0.2)
PLATELET # BLD AUTO: 153 10*3/MM3 (ref 140–450)
PMV BLD AUTO: 9.7 FL (ref 6–12)
POTASSIUM SERPL-SCNC: 4.4 MMOL/L (ref 3.5–5.2)
PROCALCITONIN SERPL-MCNC: 0.05 NG/ML (ref 0–0.25)
PROT SERPL-MCNC: 6.6 G/DL (ref 6–8.5)
QT INTERVAL: 427 MS
QTC INTERVAL: 465 MS
RBC # BLD AUTO: 4.02 10*6/MM3 (ref 4.14–5.8)
RHINOVIRUS RNA SPEC NAA+PROBE: NOT DETECTED
RSV RNA NPH QL NAA+NON-PROBE: NOT DETECTED
SARS-COV-2 RNA NPH QL NAA+NON-PROBE: NOT DETECTED
SODIUM SERPL-SCNC: 141 MMOL/L (ref 136–145)
TROPONIN T DELTA: 0 NG/L
TROPONIN T SERPL HS-MCNC: 28 NG/L
WBC NRBC COR # BLD AUTO: 6.23 10*3/MM3 (ref 3.4–10.8)

## 2023-11-17 PROCEDURE — G0378 HOSPITAL OBSERVATION PER HR: HCPCS

## 2023-11-17 PROCEDURE — 70450 CT HEAD/BRAIN W/O DYE: CPT

## 2023-11-17 PROCEDURE — 84484 ASSAY OF TROPONIN QUANT: CPT | Performed by: PHYSICIAN ASSISTANT

## 2023-11-17 PROCEDURE — 0202U NFCT DS 22 TRGT SARS-COV-2: CPT | Performed by: PHYSICIAN ASSISTANT

## 2023-11-17 PROCEDURE — 94761 N-INVAS EAR/PLS OXIMETRY MLT: CPT

## 2023-11-17 PROCEDURE — 94799 UNLISTED PULMONARY SVC/PX: CPT

## 2023-11-17 PROCEDURE — 84145 PROCALCITONIN (PCT): CPT | Performed by: PHYSICIAN ASSISTANT

## 2023-11-17 PROCEDURE — 72125 CT NECK SPINE W/O DYE: CPT

## 2023-11-17 PROCEDURE — 99284 EMERGENCY DEPT VISIT MOD MDM: CPT

## 2023-11-17 PROCEDURE — 99222 1ST HOSP IP/OBS MODERATE 55: CPT | Performed by: NURSE PRACTITIONER

## 2023-11-17 PROCEDURE — 87040 BLOOD CULTURE FOR BACTERIA: CPT | Performed by: PHYSICIAN ASSISTANT

## 2023-11-17 PROCEDURE — 93005 ELECTROCARDIOGRAM TRACING: CPT | Performed by: PHYSICIAN ASSISTANT

## 2023-11-17 PROCEDURE — 85025 COMPLETE CBC W/AUTO DIFF WBC: CPT | Performed by: PHYSICIAN ASSISTANT

## 2023-11-17 PROCEDURE — 80053 COMPREHEN METABOLIC PANEL: CPT | Performed by: PHYSICIAN ASSISTANT

## 2023-11-17 PROCEDURE — 25010000002 CEFTRIAXONE PER 250 MG: Performed by: PHYSICIAN ASSISTANT

## 2023-11-17 PROCEDURE — 72110 X-RAY EXAM L-2 SPINE 4/>VWS: CPT

## 2023-11-17 PROCEDURE — 94664 DEMO&/EVAL PT USE INHALER: CPT

## 2023-11-17 PROCEDURE — 25810000003 SODIUM CHLORIDE 0.9 % SOLUTION: Performed by: INTERNAL MEDICINE

## 2023-11-17 PROCEDURE — 36415 COLL VENOUS BLD VENIPUNCTURE: CPT | Performed by: PHYSICIAN ASSISTANT

## 2023-11-17 PROCEDURE — 93010 ELECTROCARDIOGRAM REPORT: CPT | Performed by: INTERNAL MEDICINE

## 2023-11-17 PROCEDURE — 83605 ASSAY OF LACTIC ACID: CPT | Performed by: PHYSICIAN ASSISTANT

## 2023-11-17 PROCEDURE — 71250 CT THORAX DX C-: CPT

## 2023-11-17 RX ORDER — MELATONIN
1000 EVERY MORNING
Status: DISCONTINUED | OUTPATIENT
Start: 2023-11-18 | End: 2023-11-19 | Stop reason: HOSPADM

## 2023-11-17 RX ORDER — SODIUM CHLORIDE 0.9 % (FLUSH) 0.9 %
10 SYRINGE (ML) INJECTION EVERY 12 HOURS SCHEDULED
Status: DISCONTINUED | OUTPATIENT
Start: 2023-11-17 | End: 2023-11-19 | Stop reason: HOSPADM

## 2023-11-17 RX ORDER — SODIUM CHLORIDE 9 MG/ML
40 INJECTION, SOLUTION INTRAVENOUS AS NEEDED
Status: DISCONTINUED | OUTPATIENT
Start: 2023-11-17 | End: 2023-11-19 | Stop reason: HOSPADM

## 2023-11-17 RX ORDER — CARBIDOPA/LEVODOPA 25MG-250MG
1 TABLET ORAL 4 TIMES DAILY
Status: DISCONTINUED | OUTPATIENT
Start: 2023-11-17 | End: 2023-11-19 | Stop reason: HOSPADM

## 2023-11-17 RX ORDER — CARBIDOPA AND LEVODOPA 50; 200 MG/1; MG/1
1 TABLET, EXTENDED RELEASE ORAL NIGHTLY
Status: DISCONTINUED | OUTPATIENT
Start: 2023-11-17 | End: 2023-11-19 | Stop reason: HOSPADM

## 2023-11-17 RX ORDER — BISACODYL 10 MG
10 SUPPOSITORY, RECTAL RECTAL DAILY PRN
Status: DISCONTINUED | OUTPATIENT
Start: 2023-11-17 | End: 2023-11-19 | Stop reason: HOSPADM

## 2023-11-17 RX ORDER — PREGABALIN 50 MG/1
50 CAPSULE ORAL 3 TIMES DAILY PRN
Status: DISCONTINUED | OUTPATIENT
Start: 2023-11-17 | End: 2023-11-19 | Stop reason: HOSPADM

## 2023-11-17 RX ORDER — POLYETHYLENE GLYCOL 3350 17 G/17G
17 POWDER, FOR SOLUTION ORAL DAILY PRN
Status: DISCONTINUED | OUTPATIENT
Start: 2023-11-17 | End: 2023-11-19 | Stop reason: HOSPADM

## 2023-11-17 RX ORDER — ACETAMINOPHEN 160 MG/5ML
650 SOLUTION ORAL EVERY 4 HOURS PRN
Status: DISCONTINUED | OUTPATIENT
Start: 2023-11-17 | End: 2023-11-19 | Stop reason: HOSPADM

## 2023-11-17 RX ORDER — METOPROLOL SUCCINATE 25 MG/1
12.5 TABLET, EXTENDED RELEASE ORAL NIGHTLY
Status: DISCONTINUED | OUTPATIENT
Start: 2023-11-17 | End: 2023-11-19 | Stop reason: HOSPADM

## 2023-11-17 RX ORDER — GUAIFENESIN 600 MG/1
600 TABLET, EXTENDED RELEASE ORAL EVERY 12 HOURS SCHEDULED
Status: DISCONTINUED | OUTPATIENT
Start: 2023-11-17 | End: 2023-11-19 | Stop reason: HOSPADM

## 2023-11-17 RX ORDER — ACETAMINOPHEN 650 MG/1
650 SUPPOSITORY RECTAL EVERY 4 HOURS PRN
Status: DISCONTINUED | OUTPATIENT
Start: 2023-11-17 | End: 2023-11-19 | Stop reason: HOSPADM

## 2023-11-17 RX ORDER — ACETAMINOPHEN 500 MG
1000 TABLET ORAL EVERY 6 HOURS PRN
Status: DISCONTINUED | OUTPATIENT
Start: 2023-11-17 | End: 2023-11-17 | Stop reason: SDUPTHER

## 2023-11-17 RX ORDER — ACETAMINOPHEN 500 MG
1000 TABLET ORAL 3 TIMES DAILY
Status: DISCONTINUED | OUTPATIENT
Start: 2023-11-17 | End: 2023-11-17

## 2023-11-17 RX ORDER — NITROGLYCERIN 0.4 MG/1
0.4 TABLET SUBLINGUAL
Status: DISCONTINUED | OUTPATIENT
Start: 2023-11-17 | End: 2023-11-19 | Stop reason: HOSPADM

## 2023-11-17 RX ORDER — MULTIPLE VITAMINS W/ MINERALS TAB 9MG-400MCG
1 TAB ORAL EVERY MORNING
Status: DISCONTINUED | OUTPATIENT
Start: 2023-11-18 | End: 2023-11-19 | Stop reason: HOSPADM

## 2023-11-17 RX ORDER — LIDOCAINE 4 G/G
2 PATCH TOPICAL
Status: DISCONTINUED | OUTPATIENT
Start: 2023-11-17 | End: 2023-11-19 | Stop reason: HOSPADM

## 2023-11-17 RX ORDER — AMOXICILLIN 250 MG
2 CAPSULE ORAL 2 TIMES DAILY
Status: DISCONTINUED | OUTPATIENT
Start: 2023-11-17 | End: 2023-11-19 | Stop reason: HOSPADM

## 2023-11-17 RX ORDER — BISACODYL 5 MG/1
5 TABLET, DELAYED RELEASE ORAL DAILY PRN
Status: DISCONTINUED | OUTPATIENT
Start: 2023-11-17 | End: 2023-11-19 | Stop reason: HOSPADM

## 2023-11-17 RX ORDER — ONDANSETRON 2 MG/ML
4 INJECTION INTRAMUSCULAR; INTRAVENOUS EVERY 6 HOURS PRN
Status: DISCONTINUED | OUTPATIENT
Start: 2023-11-17 | End: 2023-11-19 | Stop reason: HOSPADM

## 2023-11-17 RX ORDER — CLOPIDOGREL BISULFATE 75 MG/1
75 TABLET ORAL EVERY MORNING
Status: DISCONTINUED | OUTPATIENT
Start: 2023-11-18 | End: 2023-11-19 | Stop reason: HOSPADM

## 2023-11-17 RX ORDER — SODIUM CHLORIDE 9 MG/ML
100 INJECTION, SOLUTION INTRAVENOUS CONTINUOUS
Status: DISCONTINUED | OUTPATIENT
Start: 2023-11-17 | End: 2023-11-18

## 2023-11-17 RX ORDER — SODIUM CHLORIDE 0.9 % (FLUSH) 0.9 %
10 SYRINGE (ML) INJECTION AS NEEDED
Status: DISCONTINUED | OUTPATIENT
Start: 2023-11-17 | End: 2023-11-19 | Stop reason: HOSPADM

## 2023-11-17 RX ORDER — ATORVASTATIN CALCIUM 20 MG/1
20 TABLET, FILM COATED ORAL NIGHTLY
Status: DISCONTINUED | OUTPATIENT
Start: 2023-11-17 | End: 2023-11-19 | Stop reason: HOSPADM

## 2023-11-17 RX ORDER — ACETAMINOPHEN 325 MG/1
650 TABLET ORAL EVERY 4 HOURS PRN
Status: DISCONTINUED | OUTPATIENT
Start: 2023-11-17 | End: 2023-11-19 | Stop reason: HOSPADM

## 2023-11-17 RX ADMIN — SODIUM CHLORIDE 100 ML/HR: 9 INJECTION, SOLUTION INTRAVENOUS at 20:36

## 2023-11-17 RX ADMIN — ATORVASTATIN CALCIUM 20 MG: 20 TABLET, FILM COATED ORAL at 20:37

## 2023-11-17 RX ADMIN — CEFTRIAXONE SODIUM 1000 MG: 1 INJECTION, POWDER, FOR SOLUTION INTRAMUSCULAR; INTRAVENOUS at 15:40

## 2023-11-17 RX ADMIN — LIDOCAINE 2 PATCH: 4 PATCH TOPICAL at 15:42

## 2023-11-17 RX ADMIN — GUAIFENESIN 600 MG: 600 TABLET, EXTENDED RELEASE ORAL at 20:37

## 2023-11-17 RX ADMIN — METOPROLOL SUCCINATE 12.5 MG: 25 TABLET, EXTENDED RELEASE ORAL at 20:37

## 2023-11-17 RX ADMIN — CARBIDOPA AND LEVODOPA 1 TABLET: 50; 200 TABLET, EXTENDED RELEASE ORAL at 22:45

## 2023-11-17 RX ADMIN — Medication 10 ML: at 20:39

## 2023-11-17 RX ADMIN — ACETAMINOPHEN 1000 MG: 500 TABLET ORAL at 15:42

## 2023-11-17 NOTE — ED NOTES
"Nursing report ED to floor  Loni Sanford  78 y.o.  male    HPI :   Chief Complaint   Patient presents with    Fall    Head Injury    Cough       Admitting doctor:   Romie Boyle MD    Admitting diagnosis:   The primary encounter diagnosis was Syncope, unspecified syncope type. Diagnoses of Closed head injury, initial encounter, Closed fracture of multiple ribs of left side, initial encounter, Closed wedge compression fracture of T1 vertebra, initial encounter, and Community acquired pneumonia of left lower lobe of lung were also pertinent to this visit.    Code status:   Current Code Status       Date Active Code Status Order ID Comments User Context       Prior            Allergies:   Aspirin, Belladonna alkaloids-opium, and Gabapentin    Isolation:   No active isolations    Intake and Output  No intake or output data in the 24 hours ending 11/17/23 1504    Weight:       11/17/23  1052   Weight: 89.4 kg (197 lb)       Most recent vitals:   Vitals:    11/17/23 1052 11/17/23 1431   BP: 147/70 123/83   BP Location: Left arm    Patient Position: Sitting    Pulse: 77 80   Resp: 18    Temp: 97.7 °F (36.5 °C)    TempSrc: Tympanic    SpO2: 97% 95%   Weight: 89.4 kg (197 lb)    Height: 175.3 cm (69\")        Active LDAs/IV Access:   Lines, Drains & Airways       Active LDAs       Name Placement date Placement time Site Days    Peripheral IV 11/17/23 1228 Anterior;Left;Upper Arm 11/17/23  1228  Arm  less than 1                    Labs (abnormal labs have a star):   Labs Reviewed   COMPREHENSIVE METABOLIC PANEL - Abnormal; Notable for the following components:       Result Value    Glucose 108 (*)     CO2 29.9 (*)     Alkaline Phosphatase 126 (*)     All other components within normal limits    Narrative:     GFR Normal >60  Chronic Kidney Disease <60  Kidney Failure <15    The GFR formula is only valid for adults with stable renal function between ages 18 and 70.   TROPONIN - Abnormal; Notable for the following " components:    HS Troponin T 28 (*)     All other components within normal limits    Narrative:     High Sensitive Troponin T Reference Range:  <14.0 ng/L- Negative Female for AMI  <22.0 ng/L- Negative Male for AMI  >=14 - Abnormal Female indicating possible myocardial injury.  >=22 - Abnormal Male indicating possible myocardial injury.   Clinicians would have to utilize clinical acumen, EKG, Troponin, and serial changes to determine if it is an Acute Myocardial Infarction or myocardial injury due to an underlying chronic condition.        CBC WITH AUTO DIFFERENTIAL - Abnormal; Notable for the following components:    RBC 4.02 (*)     RDW 11.5 (*)     Neutrophil % 77.6 (*)     Lymphocyte % 10.6 (*)     Immature Grans % 0.6 (*)     Lymphocytes, Absolute 0.66 (*)     All other components within normal limits   RESPIRATORY PANEL PCR W/ COVID-19 (SARS-COV-2), NP SWAB IN UTM/VTP, 2 HR TAT - Normal    Narrative:     In the setting of a positive respiratory panel with a viral infection PLUS a negative procalcitonin without other underlying concern for bacterial infection, consider observing off antibiotics or discontinuation of antibiotics and continue supportive care. If the respiratory panel is positive for atypical bacterial infection (Bordetella pertussis, Chlamydophila pneumoniae, or Mycoplasma pneumoniae), consider antibiotic de-escalation to target atypical bacterial infection.   PROCALCITONIN - Normal    Narrative:     As a Marker for Sepsis (Non-Neonates):    1. <0.5 ng/mL represents a low risk of severe sepsis and/or septic shock.  2. >2 ng/mL represents a high risk of severe sepsis and/or septic shock.    As a Marker for Lower Respiratory Tract Infections that require antibiotic therapy:    PCT on Admission    Antibiotic Therapy       6-12 Hrs later    >0.5                Strongly Recommended  >0.25 - <0.5        Recommended   0.1 - 0.25          Discouraged              Remeasure/reassess PCT  <0.1               "  Strongly Discouraged     Remeasure/reassess PCT    As 28 day mortality risk marker: \"Change in Procalcitonin Result\" (>80% or <=80%) if Day 0 (or Day 1) and Day 4 values are available. Refer to http://www.[facility]-pct-calculator.com    Change in PCT <=80%  A decrease of PCT levels below or equal to 80% defines a positive change in PCT test result representing a higher risk for 28-day all-cause mortality of patients diagnosed with severe sepsis for septic shock.    Change in PCT >80%  A decrease of PCT levels of more than 80% defines a negative change in PCT result representing a lower risk for 28-day all-cause mortality of patients diagnosed with severe sepsis or septic shock.      BLOOD CULTURE   BLOOD CULTURE   HIGH SENSITIVITIY TROPONIN T 2HR   LACTIC ACID, PLASMA   CBC AND DIFFERENTIAL    Narrative:     The following orders were created for panel order CBC & Differential.  Procedure                               Abnormality         Status                     ---------                               -----------         ------                     CBC Auto Differential[[id number]]        Abnormal            Final result                 Please view results for these tests on the individual orders.       EKG:   ECG 12 Lead Syncope   Preliminary Result   HEART RATE= 71  bpm   RR Interval= 845  ms   MD Interval= 159  ms   P Horizontal Axis=   deg   P Front Axis= 48  deg   QRSD Interval= 114  ms   QT Interval= 427  ms   QTcB= 465  ms   QRS Axis= -11  deg   T Wave Axis=   deg   - OTHERWISE NORMAL ECG -   Sinus rhythm   Borderline intraventricular conduction delay   Minimal ST elevation, inferior leads   Electronically Signed By:    Date and Time of Study: 2023-11-17 11:33:31          Meds given in ED:   Medications   sodium chloride 0.9 % flush 10 mL (has no administration in time range)   cefTRIAXone (ROCEPHIN) 1,000 mg in sodium chloride 0.9 % 100 mL IVPB-VTB (has no administration in time range)       Imaging " results:  CT Head Without Contrast    Result Date: 11/17/2023   No evidence for acute traumatic intracranial pathology.   TECHNIQUE: CT scan of the cervical spine was obtained with 1 mm axial bone algorithm and 2 mm axial soft tissue algorithm images. Sagittal and coronal reconstructed images were obtained.  FINDINGS: There is mild (less than 10%) loss of vertebral body height at T1. There is a subtle area of increased density within the superior aspect of the T1 vertebral body. Ultimately, I cannot exclude the possibility of an acute to subacute compression fracture at T1. I recommend further evaluation with an MRI of the thoracic spine.  Otherwise, there is no evidence for acute fracture or bony malalignment involving the cervical spine. There is degenerative retrolisthesis of C3 on C4 by approximately 3 mm. There is moderate canal stenosis at C3-4, C5-6, and C6-7 secondary to disc osteophyte complexes. Multilevel foraminal stenotic changes are appreciated most prominently identified from C3-4 down to C6-7.  IMPRESSION:  There is mild (less than 10%) loss of vertebral body height at the T1 level. Additionally, there is subtle area of increased density within the superior aspect of the T1 vertebral body. I cannot exclude the possibility of an acute to subacute compression fracture involving T1. I recommend further evaluation with an MRI of the thoracic spine.  Otherwise, there is no evidence for acute fracture or bony malalignment involving the cervical spine.  Incidental degenerative phenomena are as discussed in detail above.  These findings and recommendations were directly discussed with Levi Cruz on 11/17/2023 at approximately 1:55 p.m.   Radiation dose reduction techniques were utilized, including automated exposure control and exposure modulation based on body size.  This report was finalized on 11/17/2023 2:46 PM by Dr. Dung Cardenas M.D on Workstation: [id number]      CT Cervical Spine Without  Contrast    Result Date: 11/17/2023   No evidence for acute traumatic intracranial pathology.   TECHNIQUE: CT scan of the cervical spine was obtained with 1 mm axial bone algorithm and 2 mm axial soft tissue algorithm images. Sagittal and coronal reconstructed images were obtained.  FINDINGS: There is mild (less than 10%) loss of vertebral body height at T1. There is a subtle area of increased density within the superior aspect of the T1 vertebral body. Ultimately, I cannot exclude the possibility of an acute to subacute compression fracture at T1. I recommend further evaluation with an MRI of the thoracic spine.  Otherwise, there is no evidence for acute fracture or bony malalignment involving the cervical spine. There is degenerative retrolisthesis of C3 on C4 by approximately 3 mm. There is moderate canal stenosis at C3-4, C5-6, and C6-7 secondary to disc osteophyte complexes. Multilevel foraminal stenotic changes are appreciated most prominently identified from C3-4 down to C6-7.  IMPRESSION:  There is mild (less than 10%) loss of vertebral body height at the T1 level. Additionally, there is subtle area of increased density within the superior aspect of the T1 vertebral body. I cannot exclude the possibility of an acute to subacute compression fracture involving T1. I recommend further evaluation with an MRI of the thoracic spine.  Otherwise, there is no evidence for acute fracture or bony malalignment involving the cervical spine.  Incidental degenerative phenomena are as discussed in detail above.  These findings and recommendations were directly discussed with Levi Cruz on 11/17/2023 at approximately 1:55 p.m.   Radiation dose reduction techniques were utilized, including automated exposure control and exposure modulation based on body size.  This report was finalized on 11/17/2023 2:46 PM by Dr. Dung Cardenas M.D on Workstation: [id number]      CT Chest Without Contrast Diagnostic    Result Date:  11/17/2023  1.  Left third through fifth rib fractures. No pneumothorax is seen. 2.  T1 and T12 compression deformities of indeterminate age resulting in and up to approximately 55 to 60% loss of height. Correlation with patient history and point tenderness recommended with follow-up MRI if clinically indicated. 3.  Asymmetric left basilar pulmonary opacification concerning for pneumonia in the appropriate context. Correlation with patient history is recommended. 4.  A few sub-6 mm pulm nodules are present. Inpatient history malignancy, follow-up with chest CT in 3 months is recommended to exclude metastatic disease. 5.  Thickening of the distal esophagus suggestive of esophagitis in the proper context. Correlation with patient history is recommended with follow-up endoscopy if clinically indicated. 6.  Other findings above.   This report was finalized on 11/17/2023 1:35 PM by Dr. Clayton Johnson M.D on Workstation: [id number]       Ambulatory status:   - bedrest    Social issues:   Social History     Socioeconomic History    Marital status:    Tobacco Use    Smoking status: Never     Passive exposure: Never    Smokeless tobacco: Never   Vaping Use    Vaping Use: Never used   Substance and Sexual Activity    Alcohol use: No    Drug use: No    Sexual activity: Defer       NIH Stroke Scale:       Estella Ferrara RN  11/17/23 15:04 EST

## 2023-11-17 NOTE — PLAN OF CARE
Goal Outcome Evaluation:      Admitted from ED after fall at home. Syncopal episode. Hit head, fx 3 ribs. Seizure precautions initiated. Spouse at bedside. A/O x4. O2 @ 2 L. Safety maintained.

## 2023-11-17 NOTE — CONSULTS
[facility], [place], operated by [facility] NEUROSURGERY CONSULT NOTE    Patient name: Loni Sanford  Referring Provider: Dr Boyle  Reason for Consultation: T1 VCF    Patient Care Team:  Zo Estrada MD as PCP - General (Family Medicine)  Robert Tang MD as Consulting Physician (Cardiology)    Chief complaint: Syncopal episode    Subjective .     History of present illness:    Patient is a 78 y.o.  male with past medical history of CAD, adenocarcinoma of prostate, hyperlipidemia, Parkinson's disease, HTN, on Plavix.  Patient presented to the ER after a syncopal episode witnessed by his wife.  He reports that he remembers walking into the kitchen and then apparently wife witnessed him passing out.  Patient mainly complains of left chest wall pain, he has chronic back pain.  He did hit his head and has an abrasion to his forehead.  He does not recall any symptoms prior to the syncopal episode.  CT cervical spine performed in the ER showed a T1 VCF, he was also found to have left third through fifth rib fractures.  He currently denies neck or back pain.  He denies leg weakness, numbness or tingling to his lower extremities.    Medical record review: Patient underwent kyphoplasty for a T12 VCF July 14, 2023.      SOCIAL HISTORY  Social History     Tobacco Use    Smoking status: Never     Passive exposure: Never    Smokeless tobacco: Never   Vaping Use    Vaping Use: Never used   Substance Use Topics    Alcohol use: No    Drug use: No       retired    History  PAST MEDICAL HISTORY  Past Medical History:   Diagnosis Date    Cancer     Prostate cancer 2013    Cancer     kidney dx 4/2018    Coronary artery disease     Dizziness     occ    Hematuria     History of angina     carries NTG    History of transfusion     Hyperlipidemia     Hypertension     Kidney stones     new one 12/2018    Kidney tumor     RIGHT removed 4/2018    Parkinson disease     Renal mass, right     surgically removed 4/2018       PAST SURGICAL HISTORY  Past Surgical  History:   Procedure Laterality Date    APPENDECTOMY  2011    CARDIAC CATHETERIZATION      CATARACT EXTRACTION W/ INTRAOCULAR LENS  IMPLANT, BILATERAL      COLONOSCOPY N/A 05/09/2023    Procedure: COLONOSCOPY FOR SCREENING;  Surgeon: Terrell Zhang MD;  Location: [facility] OR;  Service: Gastroenterology;  Laterality: N/A;  poor prep, polyp    EXTRACORPOREAL SHOCKWAVE LITHOTRIPSY (ESWL), STENT INSERTION/REMOVAL Right 12/29/2017    Procedure: RIGHT EXTRACORPOREAL SHOCKWAVE LITHOTRIPSY CYSTOSCOPY STENT PLACEMENT;  Surgeon: Marcelo Suarez MD;  Location: [facility];  Service:     EXTRACORPOREAL SHOCKWAVE LITHOTRIPSY (ESWL), STENT INSERTION/REMOVAL Right 12/28/2018    Procedure: RT EXTRACORPREAL SHOCKWAVE LITHOTRIPSY CYSTOSCOPY  WITH  MANIPULATION OF STONE;  Surgeon: Moe Vasquez MD;  Location: [facility];  Service: Urology    FINGER SURGERY Left 1984    INDEX    HAND SURGERY Right 1998    KYPHOPLASTY N/A 07/14/2023    Procedure: KYPHOPLASTY 1-2 LEVELS;  Surgeon: Jeison Gilman MD;  Location: [facility] OR 18/19;  Service: Neurosurgery;  Laterality: N/A;    NEPHRECTOMY PARTIAL Right 04/05/2018    Procedure: RT OPEN PARTIAL NEPHRECTOMY WITH INTRAOPERATIVE DOPPLER, DECORTICATION OF RIGHT RENAL CYST;  Surgeon: Marcelo Suarez MD;  Location: [facility] OR;  Service: Urology    PROSTATECTOMY  2013       FAMILY HISTORY  Family History   Problem Relation Age of Onset    Heart disease Father     Heart attack Father     Diabetes Brother     Heart attack Brother     Coronary artery disease Brother         CABG    Heart disease Brother     Hypertension Mother     Malig Hyperthermia Neg Hx     Stroke Neg Hx        Allergies:  Aspirin, Belladonna alkaloids-opium, and Gabapentin    MEDICATIONS:  Medications Prior to Admission   Medication Sig Dispense Refill Last Dose    acetaminophen (TYLENOL) 500 MG tablet Take 2 tablets by mouth Every 6 (Six) Hours As Needed for Mild Pain.       atorvastatin  (LIPITOR) 20 MG tablet Take 1 tablet by mouth Every Night.       carbidopa-levodopa (SINEMET)  MG per tablet Take 1 tablet by mouth 4 (Four) Times a Day.       carbidopa-levodopa CR (SINEMET CR)  MG per CR tablet Take 1 tablet by mouth Every Night.       cholecalciferol (VITAMIN D3) 1000 units tablet Take 1 tablet by mouth Every Morning.       clopidogrel (PLAVIX) 75 MG tablet Take 1 tablet by mouth Every Morning. 30 tablet      ketoconazole (NIZORAL) 2 % shampoo 1 application  Daily.       metoprolol succinate XL (TOPROL-XL) 25 MG 24 hr tablet Take 0.5 tablets by mouth Every Night.       Multiple Vitamins-Minerals (CENTRUM ADULTS PO) Take 1 tablet by mouth Every Morning.       nitroglycerin (NITROSTAT) 0.4 MG SL tablet        pregabalin (Lyrica) 50 MG capsule Take 1 capsule by mouth 3 (Three) Times a Day As Needed (back pain). 90 capsule 2        Review of Systems  Review of Systems   Gastrointestinal:  Negative for nausea and vomiting.   Musculoskeletal:  Negative for back pain and neck pain.        Left anterior chest wall pain   Neurological:  Positive for syncope. Negative for weakness and numbness.   Psychiatric/Behavioral:  The patient is not nervous/anxious.        Objective     Physical Exam:  Physical Exam  Vitals reviewed.   Constitutional:       Appearance: Normal appearance.   Pulmonary:      Effort: Pulmonary effort is normal.   Musculoskeletal:      Cervical back: Normal.      Thoracic back: Normal.      Lumbar back: Normal.      Comments: Left anterior chest wall tenderness to palpation   Skin:     General: Skin is warm and dry.   Neurological:      Mental Status: He is alert and oriented to person, place, and time.      Deep Tendon Reflexes:      Reflex Scores:       Tricep reflexes are 2+ on the right side and 2+ on the left side.       Bicep reflexes are 2+ on the right side and 2+ on the left side.       Brachioradialis reflexes are 2+ on the right side and 2+ on the left side.        Patellar reflexes are 2+ on the right side and 2+ on the left side.       Achilles reflexes are 2+ on the right side and 2+ on the left side.  Psychiatric:         Mood and Affect: Mood normal.         Speech: Speech normal.       Neurologic Exam     Mental Status   Oriented to person, place, and time.   Speech: speech is normal   Level of consciousness: alert  Knowledge: good.   Normal comprehension.     Motor Exam   Muscle bulk: normal  Overall muscle tone: normal  5/5 strength to bilateral upper and lower extremities     Sensory Exam   Light touch normal.     Gait, Coordination, and Reflexes     Reflexes   Right brachioradialis: 2+  Left brachioradialis: 2+  Right biceps: 2+  Left biceps: 2+  Right triceps: 2+  Left triceps: 2+  Right patellar: 2+  Left patellar: 2+  Right achilles: 2+  Left achilles: 2+  Gait not tested due to fall risk         Results Review:  LABS:    Admission on 11/17/2023   Component Date Value Ref Range Status    Glucose 11/17/2023 108 (H)  65 - 99 mg/dL Final    BUN 11/17/2023 23  8 - 23 mg/dL Final    Creatinine 11/17/2023 1.00  0.76 - 1.27 mg/dL Final    Sodium 11/17/2023 141  136 - 145 mmol/L Final    Potassium 11/17/2023 4.4  3.5 - 5.2 mmol/L Final    Chloride 11/17/2023 104  98 - 107 mmol/L Final    CO2 11/17/2023 29.9 (H)  22.0 - 29.0 mmol/L Final    Calcium 11/17/2023 9.8  8.6 - 10.5 mg/dL Final    Total Protein 11/17/2023 6.6  6.0 - 8.5 g/dL Final    Albumin 11/17/2023 4.5  3.5 - 5.2 g/dL Final    ALT (SGPT) 11/17/2023 <5  1 - 41 U/L Final    AST (SGOT) 11/17/2023 18  1 - 40 U/L Final    Alkaline Phosphatase 11/17/2023 126 (H)  39 - 117 U/L Final    Total Bilirubin 11/17/2023 0.6  0.0 - 1.2 mg/dL Final    Globulin 11/17/2023 2.1  gm/dL Final    A/G Ratio 11/17/2023 2.1  g/dL Final    BUN/Creatinine Ratio 11/17/2023 23.0  7.0 - 25.0 Final    Anion Gap 11/17/2023 7.1  5.0 - 15.0 mmol/L Final    eGFR 11/17/2023 77.5  >60.0 mL/min/1.73 Final    HS Troponin T 11/17/2023 28 (H)  <22  ng/L Final    QT Interval 11/17/2023 427  ms Final    QTC Interval 11/17/2023 465  ms Final    ADENOVIRUS, PCR 11/17/2023 Not Detected  Not Detected Final    Coronavirus 229E 11/17/2023 Not Detected  Not Detected Final    Coronavirus HKU1 11/17/2023 Not Detected  Not Detected Final    Coronavirus NL63 11/17/2023 Not Detected  Not Detected Final    Coronavirus OC43 11/17/2023 Not Detected  Not Detected Final    COVID19 11/17/2023 Not Detected  Not Detected - Ref. Range Final    Human Metapneumovirus 11/17/2023 Not Detected  Not Detected Final    Human Rhinovirus/Enterovirus 11/17/2023 Not Detected  Not Detected Final    Influenza A PCR 11/17/2023 Not Detected  Not Detected Final    Influenza B PCR 11/17/2023 Not Detected  Not Detected Final    Parainfluenza Virus 1 11/17/2023 Not Detected  Not Detected Final    Parainfluenza Virus 2 11/17/2023 Not Detected  Not Detected Final    Parainfluenza Virus 3 11/17/2023 Not Detected  Not Detected Final    Parainfluenza Virus 4 11/17/2023 Not Detected  Not Detected Final    RSV, PCR 11/17/2023 Not Detected  Not Detected Final    Bordetella pertussis pcr 11/17/2023 Not Detected  Not Detected Final    Bordetella parapertussis PCR 11/17/2023 Not Detected  Not Detected Final    Chlamydophila pneumoniae PCR 11/17/2023 Not Detected  Not Detected Final    Mycoplasma pneumo by PCR 11/17/2023 Not Detected  Not Detected Final    WBC 11/17/2023 6.23  3.40 - 10.80 10*3/mm3 Final    RBC 11/17/2023 4.02 (L)  4.14 - 5.80 10*6/mm3 Final    Hemoglobin 11/17/2023 13.2  13.0 - 17.7 g/dL Final    Hematocrit 11/17/2023 38.6  37.5 - 51.0 % Final    MCV 11/17/2023 96.0  79.0 - 97.0 fL Final    MCH 11/17/2023 32.8  26.6 - 33.0 pg Final    MCHC 11/17/2023 34.2  31.5 - 35.7 g/dL Final    RDW 11/17/2023 11.5 (L)  12.3 - 15.4 % Final    RDW-SD 11/17/2023 40.3  37.0 - 54.0 fl Final    MPV 11/17/2023 9.7  6.0 - 12.0 fL Final    Platelets 11/17/2023 153  140 - 450 10*3/mm3 Final    Neutrophil % 11/17/2023  77.6 (H)  42.7 - 76.0 % Final    Lymphocyte % 11/17/2023 10.6 (L)  19.6 - 45.3 % Final    Monocyte % 11/17/2023 10.1  5.0 - 12.0 % Final    Eosinophil % 11/17/2023 0.8  0.3 - 6.2 % Final    Basophil % 11/17/2023 0.3  0.0 - 1.5 % Final    Immature Grans % 11/17/2023 0.6 (H)  0.0 - 0.5 % Final    Neutrophils, Absolute 11/17/2023 4.83  1.70 - 7.00 10*3/mm3 Final    Lymphocytes, Absolute 11/17/2023 0.66 (L)  0.70 - 3.10 10*3/mm3 Final    Monocytes, Absolute 11/17/2023 0.63  0.10 - 0.90 10*3/mm3 Final    Eosinophils, Absolute 11/17/2023 0.05  0.00 - 0.40 10*3/mm3 Final    Basophils, Absolute 11/17/2023 0.02  0.00 - 0.20 10*3/mm3 Final    Immature Grans, Absolute 11/17/2023 0.04  0.00 - 0.05 10*3/mm3 Final    nRBC 11/17/2023 0.0  0.0 - 0.2 /100 WBC Final    HS Troponin T 11/17/2023 28 (H)  <22 ng/L Final    Troponin T Delta 11/17/2023 0  >=-4 - <+4 ng/L Final    Lactate 11/17/2023 1.3  0.5 - 2.0 mmol/L Final    Procalcitonin 11/17/2023 0.05  0.00 - 0.25 ng/mL Final       DIAGNOSTICS:  CT cervical spine: Mild loss of vertebral height at T1 level, acute to subacute compression fracture involving T1    Results Review:   I reviewed the patient's new clinical results.  I personally viewed the patient's CT lumbar spine    Vital Signs   Temp:  [97.7 °F (36.5 °C)-98.2 °F (36.8 °C)] 98.2 °F (36.8 °C)  Heart Rate:  [77-90] 90  Resp:  [16-18] 16  BP: (123-158)/(70-98) 158/98      Assessment & Plan      Thoracic Compression Fracture    78-year-old who presented to the ER after a syncopal episode witnessed by his wife.  Found to have on CT C-spine and a T1 acute to subacute VCF.  He was also found to have left 3 through 5 rib fractures which seems to be the main cause of his current pain.  He has had previous kyphoplasty at T8 12 in July of this year.  Exam as noted above with no red flags.  Plan for MRI of the thoracic spine to further evaluate T1 VCF .  Further recommendations to follow once these images have been obtained and  "resulted.    PLAN:  -MRI thoracic spine  -Medical management per hospitalist        I discussed the patient's findings and my recommendations with patient    During patient visit, I utilized appropriate personal protective equipment including gloves.  Appropriate PPE was worn during the entire visit.  Hand hygiene was completed before and after.     Tsering Santos, APRN  11/17/23  16:37 EST    \"Dictated utilizing Dragon dictation\".      "

## 2023-11-17 NOTE — H&P
Internal medicine history and physical  INTERNAL MEDICINE   [facility]       Patient Identification:  Name: Loni Sanford  Age: 78 y.o.  Sex: male  :  1945  MRN: [id number]                   Primary Care Physician: Zo Estrada MD                               Date of admission:2023    Chief Complaint: Lost consciousness while sitting and eating breakfast and hit his head on the floor.    History of Present Illness:   Patient is a 78-year-old male who has a history of Parkinson's disease, history of hypertension, autonomic dysfunction history of renal cell carcinoma for which he had right nephrectomy as well as history of prostate cancer and coronary artery disease and hypertension was in his usual state of his health and does not recall any new changes in his medication recently was in his usual state of his health until this morning when he woke up feeling fine.  He was having breakfast.  Sitting and eating his breakfast and suddenly his wife noticed that he was leaning to his left side and before she could do anything he fell and hit his head.  According to his wife she could see saliva rolling from his side of his mouth and he was confused and it took him about 15 to 20 minutes to come back to his senses.  Patient denies any focal weakness of arm or legs but does not remember any of that stuff and remembers sitting eating breakfast and subsequently finding himself in the ambulance.  Interval of this labs time according to wife to be watched 20 to 30 minutes.  Patient denies any losing continence.  He hit the left forehead on the ground and had confusion.  Patient is complaining of pain and discomfort in the left side of the chest.  Work-up in the emergency room revealed no acute intracranial injury or cervical spine injury but was noted to have left third through fifth rib fracture with no pneumothorax.  Patient has known T1 and T12 compression deformities of indeterminate  age.  And has had kyphoplasty intervention for T12.  Chest CT without contrast to look for cause of left-sided chest pain confirmed left third through fifth rib fracture with no pneumothorax as detailed above but also showed asymmetric left bibasilar pulmonary opacification concerning for pneumonia.  He was also noted to have few sub-6 mm pulmonary nodules as well as thickening of the esophagus concerning for esophagitis.  Patient does not have any fever or chills or weakness but complaining of cough for the last 3 to 4 days.  Respiratory viral panel and blood cultures were drawn and patient has received a dose of Rocephin in the emergency room.  Patient is being admitted for further care.      Past Medical History:  Past Medical History:   Diagnosis Date    Cancer     Prostate cancer 2013    Cancer     kidney dx 4/2018    Coronary artery disease     Dizziness     occ    Hematuria     History of angina     carries NTG    History of transfusion     Hyperlipidemia     Hypertension     Kidney stones     new one 12/2018    Kidney tumor     RIGHT removed 4/2018    Parkinson disease     Renal mass, right     surgically removed 4/2018     Past Surgical History:  Past Surgical History:   Procedure Laterality Date    APPENDECTOMY  2011    CARDIAC CATHETERIZATION      CATARACT EXTRACTION W/ INTRAOCULAR LENS  IMPLANT, BILATERAL      COLONOSCOPY N/A 05/09/2023    Procedure: COLONOSCOPY FOR SCREENING;  Surgeon: Terrell Zhang MD;  Location: [facility] OR;  Service: Gastroenterology;  Laterality: N/A;  poor prep, polyp    EXTRACORPOREAL SHOCKWAVE LITHOTRIPSY (ESWL), STENT INSERTION/REMOVAL Right 12/29/2017    Procedure: RIGHT EXTRACORPOREAL SHOCKWAVE LITHOTRIPSY CYSTOSCOPY STENT PLACEMENT;  Surgeon: Marcelo Suarez MD;  Location: [facility];  Service:     EXTRACORPOREAL SHOCKWAVE LITHOTRIPSY (ESWL), STENT INSERTION/REMOVAL Right 12/28/2018    Procedure: RT EXTRACORPREAL SHOCKWAVE LITHOTRIPSY CYSTOSCOPY  WITH   MANIPULATION OF STONE;  Surgeon: Moe Vasquez MD;  [place] [facility] [place] [facility] [place]  Service: Urology    FINGER SURGERY Left 1984    INDEX    HAND SURGERY Right 1998    KYPHOPLASTY N/A 07/14/2023    Procedure: KYPHOPLASTY 1-2 LEVELS;  Surgeon: Jeison Gilman MD;  Location: [facility] HYBRID OR 18/19;  Service: Neurosurgery;  Laterality: N/A;    NEPHRECTOMY PARTIAL Right 04/05/2018    Procedure: RT OPEN PARTIAL NEPHRECTOMY WITH INTRAOPERATIVE DOPPLER, DECORTICATION OF RIGHT RENAL CYST;  Surgeon: Marcelo Suarez MD;  Location: [facility] MAIN OR;  Service: Urology    PROSTATECTOMY  2013      Home Meds:  (Not in a hospital admission)    Current Meds:     Current Facility-Administered Medications:     acetaminophen (TYLENOL) tablet 1,000 mg, 1,000 mg, Oral, TID, Roque Robles APRN, 1,000 mg at 11/17/23 1542    cefTRIAXone (ROCEPHIN) 1,000 mg in sodium chloride 0.9 % 100 mL IVPB-VTB, 1,000 mg, Intravenous, Once, Levi Cruz PA, Last Rate: 200 mL/hr at 11/17/23 1540, 1,000 mg at 11/17/23 1540    guaiFENesin (MUCINEX) 12 hr tablet 600 mg, 600 mg, Oral, Q12H, Roque Robles APRN    Lidocaine 4 % 2 patch, 2 patch, Transdermal, Q24H, Roque Robles APRN, 2 patch at 11/17/23 1542    [COMPLETED] Insert Peripheral IV, , , Once **AND** sodium chloride 0.9 % flush 10 mL, 10 mL, Intravenous, PRN, Levi Cruz PA    Current Outpatient Medications:     acetaminophen (TYLENOL) 500 MG tablet, Take 2 tablets by mouth Every 6 (Six) Hours As Needed for Mild Pain., Disp: , Rfl:     atorvastatin (LIPITOR) 20 MG tablet, Take 1 tablet by mouth Every Night., Disp: , Rfl:     carbidopa-levodopa (SINEMET)  MG per tablet, Take 1 tablet by mouth 4 (Four) Times a Day., Disp: , Rfl:     carbidopa-levodopa CR (SINEMET CR)  MG per CR tablet, Take 1 tablet by mouth Every Night., Disp: , Rfl:     cholecalciferol (VITAMIN D3) 1000 units tablet, Take 1 tablet by mouth Every Morning., Disp: , Rfl:     clopidogrel (PLAVIX) 75 MG  "tablet, Take 1 tablet by mouth Every Morning., Disp: 30 tablet, Rfl:     ketoconazole (NIZORAL) 2 % shampoo, 1 application  Daily., Disp: , Rfl:     metoprolol succinate XL (TOPROL-XL) 25 MG 24 hr tablet, Take 0.5 tablets by mouth Every Night., Disp: , Rfl:     Multiple Vitamins-Minerals (CENTRUM ADULTS PO), Take 1 tablet by mouth Every Morning., Disp: , Rfl:     nitroglycerin (NITROSTAT) 0.4 MG SL tablet, , Disp: , Rfl:     pregabalin (Lyrica) 50 MG capsule, Take 1 capsule by mouth 3 (Three) Times a Day As Needed (back pain)., Disp: 90 capsule, Rfl: 2  Allergies:  Allergies   Allergen Reactions    Aspirin Hives    Belladonna Alkaloids-Opium Other (See Comments)     COLD SWEATS AND FEVER    Gabapentin Delirium     Social History:   Social History     Tobacco Use    Smoking status: Never     Passive exposure: Never    Smokeless tobacco: Never   Substance Use Topics    Alcohol use: No      Family History:  Family History   Problem Relation Age of Onset    Heart disease Father     Heart attack Father     Diabetes Brother     Heart attack Brother     Coronary artery disease Brother         CABG    Heart disease Brother     Hypertension Mother     Malig Hyperthermia Neg Hx     Stroke Neg Hx           Review of Systems  See history of present illness and past medical history.  As described in history presenting illness.      Vitals:   /83   Pulse 80   Temp 97.7 °F (36.5 °C) (Tympanic)   Resp 16   Ht 175.3 cm (69\")   Wt 89.4 kg (197 lb)   SpO2 95%   BMI 29.09 kg/m²   I/O: No intake or output data in the 24 hours ending 11/17/23 0969  Exam:  Patient is examined using the personal protective equipment as per guidelines from infection control for this particular patient as enacted.  Hand washing was performed before and after patient interaction.  General Appearance:  Alert cooperative appropriate male who does not appear to be in any acute distress   Head:    Normocephalic, without obvious abnormality, contusion " of the left forehead noted.     Eyes:    PERRL, conjunctiva/corneas clear, EOM's intact, both eyes   Ears:    Normal external ear canals, both ears   Nose:   Nares normal, septum midline, mucosa normal, no drainage    or sinus tenderness   Throat:   Lips, tongue, gums normal; oral mucosa pink and moist   Neck:   Supple, symmetrical, trachea midline, no adenopathy;     thyroid:  no enlargement/tenderness/nodules; no carotid    bruit or JVD   Back:     Symmetric, no curvature, ROM normal, no CVA tenderness   Lungs:   Decreased breath sounds at the bases left greater than right no obvious use of accessory muscles of breathing noted   Chest Wall:    Left-sided chest wall tenderness with Lidoderm patch in place    Heart:  S1-S2 regular   Abdomen:   Soft nontender   Extremities:   Extremities normal, atraumatic, no cyanosis or edema   Pulses:   Pulses palpable in all extremities; symmetric all extremities   Skin: Contusion of the forehead noted   Neurologic: Generalized upper and lower extremity spasticity and rigidity noted but no focal neurological deficits.       Data Review:      I reviewed the patient's new clinical results.  Results from last 7 days   Lab Units 11/17/23  1228   WBC 10*3/mm3 6.23   HEMOGLOBIN g/dL 13.2   PLATELETS 10*3/mm3 153     Results from last 7 days   Lab Units 11/17/23  1228   SODIUM mmol/L 141   POTASSIUM mmol/L 4.4   CHLORIDE mmol/L 104   CO2 mmol/L 29.9*   BUN mg/dL 23   CREATININE mg/dL 1.00   CALCIUM mg/dL 9.8   GLUCOSE mg/dL 108*     CT Head Without Contrast    Result Date: 11/17/2023   No evidence for acute traumatic intracranial pathology.   TECHNIQUE: CT scan of the cervical spine was obtained with 1 mm axial bone algorithm and 2 mm axial soft tissue algorithm images. Sagittal and coronal reconstructed images were obtained.  FINDINGS: There is mild (less than 10%) loss of vertebral body height at T1. There is a subtle area of increased density within the superior aspect of the T1  vertebral body. Ultimately, I cannot exclude the possibility of an acute to subacute compression fracture at T1. I recommend further evaluation with an MRI of the thoracic spine.  Otherwise, there is no evidence for acute fracture or bony malalignment involving the cervical spine. There is degenerative retrolisthesis of C3 on C4 by approximately 3 mm. There is moderate canal stenosis at C3-4, C5-6, and C6-7 secondary to disc osteophyte complexes. Multilevel foraminal stenotic changes are appreciated most prominently identified from C3-4 down to C6-7.  IMPRESSION:  There is mild (less than 10%) loss of vertebral body height at the T1 level. Additionally, there is subtle area of increased density within the superior aspect of the T1 vertebral body. I cannot exclude the possibility of an acute to subacute compression fracture involving T1. I recommend further evaluation with an MRI of the thoracic spine.  Otherwise, there is no evidence for acute fracture or bony malalignment involving the cervical spine.  Incidental degenerative phenomena are as discussed in detail above.  These findings and recommendations were directly discussed with Levi Cruz on 11/17/2023 at approximately 1:55 p.m.   Radiation dose reduction techniques were utilized, including automated exposure control and exposure modulation based on body size.  This report was finalized on 11/17/2023 2:46 PM by Dr. Dung Cardenas M.D on Workstation: [id number]      CT Cervical Spine Without Contrast    Result Date: 11/17/2023   No evidence for acute traumatic intracranial pathology.   TECHNIQUE: CT scan of the cervical spine was obtained with 1 mm axial bone algorithm and 2 mm axial soft tissue algorithm images. Sagittal and coronal reconstructed images were obtained.  FINDINGS: There is mild (less than 10%) loss of vertebral body height at T1. There is a subtle area of increased density within the superior aspect of the T1 vertebral body. Ultimately, I cannot  exclude the possibility of an acute to subacute compression fracture at T1. I recommend further evaluation with an MRI of the thoracic spine.  Otherwise, there is no evidence for acute fracture or bony malalignment involving the cervical spine. There is degenerative retrolisthesis of C3 on C4 by approximately 3 mm. There is moderate canal stenosis at C3-4, C5-6, and C6-7 secondary to disc osteophyte complexes. Multilevel foraminal stenotic changes are appreciated most prominently identified from C3-4 down to C6-7.  IMPRESSION:  There is mild (less than 10%) loss of vertebral body height at the T1 level. Additionally, there is subtle area of increased density within the superior aspect of the T1 vertebral body. I cannot exclude the possibility of an acute to subacute compression fracture involving T1. I recommend further evaluation with an MRI of the thoracic spine.  Otherwise, there is no evidence for acute fracture or bony malalignment involving the cervical spine.  Incidental degenerative phenomena are as discussed in detail above.  These findings and recommendations were directly discussed with Levi Cruz on 11/17/2023 at approximately 1:55 p.m.   Radiation dose reduction techniques were utilized, including automated exposure control and exposure modulation based on body size.  This report was finalized on 11/17/2023 2:46 PM by Dr. Dung Cardenas M.D on Workstation: [id number]      CT Chest Without Contrast Diagnostic    Result Date: 11/17/2023  1.  Left third through fifth rib fractures. No pneumothorax is seen. 2.  T1 and T12 compression deformities of indeterminate age resulting in and up to approximately 55 to 60% loss of height. Correlation with patient history and point tenderness recommended with follow-up MRI if clinically indicated. 3.  Asymmetric left basilar pulmonary opacification concerning for pneumonia in the appropriate context. Correlation with patient history is recommended. 4.  A few sub-6 mm pulm  nodules are present. Inpatient history malignancy, follow-up with chest CT in 3 months is recommended to exclude metastatic disease. 5.  Thickening of the distal esophagus suggestive of esophagitis in the proper context. Correlation with patient history is recommended with follow-up endoscopy if clinically indicated. 6.  Other findings above.   This report was finalized on 11/17/2023 1:35 PM by Dr. 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  Provided with fall precautions and further management as his condition evolves.  Left rib fracture multiple 3-6 on the left side-continue with symptomatic management including incentive spirometry and pain control.  Thoracic surgery service has been consulted.  Multiple pulmonary nodules-out of hospital follow-ups with repeat CT scans.  Recurrent falls and orthostatic hypotension and upcoming insufficiency part of Parkinson's disease-check his orthostatics and consider measures such as pressure stockings and still fall precautions.  Parkinson's  disease-continue rest and the Parkinson's regimen and monitor.  Cough with CT scan evidence of asymmetric left-sided pulmonary infiltrate-plan is to continue with IV Rocephin for 5 days with incentive spirometry.  His blood cultures need to be followed.  His procalcitonin is 0.05 so decision can be made to shorten antibiotic treatment altogether.  If his left-sided infiltrate seen on the CT scan can be attributed to the reaction to rib fracture.    Romie Boyle MD   11/17/2023  15:53 EST    Parts of this note may be an electronic transcription/translation of spoken language to printed text using the Dragon dictation system.

## 2023-11-17 NOTE — CONSULTS
Inpatient Thoracic Surgery Consult  Consult performed by: Roque Robles APRN  Consult ordered by: Romie Boyle MD          Patient Care Team:  Zo Estrada MD as PCP - General (Family Medicine)  Robert Tang MD as Consulting Physician (Cardiology)    Chief Complaint   Patient presents with    Fall    Head Injury    Cough       Subjective     History of Present Illness  Mr. Sanford is a pleasant 78-year-old gentleman with a past medical history outlined below. He presented today [facility] after a syncopal episode at home. CT of the chest performed emergency department demonstrated multiple left-sided rib fractures for which we have been consulted. He is accompanied by his wife at bedside.  Patient is reporting some mild to moderate left chest wall tenderness with deep inspiration and movement. He also has a productive cough, denies fever or chills. He reports he recently underwent kyphoplasty in July and has been having back pain.  He is a never smoker.    Review of Systems   Constitutional:  Positive for activity change. Negative for chills and fever.   HENT:  Positive for congestion. Negative for sore throat and trouble swallowing.    Respiratory:  Positive for cough. Negative for chest tightness and shortness of breath.    Cardiovascular:  Negative for chest pain.   Gastrointestinal:  Negative for abdominal pain, nausea and vomiting.   Musculoskeletal:  Positive for back pain.   Skin:  Negative for color change.   Neurological:  Positive for syncope. Negative for seizures and headaches.   Psychiatric/Behavioral:  Negative for agitation.         Past Medical History:   Diagnosis Date    Cancer     Prostate cancer 2013    Cancer     kidney dx 4/2018    Coronary artery disease     Dizziness     occ    Hematuria     History of angina     carries NTG    History of transfusion     Hyperlipidemia     Hypertension     Kidney stones     new one 12/2018    Kidney tumor     RIGHT removed  4/2018    Parkinson disease     Renal mass, right     surgically removed 4/2018     Past Surgical History:   Procedure Laterality Date    APPENDECTOMY  2011    CARDIAC CATHETERIZATION      CATARACT EXTRACTION W/ INTRAOCULAR LENS  IMPLANT, BILATERAL      COLONOSCOPY N/A 05/09/2023    Procedure: COLONOSCOPY FOR SCREENING;  Surgeon: Terrell Zhang MD;  Location: [facility] OR;  Service: Gastroenterology;  Laterality: N/A;  poor prep, polyp    EXTRACORPOREAL SHOCKWAVE LITHOTRIPSY (ESWL), STENT INSERTION/REMOVAL Right 12/29/2017    Procedure: RIGHT EXTRACORPOREAL SHOCKWAVE LITHOTRIPSY CYSTOSCOPY STENT PLACEMENT;  Surgeon: Marcelo Suarez MD;  Location: [facility] OR [facility];  Service:     EXTRACORPOREAL SHOCKWAVE LITHOTRIPSY (ESWL), STENT INSERTION/REMOVAL Right 12/28/2018    Procedure: RT EXTRACORPREAL SHOCKWAVE LITHOTRIPSY CYSTOSCOPY  WITH  MANIPULATION OF STONE;  Surgeon: Moe Vasquez MD;  Location: [facility] OR [facility];  Service: Urology    FINGER SURGERY Left 1984    INDEX    HAND SURGERY Right 1998    KYPHOPLASTY N/A 07/14/2023    Procedure: KYPHOPLASTY 1-2 LEVELS;  Surgeon: Jeison Gilman MD;  Location: [facility] OR 18/19;  Service: Neurosurgery;  Laterality: N/A;    NEPHRECTOMY PARTIAL Right 04/05/2018    Procedure: RT OPEN PARTIAL NEPHRECTOMY WITH INTRAOPERATIVE DOPPLER, DECORTICATION OF RIGHT RENAL CYST;  Surgeon: Marcelo Suarez MD;  Location: [facility] OR;  Service: Urology    PROSTATECTOMY  2013     Family History   Problem Relation Age of Onset    Heart disease Father     Heart attack Father     Diabetes Brother     Heart attack Brother     Coronary artery disease Brother         CABG    Heart disease Brother     Hypertension Mother     Malig Hyperthermia Neg Hx     Stroke Neg Hx      Social History     Socioeconomic History    Marital status:    Tobacco Use    Passive exposure: Never   Substance and Sexual Activity    Alcohol use: No    Drug use: No    Sexual activity: Defer      Medications Prior to Admission   Medication Sig Dispense Refill Last Dose    acetaminophen (TYLENOL) 500 MG tablet Take 2 tablets by mouth Every 6 (Six) Hours As Needed for Mild Pain.       atorvastatin (LIPITOR) 20 MG tablet Take 1 tablet by mouth Every Night.       carbidopa-levodopa (SINEMET)  MG per tablet Take 1 tablet by mouth 4 (Four) Times a Day.       carbidopa-levodopa CR (SINEMET CR)  MG per CR tablet Take 1 tablet by mouth Every Night.       cholecalciferol (VITAMIN D3) 1000 units tablet Take 1 tablet by mouth Every Morning.       clopidogrel (PLAVIX) 75 MG tablet Take 1 tablet by mouth Every Morning. 30 tablet      ketoconazole (NIZORAL) 2 % shampoo 1 application  Daily.       metoprolol succinate XL (TOPROL-XL) 25 MG 24 hr tablet Take 0.5 tablets by mouth Every Night.       Multiple Vitamins-Minerals (CENTRUM ADULTS PO) Take 1 tablet by mouth Every Morning.       nitroglycerin (NITROSTAT) 0.4 MG SL tablet        pregabalin (Lyrica) 50 MG capsule Take 1 capsule by mouth 3 (Three) Times a Day As Needed (back pain). 90 capsule 2      Allergies   Allergen Reactions    Aspirin Hives    Belladonna Alkaloids-Opium Other (See Comments)     COLD SWEATS AND FEVER    Gabapentin Delirium       Objective      Vital Signs  Temp:  [97.7 °F (36.5 °C)-98.2 °F (36.8 °C)] 98.2 °F (36.8 °C)  Heart Rate:  [77-90] 90  Resp:  [16-18] 16  BP: (123-158)/(70-98) 158/98    Intake & Output (last day)       None            Physical Exam  Constitutional:       General: He is not in acute distress.     Appearance: Normal appearance. He is not ill-appearing.   HENT:      Head:      Comments: Not forehead, left.     Mouth/Throat:      Mouth: Mucous membranes are moist.      Pharynx: Oropharynx is clear.   Eyes:      Pupils: Pupils are equal, round, and reactive to light.   Cardiovascular:      Rate and Rhythm: Normal rate and regular rhythm.   Pulmonary:      Effort: Pulmonary effort is normal. No respiratory distress.       Breath sounds: Rhonchi present.   Chest:      Chest wall: Tenderness (Left lateral) present.   Abdominal:      General: Abdomen is flat. There is no distension.      Palpations: Abdomen is soft.   Musculoskeletal:         General: Tenderness (Left lateral chest wall tenderness) present.      Comments: Left chest stable, no paradoxical chest wall movement noted.   Skin:     General: Skin is warm and dry.      Capillary Refill: Capillary refill takes less than 2 seconds.   Neurological:      General: No focal deficit present.      Mental Status: He is alert and oriented to person, place, and time.   Psychiatric:         Mood and Affect: Mood normal.         Results Review:    I reviewed the patient's new clinical results.  I reviewed the patient's new imaging results and agree with the interpretation.  Discussed with patient, nurse, spouse at bedside, and Dr. Fountain    Imaging Results (Last 24 Hours)       Procedure Component Value Units Date/Time    CT Head Without Contrast [[id number]] Collected: 11/17/23 1359     Updated: 11/17/23 1449    Narrative:      CT HEAD AND CERVICAL SPINE WITHOUT CONTRAST     CLINICAL HISTORY: Fall with head and neck injury.     TECHNIQUE: CT scan of the head was obtained with 3 mm axial soft tissue  and 2 mm bone algorithm images. No intravenous contrast was  administered. Sagittal and coronal reconstructions were obtained.     COMPARISON: CT head dated 07/18/2023.     FINDINGS:       There is no evidence for a calvarial fracture. There is no evidence for  an acute extra-axial hemorrhage.     The ventricles, sulci, and cisterns are age-appropriate. The gray-white  matter differentiation is within normal limits. The basal ganglia and  thalami are unremarkable in appearance. The posterior fossa structures  are within normal limits. Incidental note is made of a partially empty  sella.     Incidental note is made of a moderate size left frontal convexity scalp  hematoma.     Mucosal  thickening is identified within the ethmoid air cells, the  maxillary sinuses, and the sphenoid sinus. An air-fluid level is seen  within the left maxillary sinus.       Impression:         No evidence for acute traumatic intracranial pathology.        TECHNIQUE: CT scan of the cervical spine was obtained with 1 mm axial  bone algorithm and 2 mm axial soft tissue algorithm images. Sagittal and  coronal reconstructed images were obtained.     FINDINGS:  There is mild (less than 10%) loss of vertebral body height at T1. There  is a subtle area of increased density within the superior aspect of the  T1 vertebral body. Ultimately, I cannot exclude the possibility of an  acute to subacute compression fracture at T1. I recommend further  evaluation with an MRI of the thoracic spine.     Otherwise, there is no evidence for acute fracture or bony malalignment  involving the cervical spine. There is degenerative retrolisthesis of C3  on C4 by approximately 3 mm. There is moderate canal stenosis at C3-4,  C5-6, and C6-7 secondary to disc osteophyte complexes. Multilevel  foraminal stenotic changes are appreciated most prominently identified  from C3-4 down to C6-7.     IMPRESSION:     There is mild (less than 10%) loss of vertebral body height at the T1  level. Additionally, there is subtle area of increased density within  the superior aspect of the T1 vertebral body. I cannot exclude the  possibility of an acute to subacute compression fracture involving T1. I  recommend further evaluation with an MRI of the thoracic spine.     Otherwise, there is no evidence for acute fracture or bony malalignment  involving the cervical spine.     Incidental degenerative phenomena are as discussed in detail above.     These findings and recommendations were directly discussed with Levi Cruz on 11/17/2023 at approximately 1:55 p.m.        Radiation dose reduction techniques were utilized, including automated  exposure control and  exposure modulation based on body size.     This report was finalized on 11/17/2023 2:46 PM by Dr. Dung Cardenas M.D  on Workstation: [id number]       CT Cervical Spine Without Contrast [[id number]] Collected: 11/17/23 1359     Updated: 11/17/23 1449    Narrative:      CT HEAD AND CERVICAL SPINE WITHOUT CONTRAST     CLINICAL HISTORY: Fall with head and neck injury.     TECHNIQUE: CT scan of the head was obtained with 3 mm axial soft tissue  and 2 mm bone algorithm images. No intravenous contrast was  administered. Sagittal and coronal reconstructions were obtained.     COMPARISON: CT head dated 07/18/2023.     FINDINGS:       There is no evidence for a calvarial fracture. There is no evidence for  an acute extra-axial hemorrhage.     The ventricles, sulci, and cisterns are age-appropriate. The gray-white  matter differentiation is within normal limits. The basal ganglia and  thalami are unremarkable in appearance. The posterior fossa structures  are within normal limits. Incidental note is made of a partially empty  sella.     Incidental note is made of a moderate size left frontal convexity scalp  hematoma.     Mucosal thickening is identified within the ethmoid air cells, the  maxillary sinuses, and the sphenoid sinus. An air-fluid level is seen  within the left maxillary sinus.       Impression:         No evidence for acute traumatic intracranial pathology.        TECHNIQUE: CT scan of the cervical spine was obtained with 1 mm axial  bone algorithm and 2 mm axial soft tissue algorithm images. Sagittal and  coronal reconstructed images were obtained.     FINDINGS:  There is mild (less than 10%) loss of vertebral body height at T1. There  is a subtle area of increased density within the superior aspect of the  T1 vertebral body. Ultimately, I cannot exclude the possibility of an  acute to subacute compression fracture at T1. I recommend further  evaluation with an MRI of the thoracic spine.     Otherwise, there is no  evidence for acute fracture or bony malalignment  involving the cervical spine. There is degenerative retrolisthesis of C3  on C4 by approximately 3 mm. There is moderate canal stenosis at C3-4,  C5-6, and C6-7 secondary to disc osteophyte complexes. Multilevel  foraminal stenotic changes are appreciated most prominently identified  from C3-4 down to C6-7.     IMPRESSION:     There is mild (less than 10%) loss of vertebral body height at the T1  level. Additionally, there is subtle area of increased density within  the superior aspect of the T1 vertebral body. I cannot exclude the  possibility of an acute to subacute compression fracture involving T1. I  recommend further evaluation with an MRI of the thoracic spine.     Otherwise, there is no evidence for acute fracture or bony malalignment  involving the cervical spine.     Incidental degenerative phenomena are as discussed in detail above.     These findings and recommendations were directly discussed with Leiv Cruz on 11/17/2023 at approximately 1:55 p.m.        Radiation dose reduction techniques were utilized, including automated  exposure control and exposure modulation based on body size.     This report was finalized on 11/17/2023 2:46 PM by Dr. Dung Cardenas M.D  on Workstation: [id number]       CT Chest Without Contrast Diagnostic [[id number]] Collected: 11/17/23 1320     Updated: 11/17/23 1338    Narrative:      CT CHEST WITHOUT IV CONTRAST     HISTORY: Fall, left rib injury     TECHNIQUE: Radiation dose reduction techniques were utilized, including  automated exposure control and exposure modulation based on body size.   3 mm images were obtained through the chest without IV contrast.     COMPARISON: None     FINDINGS:  Evaluation is suboptimal without intravenous contrast. Evaluation  additionally, beam hardening and streak artifact and the patient's arms  being along his side significantly limit evaluation.     Subcentimeter renal lesions are too small  to characterize. Bilateral  nephrolithiasis measuring up to approximately 5 to 6 mm.     The distal esophagus is thickened. No noncalcified mediastinal or  axillary adenopathy by size criteria. Small pericardial effusion. Mild  to moderate coronary artery calcification or coronary artery stents are  present.     Bibasilar atelectasis or pleural parenchymal scarring is present. There  is asymmetric pulmonary opacification within the left lung base. There  is a mildly displaced left fifth rib fracture anterolaterally. Minimally  displaced left third and fourth rib fractures posteriorly are present.  No pleural effusion or pneumothorax.     No suspicious lytic or blastic osseous lesions. Superior plate  compression formae of T1 with increased sclerosis of the superior  endplate is present. Postsurgical changes from kyphoplasty of L1 is  present. There is an anterior wedge compression deformity of T12 of  indeterminate age resulting in approximately 55 to 60% loss of height.       Impression:      1.  Left third through fifth rib fractures. No pneumothorax is seen.  2.  T1 and T12 compression deformities of indeterminate age resulting in  and up to approximately 55 to 60% loss of height. Correlation with  patient history and point tenderness recommended with follow-up MRI if  clinically indicated.  3.  Asymmetric left basilar pulmonary opacification concerning for  pneumonia in the appropriate context. Correlation with patient history  is recommended.  4.  A few sub-6 mm pulm nodules are present. Inpatient history  malignancy, follow-up with chest CT in 3 months is recommended to  exclude metastatic disease.  5.  Thickening of the distal esophagus suggestive of esophagitis in the  proper context. Correlation with patient history is recommended with  follow-up endoscopy if clinically indicated.  6.  Other findings above.        This report was finalized on 11/17/2023 1:35 PM by Dr. Clayton Johnson M.D on Workstation:  [id number]       XR Spine Lumbar Complete 4+VW [[id number]] Collected: 11/17/23 1219     Updated: 11/17/23 1224    Narrative:      3 VIEW LUMBAR SPINE     HISTORY: Fell. Low back pain.     FINDINGS: There is osteoporosis with moderately severe compression  fracture of the T12 vertebral body treated with kyphoplasty. This shows  slight worsening when compared to the MRI scan dated 7/10/2023 and there  is further localized kyphosis related to the compression deformity. No  other compression fractures are seen. There are minor degenerative  changes scattered in the lumbar spine.     This report was finalized on 11/17/2023 12:21 PM by Dr. Khoi Monte M.D on Workstation: [id number]               Lab Results:  Lab Results (last 24 hours)       Procedure Component Value Units Date/Time    Blood Culture - Blood, Arm, Right [[id number]] Collected: 11/17/23 1520    Specimen: Blood from Arm, Right Updated: 11/17/23 1524    High Sensitivity Troponin T 2Hr [[id number]]  (Abnormal) Collected: 11/17/23 1436    Specimen: Blood from Arm, Left Updated: 11/17/23 1506     HS Troponin T 28 ng/L      Troponin T Delta 0 ng/L     Narrative:      High Sensitive Troponin T Reference Range:  <14.0 ng/L- Negative Female for AMI  <22.0 ng/L- Negative Male for AMI  >=14 - Abnormal Female indicating possible myocardial injury.  >=22 - Abnormal Male indicating possible myocardial injury.   Clinicians would have to utilize clinical acumen, EKG, Troponin, and serial changes to determine if it is an Acute Myocardial Infarction or myocardial injury due to an underlying chronic condition.         Lactic Acid, Plasma [[id number]]  (Normal) Collected: 11/17/23 1436    Specimen: Blood from Arm, Left Updated: 11/17/23 1505     Lactate 1.3 mmol/L     Blood Culture - Blood, Arm, Left [[id number]] Collected: 11/17/23 1436    Specimen: Blood from Arm, Left Updated: 11/17/23 1441    Procalcitonin [[id number]]  (Normal) Collected: 11/17/23 1228    Specimen: Blood  "Updated: 11/17/23 1423     Procalcitonin 0.05 ng/mL     Narrative:      As a Marker for Sepsis (Non-Neonates):    1. <0.5 ng/mL represents a low risk of severe sepsis and/or septic shock.  2. >2 ng/mL represents a high risk of severe sepsis and/or septic shock.    As a Marker for Lower Respiratory Tract Infections that require antibiotic therapy:    PCT on Admission    Antibiotic Therapy       6-12 Hrs later    >0.5                Strongly Recommended  >0.25 - <0.5        Recommended   0.1 - 0.25          Discouraged              Remeasure/reassess PCT  <0.1                Strongly Discouraged     Remeasure/reassess PCT    As 28 day mortality risk marker: \"Change in Procalcitonin Result\" (>80% or <=80%) if Day 0 (or Day 1) and Day 4 values are available. Refer to http://www.[facility]pct[facility]cal[facility]    Change in PCT <=80%  A decrease of PCT levels below or equal to 80% defines a positive change in PCT test result representing a higher risk for 28-day all-cause mortality of patients diagnosed with severe sepsis for septic shock.    Change in PCT >80%  A decrease of PCT levels of more than 80% defines a negative change in PCT result representing a lower risk for 28-day all-cause mortality of patients diagnosed with severe sepsis or septic shock.       High Sensitivity Troponin T [[id number]]  (Abnormal) Collected: 11/17/23 1228    Specimen: Blood Updated: 11/17/23 1311     HS Troponin T 28 ng/L     Narrative:      High Sensitive Troponin T Reference Range:  <14.0 ng/L- Negative Female for AMI  <22.0 ng/L- Negative Male for AMI  >=14 - Abnormal Female indicating possible myocardial injury.  >=22 - Abnormal Male indicating possible myocardial injury.   Clinicians would have to utilize clinical acumen, EKG, Troponin, and serial changes to determine if it is an Acute Myocardial Infarction or myocardial injury due to an underlying chronic condition.         Comprehensive Metabolic Panel [[id number]]  (Abnormal) Collected: " 11/17/23 1228    Specimen: Blood Updated: 11/17/23 1308     Glucose 108 mg/dL      BUN 23 mg/dL      Creatinine 1.00 mg/dL      Sodium 141 mmol/L      Potassium 4.4 mmol/L      Chloride 104 mmol/L      CO2 29.9 mmol/L      Calcium 9.8 mg/dL      Total Protein 6.6 g/dL      Albumin 4.5 g/dL      ALT (SGPT) <5 U/L      AST (SGOT) 18 U/L      Alkaline Phosphatase 126 U/L      Total Bilirubin 0.6 mg/dL      Globulin 2.1 gm/dL      A/G Ratio 2.1 g/dL      BUN/Creatinine Ratio 23.0     Anion Gap 7.1 mmol/L      eGFR 77.5 mL/min/1.73     Narrative:      GFR Normal >60  Chronic Kidney Disease <60  Kidney Failure <15    The GFR formula is only valid for adults with stable renal function between ages 18 and 70.    Respiratory Panel PCR w/COVID-19(SARS-CoV-2) ZACHARIAH/KAUR/ROBERT/PAD/COR/LORE In-House, NP Swab in UTM/VTM, 2 HR TAT - Swab, Nasopharynx [[id number]]  (Normal) Collected: 11/17/23 1131    Specimen: Swab from Nasopharynx Updated: 11/17/23 1252     ADENOVIRUS, PCR Not Detected     Coronavirus 229E Not Detected     Coronavirus HKU1 Not Detected     Coronavirus NL63 Not Detected     Coronavirus OC43 Not Detected     COVID19 Not Detected     Human Metapneumovirus Not Detected     Human Rhinovirus/Enterovirus Not Detected     Influenza A PCR Not Detected     Influenza B PCR Not Detected     Parainfluenza Virus 1 Not Detected     Parainfluenza Virus 2 Not Detected     Parainfluenza Virus 3 Not Detected     Parainfluenza Virus 4 Not Detected     RSV, PCR Not Detected     Bordetella pertussis pcr Not Detected     Bordetella parapertussis PCR Not Detected     Chlamydophila pneumoniae PCR Not Detected     Mycoplasma pneumo by PCR Not Detected    Narrative:      In the setting of a positive respiratory panel with a viral infection PLUS a negative procalcitonin without other underlying concern for bacterial infection, consider observing off antibiotics or discontinuation of antibiotics and continue supportive care. If the respiratory  panel is positive for atypical bacterial infection (Bordetella pertussis, Chlamydophila pneumoniae, or Mycoplasma pneumoniae), consider antibiotic de-escalation to target atypical bacterial infection.    CBC & Differential [[id number]]  (Abnormal) Collected: 11/17/23 1228    Specimen: Blood Updated: 11/17/23 1250    Narrative:      The following orders were created for panel order CBC & Differential.  Procedure                               Abnormality         Status                     ---------                               -----------         ------                     CBC Auto Differential[[id number]]        Abnormal            Final result                 Please view results for these tests on the individual orders.    CBC Auto Differential [[id number]]  (Abnormal) Collected: 11/17/23 1228    Specimen: Blood Updated: 11/17/23 1250     WBC 6.23 10*3/mm3      RBC 4.02 10*6/mm3      Hemoglobin 13.2 g/dL      Hematocrit 38.6 %      MCV 96.0 fL      MCH 32.8 pg      MCHC 34.2 g/dL      RDW 11.5 %      RDW-SD 40.3 fl      MPV 9.7 fL      Platelets 153 10*3/mm3      Neutrophil % 77.6 %      Lymphocyte % 10.6 %      Monocyte % 10.1 %      Eosinophil % 0.8 %      Basophil % 0.3 %      Immature Grans % 0.6 %      Neutrophils, Absolute 4.83 10*3/mm3      Lymphocytes, Absolute 0.66 10*3/mm3      Monocytes, Absolute 0.63 10*3/mm3      Eosinophils, Absolute 0.05 10*3/mm3      Basophils, Absolute 0.02 10*3/mm3      Immature Grans, Absolute 0.04 10*3/mm3      nRBC 0.0 /100 WBC                 Assessment & Plan       Syncope    Clear cell carcinoma of kidney    Coronary artery disease    Parkinson disease    Primary hypertension    Recurrent falls    Orthostatic hypotension    Closed fracture of one rib of left side    History of vertebral compression fracture    Pneumonia    Thoracic compression fracture, closed, initial encounter      Assessment & Plan  I have independently reviewed the CT of the chest performed today,  "11/17/2023 which demonstrates multiple left-sided rib fractures involving left ribs 3 through 5.  No pneumothorax or significant effusion.  Asymmetric left basilar pulmonary opacifications concerning for pneumonia.  A few sub-6 mm pulmonary nodules.  Thickening of the distal esophagus suggestive of esophagitis.  T1 and 12 compression deformities.  I have advised reviewed the CT of the spine performed today which demonstrates a T1 compression fracture acute versus subacute.  No evidence for acute fracture or bony malalignment involving the cervical spine.     Multiple rib fractures, left: Minimally displaced on CT of the chest.  Recommend nonoperative medical management with analgesic medications.  Recommend scheduled Tylenol,  Lidocaine patches and his home regimen of Lyrica.  Would ideally benefit from as needed narcotics for breakthrough pain patient and his wife report he \"does not do well with narcotics\" along with a reported allergy to belladonna alkaloids-opium.  Will reevaluate his pain on this analgesic regimen.  Patient is on Plavix therefore he is not a candidate for MINNIE block.  Encourage good pulmonary hygiene, use of I-S.  We will start him on Mucinex and OPEP for his cough.  Antibiotics per primary service.     Multiple sub-6 mm pulmonary nodules: Patient is a never smoker.  Given his history of prostate cancer would like to repeat a CT of the chest to be performed in 3 months for continued monitoring of his lung nodules. We will arrange for him to be scheduled to see us in the office in 3 months.    Acute versus subacute T1 compression fracture noted on CT.  Patient recently underwent kyphoplasty in July.  We will ask neurosurgery to evaluate him while he remains admitted.     Repeat a.m. chest x-ray.     I discussed the patients findings and our recommendations with patient, family, and consulting provider    Thank you for this consult and allowing us to participate in the care of your patient.  We " will follow along with you during this hospitalization.     IVONNE Mancuso  [facility]  11/17/23  17:13 EST    I have spent greater than 75 minutes reviewing the patient's chart including medical history, notes, radiographic images, labs, and performing assessment and development of a plan and discussion with the patient/family at bedside.

## 2023-11-17 NOTE — ED PROVIDER NOTES
EMERGENCY DEPARTMENT ENCOUNTER    Room Number:  07/07  Date of encounter:  11/17/2023  PCP: Zo Estrada MD  Historian: Patient, family  Chronic or social conditions impacting care (social determinants of health): Full code from home    HPI:  Chief Complaint: Syncope  A complete HPI/ROS/PMH/PSH/SH/FH are unobtainable due to: Patient amnestic to the event    Context: Loni Sanford is a 78 y.o. male who presents to the ED c/o syncopal episode prior to arrival.  Patient reports feeling fine when he first woke up this morning.  He reports walking into the kitchen and then passing out.  He does not recall any prodromal symptoms.  His wife reports that she saw him stop and stare blankly before he fell.  Patient hit his left forehead on the ground.  He was out for several seconds.  Patient also complains of left-sided rib pain.  It is unclear if this pain is from the fall or he had pain before.  He does have a history of angina.    Review of prior external notes (non-ED):   I reviewed primary care office visit from 9/12/2023.  Patient being followed for Parkinson's disease, back pain.    Review of prior external test results outside of this encounter:  I reviewed a BMP from 8/10/2023.  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    Coronary artery disease 06/29/2021    History of adenocarcinoma of prostate 06/17/2019    Hyperlipidemia LDL goal <70 06/29/2021    MCI (mild cognitive impairment) 11/15/2019    Neuropathy 11/15/2019    Parkinson disease 12/17/2018    Stable angina 11/20/2020    Primary hypertension 04/15/2022    Encounter for screening for malignant neoplasm of colon 05/03/2023    History of colon polyps 05/03/2023    Recurrent falls 07/09/2023    Compression fracture of T12 vertebra 07/09/2023    RLS (restless legs syndrome) 07/17/2023    Constipation  07/08/2022    Labile hypertension 04/11/2023    Orthostatic hypotension 04/11/2023    RLS (restless legs syndrome) 07/08/2022     Resolved Ambulatory Problems     Diagnosis Date Noted    Renal mass, right 04/05/2018     Past Medical History:   Diagnosis Date    Cancer     Cancer     Dizziness     Hematuria     History of angina     History of transfusion     Hyperlipidemia     Hypertension     Kidney stones     Kidney tumor          PAST SURGICAL HISTORY  Past Surgical History:   Procedure Laterality Date    APPENDECTOMY  2011    CARDIAC CATHETERIZATION      CATARACT EXTRACTION W/ INTRAOCULAR LENS  IMPLANT, BILATERAL      COLONOSCOPY N/A 05/09/2023    Procedure: COLONOSCOPY FOR SCREENING;  Surgeon: Terrell Zhang MD;  Location: [facility] OR;  Service: Gastroenterology;  Laterality: N/A;  poor prep, polyp    EXTRACORPOREAL SHOCKWAVE LITHOTRIPSY (ESWL), STENT INSERTION/REMOVAL Right 12/29/2017    Procedure: RIGHT EXTRACORPOREAL SHOCKWAVE LITHOTRIPSY CYSTOSCOPY STENT PLACEMENT;  Surgeon: Marcelo Suarez MD;  Location: [facility] [place];  Service:     EXTRACORPOREAL SHOCKWAVE LITHOTRIPSY (ESWL), STENT INSERTION/REMOVAL Right 12/28/2018    Procedure: RT EXTRACORPREAL SHOCKWAVE LITHOTRIPSY CYSTOSCOPY  WITH  MANIPULATION OF STONE;  Surgeon: Moe Vasquez MD;  Location: [facility] [place];  Service: Urology    FINGER SURGERY Left 1984    INDEX    HAND SURGERY Right 1998    KYPHOPLASTY N/A 07/14/2023    Procedure: KYPHOPLASTY 1-2 LEVELS;  Surgeon: Jeison Gilman MD;  Location: [facility] OR 18/19;  Service: Neurosurgery;  Laterality: N/A;    NEPHRECTOMY PARTIAL Right 04/05/2018    Procedure: RT OPEN PARTIAL NEPHRECTOMY WITH INTRAOPERATIVE DOPPLER, DECORTICATION OF RIGHT RENAL CYST;  Surgeon: Marcelo Suarez MD;  Location: [facility] OR;  Service: Urology    PROSTATECTOMY  2013         FAMILY HISTORY  Family History   Problem Relation Age of Onset    Heart disease Father     Heart attack Father     Diabetes  Brother     Heart attack Brother     Coronary artery disease Brother         CABG    Heart disease Brother     Hypertension Mother     Malkit Hyperthermia Neg Hx     Stroke Neg Hx          SOCIAL HISTORY  Social History     Socioeconomic History    Marital status:    Tobacco Use    Smoking status: Never     Passive exposure: Never    Smokeless tobacco: Never   Vaping Use    Vaping Use: Never used   Substance and Sexual Activity    Alcohol use: No    Drug use: No    Sexual activity: Defer         ALLERGIES  Aspirin, Belladonna alkaloids-opium, and Gabapentin        REVIEW OF SYSTEMS  All systems reviewed and negative except for those discussed in HPI.       PHYSICAL EXAM    I have reviewed the triage vital signs and nursing notes.    ED Triage Vitals [11/17/23 1052]   Temp Heart Rate Resp BP SpO2   97.7 °F (36.5 °C) 77 18 147/70 97 %      Temp src Heart Rate Source Patient Position BP Location FiO2 (%)   Tympanic Monitor Sitting Left arm --       Physical Exam  GENERAL: Alert, oriented, elderly, not distressed  HENT: Hematoma to left forehead.  No dental or nasal injury.  No cervical tenderness.  EYES: no scleral icterus, EOMI  CV: regular rhythm, regular rate, no murmur  RESPIRATORY: normal effort, CTA.  Moderate left lateral chest wall tenderness without flail segment or deformity.  ABDOMEN: soft, nontender  MUSCULOSKELETAL: no deformity, FROM, no calf swelling or tenderness  Mild tenderness over the upper thoracic spine.  No vertebral step-off  NEURO: alert, moves all extremities, follows commands  SKIN: warm, dry        LAB RESULTS  Recent Results (from the past 24 hour(s))   Respiratory Panel PCR w/COVID-19(SARS-CoV-2) ZACHARIAH/KAUR/RBOERT/PAD/COR/LORE In-House, NP Swab in UTM/VTM, 2 HR TAT - Swab, Nasopharynx    Collection Time: 11/17/23 11:31 AM    Specimen: Nasopharynx; Swab   Result Value Ref Range    ADENOVIRUS, PCR Not Detected Not Detected    Coronavirus 229E Not Detected Not Detected    Coronavirus HKU1 Not  Detected Not Detected    Coronavirus NL63 Not Detected Not Detected    Coronavirus OC43 Not Detected Not Detected    COVID19 Not Detected Not Detected - Ref. Range    Human Metapneumovirus Not Detected Not Detected    Human Rhinovirus/Enterovirus Not Detected Not Detected    Influenza A PCR Not Detected Not Detected    Influenza B PCR Not Detected Not Detected    Parainfluenza Virus 1 Not Detected Not Detected    Parainfluenza Virus 2 Not Detected Not Detected    Parainfluenza Virus 3 Not Detected Not Detected    Parainfluenza Virus 4 Not Detected Not Detected    RSV, PCR Not Detected Not Detected    Bordetella pertussis pcr Not Detected Not Detected    Bordetella parapertussis PCR Not Detected Not Detected    Chlamydophila pneumoniae PCR Not Detected Not Detected    Mycoplasma pneumo by PCR Not Detected Not Detected   ECG 12 Lead Syncope    Collection Time: 11/17/23 11:33 AM   Result Value Ref Range    QT Interval 427 ms    QTC Interval 465 ms   Comprehensive Metabolic Panel    Collection Time: 11/17/23 12:28 PM    Specimen: Blood   Result Value Ref Range    Glucose 108 (H) 65 - 99 mg/dL    BUN 23 8 - 23 mg/dL    Creatinine 1.00 0.76 - 1.27 mg/dL    Sodium 141 136 - 145 mmol/L    Potassium 4.4 3.5 - 5.2 mmol/L    Chloride 104 98 - 107 mmol/L    CO2 29.9 (H) 22.0 - 29.0 mmol/L    Calcium 9.8 8.6 - 10.5 mg/dL    Total Protein 6.6 6.0 - 8.5 g/dL    Albumin 4.5 3.5 - 5.2 g/dL    ALT (SGPT) <5 1 - 41 U/L    AST (SGOT) 18 1 - 40 U/L    Alkaline Phosphatase 126 (H) 39 - 117 U/L    Total Bilirubin 0.6 0.0 - 1.2 mg/dL    Globulin 2.1 gm/dL    A/G Ratio 2.1 g/dL    BUN/Creatinine Ratio 23.0 7.0 - 25.0    Anion Gap 7.1 5.0 - 15.0 mmol/L    eGFR 77.5 >60.0 mL/min/1.73   High Sensitivity Troponin T    Collection Time: 11/17/23 12:28 PM    Specimen: Blood   Result Value Ref Range    HS Troponin T 28 (H) <22 ng/L   CBC Auto Differential    Collection Time: 11/17/23 12:28 PM    Specimen: Blood   Result Value Ref Range    WBC 6.23  3.40 - 10.80 10*3/mm3    RBC 4.02 (L) 4.14 - 5.80 10*6/mm3    Hemoglobin 13.2 13.0 - 17.7 g/dL    Hematocrit 38.6 37.5 - 51.0 %    MCV 96.0 79.0 - 97.0 fL    MCH 32.8 26.6 - 33.0 pg    MCHC 34.2 31.5 - 35.7 g/dL    RDW 11.5 (L) 12.3 - 15.4 %    RDW-SD 40.3 37.0 - 54.0 fl    MPV 9.7 6.0 - 12.0 fL    Platelets 153 140 - 450 10*3/mm3    Neutrophil % 77.6 (H) 42.7 - 76.0 %    Lymphocyte % 10.6 (L) 19.6 - 45.3 %    Monocyte % 10.1 5.0 - 12.0 %    Eosinophil % 0.8 0.3 - 6.2 %    Basophil % 0.3 0.0 - 1.5 %    Immature Grans % 0.6 (H) 0.0 - 0.5 %    Neutrophils, Absolute 4.83 1.70 - 7.00 10*3/mm3    Lymphocytes, Absolute 0.66 (L) 0.70 - 3.10 10*3/mm3    Monocytes, Absolute 0.63 0.10 - 0.90 10*3/mm3    Eosinophils, Absolute 0.05 0.00 - 0.40 10*3/mm3    Basophils, Absolute 0.02 0.00 - 0.20 10*3/mm3    Immature Grans, Absolute 0.04 0.00 - 0.05 10*3/mm3    nRBC 0.0 0.0 - 0.2 /100 WBC   Procalcitonin    Collection Time: 11/17/23 12:28 PM    Specimen: Blood   Result Value Ref Range    Procalcitonin 0.05 0.00 - 0.25 ng/mL       Ordered the above labs and independently reviewed the results.        RADIOLOGY  CT Head Without Contrast, CT Cervical Spine Without Contrast    Result Date: 11/17/2023  CT HEAD AND CERVICAL SPINE WITHOUT CONTRAST  CLINICAL HISTORY: Fall with head and neck injury.  TECHNIQUE: CT scan of the head was obtained with 3 mm axial soft tissue and 2 mm bone algorithm images. No intravenous contrast was administered. Sagittal and coronal reconstructions were obtained.  COMPARISON: CT head dated 07/18/2023.  FINDINGS:   There is no evidence for a calvarial fracture. There is no evidence for an acute extra-axial hemorrhage.  The ventricles, sulci, and cisterns are age-appropriate. The gray-white matter differentiation is within normal limits. The basal ganglia and thalami are unremarkable in appearance. The posterior fossa structures are within normal limits. Incidental note is made of a partially empty sella.   Incidental note is made of a moderate size left frontal convexity scalp hematoma.  Mucosal thickening is identified within the ethmoid air cells, the maxillary sinuses, and the sphenoid sinus. An air-fluid level is seen within the left maxillary sinus.       No evidence for acute traumatic intracranial pathology.   TECHNIQUE: CT scan of the cervical spine was obtained with 1 mm axial bone algorithm and 2 mm axial soft tissue algorithm images. Sagittal and coronal reconstructed images were obtained.  FINDINGS: There is mild (less than 10%) loss of vertebral body height at T1. There is a subtle area of increased density within the superior aspect of the T1 vertebral body. Ultimately, I cannot exclude the possibility of an acute to subacute compression fracture at T1. I recommend further evaluation with an MRI of the thoracic spine.  Otherwise, there is no evidence for acute fracture or bony malalignment involving the cervical spine. There is degenerative retrolisthesis of C3 on C4 by approximately 3 mm. There is moderate canal stenosis at C3-4, C5-6, and C6-7 secondary to disc osteophyte complexes. Multilevel foraminal stenotic changes are appreciated most prominently identified from C3-4 down to C6-7.  IMPRESSION:  There is mild (less than 10%) loss of vertebral body height at the T1 level. Additionally, there is subtle area of increased density within the superior aspect of the T1 vertebral body. I cannot exclude the possibility of an acute to subacute compression fracture involving T1. I recommend further evaluation with an MRI of the thoracic spine.  Otherwise, there is no evidence for acute fracture or bony malalignment involving the cervical spine.  Incidental degenerative phenomena are as discussed in detail above.  These findings and recommendations were directly discussed with Levi Cruz on 11/17/2023 at approximately 1:55 p.m.   Radiation dose reduction techniques were utilized, including automated exposure  control and exposure modulation based on body size.  This report was finalized on 11/17/2023 2:46 PM by Dr. Dung Cardenas M.D on Workstation: [id number]      CT Chest Without Contrast Diagnostic    Result Date: 11/17/2023  CT CHEST WITHOUT IV CONTRAST  HISTORY: Fall, left rib injury  TECHNIQUE: Radiation dose reduction techniques were utilized, including automated exposure control and exposure modulation based on body size. 3 mm images were obtained through the chest without IV contrast.  COMPARISON: None  FINDINGS: Evaluation is suboptimal without intravenous contrast. Evaluation additionally, beam hardening and streak artifact and the patient's arms being along his side significantly limit evaluation.  Subcentimeter renal lesions are too small to characterize. Bilateral nephrolithiasis measuring up to approximately 5 to 6 mm.  The distal esophagus is thickened. No noncalcified mediastinal or axillary adenopathy by size criteria. Small pericardial effusion. Mild to moderate coronary artery calcification or coronary artery stents are present.  Bibasilar atelectasis or pleural parenchymal scarring is present. There is asymmetric pulmonary opacification within the left lung base. There is a mildly displaced left fifth rib fracture anterolaterally. Minimally displaced left third and fourth rib fractures posteriorly are present. No pleural effusion or pneumothorax.  No suspicious lytic or blastic osseous lesions. Superior plate compression formae of T1 with increased sclerosis of the superior endplate is present. Postsurgical changes from kyphoplasty of L1 is present. There is an anterior wedge compression deformity of T12 of indeterminate age resulting in approximately 55 to 60% loss of height.      1.  Left third through fifth rib fractures. No pneumothorax is seen. 2.  T1 and T12 compression deformities of indeterminate age resulting in and up to approximately 55 to 60% loss of height. Correlation with patient history  and point tenderness recommended with follow-up MRI if clinically indicated. 3.  Asymmetric left basilar pulmonary opacification concerning for pneumonia in the appropriate context. Correlation with patient history is recommended. 4.  A few sub-6 mm pulm nodules are present. Inpatient history malignancy, follow-up with chest CT in 3 months is recommended to exclude metastatic disease. 5.  Thickening of the distal esophagus suggestive of esophagitis in the proper context. Correlation with patient history is recommended with follow-up endoscopy if clinically indicated. 6.  Other findings above.   This report was finalized on 11/17/2023 1:35 PM by Dr. Clayton Johnson M.D on Workstation: [id number]      XR Spine Lumbar Complete 4+VW    Result Date: 11/17/2023  3 VIEW LUMBAR SPINE  HISTORY: Fell. Low back pain.  FINDINGS: There is osteoporosis with moderately severe compression fracture of the T12 vertebral body treated with kyphoplasty. This shows slight worsening when compared to the MRI scan dated 7/10/2023 and there is further localized kyphosis related to the compression deformity. No other compression fractures are seen. There are minor degenerative changes scattered in the lumbar spine.  This report was finalized on 11/17/2023 12:21 PM by Dr. Khoi Monte M.D on Workstation: [id number]       I ordered the above noted radiological studies. Reviewed by me and discussed with radiologist.  See dictation for official radiology interpretation.      MEDICATIONS GIVEN IN ER    Medications   sodium chloride 0.9 % flush 10 mL (has no administration in time range)   cefTRIAXone (ROCEPHIN) 1,000 mg in sodium chloride 0.9 % 100 mL IVPB-VTB (has no administration in time range)         ADDITIONAL ORDERS CONSIDERED BUT NOT ORDERED:  Nothing      PROGRESS, DATA ANALYSIS, CONSULTS, AND MEDICAL DECISION MAKING    All labs have been independently interpreted by myself.  All radiology studies have been independently interpreted by  myself and discussed with radiologist dictating the report.   EKG's independently interpreted by myself.  Discussion below represents my analysis of pertinent findings related to patient's condition, differential diagnosis, treatment plan and final disposition.    I have discussed case with Dr. Wong, emergency room physician.  He has performed his own bedside examination and agrees with treatment plan.    ED Course as of 11/17/23 1502   Fri Nov 17, 2023   1120 Patient presents from home after syncopal episode prior to arrival.  Patient was witnessed by his wife falling to the ground.  The patient does not recall any of this.  He did hit his left forehead and possibly his left chest wall.  At this point he does complain of chest pain however it is unclear if this is traumatic or cardiac in nature.  He has no focal deficits on exam.  He does have a history of Parkinson's.  Stable vitals at this time. [EE]   1234 Lumbar spine films independently interpreted myself show slightly progressed T12 compression fracture.  This has been repaired with kyphoplasty. [EE]   1355 WBC: 6.23 [EE]   1355 Creatinine: 1.00 [EE]   1417 Patient has multiple issues going on pneumonia, multiple rib fractures, T1 compression fracture, syncope.  Plan to admit the patient for further evaluation.  They are in agreement with treatment plan. [EE]   1430 I discussed the case with Dr. Boyle [facility].  He agrees to admit. [EE]   1441 I discussed pt with JULIANE Nevarez with thoracic surgery, they will consult for rib fractures.   [EE]      ED Course User Index  [EE] Levi Cruz PA       AS OF 15:02 EST VITALS:    BP - 123/83  HR - 80  TEMP - 97.7 °F (36.5 °C) (Tympanic)  O2 SATS - 95%        DIAGNOSIS  Final diagnoses:   Syncope, unspecified syncope type   Closed head injury, initial encounter   Closed fracture of multiple ribs of left side, initial encounter   Closed wedge compression fracture of T1 vertebra, initial encounter   Community acquired  pneumonia of left lower lobe of lung         DISPOSITION  Admitted      Dictated utilizing Dragon dictation     Levi Cruz PA  11/17/23 [id number]

## 2023-11-17 NOTE — ED PROVIDER NOTES
"The LARRY and I have discussed this patient's history, physical exam, and treatment plan.  I have reviewed the documentation and personally had a face to face interaction with the patient. I affirm the documentation and agree with the treatment and plan.  The attached note describes my personal findings.      I provided a substantive portion of the care of the patient.  I personally performed the physical exam in its entirety, and below are my findings.     Brief history of present illness: 78-year-old male presents sustaining injuries from a syncopal episode earlier today.  Complains of left-sided chest discomfort and was placed on supplemental oxygen upon arrival.    Physical exam:    /70 (BP Location: Left arm, Patient Position: Sitting)   Pulse 77   Temp 97.7 °F (36.5 °C) (Tympanic)   Resp 18   Ht 175.3 cm (69\")   Wt 89.4 kg (197 lb)   SpO2 97%   BMI 29.09 kg/m²       Physical Exam   Constitutional: No distress.  Nontoxic but uncomfortable appearing  HENT:  Head: Normocephalic  Oropharynx: Mucous membranes are moist.   Eyes: . No scleral icterus.   Neck: Normal range of motion. Neck supple.   Cardiovascular: Pink warm and well perfused throughout.    Pulmonary/Chest: No respiratory distress.    Musculoskeletal: Moves all extremities equally.    Neurological: Alert and oriented.    Skin: Skin is pink, warm, and dry.   Psychiatric: Mood and affect normal.   Nursing note and vitals reviewed.        MDM:   I have personally reviewed and interpreted the EKG obtained today in the emergency department at 1133 concomitant with treatment.  EKG reveals rhythm/rate -sinus, 70. VT-EUSEBIA within normal limits.  QRS-borderline IVCD with slow R wave progression ST/T-wave -nonspecific ST/T wave repolarization with poor baseline to interpret for possible STEMI    Agree with planned laboratory and radiologic evaluation to assess for both traumatic injuries and because of patient's syncope.    Please note that portions of " this were completed with a voice recognition program.       Note Disclaimer: At [facility], we believe that sharing information builds trust and better relationships. You are receiving this note because you are receiving care at [facility] or recently visited. It is possible you will see health information before a provider has talked with you about it. This kind of information can be easy to misunderstand. To help you fully understand what it means for your health, we urge you to discuss this note with your provider.     Danie Wong MD  11/17/23 1397

## 2023-11-17 NOTE — CASE MANAGEMENT/SOCIAL WORK
Discharge Planning Assessment  [facility]     Patient Name: Loni Sanford  MRN: [id number]  Today's Date: 11/17/2023    Admit Date: 11/17/2023    Plan: Home, spouse to transport   Discharge Needs Assessment       Row Name 11/17/23 1638       Living Environment    People in Home spouse    Name(s) of People in Home Zuri Sanford    Current Living Arrangements home    Potentially Unsafe Housing Conditions none    In the past 12 months has the electric, gas, oil, or water company threatened to shut off services in your home? No    Primary Care Provided by self    Provides Primary Care For no one    Family Caregiver if Needed spouse    Family Caregiver Names Zuri Sanford    Quality of Family Relationships helpful;involved    Able to Return to Prior Arrangements yes       Resource/Environmental Concerns    Resource/Environmental Concerns none    Transportation Concerns none       Food Insecurity    Within the past 12 months, you worried that your food would run out before you got the money to buy more. Never true    Within the past 12 months, the food you bought just didn't last and you didn't have money to get more. Never true       Transition Planning    Patient/Family Anticipates Transition to home;home with family    Patient/Family Anticipated Services at Transition none    Transportation Anticipated family or friend will provide       Discharge Needs Assessment    Equipment Currently Used at Home cane, straight;walker, rolling    Concerns to be Addressed no discharge needs identified;denies needs/concerns at this time    Anticipated Changes Related to Illness none    Equipment Needed After Discharge none                   Discharge Plan       Row Name 11/17/23 1639       Plan    Plan Home, spouse to transport    Plan Comments Met with pt at bedside.Introduced self and explained role of . Face sheet verified, PCP is Zo Estrada. Pt denies any difficulty paying for medications, and he obtains his  medications from [facility] in [place]. Pt lives with his spouse, Zuri, and he stated that she can provide any care needs that may arise. Pt stated that he is independent in ADL's and that he makes every effort to maintain his independence. Pt uses a cane/walker for mobility if he is ambulating for long distances. Pt stated that he has had home health in the past, but he cannot remember which agency. Pt stated that he has been to [facility] in [place]. He does not anticipate requiring any of those services upon discharge. At discharge, pt stated that his spouse will transport home. Explained that CCP would follow to assess for discharge needs.                  Continued Care and Services - Admitted Since 11/17/2023    Coordination has not been started for this encounter.       Expected Discharge Date and Time       Expected Discharge Date Expected Discharge Time    Nov 20, 2023            Demographic Summary    No documentation.                  Functional Status    No documentation.                  Psychosocial    No documentation.                  Abuse/Neglect    No documentation.                  Legal    No documentation.                  Substance Abuse    No documentation.                  Patient Forms    No documentation.                     Debbie Barbour RN

## 2023-11-18 ENCOUNTER — APPOINTMENT (OUTPATIENT)
Dept: GENERAL RADIOLOGY | Facility: HOSPITAL | Age: 78
End: 2023-11-18
Payer: MEDICARE

## 2023-11-18 ENCOUNTER — APPOINTMENT (OUTPATIENT)
Dept: MRI IMAGING | Facility: HOSPITAL | Age: 78
End: 2023-11-18
Payer: MEDICARE

## 2023-11-18 PROBLEM — S22.42XA CLOSED FRACTURE OF MULTIPLE RIBS OF LEFT SIDE: Status: ACTIVE | Noted: 2023-11-18

## 2023-11-18 LAB
ALBUMIN SERPL-MCNC: 3.8 G/DL (ref 3.5–5.2)
ALBUMIN/GLOB SERPL: 2.1 G/DL
ALP SERPL-CCNC: 111 U/L (ref 39–117)
ALT SERPL W P-5'-P-CCNC: 6 U/L (ref 1–41)
ANION GAP SERPL CALCULATED.3IONS-SCNC: 7 MMOL/L (ref 5–15)
AST SERPL-CCNC: 17 U/L (ref 1–40)
BASOPHILS # BLD AUTO: 0.01 10*3/MM3 (ref 0–0.2)
BASOPHILS NFR BLD AUTO: 0.2 % (ref 0–1.5)
BILIRUB SERPL-MCNC: 0.6 MG/DL (ref 0–1.2)
BUN SERPL-MCNC: 27 MG/DL (ref 8–23)
BUN/CREAT SERPL: 28.7 (ref 7–25)
CALCIUM SPEC-SCNC: 8.7 MG/DL (ref 8.6–10.5)
CHLORIDE SERPL-SCNC: 108 MMOL/L (ref 98–107)
CO2 SERPL-SCNC: 26 MMOL/L (ref 22–29)
CREAT SERPL-MCNC: 0.94 MG/DL (ref 0.76–1.27)
DEPRECATED RDW RBC AUTO: 41.1 FL (ref 37–54)
EGFRCR SERPLBLD CKD-EPI 2021: 83.5 ML/MIN/1.73
EOSINOPHIL # BLD AUTO: 0.06 10*3/MM3 (ref 0–0.4)
EOSINOPHIL NFR BLD AUTO: 1 % (ref 0.3–6.2)
ERYTHROCYTE [DISTWIDTH] IN BLOOD BY AUTOMATED COUNT: 11.6 % (ref 12.3–15.4)
GLOBULIN UR ELPH-MCNC: 1.8 GM/DL
GLUCOSE SERPL-MCNC: 109 MG/DL (ref 65–99)
HCT VFR BLD AUTO: 33.4 % (ref 37.5–51)
HGB BLD-MCNC: 11.4 G/DL (ref 13–17.7)
IMM GRANULOCYTES # BLD AUTO: 0.03 10*3/MM3 (ref 0–0.05)
IMM GRANULOCYTES NFR BLD AUTO: 0.5 % (ref 0–0.5)
LYMPHOCYTES # BLD AUTO: 0.79 10*3/MM3 (ref 0.7–3.1)
LYMPHOCYTES NFR BLD AUTO: 13.5 % (ref 19.6–45.3)
MCH RBC QN AUTO: 32.9 PG (ref 26.6–33)
MCHC RBC AUTO-ENTMCNC: 34.1 G/DL (ref 31.5–35.7)
MCV RBC AUTO: 96.3 FL (ref 79–97)
MONOCYTES # BLD AUTO: 0.81 10*3/MM3 (ref 0.1–0.9)
MONOCYTES NFR BLD AUTO: 13.8 % (ref 5–12)
NEUTROPHILS NFR BLD AUTO: 4.15 10*3/MM3 (ref 1.7–7)
NEUTROPHILS NFR BLD AUTO: 71 % (ref 42.7–76)
NRBC BLD AUTO-RTO: 0 /100 WBC (ref 0–0.2)
PLATELET # BLD AUTO: 134 10*3/MM3 (ref 140–450)
PMV BLD AUTO: 9.5 FL (ref 6–12)
POTASSIUM SERPL-SCNC: 4 MMOL/L (ref 3.5–5.2)
PROCALCITONIN SERPL-MCNC: 0.05 NG/ML (ref 0–0.25)
PROT SERPL-MCNC: 5.6 G/DL (ref 6–8.5)
RBC # BLD AUTO: 3.47 10*6/MM3 (ref 4.14–5.8)
SODIUM SERPL-SCNC: 141 MMOL/L (ref 136–145)
WBC NRBC COR # BLD AUTO: 5.85 10*3/MM3 (ref 3.4–10.8)

## 2023-11-18 PROCEDURE — 97530 THERAPEUTIC ACTIVITIES: CPT

## 2023-11-18 PROCEDURE — 85025 COMPLETE CBC W/AUTO DIFF WBC: CPT | Performed by: INTERNAL MEDICINE

## 2023-11-18 PROCEDURE — G0378 HOSPITAL OBSERVATION PER HR: HCPCS

## 2023-11-18 PROCEDURE — 84145 PROCALCITONIN (PCT): CPT | Performed by: INTERNAL MEDICINE

## 2023-11-18 PROCEDURE — 72146 MRI CHEST SPINE W/O DYE: CPT

## 2023-11-18 PROCEDURE — 71045 X-RAY EXAM CHEST 1 VIEW: CPT

## 2023-11-18 PROCEDURE — 99221 1ST HOSP IP/OBS SF/LOW 40: CPT | Performed by: NURSE PRACTITIONER

## 2023-11-18 PROCEDURE — 25810000003 SODIUM CHLORIDE 0.9 % SOLUTION: Performed by: INTERNAL MEDICINE

## 2023-11-18 PROCEDURE — 80053 COMPREHEN METABOLIC PANEL: CPT | Performed by: INTERNAL MEDICINE

## 2023-11-18 PROCEDURE — 97162 PT EVAL MOD COMPLEX 30 MIN: CPT

## 2023-11-18 PROCEDURE — 25010000002 CEFTRIAXONE PER 250 MG: Performed by: INTERNAL MEDICINE

## 2023-11-18 RX ORDER — TRAMADOL HYDROCHLORIDE 50 MG/1
50 TABLET ORAL EVERY 6 HOURS PRN
Status: DISCONTINUED | OUTPATIENT
Start: 2023-11-18 | End: 2023-11-19 | Stop reason: HOSPADM

## 2023-11-18 RX ORDER — ACETAMINOPHEN 500 MG
1000 TABLET ORAL 3 TIMES DAILY
Status: DISCONTINUED | OUTPATIENT
Start: 2023-11-18 | End: 2023-11-19 | Stop reason: HOSPADM

## 2023-11-18 RX ADMIN — ACETAMINOPHEN 1000 MG: 500 TABLET ORAL at 15:06

## 2023-11-18 RX ADMIN — SODIUM CHLORIDE 100 ML/HR: 9 INJECTION, SOLUTION INTRAVENOUS at 09:03

## 2023-11-18 RX ADMIN — CARBIDOPA AND LEVODOPA 1 TABLET: 25; 250 TABLET ORAL at 09:03

## 2023-11-18 RX ADMIN — GUAIFENESIN 600 MG: 600 TABLET, EXTENDED RELEASE ORAL at 20:23

## 2023-11-18 RX ADMIN — Medication 1000 UNITS: at 06:21

## 2023-11-18 RX ADMIN — MULTIPLE VITAMINS W/ MINERALS TAB 1 TABLET: TAB at 06:21

## 2023-11-18 RX ADMIN — ATORVASTATIN CALCIUM 20 MG: 20 TABLET, FILM COATED ORAL at 20:23

## 2023-11-18 RX ADMIN — CLOPIDOGREL BISULFATE 75 MG: 75 TABLET, FILM COATED ORAL at 06:21

## 2023-11-18 RX ADMIN — CEFTRIAXONE SODIUM 1000 MG: 1 INJECTION, POWDER, FOR SOLUTION INTRAMUSCULAR; INTRAVENOUS at 13:08

## 2023-11-18 RX ADMIN — DOCUSATE SODIUM 50MG AND SENNOSIDES 8.6MG 2 TABLET: 8.6; 5 TABLET, FILM COATED ORAL at 09:03

## 2023-11-18 RX ADMIN — Medication 10 ML: at 09:04

## 2023-11-18 RX ADMIN — ACETAMINOPHEN 1000 MG: 500 TABLET ORAL at 20:23

## 2023-11-18 RX ADMIN — CARBIDOPA AND LEVODOPA 1 TABLET: 25; 250 TABLET ORAL at 18:54

## 2023-11-18 RX ADMIN — METOPROLOL SUCCINATE 12.5 MG: 25 TABLET, EXTENDED RELEASE ORAL at 20:23

## 2023-11-18 RX ADMIN — CARBIDOPA AND LEVODOPA 1 TABLET: 25; 250 TABLET ORAL at 13:08

## 2023-11-18 RX ADMIN — Medication 10 ML: at 20:24

## 2023-11-18 RX ADMIN — GUAIFENESIN 600 MG: 600 TABLET, EXTENDED RELEASE ORAL at 09:03

## 2023-11-18 RX ADMIN — CARBIDOPA AND LEVODOPA 1 TABLET: 50; 200 TABLET, EXTENDED RELEASE ORAL at 20:23

## 2023-11-18 RX ADMIN — LIDOCAINE 2 PATCH: 4 PATCH TOPICAL at 09:03

## 2023-11-18 NOTE — PROGRESS NOTES
Name: Loni Sanford ADMIT: 2023   : 1945  PCP: Zo Estrada MD    MRN: [id number] LOS: 0 days   AGE/SEX: 78 y.o. male  ROOM: [facility]     Subjective   Subjective   Complaining of chest wall pain. No shortness of breath. Sitting in chair eating lunch     Objective   Objective   Vital Signs  Temp:  [98.2 °F (36.8 °C)-99 °F (37.2 °C)] 99 °F (37.2 °C)  Heart Rate:  [78-93] 87  Resp:  [16] 16  BP: (123-174)/() 139/96  SpO2:  [95 %-100 %] 97 %  on  Flow (L/min):  [1-2] 1;   Device (Oxygen Therapy): room air  Body mass index is 29.09 kg/m².    Physical Exam  Constitutional:       General: He is not in acute distress.     Appearance: He is ill-appearing (chronic). He is not toxic-appearing.   HENT:      Head: Normocephalic and atraumatic.   Eyes:      Extraocular Movements: Extraocular movements intact.   Cardiovascular:      Rate and Rhythm: Normal rate and regular rhythm.   Pulmonary:      Effort: Pulmonary effort is normal. No respiratory distress.      Breath sounds: Decreased breath sounds present. No wheezing.   Chest:      Chest wall: Tenderness present.   Abdominal:      General: Bowel sounds are normal.      Palpations: Abdomen is soft.      Tenderness: There is no abdominal tenderness. There is no guarding or rebound.   Musculoskeletal:         General: No swelling.      Cervical back: Normal range of motion.   Skin:     General: Skin is warm and dry.   Neurological:      General: No focal deficit present.      Mental Status: He is alert and oriented to person, place, and time.   Psychiatric:         Mood and Affect: Mood normal.         Behavior: Behavior normal.         Thought Content: Thought content normal.       Results Review  I reviewed the patient's new clinical results.  Results from last 7 days   Lab Units 23  0411 23  1228   WBC 10*3/mm3 5.85 6.23   HEMOGLOBIN g/dL 11.4* 13.2   PLATELETS 10*3/mm3 134* 153     Results from last 7 days   Lab Units 231  "11/17/23  1228   SODIUM mmol/L 141 141   POTASSIUM mmol/L 4.0 4.4   CHLORIDE mmol/L 108* 104   CO2 mmol/L 26.0 29.9*   BUN mg/dL 27* 23   CREATININE mg/dL 0.94 1.00   GLUCOSE mg/dL 109* 108*     Lab Results   Component Value Date    ANIONGAP 7.0 11/18/2023     Estimated Creatinine Clearance: 72.8 mL/min (by C-G formula based on SCr of 0.94 mg/dL).   Lab Results   Component Value Date    EGFR 83.5 11/18/2023     Results from last 7 days   Lab Units 11/18/23  0411 11/17/23  1228   ALBUMIN g/dL 3.8 4.5   BILIRUBIN mg/dL 0.6 0.6   ALK PHOS U/L 111 126*   AST (SGOT) U/L 17 18   ALT (SGPT) U/L 6 <5     Results from last 7 days   Lab Units 11/18/23  0411 11/17/23  1228   CALCIUM mg/dL 8.7 9.8   ALBUMIN g/dL 3.8 4.5     Results from last 7 days   Lab Units 11/18/23  0411 11/17/23  1436 11/17/23  1228   PROCALCITONIN ng/mL 0.05  --  0.05   LACTATE mmol/L  --  1.3  --      No results found for: \"HGBA1C\", \"POCGLU\"    XR Chest 1 View    Result Date: 11/18/2023  As described.    This report was finalized on 11/18/2023 7:24 AM by Dr. Efrain Cooper M.D on Workstation: [id number]      CT Head Without Contrast    Result Date: 11/17/2023   No evidence for acute traumatic intracranial pathology.   TECHNIQUE: CT scan of the cervical spine was obtained with 1 mm axial bone algorithm and 2 mm axial soft tissue algorithm images. Sagittal and coronal reconstructed images were obtained.  FINDINGS: There is mild (less than 10%) loss of vertebral body height at T1. There is a subtle area of increased density within the superior aspect of the T1 vertebral body. Ultimately, I cannot exclude the possibility of an acute to subacute compression fracture at T1. I recommend further evaluation with an MRI of the thoracic spine.  Otherwise, there is no evidence for acute fracture or bony malalignment involving the cervical spine. There is degenerative retrolisthesis of C3 on C4 by approximately 3 mm. There is moderate canal stenosis at C3-4, C5-6, " and C6-7 secondary to disc osteophyte complexes. Multilevel foraminal stenotic changes are appreciated most prominently identified from C3-4 down to C6-7.  IMPRESSION:  There is mild (less than 10%) loss of vertebral body height at the T1 level. Additionally, there is subtle area of increased density within the superior aspect of the T1 vertebral body. I cannot exclude the possibility of an acute to subacute compression fracture involving T1. I recommend further evaluation with an MRI of the thoracic spine.  Otherwise, there is no evidence for acute fracture or bony malalignment involving the cervical spine.  Incidental degenerative phenomena are as discussed in detail above.  These findings and recommendations were directly discussed with Levi Cruz on 11/17/2023 at approximately 1:55 p.m.   Radiation dose reduction techniques were utilized, including automated exposure control and exposure modulation based on body size.  This report was finalized on 11/17/2023 2:46 PM by Dr. Dung Cardenas M.D on Workstation: [email or web address]      CT Cervical Spine Without Contrast    Result Date: 11/17/2023   No evidence for acute traumatic intracranial pathology.   TECHNIQUE: CT scan of the cervical spine was obtained with 1 mm axial bone algorithm and 2 mm axial soft tissue algorithm images. Sagittal and coronal reconstructed images were obtained.  FINDINGS: There is mild (less than 10%) loss of vertebral body height at T1. There is a subtle area of increased density within the superior aspect of the T1 vertebral body. Ultimately, I cannot exclude the possibility of an acute to subacute compression fracture at T1. I recommend further evaluation with an MRI of the thoracic spine.  Otherwise, there is no evidence for acute fracture or bony malalignment involving the cervical spine. There is degenerative retrolisthesis of C3 on C4 by approximately 3 mm. There is moderate canal stenosis at C3-4, C5-6, and C6-7 secondary to disc osteophyte  complexes. Multilevel foraminal stenotic changes are appreciated most prominently identified from C3-4 down to C6-7.  IMPRESSION:  There is mild (less than 10%) loss of vertebral body height at the T1 level. Additionally, there is subtle area of increased density within the superior aspect of the T1 vertebral body. I cannot exclude the possibility of an acute to subacute compression fracture involving T1. I recommend further evaluation with an MRI of the thoracic spine.  Otherwise, there is no evidence for acute fracture or bony malalignment involving the cervical spine.  Incidental degenerative phenomena are as discussed in detail above.  These findings and recommendations were directly discussed with Levi Cruz on 11/17/2023 at approximately 1:55 p.m.   Radiation dose reduction techniques were utilized, including automated exposure control and exposure modulation based on body size.  This report was finalized on 11/17/2023 2:46 PM by Dr. Dung Cardenas M.D on Workstation: [id number]      CT Chest Without Contrast Diagnostic    Result Date: 11/17/2023  1.  Left third through fifth rib fractures. No pneumothorax is seen. 2.  T1 and T12 compression deformities of indeterminate age resulting in and up to approximately 55 to 60% loss of height. Correlation with patient history and point tenderness recommended with follow-up MRI if clinically indicated. 3.  Asymmetric left basilar pulmonary opacification concerning for pneumonia in the appropriate context. Correlation with patient history is recommended. 4.  A few sub-6 mm pulm nodules are present. Inpatient history malignancy, follow-up with chest CT in 3 months is recommended to exclude metastatic disease. 5.  Thickening of the distal esophagus suggestive of esophagitis in the proper context. Correlation with patient history is recommended with follow-up endoscopy if clinically indicated. 6.  Other findings above.   This report was finalized on 11/17/2023 1:35 PM by   Clayton Johnson M.D on Workstation: [id number]       Scheduled Meds  atorvastatin, 20 mg, Oral, Nightly  carbidopa-levodopa, 1 tablet, Oral, 4x Daily  carbidopa-levodopa CR, 1 tablet, Oral, Nightly  cefTRIAXone, 1,000 mg, Intravenous, Q24H  cholecalciferol, 1,000 Units, Oral, QAM  clopidogrel, 75 mg, Oral, QAM  guaiFENesin, 600 mg, Oral, Q12H  Lidocaine, 2 patch, Transdermal, Q24H  metoprolol succinate XL, 12.5 mg, Oral, Nightly  multivitamin with minerals, 1 tablet, Oral, QAM  senna-docusate sodium, 2 tablet, Oral, BID  sodium chloride, 10 mL, Intravenous, Q12H    Continuous Infusions  sodium chloride, 100 mL/hr, Last Rate: 100 mL/hr (11/18/23 0903)    PRN Meds    acetaminophen **OR** acetaminophen **OR** acetaminophen    senna-docusate sodium **AND** polyethylene glycol **AND** bisacodyl **AND** bisacodyl    nitroglycerin    ondansetron    pregabalin    [COMPLETED] Insert Peripheral IV **AND** sodium chloride    sodium chloride    sodium chloride    sodium chloride, 100 mL/hr, Last Rate: 100 mL/hr (11/18/23 0903)    Diet  Diet: Cardiac Diets; Healthy Heart (2-3 Na+); Texture: Regular Texture (IDDSI 7); Fluid Consistency: Thin (IDDSI 0)    I have personally reviewed:  [x]  Medications  [x]  Laboratory   [x]  Microbiology   [x]  Radiology   []  EKG/Telemetry   []  Cardiology/Vascular   []  Pathology   []  Records      Assessment/Plan     Active Hospital Problems    Diagnosis  POA    **Syncope [R55]  Yes    Closed fracture of multiple ribs of left side [S22.42XA]  Yes    History of vertebral compression fracture [Z87.81]  Not Applicable    Pneumonia [J18.9]  Yes    Thoracic compression fracture, closed, initial encounter [S22.000A]  Yes    Recurrent falls [R29.6]  Not Applicable    Orthostatic hypotension [I95.1]  Yes    Primary hypertension [I10]  Yes    Coronary artery disease [I25.10]  Yes    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     78 y.o. male with a history of Parkinson disease admitted with syncope.   Patient told me he stood up before passing out    Syncope, recurrent  Parkinson disease with autonomic dysfunction  Neurology feels associated with autonomic dysfunction and Parkinson's disease and possibly Sinemet side effect. May need medication adjustment (inpatient vs outpatient)  Continue to monitor orthostatics, IVF, compression stockings, binder    Left 3-5 rib fractures  Thoracic surgery recommends conservative management: Pain control, pulmonary hygiene, PT.    Possible left basilar pneumonia  Pulmonary nodules  Thoracic surgery plans outpatient CT chest to follow-up nodules  WBC, lactate, procalcitonin not elevated. No fever.  Currently on ceftriaxone continue to monitor    Distal esophageal thickening/esophagitis  PPI  GI consult    Possible T1 and T12 compression fracture  Neurosurgery following  MRI pending  Hold Plavix for now    SCDs for DVT prophylaxis    Discussed with patient and nursing staff and Lou Zuniga, thoracic surgery APRN    Expected Discharge Date: 11/20/2023; Expected Discharge Time:     Mat Cordero MD  [facility]  11/18/23

## 2023-11-18 NOTE — PLAN OF CARE
Goal Outcome Evaluation:  Plan of Care Reviewed With: patient           Outcome Evaluation: Patient is a 79 y/o male admitted to [facility] following a fall d/t syncope episode resulting in L rib fractures 3-5. He currently lives with his wife and s 3 [place]TE[place]. He has a PMH of Parkinson's. Prior to admission he was indep with functional mobility and used a cane/walker for community distances. Today he presented with generalized weakness and decreased tolerance to activity. He completed bed mobility SBA and stood with Gail and rwx. He ambualted 5' to the chair with rwx and CGA. Session ended with pt UIC and all needs met within reach. Therapy recommendations include HHPT if needed at d/c.      Anticipated Discharge Disposition (PT): home, home with home health

## 2023-11-18 NOTE — PROGRESS NOTES
"    Chief Complaint: Multiple rib fractures, follow-up    Subjective:  Symptoms:  Stable.  He reports shortness of breath, chest pain and weakness.    Diet:  Poor intake.  No nausea or vomiting.    Activity level: Impaired due to pain.    Pain:  He complains of pain that is moderate.        Vital Signs:  Temp:  [98.2 °F (36.8 °C)-99 °F (37.2 °C)] 99 °F (37.2 °C)  Heart Rate:  [78-93] 87  Resp:  [16] 16  BP: (123-174)/() 139/96    Intake & Output (last day)         11/17 0701 11/18 0700 11/18 0701 11/19 0700    P.O.  240    Total Intake(mL/kg)  240 (2.7)    Net  +240          Urine Unmeasured Occurrence 2 x 2 x    Stool Unmeasured Occurrence 1 x             Objective:  General Appearance:  Uncomfortable, ill-appearing and in no acute distress.    Vital signs: (most recent): Blood pressure 139/96, pulse 87, temperature 99 °F (37.2 °C), temperature source Oral, resp. rate 16, height 175.3 cm (69\"), weight 89.4 kg (197 lb), SpO2 97%.  Vital signs are normal.    Lungs:  Normal effort and normal respiratory rate.  There are decreased breath sounds.  No wheezes or rhonchi.    Heart: Normal rate.  Regular rhythm.    Chest: Chest wall tenderness present.    Abdomen: Bowel sounds are normal.   There is no abdominal tenderness.     Extremities: Decreased range of motion.    Neurological: Patient is alert and oriented to person, place and time.    Skin:  Warm and dry.            Results Review:     I reviewed the patient's new clinical results.  I reviewed the patient's new imaging results and agree with the interpretation.  I reviewed the patient's other test results and agree with the interpretation  Discussed with patient, Dr. Fountain.    Imaging Results (Last 24 Hours)       Procedure Component Value Units Date/Time    XR Chest 1 View [[id number]] Collected: 11/18/23 0718     Updated: 11/18/23 0728    Narrative:      XR CHEST 1 VW-     HISTORY: Male who is 78 years-old, rib fractures, possible pleural  effusion   "   TECHNIQUE: Frontal view of the chest     COMPARISON: 4/6/2018     FINDINGS: The heart size is borderline. Pulmonary vasculature is  unremarkable. Lung volume is low. Mild left basilar  atelectasis/infiltrate/effusion, follow-up suggested. Minimal right  basilar likely atelectasis. No pneumothorax. Previous CT demonstrated  left rib fractures are also apparent on this exam.       Impression:      As described.           This report was finalized on 11/18/2023 7:24 AM by Dr. Efrain Cooper M.D on Workstation[id number]       CT Head Without Contrast [[id number]] Collected: 11/17/23 1359     Updated: 11/17/23 1449    Narrative:      CT HEAD AND CERVICAL SPINE WITHOUT CONTRAST     CLINICAL HISTORY: Fall with head and neck injury.     TECHNIQUE: CT scan of the head was obtained with 3 mm axial soft tissue  and 2 mm bone algorithm images. No intravenous contrast was  administered. Sagittal and coronal reconstructions were obtained.     COMPARISON: CT head dated 07/18/2023.     FINDINGS:       There is no evidence for a calvarial fracture. There is no evidence for  an acute extra-axial hemorrhage.     The ventricles, sulci, and cisterns are age-appropriate. The gray-white  matter differentiation is within normal limits. The basal ganglia and  thalami are unremarkable in appearance. The posterior fossa structures  are within normal limits. Incidental note is made of a partially empty  sella.     Incidental note is made of a moderate size left frontal convexity scalp  hematoma.     Mucosal thickening is identified within the ethmoid air cells, the  maxillary sinuses, and the sphenoid sinus. An air-fluid level is seen  within the left maxillary sinus.       Impression:         No evidence for acute traumatic intracranial pathology.        TECHNIQUE: CT scan of the cervical spine was obtained with 1 mm axial  bone algorithm and 2 mm axial soft tissue algorithm images. Sagittal and  coronal reconstructed images were  obtained.     FINDINGS:  There is mild (less than 10%) loss of vertebral body height at T1. There  is a subtle area of increased density within the superior aspect of the  T1 vertebral body. Ultimately, I cannot exclude the possibility of an  acute to subacute compression fracture at T1. I recommend further  evaluation with an MRI of the thoracic spine.     Otherwise, there is no evidence for acute fracture or bony malalignment  involving the cervical spine. There is degenerative retrolisthesis of C3  on C4 by approximately 3 mm. There is moderate canal stenosis at C3-4,  C5-6, and C6-7 secondary to disc osteophyte complexes. Multilevel  foraminal stenotic changes are appreciated most prominently identified  from C3-4 down to C6-7.     IMPRESSION:     There is mild (less than 10%) loss of vertebral body height at the T1  level. Additionally, there is subtle area of increased density within  the superior aspect of the T1 vertebral body. I cannot exclude the  possibility of an acute to subacute compression fracture involving T1. I  recommend further evaluation with an MRI of the thoracic spine.     Otherwise, there is no evidence for acute fracture or bony malalignment  involving the cervical spine.     Incidental degenerative phenomena are as discussed in detail above.     These findings and recommendations were directly discussed with Levi Cruz on 11/17/2023 at approximately 1:55 p.m.        Radiation dose reduction techniques were utilized, including automated  exposure control and exposure modulation based on body size.     This report was finalized on 11/17/2023 2:46 PM by Dr. Dung Cardenas M.D  on Workstation: [id number]       CT Cervical Spine Without Contrast [[id number]] Collected: 11/17/23 1359     Updated: 11/17/23 1449    Narrative:      CT HEAD AND CERVICAL SPINE WITHOUT CONTRAST     CLINICAL HISTORY: Fall with head and neck injury.     TECHNIQUE: CT scan of the head was obtained with 3 mm axial soft  tissue  and 2 mm bone algorithm images. No intravenous contrast was  administered. Sagittal and coronal reconstructions were obtained.     COMPARISON: CT head dated 07/18/2023.     FINDINGS:       There is no evidence for a calvarial fracture. There is no evidence for  an acute extra-axial hemorrhage.     The ventricles, sulci, and cisterns are age-appropriate. The gray-white  matter differentiation is within normal limits. The basal ganglia and  thalami are unremarkable in appearance. The posterior fossa structures  are within normal limits. Incidental note is made of a partially empty  sella.     Incidental note is made of a moderate size left frontal convexity scalp  hematoma.     Mucosal thickening is identified within the ethmoid air cells, the  maxillary sinuses, and the sphenoid sinus. An air-fluid level is seen  within the left maxillary sinus.       Impression:         No evidence for acute traumatic intracranial pathology.        TECHNIQUE: CT scan of the cervical spine was obtained with 1 mm axial  bone algorithm and 2 mm axial soft tissue algorithm images. Sagittal and  coronal reconstructed images were obtained.     FINDINGS:  There is mild (less than 10%) loss of vertebral body height at T1. There  is a subtle area of increased density within the superior aspect of the  T1 vertebral body. Ultimately, I cannot exclude the possibility of an  acute to subacute compression fracture at T1. I recommend further  evaluation with an MRI of the thoracic spine.     Otherwise, there is no evidence for acute fracture or bony malalignment  involving the cervical spine. There is degenerative retrolisthesis of C3  on C4 by approximately 3 mm. There is moderate canal stenosis at C3-4,  C5-6, and C6-7 secondary to disc osteophyte complexes. Multilevel  foraminal stenotic changes are appreciated most prominently identified  from C3-4 down to C6-7.     IMPRESSION:     There is mild (less than 10%) loss of vertebral body  height at the T1  level. Additionally, there is subtle area of increased density within  the superior aspect of the T1 vertebral body. I cannot exclude the  possibility of an acute to subacute compression fracture involving T1. I  recommend further evaluation with an MRI of the thoracic spine.     Otherwise, there is no evidence for acute fracture or bony malalignment  involving the cervical spine.     Incidental degenerative phenomena are as discussed in detail above.     These findings and recommendations were directly discussed with Levi Cruz on 11/17/2023 at approximately 1:55 p.m.        Radiation dose reduction techniques were utilized, including automated  exposure control and exposure modulation based on body size.     This report was finalized on 11/17/2023 2:46 PM by Dr. Dung Cardenas M.D  on Workstation: [id number]       CT Chest Without Contrast Diagnostic [[id number]] Collected: 11/17/23 1320     Updated: 11/17/23 1338    Narrative:      CT CHEST WITHOUT IV CONTRAST     HISTORY: Fall, left rib injury     TECHNIQUE: Radiation dose reduction techniques were utilized, including  automated exposure control and exposure modulation based on body size.   3 mm images were obtained through the chest without IV contrast.     COMPARISON: None     FINDINGS:  Evaluation is suboptimal without intravenous contrast. Evaluation  additionally, beam hardening and streak artifact and the patient's arms  being along his side significantly limit evaluation.     Subcentimeter renal lesions are too small to characterize. Bilateral  nephrolithiasis measuring up to approximately 5 to 6 mm.     The distal esophagus is thickened. No noncalcified mediastinal or  axillary adenopathy by size criteria. Small pericardial effusion. Mild  to moderate coronary artery calcification or coronary artery stents are  present.     Bibasilar atelectasis or pleural parenchymal scarring is present. There  is asymmetric pulmonary opacification within  the left lung base. There  is a mildly displaced left fifth rib fracture anterolaterally. Minimally  displaced left third and fourth rib fractures posteriorly are present.  No pleural effusion or pneumothorax.     No suspicious lytic or blastic osseous lesions. Superior plate  compression formae of T1 with increased sclerosis of the superior  endplate is present. Postsurgical changes from kyphoplasty of L1 is  present. There is an anterior wedge compression deformity of T12 of  indeterminate age resulting in approximately 55 to 60% loss of height.       Impression:      1.  Left third through fifth rib fractures. No pneumothorax is seen.  2.  T1 and T12 compression deformities of indeterminate age resulting in  and up to approximately 55 to 60% loss of height. Correlation with  patient history and point tenderness recommended with follow-up MRI if  clinically indicated.  3.  Asymmetric left basilar pulmonary opacification concerning for  pneumonia in the appropriate context. Correlation with patient history  is recommended.  4.  A few sub-6 mm pulm nodules are present. Inpatient history  malignancy, follow-up with chest CT in 3 months is recommended to  exclude metastatic disease.  5.  Thickening of the distal esophagus suggestive of esophagitis in the  proper context. Correlation with patient history is recommended with  follow-up endoscopy if clinically indicated.  6.  Other findings above.        This report was finalized on 11/17/2023 1:35 PM by Dr. Clayton Johnson M.D on Workstation: [id number]       XR Spine Lumbar Complete 4+VW [[id number]] Collected: 11/17/23 1219     Updated: 11/17/23 1224    Narrative:      3 VIEW LUMBAR SPINE     HISTORY: Fell. Low back pain.     FINDINGS: There is osteoporosis with moderately severe compression  fracture of the T12 vertebral body treated with kyphoplasty. This shows  slight worsening when compared to the MRI scan dated 7/10/2023 and there  is further localized kyphosis  "related to the compression deformity. No  other compression fractures are seen. There are minor degenerative  changes scattered in the lumbar spine.     This report was finalized on 11/17/2023 12:21 PM by Dr. Khoi Monte M.D on Workstation: [id number]               Lab Results:     Lab Results (last 24 hours)       Procedure Component Value Units Date/Time    Procalcitonin [[id number]]  (Normal) Collected: 11/18/23 0411    Specimen: Blood Updated: 11/18/23 0507     Procalcitonin 0.05 ng/mL     Narrative:      As a Marker for Sepsis (Non-Neonates):    1. <0.5 ng/mL represents a low risk of severe sepsis and/or septic shock.  2. >2 ng/mL represents a high risk of severe sepsis and/or septic shock.    As a Marker for Lower Respiratory Tract Infections that require antibiotic therapy:    PCT on Admission    Antibiotic Therapy       6-12 Hrs later    >0.5                Strongly Recommended  >0.25 - <0.5        Recommended   0.1 - 0.25          Discouraged              Remeasure/reassess PCT  <0.1                Strongly Discouraged     Remeasure/reassess PCT    As 28 day mortality risk marker: \"Change in Procalcitonin Result\" (>80% or <=80%) if Day 0 (or Day 1) and Day 4 values are available. Refer to http://[email or web address]-pct-[email or web address]    Change in PCT <=80%  A decrease of PCT levels below or equal to 80% defines a positive change in PCT test result representing a higher risk for 28-day all-cause mortality of patients diagnosed with severe sepsis for septic shock.    Change in PCT >80%  A decrease of PCT levels of more than 80% defines a negative change in PCT result representing a lower risk for 28-day all-cause mortality of patients diagnosed with severe sepsis or septic shock.       Comprehensive Metabolic Panel [[id number]]  (Abnormal) Collected: 11/18/23 0411    Specimen: Blood Updated: 11/18/23 0501     Glucose 109 mg/dL      BUN 27 mg/dL      Creatinine 0.94 mg/dL      Sodium 141 mmol/L      Potassium 4.0 " mmol/L      Chloride 108 mmol/L      CO2 26.0 mmol/L      Calcium 8.7 mg/dL      Total Protein 5.6 g/dL      Albumin 3.8 g/dL      ALT (SGPT) 6 U/L      AST (SGOT) 17 U/L      Alkaline Phosphatase 111 U/L      Total Bilirubin 0.6 mg/dL      Globulin 1.8 gm/dL      A/G Ratio 2.1 g/dL      BUN/Creatinine Ratio 28.7     Anion Gap 7.0 mmol/L      eGFR 83.5 mL/min/1.73     Narrative:      GFR Normal >60  Chronic Kidney Disease <60  Kidney Failure <15    The GFR formula is only valid for adults with stable renal function between ages 18 and 70.    CBC Auto Differential [[id number]]  (Abnormal) Collected: 11/18/23 0411    Specimen: Blood Updated: 11/18/23 0444     WBC 5.85 10*3/mm3      RBC 3.47 10*6/mm3      Hemoglobin 11.4 g/dL      Hematocrit 33.4 %      MCV 96.3 fL      MCH 32.9 pg      MCHC 34.1 g/dL      RDW 11.6 %      RDW-SD 41.1 fl      MPV 9.5 fL      Platelets 134 10*3/mm3      Neutrophil % 71.0 %      Lymphocyte % 13.5 %      Monocyte % 13.8 %      Eosinophil % 1.0 %      Basophil % 0.2 %      Immature Grans % 0.5 %      Neutrophils, Absolute 4.15 10*3/mm3      Lymphocytes, Absolute 0.79 10*3/mm3      Monocytes, Absolute 0.81 10*3/mm3      Eosinophils, Absolute 0.06 10*3/mm3      Basophils, Absolute 0.01 10*3/mm3      Immature Grans, Absolute 0.03 10*3/mm3      nRBC 0.0 /100 WBC     Blood Culture - Blood, Arm, Right [[id number]] Collected: 11/17/23 1520    Specimen: Blood from Arm, Right Updated: 11/17/23 1524    High Sensitivity Troponin T 2Hr [[id number]]  (Abnormal) Collected: 11/17/23 1436    Specimen: Blood from Arm, Left Updated: 11/17/23 1506     HS Troponin T 28 ng/L      Troponin T Delta 0 ng/L     Narrative:      High Sensitive Troponin T Reference Range:  <14.0 ng/L- Negative Female for AMI  <22.0 ng/L- Negative Male for AMI  >=14 - Abnormal Female indicating possible myocardial injury.  >=22 - Abnormal Male indicating possible myocardial injury.   Clinicians would have to utilize clinical acumen, EKG,  "Troponin, and serial changes to determine if it is an Acute Myocardial Infarction or myocardial injury due to an underlying chronic condition.         Lactic Acid, Plasma [[id number]]  (Normal) Collected: 11/17/23 1436    Specimen: Blood from Arm, Left Updated: 11/17/23 1505     Lactate 1.3 mmol/L     Blood Culture - Blood, Arm, Left [[id number]] Collected: 11/17/23 1436    Specimen: Blood from Arm, Left Updated: 11/17/23 1441    Procalcitonin [[id number]]  (Normal) Collected: 11/17/23 1228    Specimen: Blood Updated: 11/17/23 1423     Procalcitonin 0.05 ng/mL     Narrative:      As a Marker for Sepsis (Non-Neonates):    1. <0.5 ng/mL represents a low risk of severe sepsis and/or septic shock.  2. >2 ng/mL represents a high risk of severe sepsis and/or septic shock.    As a Marker for Lower Respiratory Tract Infections that require antibiotic therapy:    PCT on Admission    Antibiotic Therapy       6-12 Hrs later    >0.5                Strongly Recommended  >0.25 - <0.5        Recommended   0.1 - 0.25          Discouraged              Remeasure/reassess PCT  <0.1                Strongly Discouraged     Remeasure/reassess PCT    As 28 day mortality risk marker: \"Change in Procalcitonin Result\" (>80% or <=80%) if Day 0 (or Day 1) and Day 4 values are available. Refer to http://www.[employer]-pct-[email or web address]    Change in PCT <=80%  A decrease of PCT levels below or equal to 80% defines a positive change in PCT test result representing a higher risk for 28-day all-cause mortality of patients diagnosed with severe sepsis for septic shock.    Change in PCT >80%  A decrease of PCT levels of more than 80% defines a negative change in PCT result representing a lower risk for 28-day all-cause mortality of patients diagnosed with severe sepsis or septic shock.       High Sensitivity Troponin T [[id number]]  (Abnormal) Collected: 11/17/23 1228    Specimen: Blood Updated: 11/17/23 1311     HS Troponin T 28 ng/L     Narrative:      " High Sensitive Troponin T Reference Range:  <14.0 ng/L- Negative Female for AMI  <22.0 ng/L- Negative Male for AMI  >=14 - Abnormal Female indicating possible myocardial injury.  >=22 - Abnormal Male indicating possible myocardial injury.   Clinicians would have to utilize clinical acumen, EKG, Troponin, and serial changes to determine if it is an Acute Myocardial Infarction or myocardial injury due to an underlying chronic condition.         Comprehensive Metabolic Panel [[id number]]  (Abnormal) Collected: 11/17/23 1228    Specimen: Blood Updated: 11/17/23 1308     Glucose 108 mg/dL      BUN 23 mg/dL      Creatinine 1.00 mg/dL      Sodium 141 mmol/L      Potassium 4.4 mmol/L      Chloride 104 mmol/L      CO2 29.9 mmol/L      Calcium 9.8 mg/dL      Total Protein 6.6 g/dL      Albumin 4.5 g/dL      ALT (SGPT) <5 U/L      AST (SGOT) 18 U/L      Alkaline Phosphatase 126 U/L      Total Bilirubin 0.6 mg/dL      Globulin 2.1 gm/dL      A/G Ratio 2.1 g/dL      BUN/Creatinine Ratio 23.0     Anion Gap 7.1 mmol/L      eGFR 77.5 mL/min/1.73     Narrative:      GFR Normal >60  Chronic Kidney Disease <60  Kidney Failure <15    The GFR formula is only valid for adults with stable renal function between ages 18 and 70.    Respiratory Panel PCR w/COVID-19(SARS-CoV-2) ZACHARIAH/KAUR/ROBERT/PAD/COR/LORE In-House, NP Swab in UTM/VTM, 2 HR TAT - Swab, Nasopharynx [[id number]]  (Normal) Collected: 11/17/23 1131    Specimen: Swab from Nasopharynx Updated: 11/17/23 1252     ADENOVIRUS, PCR Not Detected     Coronavirus 229E Not Detected     Coronavirus HKU1 Not Detected     Coronavirus NL63 Not Detected     Coronavirus OC43 Not Detected     COVID19 Not Detected     Human Metapneumovirus Not Detected     Human Rhinovirus/Enterovirus Not Detected     Influenza A PCR Not Detected     Influenza B PCR Not Detected     Parainfluenza Virus 1 Not Detected     Parainfluenza Virus 2 Not Detected     Parainfluenza Virus 3 Not Detected     Parainfluenza Virus 4  Not Detected     RSV, PCR Not Detected     Bordetella pertussis pcr Not Detected     Bordetella parapertussis PCR Not Detected     Chlamydophila pneumoniae PCR Not Detected     Mycoplasma pneumo by PCR Not Detected    Narrative:      In the setting of a positive respiratory panel with a viral infection PLUS a negative procalcitonin without other underlying concern for bacterial infection, consider observing off antibiotics or discontinuation of antibiotics and continue supportive care. If the respiratory panel is positive for atypical bacterial infection (Bordetella pertussis, Chlamydophila pneumoniae, or Mycoplasma pneumoniae), consider antibiotic de-escalation to target atypical bacterial infection.    CBC & Differential [[id number]]  (Abnormal) Collected: 11/17/23 1228    Specimen: Blood Updated: 11/17/23 1250    Narrative:      The following orders were created for panel order CBC & Differential.  Procedure                               Abnormality         Status                     ---------                               -----------         ------                     CBC Auto Differential[[id number]]        Abnormal            Final result                 Please view results for these tests on the individual orders.    CBC Auto Differential [[id number]]  (Abnormal) Collected: 11/17/23 1228    Specimen: Blood Updated: 11/17/23 1250     WBC 6.23 10*3/mm3      RBC 4.02 10*6/mm3      Hemoglobin 13.2 g/dL      Hematocrit 38.6 %      MCV 96.0 fL      MCH 32.8 pg      MCHC 34.2 g/dL      RDW 11.5 %      RDW-SD 40.3 fl      MPV 9.7 fL      Platelets 153 10*3/mm3      Neutrophil % 77.6 %      Lymphocyte % 10.6 %      Monocyte % 10.1 %      Eosinophil % 0.8 %      Basophil % 0.3 %      Immature Grans % 0.6 %      Neutrophils, Absolute 4.83 10*3/mm3      Lymphocytes, Absolute 0.66 10*3/mm3      Monocytes, Absolute 0.63 10*3/mm3      Eosinophils, Absolute 0.05 10*3/mm3      Basophils, Absolute 0.02 10*3/mm3      Immature  Grans, Absolute 0.04 10*3/mm3      nRBC 0.0 /100 WBC              Assessment & Plan       Syncope    Clear cell carcinoma of kidney    Coronary artery disease    Parkinson disease    Primary hypertension    Recurrent falls    Orthostatic hypotension    History of vertebral compression fracture    Pneumonia    Thoracic compression fracture, closed, initial encounter    Closed fracture of multiple ribs of left side       Assessment & Plan    Patient is new to me today.    I independently reviewed the patient's graphic imaging in chart.  Patient fractures of the left ribs 3 through 5.  Significant displacement visualized on 3D imaging.  Is also concern for vertebral fracture.  Neurosurgery is following.  No evidence of significant pleural effusion on CT or this morning's chest x-ray.  Mild pulmonary nodules are noted.    Multiple rib fractures: Would recommend conservative management with pain medication.  Expressed the importance of good pulmonary hygiene to the patient including ambulation with PT, incentive spirometry and flutter valve.  She is definitely high risk for developing a worsening pneumonia given his presentation.  Patient is not a candidate for MINNIE block due to Plavix.  Would recommend holding Plavix until surgery evaluations and need for any procedures have been determined.    Pulmonary nodules: Is reportedly a never smoker but does have history of prostate cancer and renal cell carcinoma.  He will need follow-up CT chest which we can arrange in our office.    Acute versus subacute T1 compression fracture: She has undergone kyphoplasty in the past.  Neurosurgery following.  MRI has been ordered for further evaluation.    Parkinson disease: Likely etiology behind patient's multiple falls.  Neurology is following and adjusting his medication as he recently had increase in carbidopa/levodopa.    We will follow peripherally while he is admitted.    IVONNE Martini  [facility]  11/18/23  11:31 EST

## 2023-11-18 NOTE — THERAPY TREATMENT NOTE
Patient Name: Loni Sanford  : 1945    MRN: [id number]                              Today's Date: 2023       Admit Date: 2023    Visit Dx:     ICD-10-CM ICD-9-CM   1. Syncope, unspecified syncope type  R55 780.2   2. Closed head injury, initial encounter  S09.90XA 959.01   3. Closed fracture of multiple ribs of left side, initial encounter  S22.42XA 807.09   4. Closed wedge compression fracture of T1 vertebra, initial encounter  S22.010A 805.2   5. Community acquired pneumonia of left lower lobe of lung  J18.9 486   6. Lung nodule seen on imaging study  R91.1 793.11     Patient Active Problem List   Diagnosis    Ureteral calculus, right    Clear cell carcinoma of kidney    Coronary artery disease    History of adenocarcinoma of prostate    Hyperlipidemia LDL goal <70    MCI (mild cognitive impairment)    Neuropathy    Parkinson disease    Stable angina    Primary hypertension    Encounter for screening for malignant neoplasm of colon    History of colon polyps    Recurrent falls    Compression fracture of T12 vertebra    RLS (restless legs syndrome)    Constipation    Labile hypertension    Orthostatic hypotension    RLS (restless legs syndrome)    Syncope    History of vertebral compression fracture    Pneumonia    Thoracic compression fracture, closed, initial encounter    Closed fracture of multiple ribs of left side     Past Medical History:   Diagnosis Date    Cancer     Prostate cancer     Cancer     kidney dx 2018    Coronary artery disease     Dizziness     occ    Hematuria     History of angina     carries NTG    History of transfusion     Hyperlipidemia     Hypertension     Kidney stones     new one 2018    Kidney tumor     RIGHT removed 2018    Parkinson disease     Renal mass, right     surgically removed 2018     Past Surgical History:   Procedure Laterality Date    APPENDECTOMY      CARDIAC CATHETERIZATION      CATARACT EXTRACTION W/ INTRAOCULAR LENS  IMPLANT,  BILATERAL      COLONOSCOPY N/A 05/09/2023    Procedure: COLONOSCOPY FOR SCREENING;  Surgeon: Terrell Zhang MD;  Location: [facility] [place] MAIN OR;  Service: Gastroenterology;  Laterality: N/A;  poor prep, polyp    EXTRACORPOREAL SHOCKWAVE LITHOTRIPSY (ESWL), STENT INSERTION/REMOVAL Right 12/29/2017    Procedure: RIGHT EXTRACORPOREAL SHOCKWAVE LITHOTRIPSY CYSTOSCOPY STENT PLACEMENT;  Surgeon: Marcelo Suarez MD;  Location: [facility];  Service:     EXTRACORPOREAL SHOCKWAVE LITHOTRIPSY (ESWL), STENT INSERTION/REMOVAL Right 12/28/2018    Procedure: RT EXTRACORPREAL SHOCKWAVE LITHOTRIPSY CYSTOSCOPY  WITH  MANIPULATION OF STONE;  Surgeon: Moe Vasquez MD;  [place] [facility] [place];  Service: Urology    FINGER SURGERY Left 1984    INDEX    HAND SURGERY Right 1998    KYPHOPLASTY N/A 07/14/2023    Procedure: KYPHOPLASTY 1-2 LEVELS;  Surgeon: Jeison Gilman MD;  Location: [facility] OR 18/19;  Service: Neurosurgery;  Laterality: N/A;    NEPHRECTOMY PARTIAL Right 04/05/2018    Procedure: RT OPEN PARTIAL NEPHRECTOMY WITH INTRAOPERATIVE DOPPLER, DECORTICATION OF RIGHT RENAL CYST;  Surgeon: Marcelo Suarez MD;  Location: [facility] OR;  Service: Urology    PROSTATECTOMY  2013      General Information       Row Name 11/18/23 1117          Physical Therapy Time and Intention    Document Type evaluation  -LW     Mode of Treatment physical therapy  -       Row Name 11/18/23 1117          General Information    Patient Profile Reviewed yes  -LW     Prior Level of Function independent:  used cane/walker for community distances  -LW     Existing Precautions/Restrictions fall;seizures  syncope  -LW     Barriers to Rehab none identified  -LW       Row Name 11/18/23 1117          Living Environment    People in Home spouse  -LW       Row Name 11/18/23 1117          Home Main Entrance    Number of Stairs, Main Entrance three  -LW     Stair Railings, Main Entrance none  -LW       Row Name 11/18/23 1117          Cognition     Orientation Status (Cognition) oriented x 3  -       Row Name 11/18/23 1117          Safety Issues, Functional Mobility    Impairments Affecting Function (Mobility) endurance/activity tolerance;strength  -               User Key  (r) = Recorded By, (t) = Taken By, (c) = Cosigned By      Initials Name Provider Type    Luzma Baldwin PT Physical Therapist                   Mobility       Row Name 11/18/23 1118          Bed Mobility    Bed Mobility supine-sit  -LW     Supine-Sit [place] (Bed Mobility) standby assist;verbal cues;nonverbal cues (demo/gesture)  -     Assistive Device (Bed Mobility) head of bed elevated;bed rails  -       Row Name 11/18/23 1118          Bed-Chair Transfer    Bed-Chair [place] (Transfers) contact guard  -     Assistive Device (Bed-Chair Transfers) walker, front-wheeled  -       Row Name 11/18/23 1118          Sit-Stand Transfer    Sit-Stand [place] (Transfers) minimum assist (75% patient effort)  -     Assistive Device (Sit-Stand Transfers) walker, front-wheeled  -       Row Name 11/18/23 1118          Gait/Stairs (Locomotion)    [place] Level (Gait) contact guard;minimum assist (75% patient effort)  -     Assistive Device (Gait) walker, front-wheeled  -LW     Distance in Feet (Gait) 5'  -LW     Deviations/Abnormal Patterns (Gait) gait speed decreased;stride length decreased  -LW     Bilateral Gait Deviations forward flexed posture  -               User Key  (r) = Recorded By, (t) = Taken By, (c) = Cosigned By      Initials Name Provider Type    Luzma Baldwin PT Physical Therapist                   Obj/Interventions       Row Name 11/18/23 1119          Range of Motion Comprehensive    General Range of Motion no range of motion deficits identified  -       Row Name 11/18/23 1119          Strength Comprehensive (MMT)    General Manual Muscle Testing (MMT) Assessment lower extremity strength deficits identified  -LW     Comment, General Manual  Muscle Testing (MMT) Assessment generalized weakness, BLE 4/5  -LW       Row Name 11/18/23 1119          Balance    Balance Assessment sitting static balance;sitting dynamic balance;standing static balance;standing dynamic balance  -LW     Static Sitting Balance standby assist  -LW     Dynamic Sitting Balance standby assist  -LW     Position, Sitting Balance sitting edge of bed;unsupported  -LW     Static Standing Balance contact guard  -LW     Dynamic Standing Balance contact guard  -LW     Position/Device Used, Standing Balance walker, front-wheeled  -LW     Balance Interventions sitting;standing;sit to stand;static;dynamic  -LW               User Key  (r) = Recorded By, (t) = Taken By, (c) = Cosigned By      Initials Name Provider Type    Luzma Baldwin, PT Physical Therapist                   Goals/Plan       Row Name 11/18/23 1137          Bed Mobility Goal 1 (PT)    Activity/Assistive Device (Bed Mobility Goal 1, PT) bed mobility activities, all  -LW     Ward Level/Cues Needed (Bed Mobility Goal 1, PT) independent  -LW     Time Frame (Bed Mobility Goal 1, PT) 1 week  -LW       Row Name 11/18/23 1137          Transfer Goal 1 (PT)    Activity/Assistive Device (Transfer Goal 1, PT) sit-to-stand/stand-to-sit;bed-to-chair/chair-to-bed  -LW     Ward Level/Cues Needed (Transfer Goal 1, PT) modified independence  -LW     Time Frame (Transfer Goal 1, PT) 1 week  -LW       Row Name 11/18/23 1137          Gait Training Goal 1 (PT)    Activity/Assistive Device (Gait Training Goal 1, PT) gait (walking locomotion);walker, rolling  -LW     Ward Level (Gait Training Goal 1, PT) modified independence  -LW     Distance (Gait Training Goal 1, PT) 80'  -LW     Time Frame (Gait Training Goal 1, PT) 1 week  -LW               User Key  (r) = Recorded By, (t) = Taken By, (c) = Cosigned By      Initials Name Provider Type    Luzma Baldwin PT Physical Therapist                   Clinical Impression       Row  Name 11/18/23 1120          Pain    Pretreatment Pain Rating 2/10  -LW     Posttreatment Pain Rating 2/10  -LW     Pain Location - chest  rib fxs  -LW     Pre/Posttreatment Pain Comment pt reported pain increases to 8/10 when coughing  -LW       Row Name 11/18/23 1120          Plan of Care Review    Plan of Care Reviewed With patient  -LW     Outcome Evaluation Patient is a 79 y/o male admitted to [facility] following a fall d/t syncope episode resulting in L rib fractures 3-5. He currently lives with his wife and [facility]hs [facility]. He has a PMH of Parkinson's. Prior to admission he was indep with functional mobility and used a cane/walker for community distances. Today he presented with generalized weakness and decreased tolerance to activity. He completed bed mobility SBA and stood with Gail and rwx. He ambualted 5' to the chair with rwx and CGA. Session ended with pt UIC and all needs met within reach. Therapy recommendations include HHPT if needed at d/c.  -LW       Row Name 11/18/23 1120          Therapy Assessment/Plan (PT)    Rehab Potential (PT) good, to achieve stated therapy goals  -LW     Criteria for Skilled Interventions Met (PT) yes  -LW     Therapy Frequency (PT) 5 times/wk  -LW       Row Name 11/18/23 1120          Positioning and Restraints    Pre-Treatment Position in bed  -LW     Post Treatment Position chair  -LW     In Chair reclined;call light within reach;encouraged to call for assist  -LW               User Key  (r) = Recorded By, (t) = Taken By, (c) = Cosigned By      Initials Name Provider Type    LW Luzma Rodriguez, PT Physical Therapist                   Outcome Measures       Row Name 11/18/23 1137 11/18/23 0848       How much help from another person do you currently need...    Turning from your back to your side while in flat bed without using bedrails? 4  -LW 4  -HD    Moving from lying on back to sitting on the side of a flat bed without bedrails? 3  -LW 3  -HD    Moving to and from a bed to a chair  (including a wheelchair)? 3  -LW 2  -HD    Standing up from a chair using your arms (e.g., wheelchair, bedside chair)? 3  -LW 2  -HD    Climbing 3-5 steps with a railing? 2  -LW 2  -HD    To walk in hospital room? 3  -LW 2  -HD    AM-PAC 6 Clicks Score (PT) 18  -LW 15  -HD    Highest Level of Mobility Goal 6 --> Walk 10 steps or more  -LW 4 --> Transfer to chair/commode  -      Row Name 11/18/23 1137          Functional Assessment    Outcome Measure Options AM-PAC 6 Clicks Basic Mobility (PT)  -               User Key  (r) = Recorded By, (t) = Taken By, (c) = Cosigned By      Initials Name Provider Type     Xenia Edwards, RN Registered Nurse    Luzma Baldwin, PT Physical Therapist                                 Physical Therapy Education       Title: PT OT SLP Therapies (In Progress)       Topic: Physical Therapy (In Progress)       Point: Mobility training (Done)       Learning Progress Summary             Patient Acceptance, E,D, VU by  at 11/18/2023 1137                         Point: Home exercise program (Not Started)       Learner Progress:  Not documented in this visit.              Point: Body mechanics (Done)       Learning Progress Summary             Patient Acceptance, E,D, VU by JOYCE at 11/18/2023 1137                         Point: Precautions (Done)       Learning Progress Summary             Patient Acceptance, E,D, VU by  at 11/18/2023 1137                                         User Key       Initials Effective Dates Name Provider Type [facility] 05/08/23 -  Luzma Rodriguez, HOLLEY Physical Therapist PT                  PT Recommendation and Plan     Plan of Care Reviewed With: patient  Outcome Evaluation: [facility] is [facility] 79 [facility] d/t syncope episode resulting in L rib fractures 3-5. He currently lives with his wife and [facility]. He has a PMH of Parkinson's. Prior to admission he was indep with functional mobility and used a cane/walker for community  distances. Today he presented with generalized weakness and decreased tolerance to activity. He completed bed mobility SBA and stood with Gail and rwx. He ambualted 5' to the chair with rwx and CGA. Session ended with pt UIC and all needs met within reach. Therapy recommendations include HHPT if needed at d/c.     Time Calculation:         PT Charges       Row Name 11/18/23 1138             Time Calculation    Start Time 1039  -LW      Stop Time 1059  -LW      Time Calculation (min) 20 min  -LW      PT Received On 11/18/23  -LW      PT - Next Appointment 11/20/23  -LW      PT Goal Re-Cert Due Date 11/25/23  -LW         Time Calculation- PT    Total Timed Code Minutes- PT 15 minute(s)  -LW         Timed Charges    69716 - PT Therapeutic Activity Minutes 15  -LW         Total Minutes    Timed Charges Total Minutes 15  -LW       Total Minutes 15  -LW                User Key  (r) = Recorded By, (t) = Taken By, (c) = Cosigned By      Initials Name Provider Type    LW Luzma Rodriguez, PT Physical Therapist                  Therapy Charges for Today       Code Description Service Date Service Provider Modifiers Qty    83879314308 HC PT THERAPEUTIC ACT EA 15 MIN 11/18/2023 Luzma Rodriguez, PT  1    [id number] HC PT EVAL MOD COMPLEXITY 3 11/18/2023 Luzma Rodriguez, PT GP 1            PT G-Codes  Outcome Measure Options: AM-PAC 6 Clicks Basic Mobility (PT)  AM-PAC 6 Clicks Score (PT): 18  PT Discharge Summary  Anticipated Discharge Disposition (PT): home, home with home health    Luzma Rodriguez PT  11/18/2023

## 2023-11-18 NOTE — CONSULTS
Neurology Consult Note  Consult Date: 11/18/2023  Referring MD: Romie Boyle MD  Reason for Consult I have been asked to see the patient in neurological consultation to render advice and opinion regarding loss of consciousness.     Loni Sanford is a 78 y.o. male with past medical history of parkinson disease on Sinemet CR  tablet nightly and Sinemet  mg 1 tablet 4 times a day followed by the [place] [facility], hypertension, CAD who presented to the hospital on 11/17/2023 with complaints of loss of consciousness.  Patient tells me that he got up that morning took a shower and felt fine.  He sat down to eat breakfast and after he took a bite of his biscuit he started to feel like a little bit of it was caught in his throat and started coughing.  He stood up and went to turn and after that he does not remember anything.  He states his wife told him that once he took the step to go in turn he collapsed to the ground.  He did hit his head. He had some drool coming out of his mouth per wife. No convulsions or abnormal head turn or eye movement. He remembers waking up in the ambulance in route to the hospital.  He denies any feeling of dizziness prior to this but does endorse numerous episodes of dizziness over the last several months.  He states he has been having loss of consciousness episodes since July 1.  He is unable to recall if he felt dizzy or lightheaded prior to these episodes but he thinks he did twice.  He states his cardiologist and his neurologist were supposed to get together and discuss his medication but his cardiologist retired and this never happened.  He denies any history of seizure.  No family history of seizure.  No history of febrile seizure or meningitis/encephalitis.  He did not lose any bowel or bladder control with this episode.  Unsure if he lost bowel or bladder control with prior episodes.  No tongue or lip biting.  He does take 1 Lyrica 50 mg capsule  nightly.    BP on arrival 147/70, HR 77.  Temp 97.7.  .  He had a noncontrast CT head that did not reveal any evidence of acute intracranial abnormalities.  Unfortunately he did suffer rib fractures and is also being followed by neurosurgery for a compression fracture at T1.    Past Medical/Surgical Hx:  Past Medical History:   Diagnosis Date    Cancer     Prostate cancer 2013    Cancer     kidney dx 4/2018    Coronary artery disease     Dizziness     occ    Hematuria     History of angina     carries NTG    History of transfusion     Hyperlipidemia     Hypertension     Kidney stones     new one 12/2018    Kidney tumor     RIGHT removed 4/2018    Parkinson disease     Renal mass, right     surgically removed 4/2018     Past Surgical History:   Procedure Laterality Date    APPENDECTOMY  2011    CARDIAC CATHETERIZATION      CATARACT EXTRACTION W/ INTRAOCULAR LENS  IMPLANT, BILATERAL      COLONOSCOPY N/A 05/09/2023    Procedure: COLONOSCOPY FOR SCREENING;  Surgeon: Terrell Zhang MD;  Location: [facility];  Service: Gastroenterology;  Laterality: N/A;  poor prep, polyp    EXTRACORPOREAL SHOCKWAVE LITHOTRIPSY (ESWL), STENT INSERTION/REMOVAL Right 12/29/2017    Procedure: RIGHT EXTRACORPOREAL SHOCKWAVE LITHOTRIPSY CYSTOSCOPY STENT PLACEMENT;  Surgeon: Marcelo Suarez MD;  Location: [facility];  Service:     EXTRACORPOREAL SHOCKWAVE LITHOTRIPSY (ESWL), STENT INSERTION/REMOVAL Right 12/28/2018    Procedure: RT EXTRACORPREAL SHOCKWAVE LITHOTRIPSY CYSTOSCOPY  WITH  MANIPULATION OF STONE;  Surgeon: Moe Vasquez MD;  Location: [facility];  Service: Urology    FINGER SURGERY Left 1984    INDEX    HAND SURGERY Right 1998    KYPHOPLASTY N/A 07/14/2023    Procedure: KYPHOPLASTY 1-2 LEVELS;  Surgeon: Jeison Gilman MD;  Location: [facility] OR 18/19;  Service: Neurosurgery;  Laterality: N/A;    NEPHRECTOMY PARTIAL Right 04/05/2018    Procedure: RT OPEN PARTIAL NEPHRECTOMY WITH INTRAOPERATIVE  DOPPLER, DECORTICATION OF RIGHT RENAL CYST;  Surgeon: Marcelo Suarez MD;  Location: [facility];  Service: Urology    PROSTATECTOMY  2013     Medications On Admission  Medications Prior to Admission   Medication Sig Dispense Refill Last Dose    acetaminophen (TYLENOL) 500 MG tablet Take 2 tablets by mouth Every 6 (Six) Hours As Needed for Mild Pain.       atorvastatin (LIPITOR) 20 MG tablet Take 1 tablet by mouth Every Night.       carbidopa-levodopa (SINEMET)  MG per tablet Take 1 tablet by mouth 4 (Four) Times a Day.       carbidopa-levodopa CR (SINEMET CR)  MG per CR tablet Take 1 tablet by mouth Every Night.       cholecalciferol (VITAMIN D3) 1000 units tablet Take 1 tablet by mouth Every Morning.       clopidogrel (PLAVIX) 75 MG tablet Take 1 tablet by mouth Every Morning. 30 tablet      ketoconazole (NIZORAL) 2 % shampoo 1 application  Daily.       metoprolol succinate XL (TOPROL-XL) 25 MG 24 hr tablet Take 0.5 tablets by mouth Every Night.       Multiple Vitamins-Minerals (CENTRUM ADULTS PO) Take 1 tablet by mouth Every Morning.       nitroglycerin (NITROSTAT) 0.4 MG SL tablet        pregabalin (Lyrica) 50 MG capsule Take 1 capsule by mouth 3 (Three) Times a Day As Needed (back pain). 90 capsule 2      Allergies:  Allergies   Allergen Reactions    Aspirin Hives    Belladonna Alkaloids-Opium Other (See Comments)     COLD SWEATS AND FEVER    Gabapentin Delirium     Social Hx:  Social History     Socioeconomic History    Marital status:    Tobacco Use    Smoking status: Never     Passive exposure: Never    Smokeless tobacco: Never   Vaping Use    Vaping Use: Never used   Substance and Sexual Activity    Alcohol use: No    Drug use: No    Sexual activity: Defer     Family Hx:  Family History   Problem Relation Age of Onset    Heart disease Father     Heart attack Father     Diabetes Brother     Heart attack Brother     Coronary artery disease Brother         CABG    Heart disease Brother   "   Hypertension Mother     Malig Hyperthermia Neg Hx     Stroke Neg Hx      Review of Systems   Neurological:  Positive for dizziness, syncope and light-headedness.   Psychiatric/Behavioral:  Positive for confusion.      Exam  /89 (BP Location: Left arm, Patient Position: Lying)   Pulse 78   Temp 99 °F (37.2 °C) (Oral)   Resp 16   Ht 175.3 cm (69\")   Wt 89.4 kg (197 lb)   SpO2 99%   BMI 29.09 kg/m²     Current Facility-Administered Medications:     acetaminophen (TYLENOL) tablet 650 mg, 650 mg, Oral, Q4H PRN **OR** acetaminophen (TYLENOL) 160 MG/5ML oral solution 650 mg, 650 mg, Oral, Q4H PRN **OR** acetaminophen (TYLENOL) suppository 650 mg, 650 mg, Rectal, Q4H PRN, Romie Boyle MD    atorvastatin (LIPITOR) tablet 20 mg, 20 mg, Oral, Nightly, Romie Boyle MD, 20 mg at 11/17/23 2037    sennosides-docusate (PERICOLACE) 8.6-50 MG per tablet 2 tablet, 2 tablet, Oral, BID, 2 tablet at 11/18/23 0903 **AND** polyethylene glycol (MIRALAX) packet 17 g, 17 g, Oral, Daily PRN **AND** bisacodyl (DULCOLAX) EC tablet 5 mg, 5 mg, Oral, Daily PRN **AND** bisacodyl (DULCOLAX) suppository 10 mg, 10 mg, Rectal, Daily PRN, Romie Boyle MD    carbidopa-levodopa (SINEMET)  MG per tablet 1 tablet, 1 tablet, Oral, 4x Daily, Romie Boyle MD, 1 tablet at 11/18/23 0903    carbidopa-levodopa CR (SINEMET CR)  MG per CR tablet 1 tablet, 1 tablet, Oral, Nightly, Romie Boyle MD, 1 tablet at 11/17/23 2245    cefTRIAXone (ROCEPHIN) 1,000 mg in sodium chloride 0.9 % 100 mL IVPB-VTB, 1,000 mg, Intravenous, Q24H, Romie Boyle MD    cholecalciferol (VITAMIN D3) tablet 1,000 Units, 1,000 Units, Oral, [place] HORTA Jawed, MD, 1,000 Units at 11/18/23 0621    clopidogrel (PLAVIX) tablet 75 mg, 75 mg, Oral, [place] HORTA Jawed, MD, 75 mg at 11/18/23 0621    guaiFENesin (MUCINEX) 12 hr tablet 600 mg, 600 mg, Oral, Q12H, Romie Boyle MD, 600 mg at 11/18/23 0903    Lidocaine 4 % 2 patch, 2 patch, Transdermal, Q24H, Romie Boyle, " MD, 2 patch at 11/18/23 0903    metoprolol succinate XL (TOPROL-XL) 24 hr tablet 12.5 mg, 12.5 mg, Oral, Nightly, Romie Boyle MD, 12.5 mg at 11/17/23 2037    multivitamin with minerals 1 tablet, 1 tablet, Oral, QAM, Romie Boyle MD, 1 tablet at 11/18/23 0621    nitroglycerin (NITROSTAT) SL tablet 0.4 mg, 0.4 mg, Sublingual, Q5 Min PRN, Romie Boyle MD    ondansetron (ZOFRAN) injection 4 mg, 4 mg, Intravenous, Q6H PRN, Romie Boyle MD    pregabalin (LYRICA) capsule 50 mg, 50 mg, Oral, TID PRN, Romie Boyle MD    [COMPLETED] Insert Peripheral IV, , , Once **AND** sodium chloride 0.9 % flush 10 mL, 10 mL, Intravenous, PRN, Romie Boyle MD    sodium chloride 0.9 % flush 10 mL, 10 mL, Intravenous, Q12H, Romie Boyle MD, 10 mL at 11/18/23 0904    sodium chloride 0.9 % flush 10 mL, 10 mL, Intravenous, PRN, Romie Boyle MD    sodium chloride 0.9 % infusion 40 mL, 40 mL, Intravenous, PRN, Romie Boyle, MD    sodium chloride 0.9 % infusion, 100 mL/hr, Intravenous, Continuous, Romie Boyle MD, Last Rate: 100 mL/hr at 11/18/23 0903, 100 mL/hr at 11/18/23 0903    PRN meds    acetaminophen **OR** acetaminophen **OR** acetaminophen    senna-docusate sodium **AND** polyethylene glycol **AND** bisacodyl **AND** bisacodyl    nitroglycerin    ondansetron    pregabalin    [COMPLETED] Insert Peripheral IV **AND** sodium chloride    sodium chloride    sodium chloride    No current facility-administered medications on file prior to encounter.     Current Outpatient Medications on File Prior to Encounter   Medication Sig    acetaminophen (TYLENOL) 500 MG tablet Take 2 tablets by mouth Every 6 (Six) Hours As Needed for Mild Pain.    atorvastatin (LIPITOR) 20 MG tablet Take 1 tablet by mouth Every Night.    carbidopa-levodopa (SINEMET)  MG per tablet Take 1 tablet by mouth 4 (Four) Times a Day.    carbidopa-levodopa CR (SINEMET CR)  MG per CR tablet Take 1 tablet by mouth Every Night.    cholecalciferol (VITAMIN D3)  "1000 units tablet Take 1 tablet by mouth Every Morning.    clopidogrel (PLAVIX) 75 MG tablet Take 1 tablet by mouth Every Morning.    ketoconazole (NIZORAL) 2 % shampoo 1 application  Daily.    metoprolol succinate XL (TOPROL-XL) 25 MG 24 hr tablet Take 0.5 tablets by mouth Every Night.    Multiple Vitamins-Minerals (CENTRUM ADULTS PO) Take 1 tablet by mouth Every Morning.    nitroglycerin (NITROSTAT) 0.4 MG SL tablet     pregabalin (Lyrica) 50 MG capsule Take 1 capsule by mouth 3 (Three) Times a Day As Needed (back pain).       General appearance: Elderly male, masked face, NAD, alert and cooperative  HEENT: Normocephalic  Skin: warm, dry    Neurological:   MS: oriented x3, recent/remote memory impaired, normal attention/concentration, hypophonia  CN: visual fields full, EOMI, facial sensation equal, no facial droop, palate elevates symmetrically, tongue midline  Motor: 5/5 in all 4 ext.  Bradykinetic movements of upper extremities  Sensory: light touch sensation intact in all 4 ext.  Coordination: Normal finger to nose test  Gait and station: Deferred  Rapid alternating movements: normal finger to thumb tap    Laboratory results:  Lab Results   Component Value Date    GLUCOSE 109 (H) 11/18/2023    CALCIUM 8.7 11/18/2023     11/18/2023    K 4.0 11/18/2023    CO2 26.0 11/18/2023     (H) 11/18/2023    BUN 27 (H) 11/18/2023    CREATININE 0.94 11/18/2023    EGFRIFAFRI 75 07/01/2021    EGFRIFNONA 62 07/01/2021    BCR 28.7 (H) 11/18/2023    ANIONGAP 7.0 11/18/2023     Lab Results   Component Value Date    WBC 5.85 11/18/2023    HGB 11.4 (L) 11/18/2023    HCT 33.4 (L) 11/18/2023    MCV 96.3 11/18/2023     (L) 11/18/2023     No results found for: \"CHOL\"  Lab Results   Component Value Date    HDL 45 01/30/2023    HDL 40 03/01/2022    HDL 33 (L) 07/01/2021     Lab Results   Component Value Date    LDL 56 01/30/2023    LDL 54 03/01/2022    LDL 44 07/01/2021     Lab Results   Component Value Date    TRIG 83 " "01/30/2023    TRIG 88 03/01/2022    TRIG 61 07/01/2021     Lab Results   Component Value Date    HGBA1C 5.40 07/01/2021     No results found for: \"INR\", \"PROTIME\"  Lab Results   Component Value Date    TXLEHBDK63 647 07/01/2021     Lab Results   Component Value Date    TSH 1.630 07/01/2021     Pain Management Panel           No data to display               Brief Urine Lab Results  (Last result in the past 365 days)        Color   Clarity   Blood   Leuk Est   Nitrite   Protein   CREAT   Urine HCG        08/10/23 1725 Yellow   Clear   Trace   Trace   Negative   Negative                   Lab review: I personally reviewed all labs as documented above.    Imaging review:   XR Chest 1 View    Result Date: 11/18/2023  As described.    This report was finalized on 11/18/2023 7:24 AM by Dr. Efrain Cooper M.D on Workstation: [id number]      CT Head Without Contrast    Result Date: 11/17/2023   No evidence for acute traumatic intracranial pathology.   TECHNIQUE: CT scan of the cervical spine was obtained with 1 mm axial bone algorithm and 2 mm axial soft tissue algorithm images. Sagittal and coronal reconstructed images were obtained.  FINDINGS: There is mild (less than 10%) loss of vertebral body height at T1. There is a subtle area of increased density within the superior aspect of the T1 vertebral body. Ultimately, I cannot exclude the possibility of an acute to subacute compression fracture at T1. I recommend further evaluation with an MRI of the thoracic spine.  Otherwise, there is no evidence for acute fracture or bony malalignment involving the cervical spine. There is degenerative retrolisthesis of C3 on C4 by approximately 3 mm. There is moderate canal stenosis at C3-4, C5-6, and C6-7 secondary to disc osteophyte complexes. Multilevel foraminal stenotic changes are appreciated most prominently identified from C3-4 down to C6-7.  IMPRESSION:  There is mild (less than 10%) loss of vertebral body height at the T1 " level. Additionally, there is subtle area of increased density within the superior aspect of the T1 vertebral body. I cannot exclude the possibility of an acute to subacute compression fracture involving T1. I recommend further evaluation with an MRI of the thoracic spine.  Otherwise, there is no evidence for acute fracture or bony malalignment involving the cervical spine.  Incidental degenerative phenomena are as discussed in detail above.  These findings and recommendations were directly discussed with Levi Cruz on 11/17/2023 at approximately 1:55 p.m.   Radiation dose reduction techniques were utilized, including automated exposure control and exposure modulation based on body size.  This report was finalized on 11/17/2023 2:46 PM by Dr. Dung Cardenas M.D on Workstation: [id number]      CT Cervical Spine Without Contrast    Result Date: 11/17/2023   No evidence for acute traumatic intracranial pathology.   TECHNIQUE: CT scan of the cervical spine was obtained with 1 mm axial bone algorithm and 2 mm axial soft tissue algorithm images. Sagittal and coronal reconstructed images were obtained.  FINDINGS: There is mild (less than 10%) loss of vertebral body height at T1. There is a subtle area of increased density within the superior aspect of the T1 vertebral body. Ultimately, I cannot exclude the possibility of an acute to subacute compression fracture at T1. I recommend further evaluation with an MRI of the thoracic spine.  Otherwise, there is no evidence for acute fracture or bony malalignment involving the cervical spine. There is degenerative retrolisthesis of C3 on C4 by approximately 3 mm. There is moderate canal stenosis at C3-4, C5-6, and C6-7 secondary to disc osteophyte complexes. Multilevel foraminal stenotic changes are appreciated most prominently identified from C3-4 down to C6-7.  IMPRESSION:  There is mild (less than 10%) loss of vertebral body height at the T1 level. Additionally, there is subtle  area of increased density within the superior aspect of the T1 vertebral body. I cannot exclude the possibility of an acute to subacute compression fracture involving T1. I recommend further evaluation with an MRI of the thoracic spine.  Otherwise, there is no evidence for acute fracture or bony malalignment involving the cervical spine.  Incidental degenerative phenomena are as discussed in detail above.  These findings and recommendations were directly discussed with Levi Cruz on 11/17/2023 at approximately 1:55 p.m.   Radiation dose reduction techniques were utilized, including automated exposure control and exposure modulation based on body size.  This report was finalized on 11/17/2023 2:46 PM by Dr. Dung Cardenas M.D on Workstation: [id number]      CT Chest Without Contrast Diagnostic    Result Date: 11/17/2023  1.  Left third through fifth rib fractures. No pneumothorax is seen. 2.  T1 and T12 compression deformities of indeterminate age resulting in and up to approximately 55 to 60% loss of height. Correlation with patient history and point tenderness recommended with follow-up MRI if clinically indicated. 3.  Asymmetric left basilar pulmonary opacification concerning for pneumonia in the appropriate context. Correlation with patient history is recommended. 4.  A few sub-6 mm pulm nodules are present. Inpatient history malignancy, follow-up with chest CT in 3 months is recommended to exclude metastatic disease. 5.  Thickening of the distal esophagus suggestive of esophagitis in the proper context. Correlation with patient history is recommended with follow-up endoscopy if clinically indicated. 6.  Other findings above.   This report was finalized on 11/17/2023 1:35 PM by Dr. Clayton Johnson M.D on Workstation: [id number]         I personally reviewed images with Dr. Freire, and she agrees with radiology report.    Diagnosis:  Syncope and collapse  Orthostatic hypotension  Parkinson's disease    Comment:  78-year-old male who presented after a loss of consciousness episode with recent increase of episodes since July.  Likely autonomic dysfunction component from Parkinson's disease but also could be worsened or secondary to medication side effect from his Sinemet.  Appears he had orthostatics checked at his last neurology follow-up on 7/3 that were negative however patient describes symptoms starting after that visit and interestingly his neurologist notes that he felt that the patient was being undertreated at that time due to patient describing a number of off symptoms therefore his Sinemet was increased to 25/250 mg 4 times daily and the Sinemet CR was added at bedtime.  Have asked RN to check orthostatics here. No need for EEG at this time. Continue IVF.     PLAN:   Check Orthostatic Bps  Continue IVF  Discussed with patient about conservative measures if orthostatics positive including thigh-high compression stockings, maintaining adequate hydration, abdominal binder, and adequate mobility/physical activity.  May need adjustment to his carbidopa/levodopa given symptoms started after increase of dosage.  Will discuss with Dr. Freire, but can address this in the outpatient setting with his movement disorder specialist at [place].  Will follow.    Case discussed with patient and Dr. Freire and she agrees with plan above.    Addenda 1137: Orthostatics were as followed per RN: Lyin/101 HR 86, Standin/89 HR 89, Standin/96 HR 88. Continue with orthostatic recs as discussed above.     IVONNE Ty

## 2023-11-18 NOTE — PLAN OF CARE
Goal Outcome Evaluation:  Plan of Care Reviewed With: spouse, patient, daughter        Progress: improving  Outcome Evaluation: Patient has been alert and oriented this shift. Complaints of pain with exertion, coughing d/t rib fractures from recent fall. Patient is using IS and flutter valve independently frequently throughout shift. Pillows provided for splinting during cough. Up with therapy this shif tand sat in recliner for short time. Assisted with walker, gait belt and SBA to and from bathroom. Voiding adequately. Appetite and fluid intake adequate. Continue IV abt without adverse reactions. Family have been at bedside off and on throughout shift. Awaiting MRI. Forms faxed to MRI per protocol. Vital signs stable. Call light in reach. Safety maintained. Wife watched patient ambulate to and from bathroom. Inquired on if gait was patient's normal or not. Wife stated she felt like he was walking a little better at home.

## 2023-11-18 NOTE — PLAN OF CARE
Goal Outcome Evaluation:  Plan of Care Reviewed With: patient, spouse           Outcome Evaluation: VSS, MRI scheduled, NS@100 infusing, maintain safety and continue to monitor.

## 2023-11-19 ENCOUNTER — ON CAMPUS - OUTPATIENT (OUTPATIENT)
Dept: URBAN - METROPOLITAN AREA HOSPITAL 114 | Facility: HOSPITAL | Age: 78
End: 2023-11-19

## 2023-11-19 ENCOUNTER — READMISSION MANAGEMENT (OUTPATIENT)
Dept: CALL CENTER | Facility: HOSPITAL | Age: 78
End: 2023-11-19
Payer: MEDICARE

## 2023-11-19 VITALS
DIASTOLIC BLOOD PRESSURE: 81 MMHG | WEIGHT: 197 LBS | TEMPERATURE: 99 F | SYSTOLIC BLOOD PRESSURE: 154 MMHG | OXYGEN SATURATION: 95 % | BODY MASS INDEX: 29.18 KG/M2 | RESPIRATION RATE: 18 BRPM | HEART RATE: 88 BPM | HEIGHT: 69 IN

## 2023-11-19 DIAGNOSIS — R93.3 ABNORMAL FINDINGS ON DIAGNOSTIC IMAGING OF OTHER PARTS OF DI: ICD-10-CM

## 2023-11-19 DIAGNOSIS — D69.6 THROMBOCYTOPENIA, UNSPECIFIED: ICD-10-CM

## 2023-11-19 DIAGNOSIS — R13.10 DYSPHAGIA, UNSPECIFIED: ICD-10-CM

## 2023-11-19 DIAGNOSIS — D64.9 ANEMIA, UNSPECIFIED: ICD-10-CM

## 2023-11-19 PROBLEM — J18.9 PNEUMONIA, UNSPECIFIED ORGANISM: Status: ACTIVE | Noted: 2023-11-19

## 2023-11-19 LAB
ANION GAP SERPL CALCULATED.3IONS-SCNC: 8 MMOL/L (ref 5–15)
BUN SERPL-MCNC: 22 MG/DL (ref 8–23)
BUN/CREAT SERPL: 24.7 (ref 7–25)
CALCIUM SPEC-SCNC: 8.8 MG/DL (ref 8.6–10.5)
CHLORIDE SERPL-SCNC: 108 MMOL/L (ref 98–107)
CO2 SERPL-SCNC: 24 MMOL/L (ref 22–29)
CREAT SERPL-MCNC: 0.89 MG/DL (ref 0.76–1.27)
DEPRECATED RDW RBC AUTO: 41.2 FL (ref 37–54)
EGFRCR SERPLBLD CKD-EPI 2021: 88.3 ML/MIN/1.73
ERYTHROCYTE [DISTWIDTH] IN BLOOD BY AUTOMATED COUNT: 11.4 % (ref 12.3–15.4)
GLUCOSE SERPL-MCNC: 162 MG/DL (ref 65–99)
HCT VFR BLD AUTO: 34.1 % (ref 37.5–51)
HGB BLD-MCNC: 11.4 G/DL (ref 13–17.7)
MCH RBC QN AUTO: 33 PG (ref 26.6–33)
MCHC RBC AUTO-ENTMCNC: 33.4 G/DL (ref 31.5–35.7)
MCV RBC AUTO: 98.8 FL (ref 79–97)
PLATELET # BLD AUTO: 131 10*3/MM3 (ref 140–450)
PMV BLD AUTO: 9.8 FL (ref 6–12)
POTASSIUM SERPL-SCNC: 3.9 MMOL/L (ref 3.5–5.2)
RBC # BLD AUTO: 3.45 10*6/MM3 (ref 4.14–5.8)
SODIUM SERPL-SCNC: 140 MMOL/L (ref 136–145)
WBC NRBC COR # BLD AUTO: 6.25 10*3/MM3 (ref 3.4–10.8)

## 2023-11-19 PROCEDURE — 85027 COMPLETE CBC AUTOMATED: CPT | Performed by: HOSPITALIST

## 2023-11-19 PROCEDURE — 80048 BASIC METABOLIC PNL TOTAL CA: CPT | Performed by: HOSPITALIST

## 2023-11-19 PROCEDURE — 99221 1ST HOSP IP/OBS SF/LOW 40: CPT | Performed by: NURSE PRACTITIONER

## 2023-11-19 PROCEDURE — 99214 OFFICE O/P EST MOD 30 MIN: CPT | Performed by: NURSE PRACTITIONER

## 2023-11-19 PROCEDURE — G0378 HOSPITAL OBSERVATION PER HR: HCPCS

## 2023-11-19 PROCEDURE — 25010000002 CEFTRIAXONE PER 250 MG: Performed by: INTERNAL MEDICINE

## 2023-11-19 RX ORDER — TRAMADOL HYDROCHLORIDE 50 MG/1
50 TABLET ORAL EVERY 6 HOURS PRN
Qty: 12 TABLET | Refills: 0 | Status: SHIPPED | OUTPATIENT
Start: 2023-11-19

## 2023-11-19 RX ORDER — CEFDINIR 300 MG/1
300 CAPSULE ORAL 2 TIMES DAILY
Qty: 10 CAPSULE | Refills: 0 | Status: SHIPPED | OUTPATIENT
Start: 2023-11-19 | End: 2023-11-24

## 2023-11-19 RX ORDER — PANTOPRAZOLE SODIUM 40 MG/1
40 TABLET, DELAYED RELEASE ORAL
Status: DISCONTINUED | OUTPATIENT
Start: 2023-11-19 | End: 2023-11-19 | Stop reason: HOSPADM

## 2023-11-19 RX ORDER — PANTOPRAZOLE SODIUM 40 MG/1
40 TABLET, DELAYED RELEASE ORAL
Qty: 30 TABLET | Refills: 0 | Status: SHIPPED | OUTPATIENT
Start: 2023-11-19

## 2023-11-19 RX ORDER — GUAIFENESIN 600 MG/1
600 TABLET, EXTENDED RELEASE ORAL EVERY 12 HOURS SCHEDULED
Qty: 20 TABLET | Refills: 0 | Status: SHIPPED | OUTPATIENT
Start: 2023-11-19

## 2023-11-19 RX ORDER — LIDOCAINE 4 G/G
2 PATCH TOPICAL
Qty: 10 PATCH | Refills: 0 | Status: SHIPPED | OUTPATIENT
Start: 2023-11-20 | End: 2023-11-27

## 2023-11-19 RX ORDER — ACETAMINOPHEN 500 MG
1000 TABLET ORAL 3 TIMES DAILY
Qty: 54 TABLET | Refills: 0 | Status: SHIPPED | OUTPATIENT
Start: 2023-11-19 | End: 2023-11-28

## 2023-11-19 RX ADMIN — GUAIFENESIN 600 MG: 600 TABLET, EXTENDED RELEASE ORAL at 09:04

## 2023-11-19 RX ADMIN — ACETAMINOPHEN 1000 MG: 500 TABLET ORAL at 09:04

## 2023-11-19 RX ADMIN — MULTIPLE VITAMINS W/ MINERALS TAB 1 TABLET: TAB at 06:39

## 2023-11-19 RX ADMIN — Medication 1000 UNITS: at 06:39

## 2023-11-19 RX ADMIN — Medication 10 ML: at 09:10

## 2023-11-19 RX ADMIN — TRAMADOL HYDROCHLORIDE 50 MG: 50 TABLET ORAL at 03:35

## 2023-11-19 RX ADMIN — PANTOPRAZOLE SODIUM 40 MG: 40 TABLET, DELAYED RELEASE ORAL at 15:26

## 2023-11-19 RX ADMIN — LIDOCAINE 2 PATCH: 4 PATCH TOPICAL at 09:05

## 2023-11-19 RX ADMIN — PREGABALIN 50 MG: 50 CAPSULE ORAL at 09:04

## 2023-11-19 RX ADMIN — ACETAMINOPHEN 1000 MG: 500 TABLET ORAL at 15:26

## 2023-11-19 RX ADMIN — CARBIDOPA AND LEVODOPA 1 TABLET: 50; 200 TABLET, EXTENDED RELEASE ORAL at 15:26

## 2023-11-19 RX ADMIN — CEFTRIAXONE SODIUM 1000 MG: 1 INJECTION, POWDER, FOR SOLUTION INTRAMUSCULAR; INTRAVENOUS at 15:28

## 2023-11-19 RX ADMIN — DOCUSATE SODIUM 50MG AND SENNOSIDES 8.6MG 2 TABLET: 8.6; 5 TABLET, FILM COATED ORAL at 09:04

## 2023-11-19 NOTE — DISCHARGE PLACEMENT REQUEST
"Carlos Greene (78 y.o. Male)       Date of Birth   1945    Social Security Number       Address   [address] [place] [address]    Home Phone   [phone]    MRN   [id number]       Denominational   None    Marital Status                               Admission Date   11/17/23    Admission Type   Emergency    Admitting Provider   Romie Boyle MD    Attending Provider   Mat Cordero MD    Department, Room/Bed   [address], [address]       Discharge Date       Discharge Disposition   Home or Self Care    Discharge Destination                                 Attending Provider: Mat Cordero MD    Allergies: Aspirin, Belladonna Alkaloids-opium, Gabapentin    Isolation: None   Infection: None   Code Status: CPR    Ht: 175.3 cm (69\")   Wt: 89.4 kg (197 lb)    Admission Cmt: None   Principal Problem: Syncope [R55]                   Active Insurance as of 11/17/2023       Primary Coverage       Payor Plan Insurance Group Employer/Plan Group    HUMANA MEDICARE REPLACEMENT HU[employer]NA MEDICARE REPLACEMENT [id number]       Payor Plan Address Payor Plan Phone Number Payor Plan Fax Number Effective Dates    [address] [address] [phone]  1/1/2013 - None Entered    [facility] [id number]         Subscriber Name Subscriber Birth Date Member ID       CARLOS GREENE 1945 [id number]                     Emergency Contacts        (Rel.) Home Phone Work Phone Mobile Phone    Zuri Greene (Spouse) -- -- [phone]    SAVITA BOSCH (Daughter) [phone] -- --                "

## 2023-11-19 NOTE — PLAN OF CARE
Goal Outcome Evaluation:  Plan of Care Reviewed With: patient, spouse        Progress: improving  Outcome Evaluation: Patient has been alert and oriented. Complaints of general pain to left rib cage area. Patient has been up with assist and doing well. Ambulated in hallway with this RN and did well, returning to room and standing at sink to brush teeth and hair. Gait slow and steady. States he has a walker, cane and wheelchair at home. Appetite and fluidintake adequate. Vital signs stable. New order for patient to discharge today. Will remove IV and tele and assist with gathering belongings. Wife will transfer home.

## 2023-11-19 NOTE — OUTREACH NOTE
Prep Survey      Flowsheet Row Responses   [facility] patient discharged from? [place]   Is LACE score < 7 ? Yes   Eligibility [facility]   Date of Admission 11/17/23   Date of Discharge 11/19/23   Discharge Disposition Home-Health Care Sv   Discharge diagnosis Pneumonia, unspecified organism   Does the patient have one of the following disease processes/diagnoses(primary or secondary)? Pneumonia   Does the patient have Home health ordered? Yes   What is the Home health agency?  [facility]   Is there a DME ordered? No   Prep survey completed? Yes            KARTHIK SANDOVAL - Registered Nurse

## 2023-11-19 NOTE — PROGRESS NOTES
"    Chief Complaint: Multiple rib fractures, follow-up    Subjective:  Symptoms:  Stable.  He reports shortness of breath, chest pain and weakness.    Diet:  Poor intake.  No nausea or vomiting.    Activity level: Impaired due to pain.    Pain:  He complains of pain that is moderate.    Able to pull 1500 mL on I-S.    Vital Signs:  Temp:  [97.9 °F (36.6 °C)-99.5 °F (37.5 °C)] 98.1 °F (36.7 °C)  Heart Rate:  [82-96] 83  Resp:  [16-18] 18  BP: (133-173)/(82-98) 172/86    Intake & Output (last day)         11/18 0701  11/19 0700 11/19 0701  11/20 0700    P.O. 720 360    IV Piggyback 100     Total Intake(mL/kg) 820 (9.2) 360 (4)    Net +820 +360          Urine Unmeasured Occurrence 4 x 1 x    Stool Unmeasured Occurrence 1 x             Patient was seen and independently assessed by me.  Physical examination is unchanged.    Objective:  General Appearance:  Uncomfortable, ill-appearing and in no acute distress.    Vital signs: (most recent): Blood pressure 172/86, pulse 83, temperature 98.1 °F (36.7 °C), temperature source Oral, resp. rate 18, height 175.3 cm (69\"), weight 89.4 kg (197 lb), SpO2 94%.  Vital signs are normal.    Lungs:  Normal effort and normal respiratory rate.  There are decreased breath sounds.  No wheezes or rhonchi.    Heart: Normal rate.  Regular rhythm.    Chest: Chest wall tenderness present.    Abdomen: Bowel sounds are normal.   There is no abdominal tenderness.     Extremities: Decreased range of motion.    Neurological: Patient is alert and oriented to person, place and time.    Skin:  Warm and dry.            Results Review:     I reviewed the patient's new clinical results.  I reviewed the patient's new imaging results and agree with the interpretation.  I reviewed the patient's other test results and agree with the interpretation  Discussed with patient, Dr. Fountain.    Imaging Results (Last 24 Hours)       Procedure Component Value Units Date/Time    MRI Thoracic Spine Without Contrast " [[id number]] Collected: 11/19/23 0056     Updated: 11/19/23 0056    Narrative:        Patient: CARLOS GREENE  Time Out: 00:55  Exam(s): MRI T SPINE Without Contrast     EXAM:    MR Thoracic Spine Without Intravenous Contrast    CLINICAL HISTORY:     Reason for exam: T1 VCF seen on CT Cspine. T1 VCF seen on CT Cspine.    TECHNIQUE:    Magnetic resonance images of the thoracic spine without intravenous   contrast in multiple planes.    COMPARISON:  CT cervical spine 11 17 2023, MRI thoracic spine 7 10 2023.    FINDINGS:    Vertebrae:  No acute thoracic spine vertebral body fracture.  Signal   abnormality in the anterior superior aspect of T1 without edema   suggesting sclerosis.  Chronic T12 vertebral body compression fracture.    Chronic L1 vertebral body compression fracture with vertebroplasty cement.      Discs spinal canal neural foramina: Minimal disc bulges greatest at T7-  8 and T11-12, without significant spinal canal stenosis.    Spinal cord: Normal caliber.  Normal signal.    Soft tissues: No paraspinal collection.  Left pleural effusion.      IMPRESSION:         No acute post-traumatic abnormality.  No evidence for an acute T1   vertebral body compression fracture.  Slight signal abnormality is an   anterior superior aspect of T1 suggesting sclerosis, without marrow edema   to suggest acute fracture.  Left pleural effusion.    Impression:          Electronically signed by Usama Mahoney M.D. on 11-19-23 at 0055            Lab Results:     Lab Results (last 24 hours)       Procedure Component Value Units Date/Time    Basic Metabolic Panel [[id number]]  (Abnormal) Collected: 11/19/23 0923    Specimen: Blood Updated: 11/19/23 1007     Glucose 162 mg/dL      BUN 22 mg/dL      Creatinine 0.89 mg/dL      Sodium 140 mmol/L      Potassium 3.9 mmol/L      Comment: Slight hemolysis detected by analyzer. Result may be falsely elevated.        Chloride 108 mmol/L      CO2 24.0 mmol/L      Calcium 8.8 mg/dL       BUN/Creatinine Ratio 24.7     Anion Gap 8.0 mmol/L      eGFR 88.3 mL/min/1.73     Narrative:      GFR Normal >60  Chronic Kidney Disease <60  Kidney Failure <15    The GFR formula is only valid for adults with stable renal function between ages 18 and 70.    CBC (No Diff) [[id number]]  (Abnormal) Collected: 11/19/23 0923    Specimen: Blood Updated: 11/19/23 0950     WBC 6.25 10*3/mm3      RBC 3.45 10*6/mm3      Hemoglobin 11.4 g/dL      Hematocrit 34.1 %      MCV 98.8 fL      MCH 33.0 pg      MCHC 33.4 g/dL      RDW 11.4 %      RDW-SD 41.2 fl      MPV 9.8 fL      Platelets 131 10*3/mm3     Blood Culture - Blood, Arm, Right [[id number]]  (Normal) Collected: 11/17/23 1520    Specimen: Blood from Arm, Right Updated: 11/18/23 1532     Blood Culture No growth at 24 hours    Blood Culture - Blood, Arm, Left [[id number]]  (Normal) Collected: 11/17/23 1436    Specimen: Blood from Arm, Left Updated: 11/18/23 1446     Blood Culture No growth at 24 hours             Assessment & Plan       Syncope    Clear cell carcinoma of kidney    Coronary artery disease    Parkinson disease    Primary hypertension    Recurrent falls    Orthostatic hypotension    History of vertebral compression fracture    Pneumonia    Thoracic compression fracture, closed, initial encounter    Closed fracture of multiple ribs of left side       Assessment & Plan    I independently reviewed the patient's graphic imaging in chart.  Patient fractures of the left ribs 3 through 5.  Significant displacement visualized on 3D imaging.  Is also concern for vertebral fracture.  Neurosurgery is following.  No evidence of significant pleural effusion on CT or this morning's chest x-ray.  Mild pulmonary nodules are noted.    Multiple rib fractures: Would recommend conservative management with pain medication.  Expressed the importance of good pulmonary hygiene to the patient including ambulation with PT, incentive spirometry and flutter valve.  He is currently pulling  1500 mL with his incentive spirometer and is working with therapy.    Pulmonary nodules: Is reportedly a never smoker but does have history of prostate cancer and renal cell carcinoma.  He will need follow-up CT chest which we can arrange in our office.    Parkinson disease: Likely etiology behind patient's multiple falls.  Neurology is following and adjusting his medication as he recently had increase in carbidopa/levodopa.    We will sign off for now we will arrange for the patient to follow-up in our office with a CT of the chest in 3 months for surveillance of his pulmonary nodules.    IVONNE Martini  [facility]  11/19/23  11:57 EST

## 2023-11-19 NOTE — PLAN OF CARE
Goal Outcome Evaluation:  Plan of Care Reviewed With: patient, spouse           Outcome Evaluation: Patient went for MRI, complaints of leg hurting pain medication given. Walked patient in room to help calm the hurting in his legs, wife at bedside helping patient. maintain safety and continue to monitor.

## 2023-11-19 NOTE — PROGRESS NOTES
[facility] THORACIC/LUMBAR NEUROSURGERY PROGRESS NOTE      CC: Syncopal episode       Subjective     Interval History: Patient states that he is having neck discomfort. He didn't sleep well last night due to RLS.    ROS:  Constitutional: No fever, chills  MS: low back pain  Neuro: No numbness, tingling, or weakness,  no balance difficulties  : No difficulty voiding, no incontinence    Objective     Vital signs in last 24 hours:  Temp:  [97.9 °F (36.6 °C)-99.5 °F (37.5 °C)] 98.1 °F (36.7 °C)  Heart Rate:  [82-96] 83  Resp:  [16-18] 18  BP: (133-173)/() 172/86    Intake/Output this shift:  No intake/output data recorded.    LABS:  Results from last 7 days   Lab Units 11/19/23  0923 11/18/23 0411 11/17/23  1228   WBC 10*3/mm3 6.25 5.85 6.23   HEMOGLOBIN g/dL 11.4* 11.4* 13.2   HEMATOCRIT % 34.1* 33.4* 38.6   PLATELETS 10*3/mm3 131* 134* 153      Results from last 7 days   Lab Units 11/19/23  0923 11/18/23  0411 11/17/23  1228   SODIUM mmol/L 140 141 141   POTASSIUM mmol/L 3.9 4.0 4.4   CHLORIDE mmol/L 108* 108* 104   CO2 mmol/L 24.0 26.0 29.9*   BUN mg/dL 22 27* 23   CREATININE mg/dL 0.89 0.94 1.00   CALCIUM mg/dL 8.8 8.7 9.8   BILIRUBIN mg/dL  --  0.6 0.6   ALK PHOS U/L  --  111 126*   ALT (SGPT) U/L  --  6 <5   AST (SGOT) U/L  --  17 18   GLUCOSE mg/dL 162* 109* 108*        IMAGING STUDIES:  11/17/23 MRI Thoracic Spine without contrast:   Per MRI, no acute post-traumatic abnormality. No evidence of acute T1 vertebral body compression fracture.     I personally viewed and interpreted the patient's chart.    Meds reviewed/changed: Yes  Lipitor 20mg at night  Sinemet 25-250mg four times a day  Sinemet CR 50-200mg at night  Rocephin 1000 mg IV  Vitamin D3 1000 units daily  Mucinex 600mg every 12 hours  Lidocaine patch every 24 hours  Toprol XL 12.5mg at night  MVI daily   Protonix 40mg in am  Pericolace 8.6-50mg 2 tablets BID  Miralax 17grams daily     Physical Exam:    General:   Awake, alert.  Back:    No back   tenderness   Motor:    Normal muscle strength, bulk and tone in lower extremities.  No fasciculations, rigidity, spasticity, or abnormal movements.  Reflexes:   no clonus  Sensation:   Normal to light touch brandyn LEs  Station and Gait:             Per PT note, he completed bed mobility SBA and stood Gail and RWX. He ambulated 5 feet to chair with RWX and CGA.    Extremities:   Wearing SCD      Assessment & Plan     ASSESSMENT:    Patient is seen today after Thoracic MRI. MRI shows that the T1 fracture is not acute. There is nothing surgical that is needed. He would not benefit from having a brace.       Syncope    Clear cell carcinoma of kidney    Coronary artery disease    Parkinson disease    Primary hypertension    Recurrent falls    Orthostatic hypotension    History of vertebral compression fracture    Pneumonia    Thoracic compression fracture, closed, initial encounter    Closed fracture of multiple ribs of left side      PLAN:   Okay to restart Plavix  No brace needed  Will refer to pain management outpatient for possible ablation and medical management   No need for follow-up with KAUSHAL. We will sign off and be available if there are further questions.     I discussed the patient's findings and my recommendations with patient, family, nursing staff, and Dr. Zavala.          LOS: 0 days       Chiquita Boyer, APRN  11/19/2023  09:00 EST

## 2023-11-19 NOTE — NURSING NOTE
Discharge instructions reviewed with patient and spouse. Verbalized understanding. IV removed, tele removed. All belongings gathered. Wife took belongings to car and will be awaiting patient at discharge lobby. No distress at time of discharge.

## 2023-11-19 NOTE — SIGNIFICANT NOTE
11/19/23 1033   OTHER   Discipline occupational therapist   Rehab Time/Intention   Session Not Performed other (see comments)  (Pt with T1 compression fx. KAUSHAL consulted and awaiting MRI for recommendations. OT will follow-up)   Recommendation   OT - Next Appointment 11/20/23

## 2023-11-19 NOTE — PROGRESS NOTES
Continued Stay Note  [facility]     Patient Name: Loni Sanford  MRN: [id number]  Today's Date: 11/19/2023    Admit Date: 11/17/2023    Plan: Home with family and [facility].......Li BRIGHT   Discharge Plan       Row Name 11/19/23 1532       Plan    Plan Home with family and P[facility].......Li BRIGHT    Patient/Family in Agreement with Plan yes    Plan Comments Inbound call from RN stating patient to discharge home today and will need home health. Call to room and S/W patient's spouse Zuri, introduced self and role. Pt. PCP is currently Dr. Zo Estrada but patient will be switching as their insurance will no longer be participating with [facility] for 2024. Pt. has new patient appointment set-up with new provider (Mary Decker) end of this month. Pt. has used HH in the past, wife believes it was [facility] and wishes to use again if they are in network with patient's benefits. S/W Mayra/Porsha, will review this evening and follow-up with pt./family by phone tomorrow. RN updated........Li BRIGHT                   Discharge Codes    No documentation.                 Expected Discharge Date and Time       Expected Discharge Date Expected Discharge Time    Nov 19, 2023               Sally Judge RN

## 2023-11-19 NOTE — DISCHARGE SUMMARY
[facility]    Date of Discharge:  11/19/2023    PCP: Zo Estrada MD    Discharge Diagnosis:   Active Hospital Problems    Diagnosis  POA    **Syncope [R55]  Yes    Pneumonia, unspecified organism [J18.9]  Yes    Closed fracture of multiple ribs of left side [S22.42XA]  Yes    History of vertebral compression fracture [Z87.81]  Not Applicable    Pneumonia [J18.9]  Yes    Thoracic compression fracture, closed, initial encounter [S22.000A]  Yes    Recurrent falls [R29.6]  Not Applicable    Orthostatic hypotension [I95.1]  Yes    Primary hypertension [I10]  Yes    Coronary artery disease [I25.10]  Yes    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]  Yes    Parkinson disease [G20.A1]  Yes      Resolved Hospital Problems   No resolved problems to display.          Consults       Date and Time Order Name Status Description    11/18/2023  1:23 PM Inpatient Gastroenterology Consult Completed     11/18/2023  4:47 AM Inpatient Neurology Consult General Completed     11/17/2023  3:24 PM Inpatient Neurosurgery Consult Completed     11/17/2023  2:11 PM Inpatient Thoracic Surgery Consult Completed     11/17/2023  2:09 PM [facility] (on-call MD unless specified) Details            Hospital Course  78 y.o. male with a history of Parkinson disease admitted with a syncopal episode. Patient was seen by neurology and they felt syncope was associated with autonomic dysfunction from Parkinson disease and possibly side effect from Sinemet. They are deferring medication adjustment to outpatient neurologist.    He sustained left 3 through fifth rib fractures. Thoracic surgery recommended conservative management including pain control, pulmonary hygiene, physical therapy. He ambulated twice around the nurses station today. He was also found to have pulmonary nodules and thoracic surgery plans outpatient CT. There was findings concerning for pneumonia and he was started on antibiotics. Lactate,  procalcitonin, and WBC were not elevated. He was also afebrile. He will finish out a short course of antibiotics in case.    There was also possible T1 and T12 compression fractures. MRI did not confirm that. Neurosurgery signed off.    He also was found to have esophageal thickening on imaging and GI suggested EGD but they would like to have that done as an outpatient. They did recommend daily PPI.     I discussed the patient's findings and my recommendations with patient, family, and nursing staff. Left-sided chest wall pain has improved. He would like to go home today.    Temp:  [97.9 °F (36.6 °C)-99.5 °F (37.5 °C)] 99 °F (37.2 °C)  Heart Rate:  [82-96] 88  Resp:  [16-18] 18  BP: (143-173)/(81-98) 154/81  Body mass index is 29.09 kg/m².    Physical Exam  Constitutional:       General: He is not in acute distress.     Appearance: Normal appearance. He is not toxic-appearing.   HENT:      Head: Normocephalic and atraumatic.   Eyes:      Extraocular Movements: Extraocular movements intact.   Cardiovascular:      Rate and Rhythm: Normal rate and regular rhythm.   Pulmonary:      Effort: Pulmonary effort is normal. No respiratory distress.      Breath sounds: Normal breath sounds. No wheezing or rhonchi.   Abdominal:      General: Bowel sounds are normal.      Palpations: Abdomen is soft.      Tenderness: There is no abdominal tenderness. There is no guarding or rebound.   Musculoskeletal:         General: No swelling.      Cervical back: Normal range of motion.   Skin:     General: Skin is warm and dry.   Neurological:      General: No focal deficit present.      Mental Status: He is alert and oriented to person, place, and time.   Psychiatric:         Mood and Affect: Mood normal.         Behavior: Behavior normal.         Thought Content: Thought content normal.       Disposition: Home or Self Care       Discharge Medications        New Medications        Instructions Start Date   cefdinir 300 MG capsule  Commonly  known as: OMNICEF   300 mg, Oral, 2 Times Daily      guaiFENesin 600 MG 12 hr tablet  Commonly known as: MUCINEX   600 mg, Oral, Every 12 Hours Scheduled      Lidocaine 4 %   2 patches, Transdermal, Every 24 Hours Scheduled, Remove & Discard patch within 12 hours or as directed by MD   Start Date: November 20, 2023     pantoprazole 40 MG EC tablet  Commonly known as: PROTONIX   40 mg, Oral, Every Early Morning      traMADol 50 MG tablet  Commonly known as: ULTRAM   50 mg, Oral, Every 6 Hours PRN             Changes to Medications        Instructions Start Date   acetaminophen 500 MG tablet  Commonly known as: TYLENOL  What changed:   when to take this  reasons to take this   Take 2 tablets by mouth 3 times a day             Continue These Medications        Instructions Start Date   atorvastatin 20 MG tablet  Commonly known as: LIPITOR   20 mg, Oral, Nightly      carbidopa-levodopa  MG per tablet  Commonly known as: SINEMET   1 tablet, Oral, 4 Times Daily      carbidopa-levodopa CR  MG per CR tablet  Commonly known as: SINEMET CR   1 tablet, Oral, Nightly      cholecalciferol 25 MCG (1000 UT) tablet  Commonly known as: VITAMIN D3   1,000 Units, Oral, Every Morning      clopidogrel 75 MG tablet  Commonly known as: PLAVIX   75 mg, Oral, Every Morning      ketoconazole 2 % shampoo  Commonly known as: NIZORAL   1 application  Daily.      metoprolol succinate XL 25 MG 24 hr tablet  Commonly known as: TOPROL-XL   12.5 mg, Oral, Nightly      multivitamin with minerals tablet tablet   1 tablet, Oral, Every Morning      nitroglycerin 0.4 MG SL tablet  Commonly known as: NITROSTAT       pregabalin 50 MG capsule  Commonly known as: Lyrica   50 mg, Oral, 3 Times Daily PRN                Additional Instructions for the Follow-ups that You Need to Schedule       Ambulatory Referral to Home Health (Hospital)   As directed      Face to Face Visit Date: 11/19/2023   Follow-up provider for Plan of Care?: I treated the  patient in an acute care facility and will not continue treatment after discharge.   Follow-up provider: ISABEL FISHMAN [[id number]]   Reason/Clinical Findings: Rib fracture   Describe mobility limitations that make leaving home difficult: rib fracture   Nursing/Therapeutic Services Requested: Physical Therapy   Frequency: 1 Week 1        CT Chest Without Contrast   Feb 16, 2024      Does this exam require Chuck Bronch Protocol?: Yes   Release to patient: Routine Release               Follow-up Information       Roque Robles APRN Follow up in 3 month(s).    Specialties: Thoracic Surgery, Nurse Practitioner  Contact information:  [address] [place] [address]  [phone]               Isabel Fishman MD Follow up in 1 week(s).    Specialty: Family Medicine  Why: post hospital follow up  Contact information:  [address] [address]  [phone]               Tashi Fountain MD PhD Follow up.    Specialty: Thoracic Surgery  Contact information:  [address] [place] [address]  [phone]                            Future Appointments   Date Time Provider Department Center   2/19/2024 11:00 AM Roque Robles APRN [facility]     Pending Labs       Order Current Status    Blood Culture - Blood, Arm, Left Preliminary result    Blood Culture - Blood, Arm, Right Preliminary result           Mat Cordero MD  [facility]  11/19/23    Discharge time spent greater than 30 minutes.

## 2023-11-19 NOTE — CONSULTS
GI CONSULT  NOTE:    Referring Provider: Dr. Cordero    Chief complaint: Thickened distal esophagus    Subjective .     History of present illness: Loni Sanford is a 78 y.o. male with history of Parkinson disease, hypertension, autonomic dysfunction, CAD, prostate cancer, and renal cell carcinoma s/p right nephrectomy who presents after episode of loss of consciousness and a fall.  He hit his head with the fall and also fractured his left third through fifth ribs causing subsequent chest pain.  CT of the chest incidentally found thickened distal esophagus.  He reports occasional episodes of dysphagia where food will get stuck or go down the wrong way.  Dysphagia is worse with breakfast.  Sometimes he will regurgitate very thick phlegm.  He denies any other GI complaints at this time.  No bloody or black stool.  He is prescribed Plavix at home.    Labs -BUN 27, Hgb 11.4,   11/17 CT chest - Left third through fifth rib fractures. No pneumothorax is seen. T1 and T12 compression deformities of indeterminate age resulting in  and up to approximately 55 to 60% loss of height.  Asymmetric left basilar pulmonary opacification concerning for pneumonia in the appropriate context.  A few sub-6 mm pulm nodules are present. Thickening of the distal esophagus suggestive of esophagitis.      Endo History:  5/2023 colonoscopy (Dr. Zhang) -polyp  No past EGD    Past Medical History:  Past Medical History:   Diagnosis Date    Cancer     Prostate cancer 2013    Cancer     kidney dx 4/2018    Coronary artery disease     Dizziness     occ    Hematuria     History of angina     carries NTG    History of transfusion     Hyperlipidemia     Hypertension     Kidney stones     new one 12/2018    Kidney tumor     RIGHT removed 4/2018    Parkinson disease     Renal mass, right     surgically removed 4/2018       Past Surgical History:  Past Surgical History:   Procedure Laterality Date    APPENDECTOMY  2011    CARDIAC  CATHETERIZATION      CATARACT EXTRACTION W/ INTRAOCULAR LENS  IMPLANT, BILATERAL      COLONOSCOPY N/A 05/09/2023    Procedure: COLONOSCOPY FOR SCREENING;  Surgeon: Terrell Zhang MD;  Location: [facility] OR;  Service: Gastroenterology;  Laterality: N/A;  poor prep, polyp    EXTRACORPOREAL SHOCKWAVE LITHOTRIPSY (ESWL), STENT INSERTION/REMOVAL Right 12/29/2017    Procedure: RIGHT EXTRACORPOREAL SHOCKWAVE LITHOTRIPSY CYSTOSCOPY STENT PLACEMENT;  Surgeon: Marcelo Suarez MD;  Location: [facility];  Service:     EXTRACORPOREAL SHOCKWAVE LITHOTRIPSY (ESWL), STENT INSERTION/REMOVAL Right 12/28/2018    Procedure: RT EXTRACORPREAL SHOCKWAVE LITHOTRIPSY CYSTOSCOPY  WITH  MANIPULATION OF STONE;  Surgeon: Moe Vasquez MD;  [facility] [place] [facility];  Service: Urology    FINGER SURGERY Left 1984    INDEX    HAND SURGERY Right 1998    KYPHOPLASTY N/A 07/14/2023    Procedure: KYPHOPLASTY 1-2 LEVELS;  Surgeon: Jeison Gilman MD;  Location: [facility] OR 18/19;  Service: Neurosurgery;  Laterality: N/A;    NEPHRECTOMY PARTIAL Right 04/05/2018    Procedure: RT OPEN PARTIAL NEPHRECTOMY WITH INTRAOPERATIVE DOPPLER, DECORTICATION OF RIGHT RENAL CYST;  Surgeon: Marcelo Suarez MD;  Location: [facility] OR;  Service: Urology    PROSTATECTOMY  2013       Social History:  Social History     Tobacco Use    Smoking status: Never     Passive exposure: Never    Smokeless tobacco: Never   Vaping Use    Vaping Use: Never used   Substance Use Topics    Alcohol use: No    Drug use: No       Family History:  Family History   Problem Relation Age of Onset    Heart disease Father     Heart attack Father     Diabetes Brother     Heart attack Brother     Coronary artery disease Brother         CABG    Heart disease Brother     Hypertension Mother     Malig Hyperthermia Neg Hx     Stroke Neg Hx        Medications:  Medications Prior to Admission   Medication Sig Dispense Refill Last Dose    acetaminophen (TYLENOL) 500 MG tablet  Take 2 tablets by mouth Every 6 (Six) Hours As Needed for Mild Pain.       atorvastatin (LIPITOR) 20 MG tablet Take 1 tablet by mouth Every Night.       carbidopa-levodopa (SINEMET)  MG per tablet Take 1 tablet by mouth 4 (Four) Times a Day.       carbidopa-levodopa CR (SINEMET CR)  MG per CR tablet Take 1 tablet by mouth Every Night.       cholecalciferol (VITAMIN D3) 1000 units tablet Take 1 tablet by mouth Every Morning.       clopidogrel (PLAVIX) 75 MG tablet Take 1 tablet by mouth Every Morning. 30 tablet      ketoconazole (NIZORAL) 2 % shampoo 1 application  Daily.       metoprolol succinate XL (TOPROL-XL) 25 MG 24 hr tablet Take 0.5 tablets by mouth Every Night.       Multiple Vitamins-Minerals (CENTRUM ADULTS PO) Take 1 tablet by mouth Every Morning.       nitroglycerin (NITROSTAT) 0.4 MG SL tablet        pregabalin (Lyrica) 50 MG capsule Take 1 capsule by mouth 3 (Three) Times a Day As Needed (back pain). 90 capsule 2        Scheduled Meds:acetaminophen, 1,000 mg, Oral, TID  atorvastatin, 20 mg, Oral, Nightly  carbidopa-levodopa, 1 tablet, Oral, 4x Daily  carbidopa-levodopa CR, 1 tablet, Oral, Nightly  cefTRIAXone, 1,000 mg, Intravenous, Q24H  cholecalciferol, 1,000 Units, Oral, QAM  [Held by provider] clopidogrel, 75 mg, Oral, QAM  guaiFENesin, 600 mg, Oral, Q12H  Lidocaine, 2 patch, Transdermal, Q24H  metoprolol succinate XL, 12.5 mg, Oral, Nightly  multivitamin with minerals, 1 tablet, Oral, QAM  senna-docusate sodium, 2 tablet, Oral, BID  sodium chloride, 10 mL, Intravenous, Q12H      Continuous Infusions:   PRN Meds:.  acetaminophen **OR** acetaminophen **OR** acetaminophen    senna-docusate sodium **AND** polyethylene glycol **AND** bisacodyl **AND** bisacodyl    nitroglycerin    ondansetron    pregabalin    [COMPLETED] Insert Peripheral IV **AND** sodium chloride    sodium chloride    sodium chloride    traMADol    ALLERGIES:  Aspirin, Belladonna alkaloids-opium, and Gabapentin    Review  of Systems:  Review of Systems   Constitutional: Negative.    HENT: Negative.     Cardiovascular:  Positive for chest pain.   Gastrointestinal: Negative.    Endocrine: Negative.    Genitourinary: Negative.    Musculoskeletal: Negative.    Allergic/Immunologic: Negative.    Neurological:  Positive for weakness.   Psychiatric/Behavioral: Negative.           Objective     Vital Signs:   Vitals:    11/18/23 1935 11/18/23 2023 11/18/23 2322 11/19/23 0740   BP: 173/98  143/84 172/86   BP Location: Right arm  Left arm Right arm   Patient Position: Lying  Lying Lying   Pulse: 96 85 82 83   Resp: 18 16 18   Temp: 99.5 °F (37.5 °C)  97.9 °F (36.6 °C) 98.1 °F (36.7 °C)   TempSrc: Oral  Oral Oral   SpO2: 94%      Weight:       Height:           Physical Exam: Resting in bed, wife present    General Appearance:    Awake and alert, chronically ill-appearing   Head:    Normocephalic, without obvious abnormality   Throat:   No oral lesions, no thrush, oral mucosa moist   Lungs:     Respirations regular, even and unlabored   Chest Wall:    No abnormalities observed   Abdomen:     Soft, right chest tender, non-distended, no rebound or guarding, no hepatosplenomegaly   Rectal:     Deferred   Extremities:   Moves all extremities, no edema, no cyanosis   Pulses:   Pulses palpable and equal bilaterally   Skin:   No bruising, no rash, no jaundice, normal palpation   Lymph nodes:   No cervical, supraclavicular or submandibular palpable adenopathy   Neurologic:   No asterixis       Results Review:  I reviewed the patient's labs and imaging.     CBC  Results from last 7 days   Lab Units 11/19/23 0923 11/18/23  0411 11/17/23  1228   RBC 10*6/mm3 3.45* 3.47* 4.02*   WBC 10*3/mm3 6.25 5.85 6.23   HEMOGLOBIN g/dL 11.4* 11.4* 13.2   PLATELETS 10*3/mm3 131* 134* 153       CMP  Results from last 7 days   Lab Units 11/19/23 0923 11/18/23  0411 11/17/23  1228   SODIUM mmol/L 140 141 141   POTASSIUM mmol/L 3.9 4.0 4.4   CHLORIDE mmol/L 108* 108*  104   CO2 mmol/L 24.0 26.0 29.9*   BUN mg/dL 22 27* 23   CREATININE mg/dL 0.89 0.94 1.00   GLUCOSE mg/dL 162* 109* 108*   ALBUMIN g/dL  --  3.8 4.5   BILIRUBIN mg/dL  --  0.6 0.6   ALK PHOS U/L  --  111 126*   AST (SGOT) U/L  --  17 18   ALT (SGPT) U/L  --  6 <5       Amylase and Lipase        CRP         Imaging Results (Last 24 Hours)       Procedure Component Value Units Date/Time    MRI Thoracic Spine Without Contrast [[id number]] Collected: 11/19/23 0056     Updated: 11/19/23 0056    Narrative:        Patient: CARLOS GREENE  Time Out: 00:55  Exam(s): MRI T SPINE Without Contrast     EXAM:    MR Thoracic Spine Without Intravenous Contrast    CLINICAL HISTORY:     Reason for exam: T1 VCF seen on CT Cspine. T1 VCF seen on CT Cspine.    TECHNIQUE:    Magnetic resonance images of the thoracic spine without intravenous   contrast in multiple planes.    COMPARISON:  CT cervical spine 11 17 2023, MRI thoracic spine 7 10 2023.    FINDINGS:    Vertebrae:  No acute thoracic spine vertebral body fracture.  Signal   abnormality in the anterior superior aspect of T1 without edema   suggesting sclerosis.  Chronic T12 vertebral body compression fracture.    Chronic L1 vertebral body compression fracture with vertebroplasty cement.      Discs spinal canal neural foramina: Minimal disc bulges greatest at T7-  8 and T11-12, without significant spinal canal stenosis.    Spinal cord: Normal caliber.  Normal signal.    Soft tissues: No paraspinal collection.  Left pleural effusion.      IMPRESSION:         No acute post-traumatic abnormality.  No evidence for an acute T1   vertebral body compression fracture.  Slight signal abnormality is an   anterior superior aspect of T1 suggesting sclerosis, without marrow edema   to suggest acute fracture.  Left pleural effusion.    Impression:          Electronically signed by Usama Mahoney M.D. on 11-19-23 at 0055              ASSESSMENT:  78 y.o. male with history of Parkinson disease,  hypertension, autonomic dysfunction, CAD, prostate cancer, and renal cell carcinoma s/p right nephrectomy who presents after episode of loss of consciousness and a fall.  GI consulted for abnormal CT of esophagus.  -Distal esophageal wall thickening -per CT, likely esophagitis  -Dysphagia  -S/p LOC and fall resulting in hitting head and rib fractures  -Right chest pain -due to rib fractures  -Mild anemia  -Thrombocytopenia  -Additional history as listed above      PLAN:  Patient was offered an EGD but he prefers to wait and do this as an outpatient after discharge.  Plavix has been held, could resume if patient is certain about decision to not do EGD as inpatient  Begin daily PPI.  Continue diet as tolerated with dysphagia modifications.  BGA will see as needed.      We appreciate the referral.    Maria Victoria Mcintosh, APRN  11/19/23  10:26 EST

## 2023-11-20 ENCOUNTER — TELEPHONE (OUTPATIENT)
Dept: GASTROENTEROLOGY | Facility: CLINIC | Age: 78
End: 2023-11-20
Payer: MEDICARE

## 2023-11-20 ENCOUNTER — PREP FOR SURGERY (OUTPATIENT)
Dept: SURGERY | Facility: SURGERY CENTER | Age: 78
End: 2023-11-20
Payer: MEDICARE

## 2023-11-20 ENCOUNTER — TRANSITIONAL CARE MANAGEMENT TELEPHONE ENCOUNTER (OUTPATIENT)
Dept: CALL CENTER | Facility: HOSPITAL | Age: 78
End: 2023-11-20
Payer: MEDICARE

## 2023-11-20 DIAGNOSIS — R93.3 ABNORMAL CT SCAN, ESOPHAGUS: Primary | ICD-10-CM

## 2023-11-20 RX ORDER — SODIUM CHLORIDE, SODIUM LACTATE, POTASSIUM CHLORIDE, CALCIUM CHLORIDE 600; 310; 30; 20 MG/100ML; MG/100ML; MG/100ML; MG/100ML
30 INJECTION, SOLUTION INTRAVENOUS CONTINUOUS PRN
OUTPATIENT
Start: 2023-11-20

## 2023-11-20 RX ORDER — SODIUM CHLORIDE 0.9 % (FLUSH) 0.9 %
10 SYRINGE (ML) INJECTION AS NEEDED
OUTPATIENT
Start: 2023-11-20

## 2023-11-20 RX ORDER — SODIUM CHLORIDE 0.9 % (FLUSH) 0.9 %
3 SYRINGE (ML) INJECTION EVERY 12 HOURS SCHEDULED
OUTPATIENT
Start: 2023-11-20

## 2023-11-20 NOTE — CASE MANAGEMENT/SOCIAL WORK
Case Management Discharge Note      Final Note: Home with home health, family to transport         Selected Continued Care - Discharged on 11/19/2023 Admission date: 11/17/2023 - Discharge disposition: Home or Self Care      Destination    No services have been selected for the patient.                Durable Medical Equipment    No services have been selected for the patient.                Dialysis/Infusion    No services have been selected for the patient.                Home Medical Care Coordination complete.      Service Provider Selected Services Address Phone Fax Patient Preferred    [facility] [address] [place] [id number] [phone] --              Therapy    No services have been selected for the patient.                Community Resources    No services have been selected for the patient.                Community & DME    No services have been selected for the patient.                    Transportation Services  Private: Car    Final Discharge Disposition Code: 03 - skilled nursing facility (SNF)

## 2023-11-20 NOTE — TELEPHONE ENCOUNTER
Per Dr. Zhang: This is a patient that was seen in the hospital by the other group. He had a CT showing thickening of the esophagus. He will need to be set up for an EGD with us. He is an established patient of ours. Thanks      *Case request placed for EGD and message sent to the schedulers regarding request. jinny

## 2023-11-20 NOTE — OUTREACH NOTE
Call Center TCM Note      Flowsheet Row Responses   [facility] patient discharged from? [place]   Does the patient have one of the following disease processes/diagnoses(primary or secondary)? Pneumonia   TCM attempt successful? Yes   Call start time 1627   Call end time 1629   Discharge diagnosis Pneumonia, unspecified organism   Meds reviewed with patient/caregiver? Yes   Is the patient having any side effects they believe may be caused by any medication additions or changes? No   Does the patient have all medications ordered at discharge? Yes   Is the patient taking all medications as directed (includes completed medication regime)? Yes   Does the patient have an appointment with their PCP within 7-14 days of discharge? Yes  [Patient has a new provider now due to having [employer] insurance.]   What is the Home health agency?  [facility]   Has home health visited the patient within 72 hours of discharge? Call prior to 72 hours   Pulse Ox monitoring None   Psychosocial issues? No   Did the patient receive a copy of their discharge instructions? Yes   Nursing interventions Reviewed instructions with patient   What is the patient's perception of their health status since discharge? Improving   If the patient is a current smoker, are they able to teach back resources for cessation? Not a smoker   Is the patient/caregiver able to teach back the hierarchy of who to call/visit for symptoms/problems? PCP, Specialist, Home health nurse, Urgent Care, ED, 911 Yes   Is the patient/caregiver able to teach back signs and symptoms of worsening condition: Fever/chills, Shortness of breath, Chest pain   Is the patient/caregiver able to teach back importance of completing antibiotic course of treatment? Yes   TCM call completed? Yes   Call end time 1629   Would this patient benefit from a Referral to [facility] Social Work? No   Is the patient interested in additional calls from an ambulatory ? No             Kena Cruz LPN    11/20/2023, 16:30 EST

## 2023-11-20 NOTE — OUTREACH NOTE
Call Center TCM Note      Flowsheet Row Responses   [facility] facility patient discharged from? [place]   Does the patient have one of the following disease processes/diagnoses(primary or secondary)? Pneumonia   TCM attempt successful? No   Unsuccessful attempts Attempt 1            Kena Cruz LPN    11/20/2023, 16:05 EST

## 2023-11-22 LAB
BACTERIA SPEC AEROBE CULT: NORMAL
BACTERIA SPEC AEROBE CULT: NORMAL

## 2023-11-27 NOTE — TELEPHONE ENCOUNTER
I called and spoke to the patient and he is at another physicians appointment. I will call them back later today. Patient understood.

## 2023-11-28 PROBLEM — R93.3 ABNORMAL CT SCAN, ESOPHAGUS: Status: ACTIVE | Noted: 2023-11-20

## 2023-11-30 ENCOUNTER — TELEPHONE (OUTPATIENT)
Dept: NEUROSURGERY | Facility: CLINIC | Age: 78
End: 2023-11-30
Payer: MEDICARE

## 2023-11-30 DIAGNOSIS — S22.000A THORACIC COMPRESSION FRACTURE, CLOSED, INITIAL ENCOUNTER: Primary | ICD-10-CM

## 2023-11-30 NOTE — TELEPHONE ENCOUNTER
----- Message from Fartun Landaverde sent at 11/21/2023  9:04 AM EST -----    ----- Message -----  From: Chiquita Boyer APRN  Sent: 11/19/2023   2:13 PM EST  To: Fartun Landaverde Dr. V wanted this patient to be referred to [facility] for possible ablation and medication management.     I cannot find where to make this referral. Is this something that you can do? If not, let me know and I will try to have someone show me how to do this.     Thank you!  Chiquita

## 2023-12-19 NOTE — SIGNIFICANT NOTE
Education provided the Patient on the following:    - Nothing to Eat or Drink after MN the night before the procedure  -You will need to have someone drive you home after your EGD and remain with you for 24 hours after the EGD  - The date of your EGD, your are welcome to have one visitor at bedside or remain within 10-15 minutes of [place]  -Please wear warm socks when you arrive for your EGD  -Remove all jewelry and leave any valuables before arriving on the date of your procedure (all will have to be removed before leaving preop)  -You will need to arrive at 0945 on 12/20/23 for your EGD  -Feel free to contact us at: [phone] with any additional questions/concerns

## 2023-12-20 ENCOUNTER — ANESTHESIA (OUTPATIENT)
Dept: SURGERY | Facility: SURGERY CENTER | Age: 78
End: 2023-12-20
Payer: MEDICARE

## 2023-12-20 ENCOUNTER — ANESTHESIA EVENT (OUTPATIENT)
Dept: SURGERY | Facility: SURGERY CENTER | Age: 78
End: 2023-12-20
Payer: MEDICARE

## 2023-12-20 ENCOUNTER — HOSPITAL ENCOUNTER (OUTPATIENT)
Facility: SURGERY CENTER | Age: 78
Setting detail: HOSPITAL OUTPATIENT SURGERY
Discharge: HOME | End: 2023-12-20
Attending: INTERNAL MEDICINE | Admitting: INTERNAL MEDICINE
Payer: MEDICARE

## 2023-12-20 VITALS
HEART RATE: 69 BPM | WEIGHT: 202.4 LBS | TEMPERATURE: 97.8 F | HEIGHT: 69 IN | DIASTOLIC BLOOD PRESSURE: 87 MMHG | BODY MASS INDEX: 29.98 KG/M2 | RESPIRATION RATE: 16 BRPM | OXYGEN SATURATION: 92 % | SYSTOLIC BLOOD PRESSURE: 142 MMHG

## 2023-12-20 DIAGNOSIS — R93.3 ABNORMAL CT SCAN, ESOPHAGUS: ICD-10-CM

## 2023-12-20 PROCEDURE — 25010000002 LIDOCAINE 1 % SOLUTION: Performed by: STUDENT IN AN ORGANIZED HEALTH CARE EDUCATION/TRAINING PROGRAM

## 2023-12-20 PROCEDURE — 43239 EGD BIOPSY SINGLE/MULTIPLE: CPT | Performed by: INTERNAL MEDICINE

## 2023-12-20 PROCEDURE — 25810000003 LACTATED RINGERS PER 1000 ML: Performed by: INTERNAL MEDICINE

## 2023-12-20 PROCEDURE — 88305 TISSUE EXAM BY PATHOLOGIST: CPT | Performed by: INTERNAL MEDICINE

## 2023-12-20 PROCEDURE — 25010000002 PROPOFOL 1000 MG/100ML EMULSION: Performed by: STUDENT IN AN ORGANIZED HEALTH CARE EDUCATION/TRAINING PROGRAM

## 2023-12-20 RX ORDER — LIDOCAINE HYDROCHLORIDE 10 MG/ML
INJECTION, SOLUTION INFILTRATION; PERINEURAL AS NEEDED
Status: DISCONTINUED | OUTPATIENT
Start: 2023-12-20 | End: 2023-12-20 | Stop reason: SURG

## 2023-12-20 RX ORDER — SODIUM CHLORIDE 0.9 % (FLUSH) 0.9 %
3 SYRINGE (ML) INJECTION EVERY 12 HOURS SCHEDULED
Status: DISCONTINUED | OUTPATIENT
Start: 2023-12-20 | End: 2023-12-20 | Stop reason: HOSPADM

## 2023-12-20 RX ORDER — SODIUM CHLORIDE 0.9 % (FLUSH) 0.9 %
10 SYRINGE (ML) INJECTION AS NEEDED
Status: DISCONTINUED | OUTPATIENT
Start: 2023-12-20 | End: 2023-12-20 | Stop reason: HOSPADM

## 2023-12-20 RX ORDER — SODIUM CHLORIDE, SODIUM LACTATE, POTASSIUM CHLORIDE, CALCIUM CHLORIDE 600; 310; 30; 20 MG/100ML; MG/100ML; MG/100ML; MG/100ML
30 INJECTION, SOLUTION INTRAVENOUS CONTINUOUS PRN
Status: DISCONTINUED | OUTPATIENT
Start: 2023-12-20 | End: 2023-12-20 | Stop reason: HOSPADM

## 2023-12-20 RX ORDER — PROPOFOL 10 MG/ML
INJECTION, EMULSION INTRAVENOUS CONTINUOUS PRN
Status: DISCONTINUED | OUTPATIENT
Start: 2023-12-20 | End: 2023-12-20 | Stop reason: SURG

## 2023-12-20 RX ADMIN — LIDOCAINE HYDROCHLORIDE 30 MG: 10 INJECTION, SOLUTION INFILTRATION; PERINEURAL at 11:18

## 2023-12-20 RX ADMIN — SODIUM CHLORIDE, POTASSIUM CHLORIDE, SODIUM LACTATE AND CALCIUM CHLORIDE 30 ML/HR: 600; 310; 30; 20 INJECTION, SOLUTION INTRAVENOUS at 10:54

## 2023-12-20 RX ADMIN — PROPOFOL 200 MCG/KG/MIN: 10 INJECTION, EMULSION INTRAVENOUS at 11:18

## 2023-12-20 NOTE — ANESTHESIA PREPROCEDURE EVALUATION
" Anesthesia Evaluation     Patient summary reviewed and Nursing notes reviewed   no history of anesthetic complications:   NPO Solid Status: > 8 hours  NPO Liquid Status: > 2 hours           Airway   Mallampati: II  TM distance: >3 FB  Neck ROM: full  Dental      Comment: caps    Pulmonary - normal exam   (-) COPD, asthma  Cardiovascular   Exercise tolerance: poor (<4 METS)    ECG reviewed  Rhythm: regular    (+) hypertension, CAD, hyperlipidemia  (-) past MI, angina, cardiac stents      Neuro/Psych  (+) Parkinson's disease, dizziness/light headedness, syncope  (-) seizures, CVA    ROS Comment: RLS  GI/Hepatic/Renal/Endo    (+) renal disease- stones  (-) GERD, liver disease    Musculoskeletal     (+) back pain  Abdominal    Substance History - negative use     OB/GYN          Other      history of cancer (RCC)                      Anesthesia Plan    ASA 3     MAC     (I have reviewed the patient's history and chart with the patient, including all pertinent laboratory results and imaging. I have explained the risks of monitored anesthesia care including but not limited to respiratory depression, possible need for airway intervention, or possible intra-op recall. I explained that airway intervention involves risk of possible dental injury.    VITALS:  /95 (BP Location: Left arm, Patient Position: Lying)   Pulse 78   Temp 36.2 °C (97.1 °F) (Tympanic)   Resp 18   Ht 175.3 cm (69\")   Wt 91.8 kg (202 lb 6.4 oz)   SpO2 98%   BMI 29.89 kg/m² )  intravenous induction     Anesthetic plan, risks, benefits, and alternatives have been provided, discussed and informed consent has been obtained with: patient.  Pre-procedure education provided      CODE STATUS:         "

## 2023-12-20 NOTE — ANESTHESIA POSTPROCEDURE EVALUATION
Patient: Loni Sanford    Procedure Summary       Date: 12/20/23 Room / Location[facility]    Anesthesia Start: 1115 Anesthesia Stop: 1131    Procedure: ESOPHAGOGASTRODUODENOSCOPY Diagnosis:       Abnormal CT scan, esophagus      (Abnormal CT scan, esophagus [R93.3])    Surgeons: Terrell Zhang MD Provider: Emely Peck MD    Anesthesia Type: MAC ASA Status: 3            Anesthesia Type: MAC    Vitals  Vitals Value Taken Time   /102 12/20/23 1157   Temp 36.6 °C (97.8 °F) 12/20/23 1131   Pulse 69 12/20/23 1146   Resp 16 12/20/23 1145   SpO2 92 % 12/20/23 1146   Vitals shown include unfiled device data.        Post Anesthesia Care and Evaluation    Patient location during evaluation: PHASE II  Patient participation: complete - patient participated  Level of consciousness: awake  Pain management: adequate    Airway patency: patent  Anesthetic complications: No anesthetic complications  PONV Status: none  Cardiovascular status: acceptable  Respiratory status: acceptable  Hydration status: acceptable

## 2023-12-20 NOTE — H&P
No chief complaint on file.      HPI  Patient today for an EGD for evaluation of abnormal thickening of the esophagus noted on CT.         Problem List:    Patient Active Problem List   Diagnosis    Ureteral calculus, right    Clear cell carcinoma of kidney    Coronary artery disease    History of adenocarcinoma of prostate    Hyperlipidemia LDL goal <70    MCI (mild cognitive impairment)    Neuropathy    Parkinson disease    Stable angina    Primary hypertension    Encounter for screening for malignant neoplasm of colon    History of colon polyps    Recurrent falls    Compression fracture of T12 vertebra    RLS (restless legs syndrome)    Constipation    Labile hypertension    Orthostatic hypotension    RLS (restless legs syndrome)    Syncope    History of vertebral compression fracture    Pneumonia    Thoracic compression fracture, closed, initial encounter    Closed fracture of multiple ribs of left side    Pneumonia, unspecified organism    Abnormal CT scan, esophagus       Medical History:    Past Medical History:   Diagnosis Date    Cancer     Prostate cancer 2013    Cancer     kidney dx 4/2018    Coronary artery disease     Dizziness     occ    Hematuria     History of angina     carries NTG    History of transfusion     Hyperlipidemia     Hypertension     Kidney stones     new one 12/2018    Kidney tumor     RIGHT removed 4/2018    Parkinson disease     Renal mass, right     surgically removed 4/2018        Social History:    Social History     Socioeconomic History    Marital status:    Tobacco Use    Smoking status: Never     Passive exposure: Never    Smokeless tobacco: Never   Vaping Use    Vaping Use: Never used   Substance and Sexual Activity    Alcohol use: No    Drug use: No    Sexual activity: Defer       Family History:   Family History   Problem Relation Age of Onset    Heart disease Father     Heart attack Father     Diabetes Brother     Heart attack Brother     Coronary artery disease  Brother         CABG    Heart disease Brother     Hypertension Mother     Malkit Hyperthermia Neg Hx     Stroke Neg Hx        Surgical History:   Past Surgical History:   Procedure Laterality Date    APPENDECTOMY  2011    CARDIAC CATHETERIZATION      CATARACT EXTRACTION W/ INTRAOCULAR LENS  IMPLANT, BILATERAL      COLONOSCOPY N/A 05/09/2023    Procedure: COLONOSCOPY FOR SCREENING;  Surgeon: Terrell Zhang MD;  Location: [facility] OR;  Service: Gastroenterology;  Laterality: N/A;  poor prep, polyp    EXTRACORPOREAL SHOCKWAVE LITHOTRIPSY (ESWL), STENT INSERTION/REMOVAL Right 12/29/2017    Procedure: RIGHT EXTRACORPOREAL SHOCKWAVE LITHOTRIPSY CYSTOSCOPY STENT PLACEMENT;  Surgeon: Marcelo Suarez MD;  Location: [facility];  Service:     EXTRACORPOREAL SHOCKWAVE LITHOTRIPSY (ESWL), STENT INSERTION/REMOVAL Right 12/28/2018    Procedure: RT EXTRACORPREAL SHOCKWAVE LITHOTRIPSY CYSTOSCOPY  WITH  MANIPULATION OF STONE;  Surgeon: Moe Vasquez MD;  Location: [facility];  Service: Urology    FINGER SURGERY Left 1984    INDEX    HAND SURGERY Right 1998    KYPHOPLASTY N/A 07/14/2023    Procedure: KYPHOPLASTY 1-2 LEVELS;  Surgeon: Jeison Gilman MD;  Location: [facility] OR 18/19;  Service: Neurosurgery;  Laterality: N/A;    NEPHRECTOMY PARTIAL Right 04/05/2018    Procedure: RT OPEN PARTIAL NEPHRECTOMY WITH INTRAOPERATIVE DOPPLER, DECORTICATION OF RIGHT RENAL CYST;  Surgeon: Marcelo Suarez MD;  Location: [facility];  Service: Urology    PROSTATECTOMY  2013       No current facility-administered medications for this encounter.    Allergies:   Allergies   Allergen Reactions    Aspirin Hives    Belladonna Alkaloids-Opium Other (See Comments)     COLD SWEATS AND FEVER    Gabapentin Delirium        The following portions of the patient's history were reviewed by me and updated as appropriate: review of systems, allergies, current medications, past family history, past medical history, past social  history, past surgical history and problem list.    There were no vitals filed for this visit.    PHYSICAL EXAM:    CONSTITUTIONAL:  today's vital signs reviewed by me  GASTROINTESTINAL: abdomen is soft nontender nondistended with normal active bowel sounds, no masses are appreciated    Assessment/ Plan  Will proceed today with EGD.    Risks and benefits as well as alternatives to endoscopic evaluation were explained to the patient and they voiced understanding and wish to proceed.  These risks include but are not limited to the risk of bleeding, perforation, adverse reaction to sedation, and missed lesions.  The patient was given the opportunity to ask questions prior to the endoscopic procedure.

## 2023-12-21 LAB
LAB AP CASE REPORT: NORMAL
LAB AP CLINICAL INFORMATION: NORMAL
PATH REPORT.FINAL DX SPEC: NORMAL
PATH REPORT.GROSS SPEC: NORMAL

## 2024-01-08 ENCOUNTER — TELEPHONE (OUTPATIENT)
Dept: GASTROENTEROLOGY | Facility: CLINIC | Age: 79
End: 2024-01-08
Payer: MEDICARE

## 2024-01-08 RX ORDER — PANTOPRAZOLE SODIUM 40 MG/1
40 TABLET, DELAYED RELEASE ORAL
Qty: 90 TABLET | Refills: 1 | Status: SHIPPED | OUTPATIENT
Start: 2024-01-08

## 2024-02-13 ENCOUNTER — TELEPHONE (OUTPATIENT)
Dept: SURGERY | Facility: CLINIC | Age: 79
End: 2024-02-13
Payer: MEDICARE

## 2024-02-27 ENCOUNTER — TELEPHONE (OUTPATIENT)
Dept: SURGERY | Facility: CLINIC | Age: 79
End: 2024-02-27
Payer: MEDICARE

## 2024-02-27 ENCOUNTER — HOSPITAL ENCOUNTER (OUTPATIENT)
Dept: PET IMAGING | Facility: HOSPITAL | Age: 79
Discharge: HOME | End: 2024-02-27
Payer: MEDICARE

## 2024-02-27 DIAGNOSIS — S22.42XA CLOSED FRACTURE OF MULTIPLE RIBS OF LEFT SIDE, INITIAL ENCOUNTER: ICD-10-CM

## 2024-02-27 DIAGNOSIS — R55 SYNCOPE, UNSPECIFIED SYNCOPE TYPE: ICD-10-CM

## 2024-02-27 DIAGNOSIS — R91.1 LUNG NODULE SEEN ON IMAGING STUDY: ICD-10-CM

## 2024-02-27 PROCEDURE — 71250 CT THORAX DX C-: CPT

## 2024-02-29 ENCOUNTER — TELEPHONE (OUTPATIENT)
Dept: SURGERY | Facility: CLINIC | Age: 79
End: 2024-02-29
Payer: MEDICARE

## 2024-03-04 ENCOUNTER — OFFICE VISIT (OUTPATIENT)
Dept: SURGERY | Facility: CLINIC | Age: 79
End: 2024-03-04
Payer: MEDICARE

## 2024-03-04 VITALS — OXYGEN SATURATION: 99 % | HEART RATE: 73 BPM | BODY MASS INDEX: 29.92 KG/M2 | HEIGHT: 69 IN | WEIGHT: 202 LBS

## 2024-03-04 DIAGNOSIS — R91.8 LUNG NODULES: Primary | ICD-10-CM

## 2024-03-04 PROCEDURE — 99214 OFFICE O/P EST MOD 30 MIN: CPT | Performed by: NURSE PRACTITIONER

## 2024-03-04 PROCEDURE — 1159F MED LIST DOCD IN RCRD: CPT | Performed by: NURSE PRACTITIONER

## 2024-03-04 PROCEDURE — 1160F RVW MEDS BY RX/DR IN RCRD: CPT | Performed by: NURSE PRACTITIONER

## 2024-03-04 RX ORDER — CARVEDILOL 3.12 MG/1
3.12 TABLET ORAL
COMMUNITY
Start: 2024-01-10

## 2024-03-04 NOTE — PROGRESS NOTES
"Chief Complaint  Follow-up, lung nodules and left rib fractures    Subjective        Loni Sanford presents to [facility] THORACIC SURGERY  History of Present Illness    Mr. Sanford is a very pleasant 78-year-old gentleman who we saw in the hospital while admitted with left-sided rib fractures in November 2023 that were treated nonoperatively.  He has a history of Parkinson's as well as chronic back pain and underwent kyphoplasty in July 2023.  He is a never smoker. He presents today in his baseline state of health accompanied by his wife with CT chest.       Objective   Vital Signs:  Pulse 73   Ht 175.3 cm (69\")   Wt 91.6 kg (202 lb)   SpO2 99%   BMI 29.83 kg/m²   Estimated body mass index is 29.83 kg/m² as calculated from the following:    Height as of this encounter: 175.3 cm (69\").    Weight as of this encounter: 91.6 kg (202 lb).               Physical Exam  Constitutional:       General: He is not in acute distress.     Appearance: Normal appearance. He is not ill-appearing.   HENT:      Head: Normocephalic and atraumatic.   Cardiovascular:      Rate and Rhythm: Normal rate.   Pulmonary:      Effort: Pulmonary effort is normal. No respiratory distress.   Musculoskeletal:      Cervical back: Normal range of motion and neck supple.      Right lower leg: No edema.      Left lower leg: No edema.      Comments: Decreased ROM secondary to Parkinson's, using cane to assist with ambulation   Skin:     General: Skin is warm and dry.   Neurological:      General: No focal deficit present.      Mental Status: He is alert.      Motor: Weakness present.   Psychiatric:         Mood and Affect: Mood normal. Affect is flat.         Thought Content: Thought content normal.        Result Review :            Independently reviewed the CT of the chest performed on 2/27/2024 which demonstrates trace pericardial effusion as well as calcified mediastinal and right hilar lymph nodes.  Central airways are " patent there is bibasilar atelectasis as well as a calcified pulmonary nodule in the right lower lobe.  No pulmonary nodules are seen.  There is coronary calcification.           Assessment and Plan     Diagnoses and all orders for this visit:    1. Lung nodules (Primary)    Patient's most recent CT chest demonstrates clearing of previously seen sub-6 mm nodules on CT chest in November 2023.  Left rib fractures have healed appropriately.  No acute intrathoracic findings or suspicion for malignancy.  He will follow-up with us as needed given his low risk for lung cancer as he is a never smoker.  He is advised to follow-up with pain management as scheduled for his back pain.  This was discussed with the patient and his wife who are in agreement with this plan.       I spent 32 minutes caring for Loni on this date of service. This time includes time spent by me in the following activities:preparing for the visit, reviewing tests, obtaining and/or reviewing a separately obtained history, performing a medically appropriate examination and/or evaluation , counseling and educating the patient/family/caregiver, ordering medications, tests, or procedures, documenting information in the medical record, independently interpreting results and communicating that information with the patient/family/caregiver, and care coordination  Follow Up     Return if symptoms worsen or fail to improve.  Patient was given instructions and counseling regarding his condition or for health maintenance advice. Please see specific information pulled into the AVS if appropriate.

## 2024-07-05 RX ORDER — PANTOPRAZOLE SODIUM 40 MG/1
40 TABLET, DELAYED RELEASE ORAL EVERY MORNING
Qty: 90 TABLET | Refills: 0 | Status: SHIPPED | OUTPATIENT
Start: 2024-07-05

## 2024-10-15 RX ORDER — PANTOPRAZOLE SODIUM 40 MG/1
40 TABLET, DELAYED RELEASE ORAL EVERY MORNING
Qty: 90 TABLET | Refills: 0 | Status: SHIPPED | OUTPATIENT
Start: 2024-10-15

## 2024-10-15 NOTE — TELEPHONE ENCOUNTER
I will sign for this 90 days supply request without refill, patient will need a follow-up appointment since 12/20/2023.

## 2025-01-20 RX ORDER — PANTOPRAZOLE SODIUM 40 MG/1
40 TABLET, DELAYED RELEASE ORAL EVERY MORNING
Qty: 90 TABLET | Refills: 0 | OUTPATIENT
Start: 2025-01-20

## 2025-01-27 RX ORDER — PANTOPRAZOLE SODIUM 40 MG/1
40 TABLET, DELAYED RELEASE ORAL EVERY MORNING
Qty: 90 TABLET | Refills: 0 | OUTPATIENT
Start: 2025-01-27

## 2025-05-07 NOTE — NURSING NOTE
RN came to room and patient was more unresponsive then usual. Unable to respond to verbal commands and sternal rubs. Would open eyes spontaneously. Patient received one-time dose of 5mg zyprexa at 1am today. MD Patterson entered the room and new orders have been placed. STAT head CT ordered.   
RRT d/t to  LOC. Temp 99.3, Respirations 8, Blood pressure 149/91, . Pt withdraws to pain and follows some commands. PT ABGs WDL. Neurology DR recommends insertion of NG to give Sinemet, unsuccessful d/t blockage. Alternative PARCOPA ordered but pharmacy does not carry this medicine in house. This RN will continue to monitor pt closely    Claire Moreira RN  
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

## (undated) DEVICE — NITINOL WIRE WITH HYDROPHILIC TIP: Brand: SENSOR

## (undated) DEVICE — ANTIBACTERIAL UNDYED BRAIDED (POLYGLACTIN 910), SYNTHETIC ABSORBABLE SUTURE: Brand: COATED VICRYL

## (undated) DEVICE — THERMOGARD PLUS ABC DUAL DISPERSIVE ELECTRODE: Brand: THERMOGARD

## (undated) DEVICE — IRRIGATOR BULB ASEPTO 60CC STRL

## (undated) DEVICE — SUT VIC 0 CT1 36IN J946H

## (undated) DEVICE — SYRINGE, LUER SLIP, STERILE, 60ML: Brand: MEDLINE

## (undated) DEVICE — CATH URETRL FLXITP POLLACK STD 5F 70CM

## (undated) DEVICE — DRN JP FLT NO TROC SIL 3/4PERF 10MM

## (undated) DEVICE — GOWN ,SIRUS,NONREINFORCED 3XL: Brand: MEDLINE

## (undated) DEVICE — JACKSON-PRATT 100CC BULB RESERVOIR: Brand: CARDINAL HEALTH

## (undated) DEVICE — SUT SILK 0 TIES 30IN A306H

## (undated) DEVICE — GOWN,NON-REINFORCED,SIRUS,SET IN SLV,XL: Brand: MEDLINE

## (undated) DEVICE — ISOLATION BAG: Brand: CONVERTORS

## (undated) DEVICE — STPLR SKIN VISISTAT WD 35CT

## (undated) DEVICE — SUT SILK 2/0 SH 30IN K833H

## (undated) DEVICE — APPL CHLORAPREP W/TINT 26ML ORNG

## (undated) DEVICE — GOWN ISOL W/THUMB UNIV BLU BX/15

## (undated) DEVICE — PK PROC MAJ 40

## (undated) DEVICE — GAUZE,SPONGE,4"X4",16PLY,XRAY,STRL,LF: Brand: MEDLINE

## (undated) DEVICE — SYR LUERLOK 20CC

## (undated) DEVICE — VIAL FORMLN CAP 10PCT 20ML

## (undated) DEVICE — CEMENT GUN AND BONE FILLER CDS2A SISE 2: Brand: MEDTRONIC REUSABLE INSTRUMENTS

## (undated) DEVICE — ELECTRD BLD EXT EDGE 1P COAT 6.5IN STRL

## (undated) DEVICE — COVER,MAYO STAND,STERILE: Brand: MEDLINE

## (undated) DEVICE — PREP SOL POVIDONE/IODINE BT 4OZ

## (undated) DEVICE — PAD,ABDOMINAL,8"X10",ST,LF: Brand: MEDLINE

## (undated) DEVICE — SPNG GZ WOVN 4X4IN 12PLY 10/BX STRL

## (undated) DEVICE — KT ORCA ORCAPOD DISP STRL

## (undated) DEVICE — BONE TAMP KIT KEX152EB FF E2 15/2 OI: Brand: KYPHON EXPRESS II KYPHOPAK TRAY

## (undated) DEVICE — TOTAL TRAY, 16FR 10ML SIL FOLEY, URN: Brand: MEDLINE

## (undated) DEVICE — DRSNG TELFA PAD NONADH STR 1S 3X8IN

## (undated) DEVICE — CEMENT CARTRIDGES CC02A CDS: Brand: KYPHON® CEMENT DELIVERY SYSTEM

## (undated) DEVICE — SPNG LAP 18X18IN LF STRL PK/5

## (undated) DEVICE — STRIP,CLOSURE,WOUND,MEDI-STRIP,1/2X4: Brand: MEDLINE

## (undated) DEVICE — 1016 S-DRAPE IRRIG POUCH 10/BOX: Brand: STERI-DRAPE™

## (undated) DEVICE — SUT SILK 2/0 SUTUPAK TIES 24IN SA75H

## (undated) DEVICE — MAGNETIC DRAPE: Brand: DEVON

## (undated) DEVICE — LOU CYSTO: Brand: MEDLINE INDUSTRIES, INC.

## (undated) DEVICE — VESSEL LOOPS X-RAY DETECTABLE: Brand: DEROYAL

## (undated) DEVICE — SUT SILK 2/0 FS BLK 18IN 685G

## (undated) DEVICE — ABC HANDPIECE SINGLE FUNCTION HANDPIECE: Brand: ABC

## (undated) DEVICE — CANN NASL CO2 TRULINK W/O2 A/

## (undated) DEVICE — SINGLE-USE BIOPSY FORCEPS: Brand: RADIAL JAW 4

## (undated) DEVICE — SPNG DISECTOR KTNER XRAY COTN 1/4X9/16IN PK/5

## (undated) DEVICE — Device

## (undated) DEVICE — GLV SURG BIOGEL LTX PF 7 1/2

## (undated) DEVICE — APPL CHLORAPREP HI/LITE 26ML ORNG

## (undated) DEVICE — MIXER A07A CEMENT

## (undated) DEVICE — VIOLET POLYDIOXANONE POLYMER, SYNTHETIC ABSORBABLE SUTURE CLIPS: Brand: LAPRA-TY

## (undated) DEVICE — FLEX ADVANTAGE 1500CC: Brand: FLEX ADVANTAGE

## (undated) DEVICE — HARMONIC FOCUS SHEARS 9CM LENGTH + ADAPTIVE TISSUE TECHNOLOGY FOR USE WITH BLUE HAND PIECE ONLY: Brand: HARMONIC FOCUS

## (undated) DEVICE — DRN WND JP RND W TROC SIL 15F 3/16IN

## (undated) DEVICE — 3M™ IOBAN™ 2 ANTIMICROBIAL INCISE DRAPE 6640EZ: Brand: IOBAN™ 2

## (undated) DEVICE — DECANT BG O JET

## (undated) DEVICE — SUT GUT CHRM 2/0 SH 27IN G123H

## (undated) DEVICE — SUT GUT CHRM 0 CT 27IN 914H

## (undated) DEVICE — BONE BIOPSY DEVICE F07A TAPERED SIZE 2: Brand: MEDTRONIC REUSABLE INSTRUMENTS

## (undated) DEVICE — ABC DISSECTING BLADE ELECTRODE, ELECTROSURGICAL ACCESSORY ELECTRODE: Brand: ABC

## (undated) DEVICE — SUT SILK 0 SH 30IN K834H

## (undated) DEVICE — PK KYPHOPLASTY 40

## (undated) DEVICE — GLV SURG TRIUMPH CLASSIC PF LTX 8 STRL

## (undated) DEVICE — NEEDLE, QUINCKE 22GX3.5": Brand: MEDLINE INDUSTRIES, INC.

## (undated) DEVICE — SUT GUT CHRM 3/0 SH 27IN G122H

## (undated) DEVICE — SUT VIC 1 CT1 36IN J947H

## (undated) DEVICE — LASSO POLYPECTOMY SNARE: Brand: LASSO

## (undated) DEVICE — MSK ENDO PORT O2 POM ELITE CURAPLEX A/

## (undated) DEVICE — ADAPT CLN SCPE ENDO PORPOISE BX/50 DISP

## (undated) DEVICE — SUT VIC 3/0 RB1 27IN J215H

## (undated) DEVICE — BITEBLOCK OMNI BLOC

## (undated) DEVICE — GLV XRAY RESIST ATTENUATING SZ8 BLK LF

## (undated) DEVICE — DRP SLUSH MACH FOR STND ALONE OM-ORS-321

## (undated) DEVICE — SEALANT HEMOS FLOSEAL MATRX W/MALL TP 5ML

## (undated) DEVICE — TOWEL,OR,DSP,ST,BLUE,STD,4/PK,20PK/CS: Brand: MEDLINE